# Patient Record
Sex: FEMALE | Race: WHITE | NOT HISPANIC OR LATINO | Employment: UNEMPLOYED | URBAN - METROPOLITAN AREA
[De-identification: names, ages, dates, MRNs, and addresses within clinical notes are randomized per-mention and may not be internally consistent; named-entity substitution may affect disease eponyms.]

---

## 2017-04-14 PROBLEM — I10 HYPERTENSION: Status: ACTIVE | Noted: 2017-04-14

## 2017-04-14 PROBLEM — E78.5 HYPERLIPIDEMIA: Status: ACTIVE | Noted: 2017-04-14

## 2017-04-14 PROBLEM — E16.2 HYPOGLYCEMIA: Status: ACTIVE | Noted: 2017-04-14

## 2017-05-16 PROBLEM — R07.9 CHEST PAIN: Status: ACTIVE | Noted: 2017-05-16

## 2017-09-28 ENCOUNTER — INITIAL CONSULT (OUTPATIENT)
Dept: RHEUMATOLOGY | Facility: CLINIC | Age: 67
End: 2017-09-28
Payer: MEDICARE

## 2017-09-28 VITALS
SYSTOLIC BLOOD PRESSURE: 123 MMHG | BODY MASS INDEX: 29.25 KG/M2 | HEART RATE: 69 BPM | DIASTOLIC BLOOD PRESSURE: 78 MMHG | WEIGHT: 186.75 LBS

## 2017-09-28 DIAGNOSIS — L40.50 PSA (PSORIATIC ARTHRITIS): Primary | ICD-10-CM

## 2017-09-28 DIAGNOSIS — G89.29 NECK PAIN, CHRONIC: ICD-10-CM

## 2017-09-28 DIAGNOSIS — M47.817 LUMBAR AND SACRAL ARTHRITIS: ICD-10-CM

## 2017-09-28 DIAGNOSIS — M06.9 RHEUMATOID ARTHRITIS, INVOLVING UNSPECIFIED SITE, UNSPECIFIED RHEUMATOID FACTOR PRESENCE: ICD-10-CM

## 2017-09-28 DIAGNOSIS — R94.6 ABNORMAL RESULTS OF THYROID FUNCTION STUDIES: ICD-10-CM

## 2017-09-28 DIAGNOSIS — M54.2 NECK PAIN, CHRONIC: ICD-10-CM

## 2017-09-28 DIAGNOSIS — B37.9 YEAST INFECTION: ICD-10-CM

## 2017-09-28 PROCEDURE — 99205 OFFICE O/P NEW HI 60 MIN: CPT | Mod: 25,S$PBB,, | Performed by: INTERNAL MEDICINE

## 2017-09-28 PROCEDURE — 99203 OFFICE O/P NEW LOW 30 MIN: CPT | Mod: PBBFAC,PO,25 | Performed by: INTERNAL MEDICINE

## 2017-09-28 PROCEDURE — 96372 THER/PROPH/DIAG INJ SC/IM: CPT | Mod: PBBFAC,PO

## 2017-09-28 PROCEDURE — 99999 PR PBB SHADOW E&M-NEW PATIENT-LVL III: CPT | Mod: PBBFAC,,, | Performed by: INTERNAL MEDICINE

## 2017-09-28 RX ORDER — CYANOCOBALAMIN 1000 UG/ML
1000 INJECTION, SOLUTION INTRAMUSCULAR; SUBCUTANEOUS
Status: COMPLETED | OUTPATIENT
Start: 2017-09-28 | End: 2017-09-28

## 2017-09-28 RX ORDER — HYDROCODONE BITARTRATE AND ACETAMINOPHEN 10; 325 MG/1; MG/1
1 TABLET ORAL 3 TIMES DAILY PRN
Qty: 90 TABLET | Refills: 0 | Status: SHIPPED | OUTPATIENT
Start: 2017-10-28 | End: 2017-11-20 | Stop reason: SDUPTHER

## 2017-09-28 RX ORDER — CLOTRIMAZOLE AND BETAMETHASONE DIPROPIONATE 10; .5 MG/ML; MG/ML
LOTION TOPICAL 2 TIMES DAILY
Qty: 30 ML | Refills: 5 | Status: SHIPPED | OUTPATIENT
Start: 2017-09-28 | End: 2018-10-02 | Stop reason: SDUPTHER

## 2017-09-28 RX ORDER — LISINOPRIL 10 MG/1
10 TABLET ORAL DAILY
Qty: 90 TABLET | Refills: 0 | Status: SHIPPED | OUTPATIENT
Start: 2017-09-28 | End: 2018-03-05 | Stop reason: SDUPTHER

## 2017-09-28 RX ORDER — METHYLPREDNISOLONE ACETATE 80 MG/ML
160 INJECTION, SUSPENSION INTRA-ARTICULAR; INTRALESIONAL; INTRAMUSCULAR; SOFT TISSUE
Status: COMPLETED | OUTPATIENT
Start: 2017-09-28 | End: 2017-09-28

## 2017-09-28 RX ORDER — ADALIMUMAB 40MG/0.8ML
40 KIT SUBCUTANEOUS
Qty: 2 EACH | Refills: 11 | Status: SHIPPED | OUTPATIENT
Start: 2017-09-28 | End: 2017-10-02 | Stop reason: SDUPTHER

## 2017-09-28 RX ORDER — FLUCONAZOLE 150 MG/1
150 TABLET ORAL DAILY
Qty: 5 TABLET | Refills: 2 | Status: SHIPPED | OUTPATIENT
Start: 2017-09-28 | End: 2017-09-29

## 2017-09-28 RX ORDER — KETOROLAC TROMETHAMINE 30 MG/ML
60 INJECTION, SOLUTION INTRAMUSCULAR; INTRAVENOUS
Status: COMPLETED | OUTPATIENT
Start: 2017-09-28 | End: 2017-09-28

## 2017-09-28 RX ORDER — MELOXICAM 15 MG/1
15 TABLET ORAL DAILY
Qty: 30 TABLET | Refills: 6 | Status: SHIPPED | OUTPATIENT
Start: 2017-09-28 | End: 2018-03-05 | Stop reason: SDUPTHER

## 2017-09-28 RX ORDER — PREDNISONE 5 MG/1
5 TABLET ORAL 3 TIMES DAILY PRN
Qty: 90 TABLET | Refills: 4 | Status: SHIPPED | OUTPATIENT
Start: 2017-09-28 | End: 2018-06-06 | Stop reason: SDUPTHER

## 2017-09-28 RX ORDER — ZOLPIDEM TARTRATE 10 MG/1
TABLET ORAL
Qty: 30 TABLET | Refills: 3 | Status: SHIPPED | OUTPATIENT
Start: 2017-09-28 | End: 2017-11-27 | Stop reason: SDUPTHER

## 2017-09-28 RX ORDER — DICLOFENAC SODIUM 10 MG/G
2 GEL TOPICAL DAILY
COMMUNITY
End: 2018-06-06 | Stop reason: SDUPTHER

## 2017-09-28 RX ORDER — METHOTREXATE 2.5 MG/1
10 TABLET ORAL WEEKLY
Qty: 16 TABLET | Refills: 3 | Status: SHIPPED | OUTPATIENT
Start: 2017-09-28 | End: 2017-11-20

## 2017-09-28 RX ADMIN — METHYLPREDNISOLONE ACETATE 160 MG: 80 INJECTION, SUSPENSION INTRA-ARTICULAR; INTRALESIONAL; INTRAMUSCULAR; SOFT TISSUE at 11:09

## 2017-09-28 RX ADMIN — CYANOCOBALAMIN 1000 MCG: 1000 INJECTION, SOLUTION INTRAMUSCULAR at 11:09

## 2017-09-28 RX ADMIN — KETOROLAC TROMETHAMINE 60 MG: 60 INJECTION, SOLUTION INTRAMUSCULAR at 11:09

## 2017-09-28 ASSESSMENT — ROUTINE ASSESSMENT OF PATIENT INDEX DATA (RAPID3)
MDHAQ FUNCTION SCORE: 1.5
AM STIFFNESS SCORE: 1, YES
FATIGUE SCORE: 4
WHEN YOU AWAKENED IN THE MORNING OVER THE LAST WEEK, PLEASE INDICATE THE AMOUNT OF TIME IT TAKES UNTIL YOU ARE AS LIMBER AS YOU WILL BE FOR THE DAY: 30 MINUTES AFTER WAKING
PSYCHOLOGICAL DISTRESS SCORE: 2.2
PAIN SCORE: 8
PATIENT GLOBAL ASSESSMENT SCORE: 3
TOTAL RAPID3 SCORE: 5.33

## 2017-09-28 NOTE — PROGRESS NOTES
Administered 1 cc ( 1000 mcg/ml ) of b12 to the right upper outer gluteal. Informed of s/s to report verbalized understanding. No adverse reactions noted.    Lot # [id number]  Expiration sep 18    Administered 2 cc ( 80 mg/ml ) of depomedrol to the right upper outer gluteal. Informed of s/s to report verbalized understanding. No adverse reactions noted.    Lot # [id number]  Expiration 09/2018    Administered 2 cc ( 30 mg/ml ) of toradol to the left upper outer gluteal. Informed of s/s to report verbalized understanding. No adverse reactions noted.    Lot # [id number]  Expiration 1 may 2019

## 2017-09-28 NOTE — PROGRESS NOTES
Subjective:       Patient ID: Rose Goff is a 67 y.o. female.    Chief Complaint: Consult    Hpi: PSA, RA, tried Humira, cimzia, enbrel, methotrexate, simponi. Patient complains of arthralgias and myalgias for which has been present for a few years. Pain is located in multiple joints, both shoulder(s), both elbow(s), both wrist(s), both MCP(s): 1st, 2nd, 3rd, 4th and 5th, both PIP(s): 1st, 2nd, 3rd, 4th and 5th, both DIP(s): 1st and 2nd, both hip(s), both knee(s) and both MTP(s): 1st, 2nd, 3rd, 4th and 5th, is described as aching, pulsating, shooting and throbbing, and is constant, moderate .  Associated symptoms include: crepitation, decreased range of motion, edema, effusion, tenderness and warmth.            Rheumatoid Arthritis    The disease course has been worsening.     She complains of pain, stiffness, joint swelling and joint warmth. The symptoms have been worsening. Affected locations include the neck, right shoulder, right wrist, right MCP, right hip, right knee, right foot, right toes, left shoulder, left elbow, left wrist, left MCP, left PIP, left DIP, left hip, left knee, left ankle, left foot and left toes. Associated symptoms include myalgias. Pertinent negatives include no dysuria, fatigue, fever, trouble swallowing or headaches.         Review of Systems   Constitutional: Negative for activity change, appetite change, chills, diaphoresis, fatigue, fever and unexpected weight change.   HENT: Negative for congestion, dental problem, ear discharge, ear pain, facial swelling, mouth sores, nosebleeds, postnasal drip, rhinorrhea, sinus pressure, sneezing, sore throat, tinnitus, trouble swallowing and voice change.    Eyes: Negative for photophobia, pain, discharge, redness and itching.   Respiratory: Negative for apnea, cough, chest tightness, shortness of breath and wheezing.    Cardiovascular: Positive for leg swelling. Negative for chest pain and palpitations.   Gastrointestinal: Negative for  abdominal distention, abdominal pain, constipation, diarrhea, nausea and vomiting.   Endocrine: Negative for cold intolerance, heat intolerance, polydipsia and polyuria.   Genitourinary: Negative for decreased urine volume, difficulty urinating, dysuria, flank pain, frequency, hematuria and urgency.   Musculoskeletal: Positive for arthralgias, back pain, gait problem, joint swelling, myalgias, neck pain, neck stiffness and stiffness.   Skin: Negative for pallor, rash and wound.   Allergic/Immunologic: Negative for immunocompromised state.   Neurological: Negative for dizziness, tremors, weakness, numbness and headaches.   Hematological: Negative for adenopathy. Does not bruise/bleed easily.   Psychiatric/Behavioral: Negative for sleep disturbance. The patient is not nervous/anxious.          Objective:   /78 (BP Location: Left arm, Patient Position: Sitting, BP Method: Large (Automatic))   Pulse 69   Wt 84.7 kg (186 lb 11.7 oz)   BMI 29.25 kg/m²      Physical Exam   Nursing note and vitals reviewed.  Constitutional: She is oriented to person, place, and time. She appears distressed.   HENT:   Head: Normocephalic and atraumatic.   Mouth/Throat: Oropharynx is clear and moist.   Eyes: EOM are normal. Pupils are equal, round, and reactive to light.   Neck: Neck supple. No thyromegaly present.   Cardiovascular: Normal rate, regular rhythm and normal heart sounds.  Exam reveals no gallop and no friction rub.    No murmur heard.  Pulmonary/Chest: Breath sounds normal. She has no wheezes. She has no rales. She exhibits no tenderness.   Abdominal: There is no tenderness. There is no rebound and no guarding.       Right Side Rheumatological Exam     The patient is tender to palpation of the shoulder, elbow, wrist, knee, 1st PIP, 1st MCP, 2nd PIP, 2nd MCP, 3rd PIP, 3rd MCP, 4th PIP, 4th MCP, 5th PIP and 5th MCP    She has swelling of the 1st PIP, 1st MCP, 2nd PIP, 2nd MCP, 3rd PIP, 3rd MCP, 4th PIP, 4th MCP, 5th PIP  and 5th MCP    Shoulder Exam   Tenderness Location: biceps tendon and clavicle    Range of Motion   Active Abduction: abnormal   Adduction: abnormal  Sensation: normal    Knee Exam   Patellofemoral Crepitus: positive  Effusion: negative  Sensation: normal    Hip Exam   Tenderness Location: posterior, greater trochanter and lateral  Sensation: normal    Elbow/Wrist Exam   Tenderness Location: lateral epicondyle and medial epicondyle  Sensation: normal    Foot Exam   Right foot exam exhibits signs of inflamed dorsum  Right foot exam exhibits signs of no podagra, no tophus and no plantar fasciitis    Muscle Strength (0-5 scale):  : 4/5     Left Side Rheumatological Exam     The patient is tender to palpation of the shoulder, elbow, wrist, knee, 1st PIP, 1st MCP, 2nd PIP, 2nd MCP, 3rd PIP, 3rd MCP, 4th PIP, 4th MCP, 5th PIP and 5th MCP.    She has swelling of the 1st PIP, 1st MCP, 2nd PIP, 2nd MCP, 3rd PIP, 3rd MCP, 4th PIP, 4th MCP, 5th PIP and 5th MCP    Shoulder Exam   Tenderness Location: biceps tendon and clavicle    Range of Motion   Active Abduction: abnormal   Sensation: normal    Knee Exam     Patellofemoral Crepitus: positive  Effusion: negative  Sensation: normal    Hip Exam   Tenderness Location: posterior, greater trochanter and lateral  Sensation: normal    Elbow/Wrist Exam   Tenderness Location: lateral epicondyle and medial epicondyle  Sensation: normal    Foot Exam   Left foot exam exhibits signs of inflamed dorsum  Left foot exam exhibits signs of no podagra and no plantar fasciitis    Muscle Strength (0-5 scale):  :  4/5       Back/Neck Exam   General Inspection   Gait: normal       Tenderness Right paramedian tenderness of the Occ, Upper C-Spine, Lower L-Spine and SI Joint.Left paramedian tenderness of the Occ, Upper C-Spine, Lower L-Spine and SI Joint.      Comments:  15 out of 18 tender points    Lymphadenopathy:     She has no cervical adenopathy.   Neurological: She is alert and oriented  to person, place, and time.   Skin: No rash noted. No erythema. No pallor.     Psychiatric: Mood and affect normal.   Musculoskeletal: She exhibits edema, tenderness and deformity.           Assessment:       1. PSA (psoriatic arthritis)    2. Rheumatoid arthritis, involving unspecified site, unspecified rheumatoid factor presence    3. Yeast infection    4. Neck pain, chronic    5. Lumbar and sacral arthritis    6. Abnormal results of thyroid function studies             Plan:       Rose was seen today for consult.    Diagnoses and all orders for this visit:    PSA (psoriatic arthritis)  -     meloxicam (MOBIC) 15 MG tablet; Take 1 tablet (15 mg total) by mouth once daily.  -     lisinopril 10 MG tablet; Take 1 tablet (10 mg total) by mouth once daily.  -     HUMIRA PEN PnKt injection; Inject 0.8 mLs (40 mg total) into the skin every 14 (fourteen) days. 1 injection every 2 weeks  -     methotrexate 2.5 MG Tab; Take 4 tablets (10 mg total) by mouth once a week. Uses Tuesday PM  -     predniSONE (DELTASONE) 5 MG tablet; Take 1 tablet (5 mg total) by mouth 3 (three) times daily as needed.  -     zolpidem (AMBIEN) 10 mg Tab; TAKE 1 TABLET BY MOUTH EVERY NIGHT AT BEDTIME AS NEEDED FOR SLEEP  -     fluconazole (DIFLUCAN) 150 MG Tab; Take 1 tablet (150 mg total) by mouth once daily.  -     clotrimazole-betamethasone (LOTRISONE) lotion; Apply topically 2 (two) times daily.  -     CBC auto differential; Future  -     Comprehensive metabolic panel; Future  -     C-reactive protein; Future  -     Sedimentation rate, manual; Future  -     Rheumatoid factor; Future  -     Cyclic citrul peptide antibody, IgG; Future  -     TSH; Future  -     Thyroid peroxidase antibody; Future  -     T4, free; Future  -     T3, free; Future  -     Anti-thyroglobulin antibody; Future  -     Vitamin D; Future  -     X-Ray Cervical Spine AP And Lateral; Future  -     Cancel: X-Ray Lumbar Spine Ap And Lateral; Future  -     X-Ray Lumbar Spine Ap  And Lateral    Rheumatoid arthritis, involving unspecified site, unspecified rheumatoid factor presence  -     meloxicam (MOBIC) 15 MG tablet; Take 1 tablet (15 mg total) by mouth once daily.  -     lisinopril 10 MG tablet; Take 1 tablet (10 mg total) by mouth once daily.  -     HUMIRA PEN PnKt injection; Inject 0.8 mLs (40 mg total) into the skin every 14 (fourteen) days. 1 injection every 2 weeks  -     methotrexate 2.5 MG Tab; Take 4 tablets (10 mg total) by mouth once a week. Uses Tuesday PM  -     predniSONE (DELTASONE) 5 MG tablet; Take 1 tablet (5 mg total) by mouth 3 (three) times daily as needed.  -     zolpidem (AMBIEN) 10 mg Tab; TAKE 1 TABLET BY MOUTH EVERY NIGHT AT BEDTIME AS NEEDED FOR SLEEP  -     fluconazole (DIFLUCAN) 150 MG Tab; Take 1 tablet (150 mg total) by mouth once daily.  -     clotrimazole-betamethasone (LOTRISONE) lotion; Apply topically 2 (two) times daily.  -     CBC auto differential; Future  -     Comprehensive metabolic panel; Future  -     C-reactive protein; Future  -     Sedimentation rate, manual; Future  -     Rheumatoid factor; Future  -     Cyclic citrul peptide antibody, IgG; Future  -     TSH; Future  -     Thyroid peroxidase antibody; Future  -     T4, free; Future  -     T3, free; Future  -     Anti-thyroglobulin antibody; Future  -     Vitamin D; Future  -     X-Ray Cervical Spine AP And Lateral; Future  -     Cancel: X-Ray Lumbar Spine Ap And Lateral; Future  -     X-Ray Lumbar Spine Ap And Lateral    Yeast infection  -     meloxicam (MOBIC) 15 MG tablet; Take 1 tablet (15 mg total) by mouth once daily.  -     lisinopril 10 MG tablet; Take 1 tablet (10 mg total) by mouth once daily.  -     HUMIRA PEN PnKt injection; Inject 0.8 mLs (40 mg total) into the skin every 14 (fourteen) days. 1 injection every 2 weeks  -     methotrexate 2.5 MG Tab; Take 4 tablets (10 mg total) by mouth once a week. Uses Tuesday PM  -     predniSONE (DELTASONE) 5 MG tablet; Take 1 tablet (5 mg  total) by mouth 3 (three) times daily as needed.  -     zolpidem (AMBIEN) 10 mg Tab; TAKE 1 TABLET BY MOUTH EVERY NIGHT AT BEDTIME AS NEEDED FOR SLEEP  -     fluconazole (DIFLUCAN) 150 MG Tab; Take 1 tablet (150 mg total) by mouth once daily.  -     clotrimazole-betamethasone (LOTRISONE) lotion; Apply topically 2 (two) times daily.  -     CBC auto differential; Future  -     Comprehensive metabolic panel; Future  -     C-reactive protein; Future  -     Sedimentation rate, manual; Future  -     Rheumatoid factor; Future  -     Cyclic citrul peptide antibody, IgG; Future  -     TSH; Future  -     Thyroid peroxidase antibody; Future  -     T4, free; Future  -     T3, free; Future  -     Anti-thyroglobulin antibody; Future  -     Vitamin D; Future  -     X-Ray Cervical Spine AP And Lateral; Future  -     Cancel: X-Ray Lumbar Spine Ap And Lateral; Future  -     X-Ray Lumbar Spine Ap And Lateral  -     X-Ray Lumbar Spine AP And Lateral; Future    Neck pain, chronic  -     CBC auto differential; Future  -     Comprehensive metabolic panel; Future  -     C-reactive protein; Future  -     Sedimentation rate, manual; Future  -     Rheumatoid factor; Future  -     Cyclic citrul peptide antibody, IgG; Future  -     TSH; Future  -     Thyroid peroxidase antibody; Future  -     T4, free; Future  -     T3, free; Future  -     Anti-thyroglobulin antibody; Future  -     Vitamin D; Future  -     X-Ray Cervical Spine AP And Lateral; Future  -     Cancel: X-Ray Lumbar Spine Ap And Lateral; Future  -     X-Ray Lumbar Spine Ap And Lateral  -     X-Ray Lumbar Spine AP And Lateral; Future    Lumbar and sacral arthritis  -     CBC auto differential; Future  -     Comprehensive metabolic panel; Future  -     C-reactive protein; Future  -     Sedimentation rate, manual; Future  -     Rheumatoid factor; Future  -     Cyclic citrul peptide antibody, IgG; Future  -     TSH; Future  -     Thyroid peroxidase antibody; Future  -     T4, free;  Future  -     T3, free; Future  -     Anti-thyroglobulin antibody; Future  -     Vitamin D; Future  -     X-Ray Cervical Spine AP And Lateral; Future  -     Cancel: X-Ray Lumbar Spine Ap And Lateral; Future  -     X-Ray Lumbar Spine Ap And Lateral  -     X-Ray Lumbar Spine AP And Lateral; Future    Abnormal results of thyroid function studies   -     CBC auto differential; Future  -     Comprehensive metabolic panel; Future  -     C-reactive protein; Future  -     Sedimentation rate, manual; Future  -     Rheumatoid factor; Future  -     Cyclic citrul peptide antibody, IgG; Future  -     TSH; Future  -     Thyroid peroxidase antibody; Future  -     T4, free; Future  -     T3, free; Future  -     Anti-thyroglobulin antibody; Future  -     Vitamin D; Future  -     X-Ray Cervical Spine AP And Lateral; Future  -     Cancel: X-Ray Lumbar Spine Ap And Lateral; Future  -     X-Ray Lumbar Spine Ap And Lateral  -     X-Ray Lumbar Spine AP And Lateral; Future    Other orders  -     hydrocodone-acetaminophen 10-325mg (NORCO)  mg Tab; Take 1 tablet by mouth 3 (three) times daily as needed.  -     ketorolac injection 60 mg; Inject 2 mLs (60 mg total) into the muscle one time.  -     methylPREDNISolone acetate injection 160 mg; Inject 2 mLs (160 mg total) into the muscle one time.  -     cyanocobalamin injection 1,000 mcg; Inject 1 mL (1,000 mcg total) into the muscle one time.        Tried Humira, enbrelm cimzia,  Simponi, mtx

## 2017-09-29 ENCOUNTER — HOSPITAL ENCOUNTER (OUTPATIENT)
Dept: RADIOLOGY | Facility: HOSPITAL | Age: 67
Discharge: HOME | End: 2017-09-29
Attending: INTERNAL MEDICINE
Payer: MEDICARE

## 2017-09-29 DIAGNOSIS — M54.2 NECK PAIN, CHRONIC: ICD-10-CM

## 2017-09-29 DIAGNOSIS — G89.29 NECK PAIN, CHRONIC: ICD-10-CM

## 2017-09-29 DIAGNOSIS — M47.817 LUMBAR AND SACRAL ARTHRITIS: ICD-10-CM

## 2017-09-29 DIAGNOSIS — M06.9 RHEUMATOID ARTHRITIS, INVOLVING UNSPECIFIED SITE, UNSPECIFIED RHEUMATOID FACTOR PRESENCE: ICD-10-CM

## 2017-09-29 DIAGNOSIS — R94.6 ABNORMAL RESULTS OF THYROID FUNCTION STUDIES: ICD-10-CM

## 2017-09-29 DIAGNOSIS — L40.50 PSA (PSORIATIC ARTHRITIS): ICD-10-CM

## 2017-09-29 DIAGNOSIS — B37.9 YEAST INFECTION: ICD-10-CM

## 2017-09-29 PROCEDURE — 72100 X-RAY EXAM L-S SPINE 2/3 VWS: CPT | Mod: 26,,, | Performed by: RADIOLOGY

## 2017-09-29 PROCEDURE — 72040 X-RAY EXAM NECK SPINE 2-3 VW: CPT | Mod: TC,PO

## 2017-09-29 PROCEDURE — 72040 X-RAY EXAM NECK SPINE 2-3 VW: CPT | Mod: 26,,, | Performed by: RADIOLOGY

## 2017-09-29 PROCEDURE — 72100 X-RAY EXAM L-S SPINE 2/3 VWS: CPT | Mod: TC,PO

## 2017-10-02 DIAGNOSIS — B37.9 YEAST INFECTION: ICD-10-CM

## 2017-10-02 DIAGNOSIS — M06.9 RHEUMATOID ARTHRITIS, INVOLVING UNSPECIFIED SITE, UNSPECIFIED RHEUMATOID FACTOR PRESENCE: ICD-10-CM

## 2017-10-02 DIAGNOSIS — L40.50 PSA (PSORIATIC ARTHRITIS): ICD-10-CM

## 2017-10-03 ENCOUNTER — TELEPHONE (OUTPATIENT)
Dept: PHARMACY | Facility: CLINIC | Age: 67
End: 2017-10-03

## 2017-10-03 RX ORDER — ADALIMUMAB 40MG/0.8ML
40 KIT SUBCUTANEOUS
Qty: 2 EACH | Refills: 11 | Status: SHIPPED | OUTPATIENT
Start: 2017-10-03 | End: 2018-04-10

## 2017-10-03 NOTE — TELEPHONE ENCOUNTER
LVM-[facility] received a prescription for Humira and it will require a prior authorization with their insurance company. We will update patient of status as more information is received.

## 2017-10-04 NOTE — TELEPHONE ENCOUNTER
DOCUMENTATION ONLY  Called insurance to inquire about starting a prior authorization and was told that there is already an existing PA on file (PA# [id number]) that is good through 4/24/2020. Insurance also stated that her refill is too soon until 10/26/2017. JOLANTA

## 2017-10-04 NOTE — TELEPHONE ENCOUNTER
Good morning,    We are currently working on Ms. Goff's Humira Rx approval. There is no updated TB test or hep panel on file. Is there anyway she can be scheduled for these labs in the near future?     Thanks,  June Robertson, PharmD  [facility]

## 2017-11-20 ENCOUNTER — HOSPITAL ENCOUNTER (OUTPATIENT)
Dept: RADIOLOGY | Facility: HOSPITAL | Age: 67
Discharge: HOME | End: 2017-11-20
Attending: INTERNAL MEDICINE
Payer: MEDICARE

## 2017-11-20 ENCOUNTER — OFFICE VISIT (OUTPATIENT)
Dept: RHEUMATOLOGY | Facility: CLINIC | Age: 67
End: 2017-11-20
Payer: MEDICARE

## 2017-11-20 VITALS
HEART RATE: 81 BPM | SYSTOLIC BLOOD PRESSURE: 125 MMHG | HEIGHT: 67 IN | BODY MASS INDEX: 28.61 KG/M2 | DIASTOLIC BLOOD PRESSURE: 72 MMHG | WEIGHT: 182.31 LBS

## 2017-11-20 DIAGNOSIS — L40.50 PSA (PSORIATIC ARTHRITIS): ICD-10-CM

## 2017-11-20 DIAGNOSIS — M47.817 LUMBAR AND SACRAL ARTHRITIS: ICD-10-CM

## 2017-11-20 DIAGNOSIS — M06.9 RHEUMATOID ARTHRITIS, INVOLVING UNSPECIFIED SITE, UNSPECIFIED RHEUMATOID FACTOR PRESENCE: Primary | ICD-10-CM

## 2017-11-20 DIAGNOSIS — M06.9 RHEUMATOID ARTHRITIS, INVOLVING UNSPECIFIED SITE, UNSPECIFIED RHEUMATOID FACTOR PRESENCE: ICD-10-CM

## 2017-11-20 PROCEDURE — 99999 PR PBB SHADOW E&M-EST. PATIENT-LVL III: CPT | Mod: PBBFAC,,, | Performed by: INTERNAL MEDICINE

## 2017-11-20 PROCEDURE — 73502 X-RAY EXAM HIP UNI 2-3 VIEWS: CPT | Mod: 26,LT,, | Performed by: RADIOLOGY

## 2017-11-20 PROCEDURE — 99214 OFFICE O/P EST MOD 30 MIN: CPT | Mod: 25,S$PBB,, | Performed by: INTERNAL MEDICINE

## 2017-11-20 PROCEDURE — 96372 THER/PROPH/DIAG INJ SC/IM: CPT | Mod: PBBFAC,PO

## 2017-11-20 PROCEDURE — 99213 OFFICE O/P EST LOW 20 MIN: CPT | Mod: PBBFAC,25,PO | Performed by: INTERNAL MEDICINE

## 2017-11-20 PROCEDURE — 73502 X-RAY EXAM HIP UNI 2-3 VIEWS: CPT | Mod: TC,PO,LT

## 2017-11-20 RX ORDER — HYDROCODONE BITARTRATE AND ACETAMINOPHEN 10; 325 MG/1; MG/1
1 TABLET ORAL 3 TIMES DAILY PRN
Qty: 90 TABLET | Refills: 0 | Status: SHIPPED | OUTPATIENT
Start: 2017-11-20 | End: 2017-11-20 | Stop reason: SDUPTHER

## 2017-11-20 RX ORDER — METHYLPREDNISOLONE ACETATE 80 MG/ML
160 INJECTION, SUSPENSION INTRA-ARTICULAR; INTRALESIONAL; INTRAMUSCULAR; SOFT TISSUE
Status: COMPLETED | OUTPATIENT
Start: 2017-11-20 | End: 2017-11-20

## 2017-11-20 RX ORDER — HYDROCODONE BITARTRATE AND ACETAMINOPHEN 10; 325 MG/1; MG/1
1 TABLET ORAL 3 TIMES DAILY PRN
Qty: 90 TABLET | Refills: 0 | Status: SHIPPED | OUTPATIENT
Start: 2017-12-20 | End: 2018-02-02 | Stop reason: SDUPTHER

## 2017-11-20 RX ORDER — KETOROLAC TROMETHAMINE 30 MG/ML
60 INJECTION, SOLUTION INTRAMUSCULAR; INTRAVENOUS
Status: COMPLETED | OUTPATIENT
Start: 2017-11-20 | End: 2017-11-20

## 2017-11-20 RX ADMIN — METHYLPREDNISOLONE ACETATE 160 MG: 80 INJECTION, SUSPENSION INTRA-ARTICULAR; INTRALESIONAL; INTRAMUSCULAR; SOFT TISSUE at 10:11

## 2017-11-20 RX ADMIN — KETOROLAC TROMETHAMINE 60 MG: 30 INJECTION, SOLUTION INTRAMUSCULAR at 10:11

## 2017-11-20 NOTE — PROGRESS NOTES
Subjective:       Patient ID: Rose Goff is a 67 y.o. female.    Chief Complaint: Disease Management and Pain    Hpi: PSA, RA, severe pain at the insertion of her thigh otherwise the other joint are painfree, . Pain is located in multiple joints, both shoulder(s), both elbow(s), both wrist(s), both MCP(s): 1st, 2nd, 3rd, 4th and 5th, both PIP(s): 1st, 2nd, 3rd, 4th and 5th, both DIP(s): 1st and 2nd, both hip(s), both knee(s) and both MTP(s): 1st, 2nd, 3rd, 4th and 5th, is described as aching, pulsating, shooting and throbbing, and is constant, moderate .  Associated symptoms include: crepitation, decreased range of motion, edema, effusion, tenderness and warmth.      Review of Systems   Constitutional: Negative for activity change, appetite change, chills, diaphoresis and unexpected weight change.   HENT: Negative for congestion, dental problem, ear discharge, ear pain, facial swelling, mouth sores, nosebleeds, postnasal drip, rhinorrhea, sinus pressure, sneezing, sore throat, tinnitus and voice change.    Eyes: Negative for photophobia, pain, discharge, redness and itching.   Respiratory: Negative for apnea, cough, chest tightness, shortness of breath and wheezing.    Cardiovascular: Positive for leg swelling. Negative for chest pain and palpitations.   Gastrointestinal: Negative for abdominal distention, abdominal pain, constipation, diarrhea, nausea and vomiting.   Endocrine: Negative for cold intolerance, heat intolerance, polydipsia and polyuria.   Genitourinary: Negative for decreased urine volume, difficulty urinating, flank pain, frequency, hematuria and urgency.   Musculoskeletal: Positive for arthralgias, back pain, gait problem, neck pain and neck stiffness.   Skin: Negative for pallor, rash and wound.   Allergic/Immunologic: Negative for immunocompromised state.   Neurological: Negative for dizziness, tremors, weakness and numbness.   Hematological: Negative for adenopathy. Does not bruise/bleed  "easily.   Psychiatric/Behavioral: Negative for sleep disturbance. The patient is not nervous/anxious.          Objective:   /72 (BP Location: Left arm, Patient Position: Sitting, BP Method: Medium (Automatic))   Pulse 81   Ht 5' 7" (1.702 m)   Wt 82.7 kg (182 lb 5.1 oz)   BMI 28.56 kg/m²      Physical Exam   Nursing note and vitals reviewed.  Constitutional: She is oriented to person, place, and time. No distress.   HENT:   Head: Normocephalic and atraumatic.   Mouth/Throat: Oropharynx is clear and moist.   Eyes: EOM are normal. Pupils are equal, round, and reactive to light.   Neck: Neck supple. No thyromegaly present.   Cardiovascular: Normal rate, regular rhythm and normal heart sounds.  Exam reveals no gallop and no friction rub.    No murmur heard.  Pulmonary/Chest: Breath sounds normal. She has no wheezes. She has no rales. She exhibits no tenderness.   Abdominal: There is no tenderness. There is no rebound and no guarding.       Right Side Rheumatological Exam     The patient is tender to palpation of the shoulder, elbow, wrist, knee, 1st PIP, 1st MCP, 2nd PIP, 2nd MCP, 3rd PIP, 3rd MCP, 4th PIP, 4th MCP, 5th PIP and 5th MCP    She has swelling of the 1st PIP, 1st MCP, 2nd PIP, 2nd MCP, 3rd PIP, 3rd MCP, 4th PIP, 4th MCP, 5th PIP and 5th MCP    Shoulder Exam   Tenderness Location: biceps tendon and clavicle    Range of Motion   Active Abduction: abnormal   Adduction: abnormal  Sensation: normal    Knee Exam   Patellofemoral Crepitus: positive  Effusion: negative  Sensation: normal    Hip Exam   Tenderness Location: posterior, greater trochanter and lateral  Sensation: normal    Elbow/Wrist Exam   Tenderness Location: lateral epicondyle and medial epicondyle  Sensation: normal    Foot Exam   Right foot exam exhibits signs of inflamed dorsum  Right foot exam exhibits signs of no podagra, no tophus and no plantar fasciitis    Muscle Strength (0-5 scale):  : 4/5     Left Side Rheumatological Exam "     The patient is tender to palpation of the shoulder, elbow, wrist, knee, 1st PIP, 1st MCP, 2nd PIP, 2nd MCP, 3rd PIP, 3rd MCP, 4th PIP, 4th MCP, 5th PIP and 5th MCP.    She has swelling of the 1st PIP, 1st MCP, 2nd PIP, 2nd MCP, 3rd PIP, 3rd MCP, 4th PIP, 4th MCP, 5th PIP and 5th MCP    Shoulder Exam   Tenderness Location: biceps tendon and clavicle    Range of Motion   Active Abduction: abnormal   Sensation: normal    Knee Exam     Patellofemoral Crepitus: positive  Effusion: negative  Sensation: normal    Hip Exam   Tenderness Location: posterior, greater trochanter and lateral  Sensation: normal    Elbow/Wrist Exam   Tenderness Location: lateral epicondyle and medial epicondyle  Sensation: normal    Foot Exam   Left foot exam exhibits signs of inflamed dorsum  Left foot exam exhibits signs of no podagra and no plantar fasciitis    Muscle Strength (0-5 scale):  :  4/5       Back/Neck Exam   General Inspection   Gait: normal       Tenderness Right paramedian tenderness of the Occ, Upper C-Spine, Lower L-Spine and SI Joint.Left paramedian tenderness of the Occ, Upper C-Spine, Lower L-Spine and SI Joint.      Comments:  15 out of 18 tender points    Lymphadenopathy:     She has no cervical adenopathy.   Neurological: She is alert and oriented to person, place, and time.   Skin: No rash noted. No erythema. No pallor.     Psychiatric: Mood and affect normal.   Musculoskeletal: She exhibits edema, tenderness and deformity.   L groin and  Thigh pain           Results for orders placed or performed in visit on 09/29/17   CBC auto differential   Result Value Ref Range    WBC 7.54 3.90 - 12.70 K/uL    RBC 4.05 4.00 - 5.40 M/uL    Hemoglobin 11.8 (L) 12.0 - 16.0 g/dL    Hematocrit 35.5 (L) 37.0 - 48.5 %    MCV 88 82 - 98 fL    MCH 29.1 27.0 - 31.0 pg    MCHC 33.2 32.0 - 36.0 g/dL    RDW 14.2 11.5 - 14.5 %    Platelets 305 150 - 350 K/uL    MPV 10.5 9.2 - 12.9 fL    Gran # 4.5 1.8 - 7.7 K/uL    Lymph # 2.5 1.0 - 4.8  K/uL    Mono # 0.4 0.3 - 1.0 K/uL    Eos # 0.1 0.0 - 0.5 K/uL    Baso # 0.05 0.00 - 0.20 K/uL    Gran% 59.6 38.0 - 73.0 %    Lymph% 32.8 18.0 - 48.0 %    Mono% 5.4 4.0 - 15.0 %    Eosinophil% 1.5 0.0 - 8.0 %    Basophil% 0.7 0.0 - 1.9 %    Differential Method Automated    Comprehensive metabolic panel   Result Value Ref Range    Sodium 138 136 - 145 mmol/L    Potassium 3.6 3.5 - 5.1 mmol/L    Chloride 105 95 - 110 mmol/L    CO2 25 23 - 29 mmol/L    Glucose 76 70 - 110 mg/dL    BUN, Bld 12 8 - 23 mg/dL    Creatinine 0.8 0.5 - 1.4 mg/dL    Calcium 8.7 8.7 - 10.5 mg/dL    Total Protein 7.5 6.0 - 8.4 g/dL    Albumin 3.7 3.5 - 5.2 g/dL    Total Bilirubin 0.5 0.1 - 1.0 mg/dL    Alkaline Phosphatase 83 55 - 135 U/L    AST 28 10 - 40 U/L    ALT 27 10 - 44 U/L    Anion Gap 8 8 - 16 mmol/L    eGFR if African American >60.0 >60 mL/min/1.73 m^2    eGFR if non African American >60.0 >60 mL/min/1.73 m^2   C-reactive protein   Result Value Ref Range    CRP 2.3 0.0 - 8.2 mg/L   Sedimentation rate, manual   Result Value Ref Range    Sed Rate 8 0 - 20 mm/Hr   Rheumatoid factor   Result Value Ref Range    Rheumatoid Factor <10.0 0.0 - 15.0 IU/mL   Cyclic citrul peptide antibody, IgG   Result Value Ref Range    CCP Antibodies <0.5 <5.0 U/mL   TSH   Result Value Ref Range    TSH 0.454 0.400 - 4.000 uIU/mL   Thyroid peroxidase antibody   Result Value Ref Range    Thyroperoxidase Antibodies <6.0 <6.0 IU/mL   T4, free   Result Value Ref Range    Free T4 1.21 0.71 - 1.51 ng/dL   T3, free   Result Value Ref Range    T3, Free 2.4 2.3 - 4.2 pg/mL   Anti-thyroglobulin antibody   Result Value Ref Range    Thyroglobulin Ab Screen <4.0 0.0 - 3.9 IU/mL   Vitamin D   Result Value Ref Range    Vit D, 25-Hydroxy 23 (L) 30 - 96 ng/mL       Assessment:       1. Rheumatoid arthritis, involving unspecified site, unspecified rheumatoid factor presence    2. PSA (psoriatic arthritis)    3. Lumbar and sacral arthritis            Plan:       Rose was seen  today for disease management and pain.    Diagnoses and all orders for this visit:    Rheumatoid arthritis, involving unspecified site, unspecified rheumatoid factor presence  -     methylPREDNISolone acetate injection 160 mg; Inject 2 mLs (160 mg total) into the muscle one time.  -     ketorolac injection 60 mg; Inject 2 mLs (60 mg total) into the muscle one time.  -     X-Ray Hip 2 View Left; Future  -     Discontinue: hydrocodone-acetaminophen 10-325mg (NORCO)  mg Tab; Take 1 tablet by mouth 3 (three) times daily as needed.  -     hydrocodone-acetaminophen 10-325mg (NORCO)  mg Tab; Take 1 tablet by mouth 3 (three) times daily as needed.  -     Comprehensive metabolic panel; Future  -     CBC auto differential; Future  -     C-reactive protein; Future  -     Sedimentation rate, manual; Future    PSA (psoriatic arthritis)  -     methylPREDNISolone acetate injection 160 mg; Inject 2 mLs (160 mg total) into the muscle one time.  -     ketorolac injection 60 mg; Inject 2 mLs (60 mg total) into the muscle one time.  -     X-Ray Hip 2 View Left; Future  -     Discontinue: hydrocodone-acetaminophen 10-325mg (NORCO)  mg Tab; Take 1 tablet by mouth 3 (three) times daily as needed.  -     hydrocodone-acetaminophen 10-325mg (NORCO)  mg Tab; Take 1 tablet by mouth 3 (three) times daily as needed.  -     Comprehensive metabolic panel; Future  -     CBC auto differential; Future  -     C-reactive protein; Future  -     Sedimentation rate, manual; Future    Lumbar and sacral arthritis  -     methylPREDNISolone acetate injection 160 mg; Inject 2 mLs (160 mg total) into the muscle one time.  -     ketorolac injection 60 mg; Inject 2 mLs (60 mg total) into the muscle one time.  -     X-Ray Hip 2 View Left; Future  -     Discontinue: hydrocodone-acetaminophen 10-325mg (NORCO)  mg Tab; Take 1 tablet by mouth 3 (three) times daily as needed.  -     hydrocodone-acetaminophen 10-325mg (NORCO)  mg Tab;  Take 1 tablet by mouth 3 (three) times daily as needed.  -     Comprehensive metabolic panel; Future  -     CBC auto differential; Future  -     C-reactive protein; Future  -     Sedimentation rate, manual; Future        Tried Humira, enbrelm cimzia,  Simponi, mtx

## 2017-11-20 NOTE — PROGRESS NOTES
Administered 2 cc ( 80 mg/ml ) of depomedrol to the right upper outer gluteal. Informed of s/s to report verbalized understanding. No adverse reactions noted.    Lot # [id number]  Expiration 10/2018    Administered 2 cc ( 30 mg/ml ) of toradol to the left upper outer gluteal. Informed of s/s to report verbalized understanding. No adverse reactions noted.    Lot # [id number]  Expiration 05/19

## 2017-11-27 PROBLEM — R07.9 CHEST PAIN: Status: RESOLVED | Noted: 2017-05-16 | Resolved: 2017-11-27

## 2017-11-27 RX ORDER — SUMATRIPTAN SUCCINATE 100 MG/1
TABLET ORAL
Qty: 18 TABLET | Refills: 3 | Status: SHIPPED | OUTPATIENT
Start: 2017-11-27 | End: 2018-05-21 | Stop reason: SDUPTHER

## 2018-01-02 ENCOUNTER — CLINICAL SUPPORT (OUTPATIENT)
Dept: RHEUMATOLOGY | Facility: CLINIC | Age: 68
End: 2018-01-02
Payer: MEDICARE

## 2018-01-02 DIAGNOSIS — M06.9 RHEUMATOID ARTHRITIS FLARE: Primary | ICD-10-CM

## 2018-01-02 PROCEDURE — 99211 OFF/OP EST MAY X REQ PHY/QHP: CPT | Mod: PBBFAC,PO

## 2018-01-02 PROCEDURE — 99211 OFF/OP EST MAY X REQ PHY/QHP: CPT | Mod: S$PBB,,, | Performed by: INTERNAL MEDICINE

## 2018-01-02 PROCEDURE — 99999 PR PBB SHADOW E&M-EST. PATIENT-LVL I: CPT | Mod: PBBFAC,,,

## 2018-01-02 PROCEDURE — 96372 THER/PROPH/DIAG INJ SC/IM: CPT | Mod: PBBFAC,PO

## 2018-01-02 RX ORDER — METHYLPREDNISOLONE ACETATE 80 MG/ML
160 INJECTION, SUSPENSION INTRA-ARTICULAR; INTRALESIONAL; INTRAMUSCULAR; SOFT TISSUE
Status: COMPLETED | OUTPATIENT
Start: 2018-01-02 | End: 2018-01-02

## 2018-01-02 RX ORDER — KETOROLAC TROMETHAMINE 30 MG/ML
60 INJECTION, SOLUTION INTRAMUSCULAR; INTRAVENOUS
Status: COMPLETED | OUTPATIENT
Start: 2018-01-02 | End: 2018-01-02

## 2018-01-02 RX ADMIN — METHYLPREDNISOLONE ACETATE 160 MG: 80 INJECTION, SUSPENSION INTRA-ARTICULAR; INTRALESIONAL; INTRAMUSCULAR; SOFT TISSUE at 09:01

## 2018-01-02 RX ADMIN — KETOROLAC TROMETHAMINE 60 MG: 60 INJECTION, SOLUTION INTRAMUSCULAR at 09:01

## 2018-01-02 NOTE — PROGRESS NOTES
Patient arrives for nurse visit due to rheumatoid arthritis flare. Patient receives 60 mg Toradol and 160 mg Depomedrol. See MAR. CG

## 2018-01-30 DIAGNOSIS — M47.817 LUMBAR AND SACRAL ARTHRITIS: ICD-10-CM

## 2018-01-30 DIAGNOSIS — L40.50 PSA (PSORIATIC ARTHRITIS): ICD-10-CM

## 2018-01-30 DIAGNOSIS — M06.9 RHEUMATOID ARTHRITIS, INVOLVING UNSPECIFIED SITE, UNSPECIFIED RHEUMATOID FACTOR PRESENCE: ICD-10-CM

## 2018-01-30 RX ORDER — HYDROCODONE BITARTRATE AND ACETAMINOPHEN 10; 325 MG/1; MG/1
1 TABLET ORAL 3 TIMES DAILY PRN
Qty: 90 TABLET | Refills: 0 | Status: CANCELLED | OUTPATIENT
Start: 2018-01-30

## 2018-01-30 NOTE — TELEPHONE ENCOUNTER
----- Message from Estephania Thornton sent at 1/30/2018 10:06 AM CST -----  Contact: Patient  Patient called last week and is calling again today to request a refill on hydrocodone-acetaminophen 10-325mg (NORCO)  mg Tab.  Please call patient once this has been called in.  Call Back#[phone]  Thanks     [facility] [id number] [place] [address] AT [facility]  [address] [place] [id number]  Phone: [phone] Fax: [phone]

## 2018-01-30 NOTE — TELEPHONE ENCOUNTER
Pt requesting refill on her pain meds    Last seen 11/20/17  Last filled 12/20/17    Med pended   Please advise.

## 2018-01-30 NOTE — TELEPHONE ENCOUNTER
Advised pt that Dr was having problems verifying script. Explained as soon as it's done dr will send script. Pt understanding verified.

## 2018-01-30 NOTE — TELEPHONE ENCOUNTER
----- Message from Mariya Garcia sent at 1/23/2018  2:09 PM CST -----  Contact: Patient  Rose, patient [phone], Also, calling for the results of her xrays on 11/20/17 and labs. And, calling about getting a refill on Pain medication. Rx hydrocodone-acetaminophen 10-325mg (NORCO)  mg Tab. Please advise if not able to refill and booked necessary appointment if needed. Thanks.   [facility] [id number] [address] [address]  Phone: [phone] Fax: [phone]

## 2018-02-01 DIAGNOSIS — M47.817 LUMBAR AND SACRAL ARTHRITIS: ICD-10-CM

## 2018-02-01 DIAGNOSIS — M06.9 RHEUMATOID ARTHRITIS, INVOLVING UNSPECIFIED SITE, UNSPECIFIED RHEUMATOID FACTOR PRESENCE: ICD-10-CM

## 2018-02-01 DIAGNOSIS — L40.50 PSA (PSORIATIC ARTHRITIS): ICD-10-CM

## 2018-02-01 RX ORDER — HYDROCODONE BITARTRATE AND ACETAMINOPHEN 10; 325 MG/1; MG/1
1 TABLET ORAL 3 TIMES DAILY PRN
Qty: 90 TABLET | Refills: 0 | Status: CANCELLED | OUTPATIENT
Start: 2018-02-01

## 2018-02-01 NOTE — TELEPHONE ENCOUNTER
checked  Last seen 11/20/17  Last filled 12/20/17  Next to be seen 2/14/18    Med pended  Please advise.

## 2018-02-02 RX ORDER — HYDROCODONE BITARTRATE AND ACETAMINOPHEN 10; 325 MG/1; MG/1
1 TABLET ORAL 3 TIMES DAILY PRN
Qty: 90 TABLET | Refills: 0 | Status: SHIPPED | OUTPATIENT
Start: 2018-02-02 | End: 2018-02-14 | Stop reason: SDUPTHER

## 2018-02-02 RX ORDER — HYDROCODONE BITARTRATE AND ACETAMINOPHEN 10; 325 MG/1; MG/1
1 TABLET ORAL 3 TIMES DAILY PRN
Qty: 90 TABLET | Refills: 0 | Status: SHIPPED | OUTPATIENT
Start: 2018-02-02 | End: 2018-02-02 | Stop reason: SDUPTHER

## 2018-02-14 ENCOUNTER — OFFICE VISIT (OUTPATIENT)
Dept: RHEUMATOLOGY | Facility: CLINIC | Age: 68
End: 2018-02-14
Payer: MEDICARE

## 2018-02-14 VITALS
HEART RATE: 85 BPM | HEIGHT: 67 IN | DIASTOLIC BLOOD PRESSURE: 86 MMHG | WEIGHT: 178.13 LBS | SYSTOLIC BLOOD PRESSURE: 140 MMHG | BODY MASS INDEX: 27.96 KG/M2

## 2018-02-14 DIAGNOSIS — R94.6 ABNORMAL RESULTS OF THYROID FUNCTION STUDIES: ICD-10-CM

## 2018-02-14 DIAGNOSIS — M06.9 RHEUMATOID ARTHRITIS, INVOLVING UNSPECIFIED SITE, UNSPECIFIED RHEUMATOID FACTOR PRESENCE: Primary | ICD-10-CM

## 2018-02-14 DIAGNOSIS — M47.817 LUMBAR AND SACRAL ARTHRITIS: ICD-10-CM

## 2018-02-14 DIAGNOSIS — L40.50 PSA (PSORIATIC ARTHRITIS): ICD-10-CM

## 2018-02-14 PROCEDURE — 96372 THER/PROPH/DIAG INJ SC/IM: CPT | Mod: PBBFAC,PO

## 2018-02-14 PROCEDURE — 99999 PR PBB SHADOW E&M-EST. PATIENT-LVL III: CPT | Mod: PBBFAC,,, | Performed by: INTERNAL MEDICINE

## 2018-02-14 PROCEDURE — 99213 OFFICE O/P EST LOW 20 MIN: CPT | Mod: PBBFAC,PO,25 | Performed by: INTERNAL MEDICINE

## 2018-02-14 PROCEDURE — 99215 OFFICE O/P EST HI 40 MIN: CPT | Mod: 25,S$PBB,, | Performed by: INTERNAL MEDICINE

## 2018-02-14 RX ORDER — CYANOCOBALAMIN 1000 UG/ML
1000 INJECTION, SOLUTION INTRAMUSCULAR; SUBCUTANEOUS
Status: COMPLETED | OUTPATIENT
Start: 2018-02-14 | End: 2018-02-14

## 2018-02-14 RX ORDER — HYDROCODONE BITARTRATE AND ACETAMINOPHEN 10; 325 MG/1; MG/1
1 TABLET ORAL 3 TIMES DAILY PRN
Qty: 90 TABLET | Refills: 0 | Status: SHIPPED | OUTPATIENT
Start: 2018-03-01 | End: 2018-02-14 | Stop reason: SDUPTHER

## 2018-02-14 RX ORDER — HYDROCODONE BITARTRATE AND ACETAMINOPHEN 10; 325 MG/1; MG/1
1 TABLET ORAL 3 TIMES DAILY PRN
Qty: 90 TABLET | Refills: 0 | Status: SHIPPED | OUTPATIENT
Start: 2018-02-14 | End: 2018-02-14 | Stop reason: SDUPTHER

## 2018-02-14 RX ORDER — METHYLPREDNISOLONE ACETATE 80 MG/ML
160 INJECTION, SUSPENSION INTRA-ARTICULAR; INTRALESIONAL; INTRAMUSCULAR; SOFT TISSUE
Status: COMPLETED | OUTPATIENT
Start: 2018-02-14 | End: 2018-02-14

## 2018-02-14 RX ORDER — KETOROLAC TROMETHAMINE 30 MG/ML
60 INJECTION, SOLUTION INTRAMUSCULAR; INTRAVENOUS
Status: COMPLETED | OUTPATIENT
Start: 2018-02-14 | End: 2018-02-14

## 2018-02-14 RX ORDER — HYDROCODONE BITARTRATE AND ACETAMINOPHEN 10; 325 MG/1; MG/1
1 TABLET ORAL 3 TIMES DAILY PRN
Qty: 90 TABLET | Refills: 0 | Status: SHIPPED | OUTPATIENT
Start: 2018-04-01 | End: 2018-02-14 | Stop reason: SDUPTHER

## 2018-02-14 RX ORDER — HYDROCODONE BITARTRATE AND ACETAMINOPHEN 10; 325 MG/1; MG/1
1 TABLET ORAL 3 TIMES DAILY PRN
Qty: 90 TABLET | Refills: 0 | Status: SHIPPED | OUTPATIENT
Start: 2018-05-01 | End: 2018-04-10 | Stop reason: SDUPTHER

## 2018-02-14 RX ADMIN — CYANOCOBALAMIN 1000 MCG: 1000 INJECTION, SOLUTION INTRAMUSCULAR at 12:02

## 2018-02-14 RX ADMIN — METHYLPREDNISOLONE ACETATE 160 MG: 80 INJECTION, SUSPENSION INTRA-ARTICULAR; INTRALESIONAL; INTRAMUSCULAR; SOFT TISSUE at 12:02

## 2018-02-14 RX ADMIN — KETOROLAC TROMETHAMINE 60 MG: 60 INJECTION, SOLUTION INTRAMUSCULAR at 12:02

## 2018-02-14 NOTE — PROGRESS NOTES
Administered 60mg (2cc) of Ketorolac 30mg/ml to left upper outer quadrant of the gluteus leonidas. Patient tolerated well. No acute reaction noted to site. Instructed to report S/S. Patient verbalized understanding.      Administered 160mg (2cc) of 80/ml of Depo Medrol and 1cc CYANOCOBALAMIN 1000mcg to right upper outer quadrant of the gluteus leonidas. Patient tolerated well. No acute reaction noted. Instructed patient to report S/S. Patient verbalized understanding.

## 2018-02-14 NOTE — PROGRESS NOTES
Subjective:       Patient ID: Rose Goff is a 67 y.o. female.    Chief Complaint: Rheumatoid Arthritis    Hpi: PSA, RA, severe pain she had a death in her family and is flaring . Pain is located in multiple joints, both shoulder(s), both elbow(s), both wrist(s), both MCP(s): 1st, 2nd, 3rd, 4th and 5th, both PIP(s): 1st, 2nd, 3rd, 4th and 5th, both DIP(s): 1st and 2nd, both hip(s), both knee(s) and both MTP(s): 1st, 2nd, 3rd, 4th and 5th, is described as aching, pulsating, shooting and throbbing, and is constant, moderate .  Associated symptoms include: crepitation, decreased range of motion, edema, effusion, tenderness and warmth.      Review of Systems   Constitutional: Positive for fatigue. Negative for activity change, appetite change, chills, diaphoresis and unexpected weight change.   HENT: Negative for congestion, dental problem, ear discharge, ear pain, facial swelling, mouth sores, nosebleeds, postnasal drip, rhinorrhea, sinus pressure, sneezing, sore throat, tinnitus and voice change.    Eyes: Negative for photophobia, pain, discharge, redness and itching.   Respiratory: Negative for apnea, cough, chest tightness, shortness of breath and wheezing.    Cardiovascular: Positive for leg swelling. Negative for chest pain and palpitations.   Gastrointestinal: Negative for abdominal distention, abdominal pain, constipation, diarrhea, nausea and vomiting.   Endocrine: Negative for cold intolerance, heat intolerance, polydipsia and polyuria.   Genitourinary: Negative for decreased urine volume, difficulty urinating, flank pain, frequency, hematuria and urgency.   Musculoskeletal: Positive for arthralgias, back pain, gait problem, joint swelling, myalgias, neck pain and neck stiffness.   Skin: Negative for pallor, rash and wound.   Allergic/Immunologic: Negative for immunocompromised state.   Neurological: Negative for dizziness, tremors, weakness and numbness.   Hematological: Negative for adenopathy. Does  "not bruise/bleed easily.   Psychiatric/Behavioral: Negative for sleep disturbance. The patient is not nervous/anxious.          Objective:   BP (!) 140/86   Pulse 85   Ht 5' 7" (1.702 m)   Wt 80.8 kg (178 lb 2.1 oz)   BMI 27.90 kg/m²      Physical Exam   Nursing note and vitals reviewed.  Constitutional: She is oriented to person, place, and time. No distress.   HENT:   Head: Normocephalic and atraumatic.   Mouth/Throat: Oropharynx is clear and moist.   Eyes: EOM are normal. Pupils are equal, round, and reactive to light.   Neck: Neck supple. No thyromegaly present.   Cardiovascular: Normal rate, regular rhythm and normal heart sounds.  Exam reveals no gallop and no friction rub.    No murmur heard.  Pulmonary/Chest: Breath sounds normal. She has no wheezes. She has no rales. She exhibits no tenderness.   Abdominal: There is no tenderness. There is no rebound and no guarding.       Right Side Rheumatological Exam     The patient is tender to palpation of the shoulder, elbow, wrist, knee, 1st PIP, 1st MCP, 2nd PIP, 2nd MCP, 3rd PIP, 3rd MCP, 4th PIP, 4th MCP, 5th PIP and 5th MCP    She has swelling of the 1st PIP, 1st MCP, 2nd PIP, 2nd MCP, 3rd PIP, 3rd MCP, 4th PIP, 4th MCP, 5th PIP and 5th MCP    Shoulder Exam   Tenderness Location: biceps tendon and clavicle    Range of Motion   Active Abduction: abnormal   Adduction: abnormal  Sensation: normal    Knee Exam   Patellofemoral Crepitus: positive  Effusion: negative  Sensation: normal    Hip Exam   Tenderness Location: posterior, greater trochanter and lateral  Sensation: normal    Elbow/Wrist Exam   Tenderness Location: lateral epicondyle and medial epicondyle  Sensation: normal    Foot Exam   Right foot exam exhibits signs of inflamed dorsum  Right foot exam exhibits signs of no podagra, no tophus and no plantar fasciitis    Muscle Strength (0-5 scale):  : 4/5     Left Side Rheumatological Exam     The patient is tender to palpation of the shoulder, elbow, " wrist, knee, 1st PIP, 1st MCP, 2nd PIP, 2nd MCP, 3rd PIP, 3rd MCP, 4th PIP, 4th MCP, 5th PIP and 5th MCP.    She has swelling of the 1st PIP, 1st MCP, 2nd PIP, 2nd MCP, 3rd PIP, 3rd MCP, 4th PIP, 4th MCP, 5th PIP and 5th MCP    Shoulder Exam   Tenderness Location: biceps tendon and clavicle    Range of Motion   Active Abduction: abnormal   Sensation: normal    Knee Exam     Patellofemoral Crepitus: positive  Effusion: negative  Sensation: normal    Hip Exam   Tenderness Location: posterior, greater trochanter and lateral  Sensation: normal    Elbow/Wrist Exam   Tenderness Location: lateral epicondyle and medial epicondyle  Sensation: normal    Foot Exam   Left foot exam exhibits signs of inflamed dorsum  Left foot exam exhibits signs of no podagra and no plantar fasciitis    Muscle Strength (0-5 scale):  :  4/5       Back/Neck Exam   General Inspection   Gait: normal       Tenderness Right paramedian tenderness of the Occ, Upper C-Spine, Lower L-Spine and SI Joint.Left paramedian tenderness of the Occ, Upper C-Spine, Lower L-Spine and SI Joint.      Comments:  15 out of 18 tender points    Lymphadenopathy:     She has no cervical adenopathy.   Neurological: She is alert and oriented to person, place, and time.   Skin: No rash noted. No erythema. No pallor.     Psychiatric: Mood and affect normal.   Musculoskeletal: She exhibits edema, tenderness and deformity.   L groin and  Thigh pain           Results for orders placed or performed in visit on 11/20/17   Comprehensive metabolic panel   Result Value Ref Range    Sodium 135 (L) 136 - 145 mmol/L    Potassium 3.7 3.5 - 5.1 mmol/L    Chloride 104 95 - 110 mmol/L    CO2 25 23 - 29 mmol/L    Glucose 88 70 - 110 mg/dL    BUN, Bld 15 8 - 23 mg/dL    Creatinine 0.8 0.5 - 1.4 mg/dL    Calcium 8.6 (L) 8.7 - 10.5 mg/dL    Total Protein 7.3 6.0 - 8.4 g/dL    Albumin 3.6 3.5 - 5.2 g/dL    Total Bilirubin 0.5 0.1 - 1.0 mg/dL    Alkaline Phosphatase 72 55 - 135 U/L    AST 17  10 - 40 U/L    ALT 16 10 - 44 U/L    Anion Gap 6 (L) 8 - 16 mmol/L    eGFR if African American >60.0 >60 mL/min/1.73 m^2    eGFR if non African American >60.0 >60 mL/min/1.73 m^2   CBC auto differential   Result Value Ref Range    WBC 7.34 3.90 - 12.70 K/uL    RBC 3.91 (L) 4.00 - 5.40 M/uL    Hemoglobin 11.6 (L) 12.0 - 16.0 g/dL    Hematocrit 35.2 (L) 37.0 - 48.5 %    MCV 90 82 - 98 fL    MCH 29.7 27.0 - 31.0 pg    MCHC 33.0 32.0 - 36.0 g/dL    RDW 15.7 (H) 11.5 - 14.5 %    Platelets 291 150 - 350 K/uL    MPV 10.7 9.2 - 12.9 fL    Immature Granulocytes 0.1 0.0 - 0.5 %    Gran # (ANC) 4.3 1.8 - 7.7 K/uL    Immature Grans (Abs) 0.01 0.00 - 0.04 K/uL    Lymph # 2.4 1.0 - 4.8 K/uL    Mono # 0.4 0.3 - 1.0 K/uL    Eos # 0.1 0.0 - 0.5 K/uL    Baso # 0.05 0.00 - 0.20 K/uL    nRBC 0 0 /100 WBC    Gran% 58.6 38.0 - 73.0 %    Lymph% 33.1 18.0 - 48.0 %    Mono% 5.9 4.0 - 15.0 %    Eosinophil% 1.6 0.0 - 8.0 %    Basophil% 0.7 0.0 - 1.9 %    Differential Method Automated    C-reactive protein   Result Value Ref Range    CRP 1.6 0.0 - 8.2 mg/L   Sedimentation rate, manual   Result Value Ref Range    Sed Rate 5 0 - 20 mm/Hr       Assessment:       1. Rheumatoid arthritis, involving unspecified site, unspecified rheumatoid factor presence    2. PSA (psoriatic arthritis)    3. Lumbar and sacral arthritis    4. Abnormal results of thyroid function studies             Plan:       Rose was seen today for rheumatoid arthritis.    Diagnoses and all orders for this visit:    Rheumatoid arthritis, involving unspecified site, unspecified rheumatoid factor presence  -     Discontinue: hydrocodone-acetaminophen 10-325mg (NORCO)  mg Tab; Take 1 tablet by mouth 3 (three) times daily as needed.  -     Discontinue: hydrocodone-acetaminophen 10-325mg (NORCO)  mg Tab; Take 1 tablet by mouth 3 (three) times daily as needed.  -     Discontinue: hydrocodone-acetaminophen 10-325mg (NORCO)  mg Tab; Take 1 tablet by mouth 3 (three)  times daily as needed.  -     CBC auto differential; Future  -     Comprehensive metabolic panel; Future  -     C-reactive protein; Future  -     Sedimentation rate, manual; Future  -     TSH; Future  -     hydrocodone-acetaminophen 10-325mg (NORCO)  mg Tab; Take 1 tablet by mouth 3 (three) times daily as needed.  -     methylPREDNISolone acetate injection 160 mg; Inject 2 mLs (160 mg total) into the muscle one time.  -     ketorolac injection 60 mg; Inject 2 mLs (60 mg total) into the muscle one time.  -     cyanocobalamin injection 1,000 mcg; Inject 1 mL (1,000 mcg total) into the muscle one time.    PSA (psoriatic arthritis)  -     Discontinue: hydrocodone-acetaminophen 10-325mg (NORCO)  mg Tab; Take 1 tablet by mouth 3 (three) times daily as needed.  -     Discontinue: hydrocodone-acetaminophen 10-325mg (NORCO)  mg Tab; Take 1 tablet by mouth 3 (three) times daily as needed.  -     Discontinue: hydrocodone-acetaminophen 10-325mg (NORCO)  mg Tab; Take 1 tablet by mouth 3 (three) times daily as needed.  -     CBC auto differential; Future  -     Comprehensive metabolic panel; Future  -     C-reactive protein; Future  -     Sedimentation rate, manual; Future  -     TSH; Future  -     hydrocodone-acetaminophen 10-325mg (NORCO)  mg Tab; Take 1 tablet by mouth 3 (three) times daily as needed.  -     methylPREDNISolone acetate injection 160 mg; Inject 2 mLs (160 mg total) into the muscle one time.  -     ketorolac injection 60 mg; Inject 2 mLs (60 mg total) into the muscle one time.  -     cyanocobalamin injection 1,000 mcg; Inject 1 mL (1,000 mcg total) into the muscle one time.    Lumbar and sacral arthritis  -     Discontinue: hydrocodone-acetaminophen 10-325mg (NORCO)  mg Tab; Take 1 tablet by mouth 3 (three) times daily as needed.  -     Discontinue: hydrocodone-acetaminophen 10-325mg (NORCO)  mg Tab; Take 1 tablet by mouth 3 (three) times daily as needed.  -      Discontinue: hydrocodone-acetaminophen 10-325mg (NORCO)  mg Tab; Take 1 tablet by mouth 3 (three) times daily as needed.  -     CBC auto differential; Future  -     Comprehensive metabolic panel; Future  -     C-reactive protein; Future  -     Sedimentation rate, manual; Future  -     TSH; Future  -     hydrocodone-acetaminophen 10-325mg (NORCO)  mg Tab; Take 1 tablet by mouth 3 (three) times daily as needed.  -     methylPREDNISolone acetate injection 160 mg; Inject 2 mLs (160 mg total) into the muscle one time.  -     ketorolac injection 60 mg; Inject 2 mLs (60 mg total) into the muscle one time.  -     cyanocobalamin injection 1,000 mcg; Inject 1 mL (1,000 mcg total) into the muscle one time.    Abnormal results of thyroid function studies   -     CBC auto differential; Future  -     Comprehensive metabolic panel; Future  -     C-reactive protein; Future  -     Sedimentation rate, manual; Future  -     TSH; Future  -     hydrocodone-acetaminophen 10-325mg (NORCO)  mg Tab; Take 1 tablet by mouth 3 (three) times daily as needed.  -     methylPREDNISolone acetate injection 160 mg; Inject 2 mLs (160 mg total) into the muscle one time.  -     ketorolac injection 60 mg; Inject 2 mLs (60 mg total) into the muscle one time.  -     cyanocobalamin injection 1,000 mcg; Inject 1 mL (1,000 mcg total) into the muscle one time.        Continue Humira

## 2018-03-05 DIAGNOSIS — B37.9 YEAST INFECTION: ICD-10-CM

## 2018-03-05 DIAGNOSIS — M06.9 RHEUMATOID ARTHRITIS, INVOLVING UNSPECIFIED SITE, UNSPECIFIED RHEUMATOID FACTOR PRESENCE: ICD-10-CM

## 2018-03-05 DIAGNOSIS — L40.50 PSA (PSORIATIC ARTHRITIS): ICD-10-CM

## 2018-03-07 RX ORDER — LISINOPRIL 10 MG/1
TABLET ORAL
Qty: 90 TABLET | Refills: 0 | Status: SHIPPED | OUTPATIENT
Start: 2018-03-07 | End: 2018-10-02 | Stop reason: SDUPTHER

## 2018-03-07 RX ORDER — MELOXICAM 15 MG/1
TABLET ORAL
Qty: 30 TABLET | Refills: 0 | Status: SHIPPED | OUTPATIENT
Start: 2018-03-07 | End: 2018-05-23 | Stop reason: SDUPTHER

## 2018-04-10 ENCOUNTER — LAB VISIT (OUTPATIENT)
Dept: LAB | Facility: HOSPITAL | Age: 68
End: 2018-04-10
Attending: INTERNAL MEDICINE
Payer: MEDICARE

## 2018-04-10 ENCOUNTER — OFFICE VISIT (OUTPATIENT)
Dept: RHEUMATOLOGY | Facility: CLINIC | Age: 68
End: 2018-04-10
Payer: MEDICARE

## 2018-04-10 ENCOUNTER — TELEPHONE (OUTPATIENT)
Dept: PHARMACY | Facility: HOSPITAL | Age: 68
End: 2018-04-10

## 2018-04-10 VITALS
DIASTOLIC BLOOD PRESSURE: 85 MMHG | HEART RATE: 67 BPM | WEIGHT: 179.44 LBS | RESPIRATION RATE: 14 BRPM | SYSTOLIC BLOOD PRESSURE: 136 MMHG | BODY MASS INDEX: 28.16 KG/M2 | HEIGHT: 67 IN

## 2018-04-10 DIAGNOSIS — G89.29 NECK PAIN, CHRONIC: ICD-10-CM

## 2018-04-10 DIAGNOSIS — L40.50 PSA (PSORIATIC ARTHRITIS): ICD-10-CM

## 2018-04-10 DIAGNOSIS — L40.8 PSORIASIS WITH PUSTULES: ICD-10-CM

## 2018-04-10 DIAGNOSIS — R94.6 ABNORMAL RESULTS OF THYROID FUNCTION STUDIES: ICD-10-CM

## 2018-04-10 DIAGNOSIS — M47.817 LUMBAR AND SACRAL ARTHRITIS: ICD-10-CM

## 2018-04-10 DIAGNOSIS — M06.9 RHEUMATOID ARTHRITIS, INVOLVING UNSPECIFIED SITE, UNSPECIFIED RHEUMATOID FACTOR PRESENCE: ICD-10-CM

## 2018-04-10 DIAGNOSIS — R53.82 CHRONIC FATIGUE: ICD-10-CM

## 2018-04-10 DIAGNOSIS — M54.2 NECK PAIN, CHRONIC: ICD-10-CM

## 2018-04-10 DIAGNOSIS — L40.8 PSORIASIS WITH PUSTULES: Primary | ICD-10-CM

## 2018-04-10 LAB
25(OH)D3+25(OH)D2 SERPL-MCNC: 22 NG/ML
ALBUMIN SERPL BCP-MCNC: 3.9 G/DL
ALP SERPL-CCNC: 81 U/L
ALT SERPL W/O P-5'-P-CCNC: 30 U/L
ANION GAP SERPL CALC-SCNC: 9 MMOL/L
AST SERPL-CCNC: 28 U/L
BASOPHILS # BLD AUTO: 0.05 K/UL
BASOPHILS NFR BLD: 0.7 %
BILIRUB SERPL-MCNC: 0.5 MG/DL
BUN SERPL-MCNC: 15 MG/DL
CALCIUM SERPL-MCNC: 8.7 MG/DL
CHLORIDE SERPL-SCNC: 104 MMOL/L
CO2 SERPL-SCNC: 25 MMOL/L
CREAT SERPL-MCNC: 0.7 MG/DL
CRP SERPL-MCNC: 1.4 MG/L
DIFFERENTIAL METHOD: ABNORMAL
EOSINOPHIL # BLD AUTO: 0.1 K/UL
EOSINOPHIL NFR BLD: 1.7 %
ERYTHROCYTE [DISTWIDTH] IN BLOOD BY AUTOMATED COUNT: 13.6 %
ERYTHROCYTE [SEDIMENTATION RATE] IN BLOOD BY WESTERGREN METHOD: 6 MM/HR
EST. GFR  (AFRICAN AMERICAN): >60 ML/MIN/1.73 M^2
EST. GFR  (NON AFRICAN AMERICAN): >60 ML/MIN/1.73 M^2
GLUCOSE SERPL-MCNC: 85 MG/DL
HCT VFR BLD AUTO: 36.2 %
HGB BLD-MCNC: 11.7 G/DL
IMM GRANULOCYTES # BLD AUTO: 0.01 K/UL
IMM GRANULOCYTES NFR BLD AUTO: 0.1 %
LYMPHOCYTES # BLD AUTO: 2.4 K/UL
LYMPHOCYTES NFR BLD: 32.4 %
MCH RBC QN AUTO: 30.7 PG
MCHC RBC AUTO-ENTMCNC: 32.3 G/DL
MCV RBC AUTO: 95 FL
MONOCYTES # BLD AUTO: 0.5 K/UL
MONOCYTES NFR BLD: 6.3 %
NEUTROPHILS # BLD AUTO: 4.4 K/UL
NEUTROPHILS NFR BLD: 58.8 %
NRBC BLD-RTO: 0 /100 WBC
PLATELET # BLD AUTO: 283 K/UL
PMV BLD AUTO: 10.6 FL
POTASSIUM SERPL-SCNC: 3.8 MMOL/L
PROT SERPL-MCNC: 7.3 G/DL
RBC # BLD AUTO: 3.81 M/UL
SODIUM SERPL-SCNC: 138 MMOL/L
WBC # BLD AUTO: 7.44 K/UL

## 2018-04-10 PROCEDURE — 85025 COMPLETE CBC W/AUTO DIFF WBC: CPT

## 2018-04-10 PROCEDURE — 86140 C-REACTIVE PROTEIN: CPT

## 2018-04-10 PROCEDURE — 36415 COLL VENOUS BLD VENIPUNCTURE: CPT | Mod: PO

## 2018-04-10 PROCEDURE — 99214 OFFICE O/P EST MOD 30 MIN: CPT | Mod: PBBFAC,PO,25 | Performed by: INTERNAL MEDICINE

## 2018-04-10 PROCEDURE — 96372 THER/PROPH/DIAG INJ SC/IM: CPT | Mod: PBBFAC,PO

## 2018-04-10 PROCEDURE — 80053 COMPREHEN METABOLIC PANEL: CPT

## 2018-04-10 PROCEDURE — 85651 RBC SED RATE NONAUTOMATED: CPT | Mod: PO

## 2018-04-10 PROCEDURE — 99999 PR PBB SHADOW E&M-EST. PATIENT-LVL IV: CPT | Mod: PBBFAC,,, | Performed by: INTERNAL MEDICINE

## 2018-04-10 PROCEDURE — 82306 VITAMIN D 25 HYDROXY: CPT

## 2018-04-10 PROCEDURE — 99214 OFFICE O/P EST MOD 30 MIN: CPT | Mod: 25,S$PBB,, | Performed by: INTERNAL MEDICINE

## 2018-04-10 RX ORDER — HYDROCODONE BITARTRATE AND ACETAMINOPHEN 10; 325 MG/1; MG/1
1 TABLET ORAL 3 TIMES DAILY PRN
Qty: 90 TABLET | Refills: 0 | Status: SHIPPED | OUTPATIENT
Start: 2018-06-01 | End: 2018-06-06 | Stop reason: SDUPTHER

## 2018-04-10 RX ORDER — HYDROCODONE BITARTRATE AND ACETAMINOPHEN 10; 325 MG/1; MG/1
1 TABLET ORAL 3 TIMES DAILY PRN
Qty: 90 TABLET | Refills: 0 | Status: SHIPPED | OUTPATIENT
Start: 2018-05-01 | End: 2018-04-10 | Stop reason: SDUPTHER

## 2018-04-10 RX ORDER — HYDROCODONE BITARTRATE AND ACETAMINOPHEN 10; 325 MG/1; MG/1
1 TABLET ORAL 3 TIMES DAILY PRN
Qty: 90 TABLET | Refills: 0 | Status: SHIPPED | OUTPATIENT
Start: 2018-07-01 | End: 2018-04-10 | Stop reason: SDUPTHER

## 2018-04-10 RX ORDER — KETOROLAC TROMETHAMINE 30 MG/ML
60 INJECTION, SOLUTION INTRAMUSCULAR; INTRAVENOUS
Status: COMPLETED | OUTPATIENT
Start: 2018-04-10 | End: 2018-04-10

## 2018-04-10 RX ORDER — CYANOCOBALAMIN 1000 UG/ML
1000 INJECTION, SOLUTION INTRAMUSCULAR; SUBCUTANEOUS
Status: COMPLETED | OUTPATIENT
Start: 2018-04-10 | End: 2018-04-10

## 2018-04-10 RX ORDER — METHYLPREDNISOLONE ACETATE 80 MG/ML
160 INJECTION, SUSPENSION INTRA-ARTICULAR; INTRALESIONAL; INTRAMUSCULAR; SOFT TISSUE
Status: COMPLETED | OUTPATIENT
Start: 2018-04-10 | End: 2018-04-10

## 2018-04-10 RX ADMIN — CYANOCOBALAMIN 1000 MCG: 1000 INJECTION, SOLUTION INTRAMUSCULAR at 12:04

## 2018-04-10 RX ADMIN — METHYLPREDNISOLONE ACETATE 160 MG: 80 INJECTION, SUSPENSION INTRA-ARTICULAR; INTRALESIONAL; INTRAMUSCULAR; SOFT TISSUE at 12:04

## 2018-04-10 RX ADMIN — KETOROLAC TROMETHAMINE 60 MG: 60 INJECTION, SOLUTION INTRAMUSCULAR at 12:04

## 2018-04-10 NOTE — TELEPHONE ENCOUNTER
Michelle Peter received a prescription for Cosentyx.We are currently working on this prescription. Will call the patient with an update.

## 2018-04-10 NOTE — PROGRESS NOTES
Subjective:       Patient ID: Rose Goff is a 67 y.o. female.    Chief Complaint: Rheumatoid Arthritis    Hpi: PSA, RA, on humira . Pain is located in multiple joints, both shoulder(s), both elbow(s), both wrist(s), both MCP(s): 1st, 2nd, 3rd, 4th and 5th, both PIP(s): 1st, 2nd, 3rd, 4th and 5th, both DIP(s): 1st and 2nd, both hip(s), both knee(s) and both MTP(s): 1st, 2nd, 3rd, 4th and 5th, is described as aching, pulsating, shooting and throbbing, and is constant, moderate .  Associated symptoms include: crepitation, decreased range of motion, edema, effusion, tenderness and warmth.      Review of Systems   Constitutional: Positive for fatigue. Negative for activity change, appetite change, chills, diaphoresis and unexpected weight change.   HENT: Negative for congestion, dental problem, ear discharge, ear pain, facial swelling, mouth sores, nosebleeds, postnasal drip, rhinorrhea, sinus pressure, sneezing, sore throat, tinnitus and voice change.    Eyes: Negative for photophobia, pain, discharge, redness and itching.   Respiratory: Negative for apnea, cough, chest tightness, shortness of breath and wheezing.    Cardiovascular: Positive for leg swelling. Negative for chest pain and palpitations.   Gastrointestinal: Negative for abdominal distention, abdominal pain, constipation, diarrhea, nausea and vomiting.   Endocrine: Negative for cold intolerance, heat intolerance, polydipsia and polyuria.   Genitourinary: Negative for decreased urine volume, difficulty urinating, flank pain, frequency, hematuria and urgency.   Musculoskeletal: Positive for arthralgias, back pain, gait problem, joint swelling, myalgias, neck pain and neck stiffness.   Skin: Negative for pallor, rash and wound.   Allergic/Immunologic: Negative for immunocompromised state.   Neurological: Negative for dizziness, tremors, weakness and numbness.   Hematological: Negative for adenopathy. Does not bruise/bleed easily.  "  Psychiatric/Behavioral: Negative for sleep disturbance. The patient is not nervous/anxious.          Objective:   /85   Pulse 67   Resp 14   Ht 5' 7" (1.702 m)   Wt 81.4 kg (179 lb 7.3 oz)   BMI 28.11 kg/m²      Physical Exam   Nursing note and vitals reviewed.  Constitutional: She is oriented to person, place, and time. No distress.   HENT:   Head: Normocephalic and atraumatic.   Mouth/Throat: Oropharynx is clear and moist.   Eyes: EOM are normal. Pupils are equal, round, and reactive to light.   Neck: Neck supple. No thyromegaly present.   Cardiovascular: Normal rate, regular rhythm and normal heart sounds.  Exam reveals no gallop and no friction rub.    No murmur heard.  Pulmonary/Chest: Breath sounds normal. She has no wheezes. She has no rales. She exhibits no tenderness.   Abdominal: There is no tenderness. There is no rebound and no guarding.       Right Side Rheumatological Exam     The patient is tender to palpation of the shoulder, elbow, wrist, knee, 1st PIP, 1st MCP, 2nd PIP, 2nd MCP, 3rd PIP, 3rd MCP, 4th PIP, 4th MCP, 5th PIP and 5th MCP    She has swelling of the 1st PIP, 1st MCP, 2nd PIP, 2nd MCP, 3rd PIP, 3rd MCP, 4th PIP, 4th MCP, 5th PIP and 5th MCP    Shoulder Exam   Tenderness Location: biceps tendon and clavicle    Range of Motion   Active Abduction: abnormal   Adduction: abnormal  Sensation: normal    Knee Exam   Patellofemoral Crepitus: positive  Effusion: negative  Sensation: normal    Hip Exam   Tenderness Location: posterior, greater trochanter and lateral  Sensation: normal    Elbow/Wrist Exam   Tenderness Location: lateral epicondyle and medial epicondyle  Sensation: normal    Foot Exam   Right foot exam exhibits signs of inflamed dorsum  Right foot exam exhibits signs of no podagra, no tophus and no plantar fasciitis    Muscle Strength (0-5 scale):  : 4/5     Left Side Rheumatological Exam     The patient is tender to palpation of the shoulder, elbow, wrist, knee, 1st " PIP, 1st MCP, 2nd PIP, 2nd MCP, 3rd PIP, 3rd MCP, 4th PIP, 4th MCP, 5th PIP and 5th MCP.    She has swelling of the 1st PIP, 1st MCP, 2nd PIP, 2nd MCP, 3rd PIP, 3rd MCP, 4th PIP, 4th MCP, 5th PIP and 5th MCP    Shoulder Exam   Tenderness Location: biceps tendon and clavicle    Range of Motion   Active Abduction: abnormal   Sensation: normal    Knee Exam     Patellofemoral Crepitus: positive  Effusion: negative  Sensation: normal    Hip Exam   Tenderness Location: posterior, greater trochanter and lateral  Sensation: normal    Elbow/Wrist Exam   Tenderness Location: lateral epicondyle and medial epicondyle  Sensation: normal    Foot Exam   Left foot exam exhibits signs of inflamed dorsum  Left foot exam exhibits signs of no podagra and no plantar fasciitis    Muscle Strength (0-5 scale):  :  4/5       Back/Neck Exam   General Inspection   Gait: normal       Tenderness Right paramedian tenderness of the Occ, Upper C-Spine, Lower L-Spine and SI Joint.Left paramedian tenderness of the Occ, Upper C-Spine, Lower L-Spine and SI Joint.      Comments:  15 out of 18 tender points    Lymphadenopathy:     She has no cervical adenopathy.   Neurological: She is alert and oriented to person, place, and time.   Skin: No rash noted. No erythema. No pallor.     Psychiatric: Mood and affect normal.   Musculoskeletal: She exhibits edema, tenderness and deformity.   L groin and  Thigh pain           Results for orders placed or performed in visit on 11/20/17   Comprehensive metabolic panel   Result Value Ref Range    Sodium 135 (L) 136 - 145 mmol/L    Potassium 3.7 3.5 - 5.1 mmol/L    Chloride 104 95 - 110 mmol/L    CO2 25 23 - 29 mmol/L    Glucose 88 70 - 110 mg/dL    BUN, Bld 15 8 - 23 mg/dL    Creatinine 0.8 0.5 - 1.4 mg/dL    Calcium 8.6 (L) 8.7 - 10.5 mg/dL    Total Protein 7.3 6.0 - 8.4 g/dL    Albumin 3.6 3.5 - 5.2 g/dL    Total Bilirubin 0.5 0.1 - 1.0 mg/dL    Alkaline Phosphatase 72 55 - 135 U/L    AST 17 10 - 40 U/L    ALT  16 10 - 44 U/L    Anion Gap 6 (L) 8 - 16 mmol/L    eGFR if African American >60.0 >60 mL/min/1.73 m^2    eGFR if non African American >60.0 >60 mL/min/1.73 m^2   CBC auto differential   Result Value Ref Range    WBC 7.34 3.90 - 12.70 K/uL    RBC 3.91 (L) 4.00 - 5.40 M/uL    Hemoglobin 11.6 (L) 12.0 - 16.0 g/dL    Hematocrit 35.2 (L) 37.0 - 48.5 %    MCV 90 82 - 98 fL    MCH 29.7 27.0 - 31.0 pg    MCHC 33.0 32.0 - 36.0 g/dL    RDW 15.7 (H) 11.5 - 14.5 %    Platelets 291 150 - 350 K/uL    MPV 10.7 9.2 - 12.9 fL    Immature Granulocytes 0.1 0.0 - 0.5 %    Gran # (ANC) 4.3 1.8 - 7.7 K/uL    Immature Grans (Abs) 0.01 0.00 - 0.04 K/uL    Lymph # 2.4 1.0 - 4.8 K/uL    Mono # 0.4 0.3 - 1.0 K/uL    Eos # 0.1 0.0 - 0.5 K/uL    Baso # 0.05 0.00 - 0.20 K/uL    nRBC 0 0 /100 WBC    Gran% 58.6 38.0 - 73.0 %    Lymph% 33.1 18.0 - 48.0 %    Mono% 5.9 4.0 - 15.0 %    Eosinophil% 1.6 0.0 - 8.0 %    Basophil% 0.7 0.0 - 1.9 %    Differential Method Automated    C-reactive protein   Result Value Ref Range    CRP 1.6 0.0 - 8.2 mg/L   Sedimentation rate, manual   Result Value Ref Range    Sed Rate 5 0 - 20 mm/Hr       Assessment:       1. Psoriasis with pustules    2. PSA (psoriatic arthritis)    3. Rheumatoid arthritis, involving unspecified site, unspecified rheumatoid factor presence    4. Lumbar and sacral arthritis    5. Neck pain, chronic            Plan:       Rose was seen today for rheumatoid arthritis.    Diagnoses and all orders for this visit:    Psoriasis with pustules  -     secukinumab (COSENTYX PEN) 150 mg/mL PnIj; Inject 300 mg into the skin every 30 days.  -     secukinumab (COSENTYX PEN, 2 PENS,) 150 mg/mL PnIj; Inject 300 each into the skin once a week.  -     CBC auto differential; Future  -     Comprehensive metabolic panel; Future  -     C-reactive protein; Future  -     TSH; Future  -     Thyroid peroxidase antibody; Future  -     T4, free; Future  -     T3, free; Future  -     Vitamin D; Future  -      Anti-thyroglobulin antibody; Future  -     Sedimentation rate, manual; Future    PSA (psoriatic arthritis)  -     CBC auto differential; Future  -     Comprehensive metabolic panel; Future  -     C-reactive protein; Future  -     TSH; Future  -     Thyroid peroxidase antibody; Future  -     T4, free; Future  -     T3, free; Future  -     Vitamin D; Future  -     Anti-thyroglobulin antibody; Future  -     Sedimentation rate, manual; Future  -     Discontinue: hydrocodone-acetaminophen 10-325mg (NORCO)  mg Tab; Take 1 tablet by mouth 3 (three) times daily as needed.  -     Discontinue: hydrocodone-acetaminophen 10-325mg (NORCO)  mg Tab; Take 1 tablet by mouth 3 (three) times daily as needed.  -     hydrocodone-acetaminophen 10-325mg (NORCO)  mg Tab; Take 1 tablet by mouth 3 (three) times daily as needed.    Rheumatoid arthritis, involving unspecified site, unspecified rheumatoid factor presence  -     CBC auto differential; Future  -     Comprehensive metabolic panel; Future  -     C-reactive protein; Future  -     TSH; Future  -     Thyroid peroxidase antibody; Future  -     T4, free; Future  -     T3, free; Future  -     Vitamin D; Future  -     Anti-thyroglobulin antibody; Future  -     Sedimentation rate, manual; Future  -     Discontinue: hydrocodone-acetaminophen 10-325mg (NORCO)  mg Tab; Take 1 tablet by mouth 3 (three) times daily as needed.  -     Discontinue: hydrocodone-acetaminophen 10-325mg (NORCO)  mg Tab; Take 1 tablet by mouth 3 (three) times daily as needed.  -     hydrocodone-acetaminophen 10-325mg (NORCO)  mg Tab; Take 1 tablet by mouth 3 (three) times daily as needed.    Lumbar and sacral arthritis  -     CBC auto differential; Future  -     Comprehensive metabolic panel; Future  -     C-reactive protein; Future  -     TSH; Future  -     Thyroid peroxidase antibody; Future  -     T4, free; Future  -     T3, free; Future  -     Vitamin D; Future  -      Anti-thyroglobulin antibody; Future  -     Sedimentation rate, manual; Future  -     Discontinue: hydrocodone-acetaminophen 10-325mg (NORCO)  mg Tab; Take 1 tablet by mouth 3 (three) times daily as needed.  -     Discontinue: hydrocodone-acetaminophen 10-325mg (NORCO)  mg Tab; Take 1 tablet by mouth 3 (three) times daily as needed.  -     hydrocodone-acetaminophen 10-325mg (NORCO)  mg Tab; Take 1 tablet by mouth 3 (three) times daily as needed.    Neck pain, chronic  -     CBC auto differential; Future  -     Comprehensive metabolic panel; Future  -     C-reactive protein; Future  -     TSH; Future  -     Thyroid peroxidase antibody; Future  -     T4, free; Future  -     T3, free; Future  -     Vitamin D; Future  -     Anti-thyroglobulin antibody; Future  -     Sedimentation rate, manual; Future    Abnormal results of thyroid function studies   -     CBC auto differential; Future  -     Comprehensive metabolic panel; Future  -     C-reactive protein; Future  -     TSH; Future  -     Thyroid peroxidase antibody; Future  -     T4, free; Future  -     T3, free; Future  -     Vitamin D; Future  -     Anti-thyroglobulin antibody; Future  -     Sedimentation rate, manual; Future  -     Discontinue: hydrocodone-acetaminophen 10-325mg (NORCO)  mg Tab; Take 1 tablet by mouth 3 (three) times daily as needed.  -     Discontinue: hydrocodone-acetaminophen 10-325mg (NORCO)  mg Tab; Take 1 tablet by mouth 3 (three) times daily as needed.  -     hydrocodone-acetaminophen 10-325mg (NORCO)  mg Tab; Take 1 tablet by mouth 3 (three) times daily as needed.        Continue Humira   tried enbrel, MTX, Humira, Cimzia

## 2018-04-10 NOTE — PROGRESS NOTES
Administered 1 cc ( 1000 mcg/ml ) of b12 to the right upper outer gluteal. Informed of s/s to report verbalized understanding. No adverse reactions noted.    Lot # [id number]  Expiration aug 19    Administered 2 cc ( 80 mg/ml ) of depomedrol to the right upper outer gluteal. Informed of s/s to report verbalized understanding. No adverse reactions noted.    Lot # [id number]  Expiration 02/2019    Administered 2 cc ( 30 mg/ml ) of toradol to the left upper outer gluteal. Informed of s/s to report verbalized understanding. No adverse reactions noted.    Lot # [id number]  Expiration 1 aug 2019

## 2018-04-11 DIAGNOSIS — L40.50 PSA (PSORIATIC ARTHRITIS): ICD-10-CM

## 2018-04-11 DIAGNOSIS — M06.9 RHEUMATOID ARTHRITIS, INVOLVING UNSPECIFIED SITE, UNSPECIFIED RHEUMATOID FACTOR PRESENCE: ICD-10-CM

## 2018-04-11 DIAGNOSIS — B37.9 YEAST INFECTION: ICD-10-CM

## 2018-04-17 RX ORDER — ZOLPIDEM TARTRATE 10 MG/1
TABLET ORAL
Qty: 30 TABLET | Refills: 3 | Status: SHIPPED | OUTPATIENT
Start: 2018-04-17 | End: 2018-10-02

## 2018-04-18 NOTE — TELEPHONE ENCOUNTER
FOR DOCUMENTATION:  Cosentyx prior authorization approved   Dates: 01/18/2018 through 04/19/2019  Case ID: [id number]  Co pay: $0

## 2018-04-18 NOTE — TELEPHONE ENCOUNTER
----- Message from Yoni Piedra sent at 4/18/2018  4:13 PM CDT -----  Contact: Pt  Pt is calling states she missed a call from Aaliyah. Pt can be reached at [phone] (home)

## 2018-05-15 ENCOUNTER — TELEPHONE (OUTPATIENT)
Dept: RHEUMATOLOGY | Facility: CLINIC | Age: 68
End: 2018-05-15

## 2018-05-15 NOTE — TELEPHONE ENCOUNTER
Attempted to reach pt, lvm at provided number of results and to add VitD 3000iu daily. Please call back with any questions.

## 2018-05-15 NOTE — TELEPHONE ENCOUNTER
----- Message from Robin Benjamin MD sent at 5/11/2018  1:17 PM CDT -----  Your results are essential normal.add vit d 3000 iu daily Continue your present medications. Will will adjust medicatiions at your next visit.

## 2018-05-22 RX ORDER — SUMATRIPTAN SUCCINATE 100 MG/1
TABLET ORAL
Qty: 18 TABLET | Refills: 3 | Status: SHIPPED | OUTPATIENT
Start: 2018-05-22 | End: 2018-11-06 | Stop reason: SDUPTHER

## 2018-05-23 DIAGNOSIS — B37.9 YEAST INFECTION: ICD-10-CM

## 2018-05-23 DIAGNOSIS — M06.9 RHEUMATOID ARTHRITIS, INVOLVING UNSPECIFIED SITE, UNSPECIFIED RHEUMATOID FACTOR PRESENCE: ICD-10-CM

## 2018-05-23 DIAGNOSIS — L40.50 PSA (PSORIATIC ARTHRITIS): ICD-10-CM

## 2018-05-30 ENCOUNTER — LAB VISIT (OUTPATIENT)
Dept: LAB | Facility: HOSPITAL | Age: 68
End: 2018-05-30
Attending: INTERNAL MEDICINE
Payer: MEDICARE

## 2018-05-30 DIAGNOSIS — R94.6 ABNORMAL RESULTS OF THYROID FUNCTION STUDIES: ICD-10-CM

## 2018-05-30 DIAGNOSIS — M54.2 NECK PAIN, CHRONIC: ICD-10-CM

## 2018-05-30 DIAGNOSIS — L40.50 PSA (PSORIATIC ARTHRITIS): ICD-10-CM

## 2018-05-30 DIAGNOSIS — G89.29 NECK PAIN, CHRONIC: ICD-10-CM

## 2018-05-30 DIAGNOSIS — L40.8 PSORIASIS WITH PUSTULES: ICD-10-CM

## 2018-05-30 DIAGNOSIS — M47.817 LUMBAR AND SACRAL ARTHRITIS: ICD-10-CM

## 2018-05-30 DIAGNOSIS — M06.9 RHEUMATOID ARTHRITIS, INVOLVING UNSPECIFIED SITE, UNSPECIFIED RHEUMATOID FACTOR PRESENCE: ICD-10-CM

## 2018-05-30 LAB
ALBUMIN SERPL BCP-MCNC: 3.8 G/DL
ALP SERPL-CCNC: 66 U/L
ALT SERPL W/O P-5'-P-CCNC: 17 U/L
ANION GAP SERPL CALC-SCNC: 6 MMOL/L
AST SERPL-CCNC: 18 U/L
BASOPHILS # BLD AUTO: 0.05 K/UL
BASOPHILS NFR BLD: 0.8 %
BILIRUB SERPL-MCNC: 0.6 MG/DL
BUN SERPL-MCNC: 13 MG/DL
CALCIUM SERPL-MCNC: 9 MG/DL
CHLORIDE SERPL-SCNC: 103 MMOL/L
CO2 SERPL-SCNC: 28 MMOL/L
CREAT SERPL-MCNC: 0.8 MG/DL
CRP SERPL-MCNC: 1.6 MG/L
DIFFERENTIAL METHOD: ABNORMAL
EOSINOPHIL # BLD AUTO: 0.2 K/UL
EOSINOPHIL NFR BLD: 2.5 %
ERYTHROCYTE [DISTWIDTH] IN BLOOD BY AUTOMATED COUNT: 13.8 %
ERYTHROCYTE [SEDIMENTATION RATE] IN BLOOD BY WESTERGREN METHOD: 5 MM/HR
EST. GFR  (AFRICAN AMERICAN): >60 ML/MIN/1.73 M^2
EST. GFR  (NON AFRICAN AMERICAN): >60 ML/MIN/1.73 M^2
GLUCOSE SERPL-MCNC: 88 MG/DL
HCT VFR BLD AUTO: 35.9 %
HGB BLD-MCNC: 11.2 G/DL
IMM GRANULOCYTES # BLD AUTO: 0.01 K/UL
IMM GRANULOCYTES NFR BLD AUTO: 0.2 %
LYMPHOCYTES # BLD AUTO: 2.7 K/UL
LYMPHOCYTES NFR BLD: 40.7 %
MCH RBC QN AUTO: 29.7 PG
MCHC RBC AUTO-ENTMCNC: 31.2 G/DL
MCV RBC AUTO: 95 FL
MONOCYTES # BLD AUTO: 0.5 K/UL
MONOCYTES NFR BLD: 7.7 %
NEUTROPHILS # BLD AUTO: 3.2 K/UL
NEUTROPHILS NFR BLD: 48.1 %
NRBC BLD-RTO: 0 /100 WBC
PLATELET # BLD AUTO: 279 K/UL
PMV BLD AUTO: 11.3 FL
POTASSIUM SERPL-SCNC: 4.3 MMOL/L
PROT SERPL-MCNC: 7 G/DL
RBC # BLD AUTO: 3.77 M/UL
SODIUM SERPL-SCNC: 137 MMOL/L
T3FREE SERPL-MCNC: 2.5 PG/ML
T4 FREE SERPL-MCNC: 1.49 NG/DL
THYROGLOB AB SERPL IA-ACNC: <4 IU/ML
THYROPEROXIDASE IGG SERPL-ACNC: <6 IU/ML
TSH SERPL DL<=0.005 MIU/L-ACNC: 0.74 UIU/ML
TSH SERPL DL<=0.005 MIU/L-ACNC: 0.74 UIU/ML
WBC # BLD AUTO: 6.53 K/UL

## 2018-05-30 PROCEDURE — 85651 RBC SED RATE NONAUTOMATED: CPT | Mod: PO

## 2018-05-30 PROCEDURE — 84481 FREE ASSAY (FT-3): CPT

## 2018-05-30 PROCEDURE — 36415 COLL VENOUS BLD VENIPUNCTURE: CPT | Mod: PO

## 2018-05-30 PROCEDURE — 84439 ASSAY OF FREE THYROXINE: CPT

## 2018-05-30 PROCEDURE — 80053 COMPREHEN METABOLIC PANEL: CPT

## 2018-05-30 PROCEDURE — 84443 ASSAY THYROID STIM HORMONE: CPT

## 2018-05-30 PROCEDURE — 85025 COMPLETE CBC W/AUTO DIFF WBC: CPT

## 2018-05-30 PROCEDURE — 86376 MICROSOMAL ANTIBODY EACH: CPT

## 2018-05-30 PROCEDURE — 86800 THYROGLOBULIN ANTIBODY: CPT

## 2018-05-30 PROCEDURE — 86140 C-REACTIVE PROTEIN: CPT

## 2018-05-30 RX ORDER — MELOXICAM 15 MG/1
TABLET ORAL
Qty: 30 TABLET | Refills: 3 | Status: SHIPPED | OUTPATIENT
Start: 2018-05-30 | End: 2018-06-06 | Stop reason: SDUPTHER

## 2018-06-06 ENCOUNTER — OFFICE VISIT (OUTPATIENT)
Dept: RHEUMATOLOGY | Facility: CLINIC | Age: 68
End: 2018-06-06
Payer: MEDICARE

## 2018-06-06 VITALS
HEART RATE: 71 BPM | SYSTOLIC BLOOD PRESSURE: 142 MMHG | DIASTOLIC BLOOD PRESSURE: 82 MMHG | BODY MASS INDEX: 28.98 KG/M2 | WEIGHT: 180.31 LBS | HEIGHT: 66 IN

## 2018-06-06 DIAGNOSIS — B37.9 YEAST INFECTION: ICD-10-CM

## 2018-06-06 DIAGNOSIS — R12 HEARTBURN: ICD-10-CM

## 2018-06-06 DIAGNOSIS — M06.9 RHEUMATOID ARTHRITIS, INVOLVING UNSPECIFIED SITE, UNSPECIFIED RHEUMATOID FACTOR PRESENCE: ICD-10-CM

## 2018-06-06 DIAGNOSIS — L40.50 PSA (PSORIATIC ARTHRITIS): ICD-10-CM

## 2018-06-06 DIAGNOSIS — M47.817 LUMBAR AND SACRAL ARTHRITIS: ICD-10-CM

## 2018-06-06 DIAGNOSIS — R94.6 ABNORMAL RESULTS OF THYROID FUNCTION STUDIES: ICD-10-CM

## 2018-06-06 DIAGNOSIS — L40.0 PLAQUE PSORIASIS: Primary | ICD-10-CM

## 2018-06-06 PROCEDURE — 99999 PR PBB SHADOW E&M-EST. PATIENT-LVL III: CPT | Mod: PBBFAC,,, | Performed by: INTERNAL MEDICINE

## 2018-06-06 PROCEDURE — 99213 OFFICE O/P EST LOW 20 MIN: CPT | Mod: PBBFAC,PO | Performed by: INTERNAL MEDICINE

## 2018-06-06 PROCEDURE — 99214 OFFICE O/P EST MOD 30 MIN: CPT | Mod: S$PBB,,, | Performed by: INTERNAL MEDICINE

## 2018-06-06 RX ORDER — DICLOFENAC SODIUM 10 MG/G
2 GEL TOPICAL DAILY
Qty: 100 G | Refills: 11 | Status: SHIPPED | OUTPATIENT
Start: 2018-06-06 | End: 2019-03-07 | Stop reason: SDUPTHER

## 2018-06-06 RX ORDER — HYDROCODONE BITARTRATE AND ACETAMINOPHEN 10; 325 MG/1; MG/1
1 TABLET ORAL 3 TIMES DAILY PRN
Qty: 90 TABLET | Refills: 0 | Status: SHIPPED | OUTPATIENT
Start: 2018-08-06 | End: 2018-10-02 | Stop reason: SDUPTHER

## 2018-06-06 RX ORDER — PREDNISONE 5 MG/1
5 TABLET ORAL 3 TIMES DAILY PRN
Qty: 90 TABLET | Refills: 4 | Status: SHIPPED | OUTPATIENT
Start: 2018-06-06 | End: 2019-10-07 | Stop reason: SDUPTHER

## 2018-06-06 RX ORDER — OMEPRAZOLE 40 MG/1
40 CAPSULE, DELAYED RELEASE ORAL DAILY
Qty: 90 CAPSULE | Refills: 3 | Status: SHIPPED | OUTPATIENT
Start: 2018-06-06 | End: 2020-03-20

## 2018-06-06 RX ORDER — VALACYCLOVIR HYDROCHLORIDE 1 G/1
TABLET, FILM COATED ORAL
Qty: 60 TABLET | Refills: 3 | Status: SHIPPED | OUTPATIENT
Start: 2018-06-06 | End: 2021-03-10

## 2018-06-06 RX ORDER — CLOTRIMAZOLE 1 G/ML
SOLUTION TOPICAL 2 TIMES DAILY
Qty: 30 ML | Refills: 5 | Status: SHIPPED | OUTPATIENT
Start: 2018-06-06 | End: 2019-01-07

## 2018-06-06 RX ORDER — MELOXICAM 15 MG/1
15 TABLET ORAL DAILY
Qty: 30 TABLET | Refills: 6 | Status: SHIPPED | OUTPATIENT
Start: 2018-06-06 | End: 2019-05-15 | Stop reason: SDUPTHER

## 2018-06-06 RX ORDER — CYCLOBENZAPRINE HCL 10 MG
10 TABLET ORAL 3 TIMES DAILY PRN
Qty: 30 TABLET | Refills: 5 | Status: SHIPPED | OUTPATIENT
Start: 2018-06-06 | End: 2020-01-09 | Stop reason: SDUPTHER

## 2018-06-06 RX ORDER — ZOLPIDEM TARTRATE 10 MG/1
TABLET ORAL
Qty: 30 TABLET | Refills: 3 | Status: SHIPPED | OUTPATIENT
Start: 2018-06-06 | End: 2018-11-19 | Stop reason: SDUPTHER

## 2018-06-06 RX ORDER — HYDROCODONE BITARTRATE AND ACETAMINOPHEN 10; 325 MG/1; MG/1
1 TABLET ORAL 3 TIMES DAILY PRN
Qty: 90 TABLET | Refills: 0 | Status: SHIPPED | OUTPATIENT
Start: 2018-06-06 | End: 2018-06-06 | Stop reason: SDUPTHER

## 2018-06-06 RX ORDER — HYDROCODONE BITARTRATE AND ACETAMINOPHEN 10; 325 MG/1; MG/1
1 TABLET ORAL 3 TIMES DAILY PRN
Qty: 90 TABLET | Refills: 0 | Status: SHIPPED | OUTPATIENT
Start: 2018-07-06 | End: 2018-06-06 | Stop reason: SDUPTHER

## 2018-06-06 NOTE — PROGRESS NOTES
Subjective:       Patient ID: Rose Goff is a 67 y.o. female.    Chief Complaint: Follow-up    Hpi: PSA, RA, on humira worked poorly, we will dc med and switch to cosentyx. Pain is located in multiple joints, both shoulder(s), both elbow(s), both wrist(s), both MCP(s): 1st, 2nd, 3rd, 4th and 5th, both PIP(s): 1st, 2nd, 3rd, 4th and 5th, both DIP(s): 1st and 2nd, both hip(s), both knee(s) and both MTP(s): 1st, 2nd, 3rd, 4th and 5th, is described as aching, pulsating, shooting and throbbing, and is constant, moderate .  Associated symptoms include: crepitation, decreased range of motion, edema, effusion, tenderness and warmth.      Review of Systems   Constitutional: Positive for fatigue. Negative for activity change, appetite change, chills, diaphoresis and unexpected weight change.   HENT: Negative for congestion, dental problem, ear discharge, ear pain, facial swelling, mouth sores, nosebleeds, postnasal drip, rhinorrhea, sinus pressure, sneezing, sore throat, tinnitus and voice change.    Eyes: Negative for photophobia, pain, discharge, redness and itching.   Respiratory: Negative for apnea, cough, chest tightness, shortness of breath and wheezing.    Cardiovascular: Positive for leg swelling. Negative for chest pain and palpitations.   Gastrointestinal: Negative for abdominal distention, abdominal pain, constipation, diarrhea, nausea and vomiting.   Endocrine: Negative for cold intolerance, heat intolerance, polydipsia and polyuria.   Genitourinary: Negative for decreased urine volume, difficulty urinating, flank pain, frequency, hematuria and urgency.   Musculoskeletal: Positive for arthralgias, back pain, gait problem, joint swelling, myalgias, neck pain and neck stiffness.   Skin: Negative for pallor, rash and wound.   Allergic/Immunologic: Negative for immunocompromised state.   Neurological: Negative for dizziness, tremors, weakness and numbness.   Hematological: Negative for adenopathy. Does not  "bruise/bleed easily.   Psychiatric/Behavioral: Negative for sleep disturbance. The patient is not nervous/anxious.          Objective:   BP (!) 142/82 (BP Location: Right arm, Patient Position: Sitting, BP Method: Medium (Automatic))   Pulse 71   Ht 5' 6" (1.676 m)   Wt 81.8 kg (180 lb 5.4 oz)   BMI 29.11 kg/m²      Physical Exam   Nursing note and vitals reviewed.  Constitutional: She is oriented to person, place, and time. No distress.   HENT:   Head: Normocephalic and atraumatic.   Mouth/Throat: Oropharynx is clear and moist.   Eyes: EOM are normal. Pupils are equal, round, and reactive to light.   Neck: Neck supple. No thyromegaly present.   Cardiovascular: Normal rate, regular rhythm and normal heart sounds.  Exam reveals no gallop and no friction rub.    No murmur heard.  Pulmonary/Chest: Breath sounds normal. She has no wheezes. She has no rales. She exhibits no tenderness.   Abdominal: There is no tenderness. There is no rebound and no guarding.       Right Side Rheumatological Exam     The patient is tender to palpation of the shoulder, elbow, wrist, knee, 1st PIP, 1st MCP, 2nd PIP, 2nd MCP, 3rd PIP, 3rd MCP, 4th PIP, 4th MCP, 5th PIP and 5th MCP    She has swelling of the 1st PIP, 1st MCP, 2nd PIP, 2nd MCP, 3rd PIP, 3rd MCP, 4th PIP, 4th MCP, 5th PIP and 5th MCP    Shoulder Exam   Tenderness Location: biceps tendon and clavicle    Range of Motion   Active Abduction: abnormal   Adduction: abnormal  Sensation: normal    Knee Exam   Patellofemoral Crepitus: positive  Effusion: negative  Sensation: normal    Hip Exam   Tenderness Location: posterior, greater trochanter and lateral  Sensation: normal    Elbow/Wrist Exam   Tenderness Location: lateral epicondyle and medial epicondyle  Sensation: normal    Foot Exam   Right foot exam exhibits signs of inflamed dorsum  Right foot exam exhibits signs of no podagra, no tophus and no plantar fasciitis    Muscle Strength (0-5 scale):  : 4/5     Left Side " Rheumatological Exam     The patient is tender to palpation of the shoulder, elbow, wrist, knee, 1st PIP, 1st MCP, 2nd PIP, 2nd MCP, 3rd PIP, 3rd MCP, 4th PIP, 4th MCP, 5th PIP and 5th MCP.    She has swelling of the 1st PIP, 1st MCP, 2nd PIP, 2nd MCP, 3rd PIP, 3rd MCP, 4th PIP, 4th MCP, 5th PIP and 5th MCP    Shoulder Exam   Tenderness Location: biceps tendon and clavicle    Range of Motion   Active Abduction: abnormal   Sensation: normal    Knee Exam     Patellofemoral Crepitus: positive  Effusion: negative  Sensation: normal    Hip Exam   Tenderness Location: posterior, greater trochanter and lateral  Sensation: normal    Elbow/Wrist Exam   Tenderness Location: lateral epicondyle and medial epicondyle  Sensation: normal    Foot Exam   Left foot exam exhibits signs of inflamed dorsum  Left foot exam exhibits signs of no podagra and no plantar fasciitis    Muscle Strength (0-5 scale):  :  4/5       Back/Neck Exam   General Inspection   Gait: normal       Tenderness Right paramedian tenderness of the Occ, Upper C-Spine, Lower L-Spine and SI Joint.Left paramedian tenderness of the Occ, Upper C-Spine, Lower L-Spine and SI Joint.      Comments:  15 out of 18 tender points    Lymphadenopathy:     She has no cervical adenopathy.   Neurological: She is alert and oriented to person, place, and time.   Skin: No rash noted. No erythema. No pallor.     Psychiatric: Mood and affect normal.   Musculoskeletal: She exhibits edema, tenderness and deformity.   L groin and  Thigh pain           Results for orders placed or performed in visit on 05/30/18   TSH   Result Value Ref Range    TSH 0.741 0.400 - 4.000 uIU/mL   CBC auto differential   Result Value Ref Range    WBC 6.53 3.90 - 12.70 K/uL    RBC 3.77 (L) 4.00 - 5.40 M/uL    Hemoglobin 11.2 (L) 12.0 - 16.0 g/dL    Hematocrit 35.9 (L) 37.0 - 48.5 %    MCV 95 82 - 98 fL    MCH 29.7 27.0 - 31.0 pg    MCHC 31.2 (L) 32.0 - 36.0 g/dL    RDW 13.8 11.5 - 14.5 %    Platelets 279 150  - 350 K/uL    MPV 11.3 9.2 - 12.9 fL    Immature Granulocytes 0.2 0.0 - 0.5 %    Gran # (ANC) 3.2 1.8 - 7.7 K/uL    Immature Grans (Abs) 0.01 0.00 - 0.04 K/uL    Lymph # 2.7 1.0 - 4.8 K/uL    Mono # 0.5 0.3 - 1.0 K/uL    Eos # 0.2 0.0 - 0.5 K/uL    Baso # 0.05 0.00 - 0.20 K/uL    nRBC 0 0 /100 WBC    Gran% 48.1 38.0 - 73.0 %    Lymph% 40.7 18.0 - 48.0 %    Mono% 7.7 4.0 - 15.0 %    Eosinophil% 2.5 0.0 - 8.0 %    Basophil% 0.8 0.0 - 1.9 %    Differential Method Automated    Comprehensive metabolic panel   Result Value Ref Range    Sodium 137 136 - 145 mmol/L    Potassium 4.3 3.5 - 5.1 mmol/L    Chloride 103 95 - 110 mmol/L    CO2 28 23 - 29 mmol/L    Glucose 88 70 - 110 mg/dL    BUN, Bld 13 8 - 23 mg/dL    Creatinine 0.8 0.5 - 1.4 mg/dL    Calcium 9.0 8.7 - 10.5 mg/dL    Total Protein 7.0 6.0 - 8.4 g/dL    Albumin 3.8 3.5 - 5.2 g/dL    Total Bilirubin 0.6 0.1 - 1.0 mg/dL    Alkaline Phosphatase 66 55 - 135 U/L    AST 18 10 - 40 U/L    ALT 17 10 - 44 U/L    Anion Gap 6 (L) 8 - 16 mmol/L    eGFR if African American >60.0 >60 mL/min/1.73 m^2    eGFR if non African American >60.0 >60 mL/min/1.73 m^2   C-reactive protein   Result Value Ref Range    CRP 1.6 0.0 - 8.2 mg/L   TSH   Result Value Ref Range    TSH 0.741 0.400 - 4.000 uIU/mL   Thyroid peroxidase antibody   Result Value Ref Range    Thyroperoxidase Antibodies <6.0 <6.0 IU/mL   T4, free   Result Value Ref Range    Free T4 1.49 0.71 - 1.51 ng/dL   T3, free   Result Value Ref Range    T3, Free 2.5 2.3 - 4.2 pg/mL   Anti-thyroglobulin antibody   Result Value Ref Range    Thyroglobulin Ab Screen <4.0 0.0 - 3.9 IU/mL   Sedimentation rate, manual   Result Value Ref Range    Sed Rate 5 0 - 20 mm/Hr       Assessment:       1. Heartburn    2. PSA (psoriatic arthritis)    3. Rheumatoid arthritis, involving unspecified site, unspecified rheumatoid factor presence    4. Yeast infection            Plan:       Rose was seen today for follow-up.    Diagnoses and all orders  for this visit:    Plaque psoriasis  -     CBC auto differential; Future  -     Comprehensive metabolic panel; Future    Heartburn  -     omeprazole (PRILOSEC) 40 MG capsule; Take 1 capsule (40 mg total) by mouth once daily.  -     CBC auto differential; Future  -     Comprehensive metabolic panel; Future    PSA (psoriatic arthritis)  -     meloxicam (MOBIC) 15 MG tablet; Take 1 tablet (15 mg total) by mouth once daily.  -     predniSONE (DELTASONE) 5 MG tablet; Take 1 tablet (5 mg total) by mouth 3 (three) times daily as needed.  -     zolpidem (AMBIEN) 10 mg Tab; TAKE 1 TABLET BY MOUTH EVERY NIGHT AT BEDTIME AS NEEDED FOR SLEEP  -     Discontinue: HYDROcodone-acetaminophen (NORCO)  mg per tablet; Take 1 tablet by mouth 3 (three) times daily as needed.  -     Discontinue: HYDROcodone-acetaminophen (NORCO)  mg per tablet; Take 1 tablet by mouth 3 (three) times daily as needed.  -     CBC auto differential; Future  -     Comprehensive metabolic panel; Future  -     HYDROcodone-acetaminophen (NORCO)  mg per tablet; Take 1 tablet by mouth 3 (three) times daily as needed.    Rheumatoid arthritis, involving unspecified site, unspecified rheumatoid factor presence  -     meloxicam (MOBIC) 15 MG tablet; Take 1 tablet (15 mg total) by mouth once daily.  -     predniSONE (DELTASONE) 5 MG tablet; Take 1 tablet (5 mg total) by mouth 3 (three) times daily as needed.  -     zolpidem (AMBIEN) 10 mg Tab; TAKE 1 TABLET BY MOUTH EVERY NIGHT AT BEDTIME AS NEEDED FOR SLEEP  -     Discontinue: HYDROcodone-acetaminophen (NORCO)  mg per tablet; Take 1 tablet by mouth 3 (three) times daily as needed.  -     Discontinue: HYDROcodone-acetaminophen (NORCO)  mg per tablet; Take 1 tablet by mouth 3 (three) times daily as needed.  -     CBC auto differential; Future  -     Comprehensive metabolic panel; Future  -     HYDROcodone-acetaminophen (NORCO)  mg per tablet; Take 1 tablet by mouth 3 (three) times  daily as needed.    Yeast infection  -     meloxicam (MOBIC) 15 MG tablet; Take 1 tablet (15 mg total) by mouth once daily.  -     predniSONE (DELTASONE) 5 MG tablet; Take 1 tablet (5 mg total) by mouth 3 (three) times daily as needed.  -     zolpidem (AMBIEN) 10 mg Tab; TAKE 1 TABLET BY MOUTH EVERY NIGHT AT BEDTIME AS NEEDED FOR SLEEP  -     CBC auto differential; Future  -     Comprehensive metabolic panel; Future    Lumbar and sacral arthritis  -     Discontinue: HYDROcodone-acetaminophen (NORCO)  mg per tablet; Take 1 tablet by mouth 3 (three) times daily as needed.  -     Discontinue: HYDROcodone-acetaminophen (NORCO)  mg per tablet; Take 1 tablet by mouth 3 (three) times daily as needed.  -     CBC auto differential; Future  -     Comprehensive metabolic panel; Future  -     HYDROcodone-acetaminophen (NORCO)  mg per tablet; Take 1 tablet by mouth 3 (three) times daily as needed.    Abnormal results of thyroid function studies   -     Discontinue: HYDROcodone-acetaminophen (NORCO)  mg per tablet; Take 1 tablet by mouth 3 (three) times daily as needed.  -     Discontinue: HYDROcodone-acetaminophen (NORCO)  mg per tablet; Take 1 tablet by mouth 3 (three) times daily as needed.  -     CBC auto differential; Future  -     Comprehensive metabolic panel; Future  -     HYDROcodone-acetaminophen (NORCO)  mg per tablet; Take 1 tablet by mouth 3 (three) times daily as needed.    Other orders  -     cyclobenzaprine (FLEXERIL) 10 MG tablet; Take 1 tablet (10 mg total) by mouth 3 (three) times daily as needed for Muscle spasms.  -     valACYclovir (VALTREX) 1000 MG tablet; TAKE 1 tab po bid  -     diclofenac sodium (VOLTAREN) 1 % Gel; Apply 2 g topically once daily.  -     clotrimazole (LOTRIMIN) 1 % Soln; Apply topically 2 (two) times daily.        Continue Humira   tried enbrel, MTX, Humira, Cimzia

## 2018-06-08 NOTE — TELEPHONE ENCOUNTER
Initial Cosentyx 300mg consult completed on 18. Cosentyx 300mg will be shipped on 18 to arrive at patient's home on 18 via [facility]. $ 8.35 copay. Patient will administer 2nd loading dose Cosentyx 300mg on 1318. Address confirmed, CC on file. Confirmed 2 patient identifiers - name and . Therapy Appropriate.    - Cosentyx SensoReady Pens 150mg:  Inject 2 pens (300mg) every week x 5 weeks, then 2 pens (300mg) every 4 weeks. (1st loading dose administered at provider office 18).     -Storage: Refrigerate between 36-46 degrees Fahrenheit  Use within ONE HOUR of taking out of fridge.    -Injection technique:  - Wash hands before and after injection.  - Monthly RX will come with gauze, bandaids, and alcohol swabs.  - Patient may inject in either the tops of the thighs, abdomen- but at least 2 inches away from her belly button, or the outer part of her upper arm. Patient was instructed to rotate injections sites.  - Examine device to ensure no particulates, cloudiness, etc.  - Patient is to wipe down the injection site with the alcohol pad, wait to dry.  Gently squeeze the area of the cleaned skin and hold it firmly. Place the pen flat at a 90 degree angle against the raised area of skin that is being squeezed, and then push down firmly on the pen to start the injection. The 1st 'click' indicates the start and the second 'click' indicates that the injection is almost complete. A green indicator will fill the window when completed, and pen can be removed.  - Patient will use sharps container; once full, per [place] law, she  may lock the sharps container and place in her trash. She can then contact the Pharmacy and we will replace the sharps at no additional charge.    -Potential Side effects:  Injection site reactions, diarrhea, cold like symptoms.  Patient advised to call if any signs of allergic reaction, infection, or use of live vaccines.    -DDI: Medication list reviewed.    Patient verbalized  understanding. Compliance stressed. Patient advised to keep a calendar marking dates of injections to ensure better compliance. Patient advised to call myself or provider should any questions arise. Patient plans to administer 2nd loading dose of Cosentyx 300mg on 6/13/18. Consultation included: indication; goals of treatment; administration; storage and handling; side effects; how to handle side effects; the importance of compliance; how to handle missed doses; the importance of laboratory monitoring; the importance of keeping all follow up appointments. Patient understands to report any medication changes to [facility] and provider. All questions answered and addressed to patients satisfaction. I will f/u with her in 1 week from start, [facility] to contact patient in 3 weeks for refills.

## 2018-07-24 ENCOUNTER — TELEPHONE (OUTPATIENT)
Dept: PHARMACY | Facility: CLINIC | Age: 68
End: 2018-07-24

## 2018-08-21 ENCOUNTER — TELEPHONE (OUTPATIENT)
Dept: PHARMACY | Facility: CLINIC | Age: 68
End: 2018-08-21

## 2018-09-24 ENCOUNTER — LAB VISIT (OUTPATIENT)
Dept: LAB | Facility: HOSPITAL | Age: 68
End: 2018-09-24
Attending: INTERNAL MEDICINE
Payer: MEDICARE

## 2018-09-24 DIAGNOSIS — L40.0 PLAQUE PSORIASIS: ICD-10-CM

## 2018-09-24 DIAGNOSIS — R12 HEARTBURN: ICD-10-CM

## 2018-09-24 DIAGNOSIS — L40.50 PSA (PSORIATIC ARTHRITIS): ICD-10-CM

## 2018-09-24 DIAGNOSIS — R94.6 ABNORMAL RESULTS OF THYROID FUNCTION STUDIES: ICD-10-CM

## 2018-09-24 DIAGNOSIS — M47.817 LUMBAR AND SACRAL ARTHRITIS: ICD-10-CM

## 2018-09-24 DIAGNOSIS — M06.9 RHEUMATOID ARTHRITIS, INVOLVING UNSPECIFIED SITE, UNSPECIFIED RHEUMATOID FACTOR PRESENCE: ICD-10-CM

## 2018-09-24 DIAGNOSIS — B37.9 YEAST INFECTION: ICD-10-CM

## 2018-09-24 LAB
ALBUMIN SERPL BCP-MCNC: 3.7 G/DL
ALP SERPL-CCNC: 73 U/L
ALT SERPL W/O P-5'-P-CCNC: 15 U/L
ANION GAP SERPL CALC-SCNC: 8 MMOL/L
AST SERPL-CCNC: 17 U/L
BASOPHILS # BLD AUTO: 0.09 K/UL
BASOPHILS NFR BLD: 1.2 %
BILIRUB SERPL-MCNC: 0.4 MG/DL
BUN SERPL-MCNC: 13 MG/DL
CALCIUM SERPL-MCNC: 8.7 MG/DL
CHLORIDE SERPL-SCNC: 104 MMOL/L
CO2 SERPL-SCNC: 25 MMOL/L
CREAT SERPL-MCNC: 0.7 MG/DL
DIFFERENTIAL METHOD: ABNORMAL
EOSINOPHIL # BLD AUTO: 0.2 K/UL
EOSINOPHIL NFR BLD: 3.2 %
ERYTHROCYTE [DISTWIDTH] IN BLOOD BY AUTOMATED COUNT: 13.9 %
EST. GFR  (AFRICAN AMERICAN): >60 ML/MIN/1.73 M^2
EST. GFR  (NON AFRICAN AMERICAN): >60 ML/MIN/1.73 M^2
GLUCOSE SERPL-MCNC: 87 MG/DL
HCT VFR BLD AUTO: 35.9 %
HGB BLD-MCNC: 11.3 G/DL
IMM GRANULOCYTES # BLD AUTO: 0.01 K/UL
IMM GRANULOCYTES NFR BLD AUTO: 0.1 %
LYMPHOCYTES # BLD AUTO: 2 K/UL
LYMPHOCYTES NFR BLD: 27.4 %
MCH RBC QN AUTO: 29.2 PG
MCHC RBC AUTO-ENTMCNC: 31.5 G/DL
MCV RBC AUTO: 93 FL
MONOCYTES # BLD AUTO: 0.5 K/UL
MONOCYTES NFR BLD: 7.5 %
NEUTROPHILS # BLD AUTO: 4.4 K/UL
NEUTROPHILS NFR BLD: 60.6 %
NRBC BLD-RTO: 0 /100 WBC
PLATELET # BLD AUTO: 291 K/UL
PMV BLD AUTO: 11.1 FL
POTASSIUM SERPL-SCNC: 3.9 MMOL/L
PROT SERPL-MCNC: 7.2 G/DL
RBC # BLD AUTO: 3.87 M/UL
SODIUM SERPL-SCNC: 137 MMOL/L
WBC # BLD AUTO: 7.23 K/UL

## 2018-09-24 PROCEDURE — 80053 COMPREHEN METABOLIC PANEL: CPT

## 2018-09-24 PROCEDURE — 85025 COMPLETE CBC W/AUTO DIFF WBC: CPT

## 2018-09-24 PROCEDURE — 36415 COLL VENOUS BLD VENIPUNCTURE: CPT | Mod: PO

## 2018-10-02 ENCOUNTER — OFFICE VISIT (OUTPATIENT)
Dept: RHEUMATOLOGY | Facility: CLINIC | Age: 68
End: 2018-10-02
Payer: MEDICARE

## 2018-10-02 VITALS
SYSTOLIC BLOOD PRESSURE: 149 MMHG | WEIGHT: 176.5 LBS | HEART RATE: 62 BPM | BODY MASS INDEX: 27.7 KG/M2 | DIASTOLIC BLOOD PRESSURE: 89 MMHG | HEIGHT: 67 IN

## 2018-10-02 DIAGNOSIS — M47.817 LUMBAR AND SACRAL ARTHRITIS: ICD-10-CM

## 2018-10-02 DIAGNOSIS — R94.6 ABNORMAL RESULTS OF THYROID FUNCTION STUDIES: ICD-10-CM

## 2018-10-02 DIAGNOSIS — B37.9 YEAST INFECTION: ICD-10-CM

## 2018-10-02 DIAGNOSIS — L40.50 PSA (PSORIATIC ARTHRITIS): ICD-10-CM

## 2018-10-02 DIAGNOSIS — M06.9 RHEUMATOID ARTHRITIS, INVOLVING UNSPECIFIED SITE, UNSPECIFIED RHEUMATOID FACTOR PRESENCE: ICD-10-CM

## 2018-10-02 PROCEDURE — 99999 PR PBB SHADOW E&M-EST. PATIENT-LVL IV: CPT | Mod: PBBFAC,,, | Performed by: INTERNAL MEDICINE

## 2018-10-02 PROCEDURE — 99214 OFFICE O/P EST MOD 30 MIN: CPT | Mod: 25,S$PBB,, | Performed by: INTERNAL MEDICINE

## 2018-10-02 PROCEDURE — 99214 OFFICE O/P EST MOD 30 MIN: CPT | Mod: PBBFAC,PO | Performed by: INTERNAL MEDICINE

## 2018-10-02 PROCEDURE — 96372 THER/PROPH/DIAG INJ SC/IM: CPT | Mod: PBBFAC,PO

## 2018-10-02 RX ORDER — LISINOPRIL 10 MG/1
10 TABLET ORAL DAILY
Qty: 90 TABLET | Refills: 0 | Status: SHIPPED | OUTPATIENT
Start: 2018-10-02 | End: 2019-01-07

## 2018-10-02 RX ORDER — METHOTREXATE 2.5 MG/1
10 TABLET ORAL WEEKLY
Qty: 16 TABLET | Refills: 3 | Status: SHIPPED | OUTPATIENT
Start: 2018-10-02 | End: 2019-01-08

## 2018-10-02 RX ORDER — HYDROCODONE BITARTRATE AND ACETAMINOPHEN 10; 325 MG/1; MG/1
1 TABLET ORAL 3 TIMES DAILY PRN
Qty: 90 TABLET | Refills: 0 | Status: SHIPPED | OUTPATIENT
Start: 2018-10-02 | End: 2018-10-02 | Stop reason: SDUPTHER

## 2018-10-02 RX ORDER — CLOTRIMAZOLE AND BETAMETHASONE DIPROPIONATE 10; .5 MG/ML; MG/ML
LOTION TOPICAL 2 TIMES DAILY
Qty: 30 ML | Refills: 5 | Status: SHIPPED | OUTPATIENT
Start: 2018-10-02 | End: 2019-10-25

## 2018-10-02 RX ORDER — KETOROLAC TROMETHAMINE 30 MG/ML
60 INJECTION, SOLUTION INTRAMUSCULAR; INTRAVENOUS
Status: COMPLETED | OUTPATIENT
Start: 2018-10-02 | End: 2018-10-02

## 2018-10-02 RX ORDER — LIDOCAINE AND PRILOCAINE 25; 25 MG/G; MG/G
CREAM TOPICAL
COMMUNITY
Start: 2015-09-11 | End: 2019-03-11

## 2018-10-02 RX ORDER — CYANOCOBALAMIN 1000 UG/ML
1000 INJECTION, SOLUTION INTRAMUSCULAR; SUBCUTANEOUS
Status: COMPLETED | OUTPATIENT
Start: 2018-10-02 | End: 2018-10-02

## 2018-10-02 RX ORDER — FOLIC ACID 0.8 MG
800 TABLET ORAL DAILY
COMMUNITY
Start: 2016-04-05 | End: 2021-07-08

## 2018-10-02 RX ORDER — METHOTREXATE 2.5 MG/1
10 TABLET ORAL WEEKLY
COMMUNITY
Start: 2016-04-05 | End: 2018-10-02 | Stop reason: SDUPTHER

## 2018-10-02 RX ORDER — HYDROCODONE BITARTRATE AND ACETAMINOPHEN 10; 325 MG/1; MG/1
1 TABLET ORAL 3 TIMES DAILY PRN
Qty: 90 TABLET | Refills: 0 | Status: SHIPPED | OUTPATIENT
Start: 2018-11-02 | End: 2018-10-02 | Stop reason: SDUPTHER

## 2018-10-02 RX ORDER — HYDROCODONE BITARTRATE AND ACETAMINOPHEN 10; 325 MG/1; MG/1
1 TABLET ORAL 3 TIMES DAILY PRN
Qty: 90 TABLET | Refills: 0 | Status: SHIPPED | OUTPATIENT
Start: 2018-12-02 | End: 2019-01-01

## 2018-10-02 RX ORDER — METHYLPREDNISOLONE ACETATE 80 MG/ML
160 INJECTION, SUSPENSION INTRA-ARTICULAR; INTRALESIONAL; INTRAMUSCULAR; SOFT TISSUE
Status: COMPLETED | OUTPATIENT
Start: 2018-10-02 | End: 2018-10-02

## 2018-10-02 RX ADMIN — KETOROLAC TROMETHAMINE 60 MG: 60 INJECTION, SOLUTION INTRAMUSCULAR at 01:10

## 2018-10-02 RX ADMIN — CYANOCOBALAMIN 1000 MCG: 1000 INJECTION, SOLUTION INTRAMUSCULAR at 01:10

## 2018-10-02 RX ADMIN — METHYLPREDNISOLONE ACETATE 160 MG: 80 INJECTION, SUSPENSION INTRA-ARTICULAR; INTRALESIONAL; INTRAMUSCULAR; SOFT TISSUE at 01:10

## 2018-10-02 ASSESSMENT — ROUTINE ASSESSMENT OF PATIENT INDEX DATA (RAPID3)
MDHAQ FUNCTION SCORE: 1.1
PATIENT GLOBAL ASSESSMENT SCORE: 5.5
PSYCHOLOGICAL DISTRESS SCORE: .2
PAIN SCORE: 6
TOTAL RAPID3 SCORE: 5.05

## 2018-10-02 NOTE — PROGRESS NOTES
Administered 1 cc ( 1000 mcg/ml ) of b12 to the right upper outer gluteal. Informed of s/s to report verbalized understanding. No adverse reactions noted.    Lot # [id number]  Expiration nov 19    Administered 2 cc ( 80 mg/ml ) of depomedrol to the right upper outer gluteal. Informed of s/s to report verbalized understanding. No adverse reactions noted.    Lot # [id number]  Expiration 08/2019    Administered 2 cc ( 30 mg/ml ) of toradol to the left upper outer gluteal. Informed of s/s to report verbalized understanding. No adverse reactions noted.    Lot # [id number]  Expiration 1 dec 2019

## 2018-10-02 NOTE — PROGRESS NOTES
Subjective:       Patient ID: Rose Goff is a 68 y.o. female.    Chief Complaint: Follow-up    Hpi: PSA, RA,  On  cosentyx and MTX doing fair. Pain is located in multiple joints, both shoulder(s), both elbow(s), both wrist(s), both MCP(s): 1st, 2nd, 3rd, 4th and 5th, both PIP(s): 1st, 2nd, 3rd, 4th and 5th, both DIP(s): 1st and 2nd, both hip(s), both knee(s) and both MTP(s): 1st, 2nd, 3rd, 4th and 5th, is described as aching, pulsating, shooting and throbbing, and is constant, moderate .  Associated symptoms include: crepitation, decreased range of motion, edema, effusion, tenderness and warmth.      Review of Systems   Constitutional: Positive for fatigue. Negative for activity change, appetite change, chills, diaphoresis and unexpected weight change.   HENT: Negative for congestion, dental problem, ear discharge, ear pain, facial swelling, mouth sores, nosebleeds, postnasal drip, rhinorrhea, sinus pressure, sneezing, sore throat, tinnitus and voice change.    Eyes: Negative for photophobia, pain, discharge, redness and itching.   Respiratory: Negative for apnea, cough, chest tightness, shortness of breath and wheezing.    Cardiovascular: Positive for leg swelling. Negative for chest pain and palpitations.   Gastrointestinal: Negative for abdominal distention, abdominal pain, constipation, diarrhea, nausea and vomiting.   Endocrine: Negative for cold intolerance, heat intolerance, polydipsia and polyuria.   Genitourinary: Negative for decreased urine volume, difficulty urinating, flank pain, frequency, hematuria and urgency.   Musculoskeletal: Positive for arthralgias, back pain, gait problem, joint swelling, myalgias, neck pain and neck stiffness.   Skin: Negative for pallor, rash and wound.   Allergic/Immunologic: Negative for immunocompromised state.   Neurological: Negative for dizziness, tremors, weakness and numbness.   Hematological: Negative for adenopathy. Does not bruise/bleed easily.  "  Psychiatric/Behavioral: Negative for sleep disturbance. The patient is not nervous/anxious.          Objective:   BP (!) 149/89 (BP Location: Left arm, Patient Position: Sitting, BP Method: Medium (Automatic))   Pulse 62   Ht 5' 7" (1.702 m)   Wt 80 kg (176 lb 7.7 oz)   BMI 27.64 kg/m²      Physical Exam   Nursing note and vitals reviewed.  Constitutional: She is oriented to person, place, and time. No distress.   HENT:   Head: Normocephalic and atraumatic.   Mouth/Throat: Oropharynx is clear and moist.   Eyes: EOM are normal. Pupils are equal, round, and reactive to light.   Neck: Neck supple. No thyromegaly present.   Cardiovascular: Normal rate, regular rhythm and normal heart sounds.  Exam reveals no gallop and no friction rub.    No murmur heard.  Pulmonary/Chest: Breath sounds normal. She has no wheezes. She has no rales. She exhibits no tenderness.   Abdominal: There is no tenderness. There is no rebound and no guarding.       Right Side Rheumatological Exam     The patient is tender to palpation of the shoulder, elbow, wrist, knee, 1st PIP, 1st MCP, 2nd PIP, 2nd MCP, 3rd PIP, 3rd MCP, 4th PIP, 4th MCP, 5th PIP and 5th MCP    She has swelling of the 1st PIP, 1st MCP, 2nd PIP, 2nd MCP, 3rd PIP, 3rd MCP, 4th PIP, 4th MCP, 5th PIP and 5th MCP    Shoulder Exam   Tenderness Location: biceps tendon and clavicle    Range of Motion   Active abduction: abnormal   Adduction: abnormal  Sensation: normal    Knee Exam   Patellofemoral Crepitus: positive  Effusion: negative  Sensation: normal    Hip Exam   Tenderness Location: posterior, greater trochanter and lateral  Sensation: normal    Elbow/Wrist Exam   Tenderness Location: lateral epicondyle and medial epicondyle  Sensation: normal    Foot Exam   Right foot exam exhibits signs of inflamed dorsum  Right foot exam exhibits signs of no podagra, no tophus and no plantar fasciitis    Muscle Strength (0-5 scale):  : 4/5     Left Side Rheumatological Exam     The " patient is tender to palpation of the shoulder, elbow, wrist, knee, 1st PIP, 1st MCP, 2nd PIP, 2nd MCP, 3rd PIP, 3rd MCP, 4th PIP, 4th MCP, 5th PIP and 5th MCP.    She has swelling of the 1st PIP, 1st MCP, 2nd PIP, 2nd MCP, 3rd PIP, 3rd MCP, 4th PIP, 4th MCP, 5th PIP and 5th MCP    Shoulder Exam   Tenderness Location: biceps tendon and clavicle    Range of Motion   Active abduction: abnormal   Sensation: normal    Knee Exam     Patellofemoral Crepitus: positive  Effusion: negative  Sensation: normal    Hip Exam   Tenderness Location: posterior, greater trochanter and lateral  Sensation: normal    Elbow/Wrist Exam   Tenderness Location: lateral epicondyle and medial epicondyle  Sensation: normal    Foot Exam   Left foot exam exhibits signs of inflamed dorsum  Left foot exam exhibits signs of no podagra and no plantar fasciitis    Muscle Strength (0-5 scale):  :  4/5       Back/Neck Exam   General Inspection   Gait: normal       Tenderness Right paramedian tenderness of the Occ, Upper C-Spine, Lower L-Spine and SI Joint.Left paramedian tenderness of the Occ, Upper C-Spine, Lower L-Spine and SI Joint.      Comments:  15 out of 18 tender points    Lymphadenopathy:     She has no cervical adenopathy.   Neurological: She is alert and oriented to person, place, and time.   Skin: No rash noted. No erythema. No pallor.     Psychiatric: Mood and affect normal.   Musculoskeletal: She exhibits edema, tenderness and deformity.   L groin and  Thigh pain           Results for orders placed or performed in visit on 09/24/18   CBC auto differential   Result Value Ref Range    WBC 7.23 3.90 - 12.70 K/uL    RBC 3.87 (L) 4.00 - 5.40 M/uL    Hemoglobin 11.3 (L) 12.0 - 16.0 g/dL    Hematocrit 35.9 (L) 37.0 - 48.5 %    MCV 93 82 - 98 fL    MCH 29.2 27.0 - 31.0 pg    MCHC 31.5 (L) 32.0 - 36.0 g/dL    RDW 13.9 11.5 - 14.5 %    Platelets 291 150 - 350 K/uL    MPV 11.1 9.2 - 12.9 fL    Immature Granulocytes 0.1 0.0 - 0.5 %    Gran # (ANC)  4.4 1.8 - 7.7 K/uL    Immature Grans (Abs) 0.01 0.00 - 0.04 K/uL    Lymph # 2.0 1.0 - 4.8 K/uL    Mono # 0.5 0.3 - 1.0 K/uL    Eos # 0.2 0.0 - 0.5 K/uL    Baso # 0.09 0.00 - 0.20 K/uL    nRBC 0 0 /100 WBC    Gran% 60.6 38.0 - 73.0 %    Lymph% 27.4 18.0 - 48.0 %    Mono% 7.5 4.0 - 15.0 %    Eosinophil% 3.2 0.0 - 8.0 %    Basophil% 1.2 0.0 - 1.9 %    Differential Method Automated    Comprehensive metabolic panel   Result Value Ref Range    Sodium 137 136 - 145 mmol/L    Potassium 3.9 3.5 - 5.1 mmol/L    Chloride 104 95 - 110 mmol/L    CO2 25 23 - 29 mmol/L    Glucose 87 70 - 110 mg/dL    BUN, Bld 13 8 - 23 mg/dL    Creatinine 0.7 0.5 - 1.4 mg/dL    Calcium 8.7 8.7 - 10.5 mg/dL    Total Protein 7.2 6.0 - 8.4 g/dL    Albumin 3.7 3.5 - 5.2 g/dL    Total Bilirubin 0.4 0.1 - 1.0 mg/dL    Alkaline Phosphatase 73 55 - 135 U/L    AST 17 10 - 40 U/L    ALT 15 10 - 44 U/L    Anion Gap 8 8 - 16 mmol/L    eGFR if African American >60.0 >60 mL/min/1.73 m^2    eGFR if non African American >60.0 >60 mL/min/1.73 m^2       Assessment:       1. PSA (psoriatic arthritis)    2. Rheumatoid arthritis, involving unspecified site, unspecified rheumatoid factor presence    3. Yeast infection    4. Lumbar and sacral arthritis    5. Abnormal results of thyroid function studies             Plan:       Rose was seen today for follow-up.    Diagnoses and all orders for this visit:    PSA (psoriatic arthritis)  -     clotrimazole-betamethasone (LOTRISONE) lotion; Apply topically 2 (two) times daily.  -     Discontinue: HYDROcodone-acetaminophen (NORCO)  mg per tablet; Take 1 tablet by mouth 3 (three) times daily as needed.  -     lisinopril 10 MG tablet; Take 1 tablet (10 mg total) by mouth once daily.  -     methotrexate 2.5 MG Tab; Take 4 tablets (10 mg total) by mouth once a week.  -     cyanocobalamin injection 1,000 mcg; Inject 1 mL (1,000 mcg total) into the muscle one time.  -     methylPREDNISolone acetate injection 160 mg; Inject  2 mLs (160 mg total) into the muscle one time.  -     ketorolac injection 60 mg; Inject 2 mLs (60 mg total) into the muscle one time.  -     Discontinue: HYDROcodone-acetaminophen (NORCO)  mg per tablet; Take 1 tablet by mouth 3 (three) times daily as needed.  -     HYDROcodone-acetaminophen (NORCO)  mg per tablet; Take 1 tablet by mouth 3 (three) times daily as needed.  -     CBC auto differential; Future  -     Comprehensive metabolic panel; Future  -     Sedimentation rate; Future  -     Rheumatoid factor; Future  -     Cyclic citrul peptide antibody, IgG; Future    Rheumatoid arthritis, involving unspecified site, unspecified rheumatoid factor presence  -     clotrimazole-betamethasone (LOTRISONE) lotion; Apply topically 2 (two) times daily.  -     Discontinue: HYDROcodone-acetaminophen (NORCO)  mg per tablet; Take 1 tablet by mouth 3 (three) times daily as needed.  -     lisinopril 10 MG tablet; Take 1 tablet (10 mg total) by mouth once daily.  -     methotrexate 2.5 MG Tab; Take 4 tablets (10 mg total) by mouth once a week.  -     cyanocobalamin injection 1,000 mcg; Inject 1 mL (1,000 mcg total) into the muscle one time.  -     methylPREDNISolone acetate injection 160 mg; Inject 2 mLs (160 mg total) into the muscle one time.  -     ketorolac injection 60 mg; Inject 2 mLs (60 mg total) into the muscle one time.  -     Discontinue: HYDROcodone-acetaminophen (NORCO)  mg per tablet; Take 1 tablet by mouth 3 (three) times daily as needed.  -     HYDROcodone-acetaminophen (NORCO)  mg per tablet; Take 1 tablet by mouth 3 (three) times daily as needed.  -     CBC auto differential; Future  -     Comprehensive metabolic panel; Future  -     Sedimentation rate; Future  -     Rheumatoid factor; Future  -     Cyclic citrul peptide antibody, IgG; Future    Yeast infection  -     clotrimazole-betamethasone (LOTRISONE) lotion; Apply topically 2 (two) times daily.  -     Discontinue:  HYDROcodone-acetaminophen (NORCO)  mg per tablet; Take 1 tablet by mouth 3 (three) times daily as needed.  -     lisinopril 10 MG tablet; Take 1 tablet (10 mg total) by mouth once daily.  -     methotrexate 2.5 MG Tab; Take 4 tablets (10 mg total) by mouth once a week.  -     cyanocobalamin injection 1,000 mcg; Inject 1 mL (1,000 mcg total) into the muscle one time.  -     methylPREDNISolone acetate injection 160 mg; Inject 2 mLs (160 mg total) into the muscle one time.  -     ketorolac injection 60 mg; Inject 2 mLs (60 mg total) into the muscle one time.  -     Discontinue: HYDROcodone-acetaminophen (NORCO)  mg per tablet; Take 1 tablet by mouth 3 (three) times daily as needed.  -     HYDROcodone-acetaminophen (NORCO)  mg per tablet; Take 1 tablet by mouth 3 (three) times daily as needed.  -     CBC auto differential; Future  -     Comprehensive metabolic panel; Future  -     Sedimentation rate; Future  -     Rheumatoid factor; Future  -     Cyclic citrul peptide antibody, IgG; Future    Lumbar and sacral arthritis  -     clotrimazole-betamethasone (LOTRISONE) lotion; Apply topically 2 (two) times daily.  -     Discontinue: HYDROcodone-acetaminophen (NORCO)  mg per tablet; Take 1 tablet by mouth 3 (three) times daily as needed.  -     lisinopril 10 MG tablet; Take 1 tablet (10 mg total) by mouth once daily.  -     methotrexate 2.5 MG Tab; Take 4 tablets (10 mg total) by mouth once a week.  -     cyanocobalamin injection 1,000 mcg; Inject 1 mL (1,000 mcg total) into the muscle one time.  -     methylPREDNISolone acetate injection 160 mg; Inject 2 mLs (160 mg total) into the muscle one time.  -     ketorolac injection 60 mg; Inject 2 mLs (60 mg total) into the muscle one time.  -     Discontinue: HYDROcodone-acetaminophen (NORCO)  mg per tablet; Take 1 tablet by mouth 3 (three) times daily as needed.  -     HYDROcodone-acetaminophen (NORCO)  mg per tablet; Take 1 tablet by mouth 3  (three) times daily as needed.  -     CBC auto differential; Future  -     Comprehensive metabolic panel; Future  -     Sedimentation rate; Future  -     Rheumatoid factor; Future  -     Cyclic citrul peptide antibody, IgG; Future    Abnormal results of thyroid function studies   -     clotrimazole-betamethasone (LOTRISONE) lotion; Apply topically 2 (two) times daily.  -     Discontinue: HYDROcodone-acetaminophen (NORCO)  mg per tablet; Take 1 tablet by mouth 3 (three) times daily as needed.  -     lisinopril 10 MG tablet; Take 1 tablet (10 mg total) by mouth once daily.  -     methotrexate 2.5 MG Tab; Take 4 tablets (10 mg total) by mouth once a week.  -     cyanocobalamin injection 1,000 mcg; Inject 1 mL (1,000 mcg total) into the muscle one time.  -     methylPREDNISolone acetate injection 160 mg; Inject 2 mLs (160 mg total) into the muscle one time.  -     ketorolac injection 60 mg; Inject 2 mLs (60 mg total) into the muscle one time.  -     Discontinue: HYDROcodone-acetaminophen (NORCO)  mg per tablet; Take 1 tablet by mouth 3 (three) times daily as needed.  -     HYDROcodone-acetaminophen (NORCO)  mg per tablet; Take 1 tablet by mouth 3 (three) times daily as needed.  -     CBC auto differential; Future  -     Comprehensive metabolic panel; Future  -     Sedimentation rate; Future  -     Rheumatoid factor; Future  -     Cyclic citrul peptide antibody, IgG; Future        Continue cosentyx  We may return to remicade  if  Not helping

## 2018-10-09 ENCOUNTER — TELEPHONE (OUTPATIENT)
Dept: PHARMACY | Facility: CLINIC | Age: 68
End: 2018-10-09

## 2018-10-31 ENCOUNTER — TELEPHONE (OUTPATIENT)
Dept: PHARMACY | Facility: CLINIC | Age: 68
End: 2018-10-31

## 2018-11-02 ENCOUNTER — TELEPHONE (OUTPATIENT)
Dept: PHARMACY | Facility: CLINIC | Age: 68
End: 2018-11-02

## 2018-11-07 RX ORDER — SUMATRIPTAN SUCCINATE 100 MG/1
TABLET ORAL
Qty: 18 TABLET | Refills: 0 | Status: SHIPPED | OUTPATIENT
Start: 2018-11-07 | End: 2018-12-20 | Stop reason: SDUPTHER

## 2018-11-13 ENCOUNTER — CLINICAL SUPPORT (OUTPATIENT)
Dept: RHEUMATOLOGY | Facility: CLINIC | Age: 68
End: 2018-11-13
Payer: MEDICARE

## 2018-11-13 VITALS
WEIGHT: 175.69 LBS | DIASTOLIC BLOOD PRESSURE: 73 MMHG | HEIGHT: 67 IN | SYSTOLIC BLOOD PRESSURE: 128 MMHG | HEART RATE: 60 BPM | BODY MASS INDEX: 27.57 KG/M2

## 2018-11-13 DIAGNOSIS — M06.9 RHEUMATOID ARTHRITIS, INVOLVING UNSPECIFIED SITE, UNSPECIFIED RHEUMATOID FACTOR PRESENCE: ICD-10-CM

## 2018-11-13 DIAGNOSIS — L40.50 PSA (PSORIATIC ARTHRITIS): Primary | ICD-10-CM

## 2018-11-13 PROCEDURE — 99999 PR PBB SHADOW E&M-EST. PATIENT-LVL IV: CPT | Mod: PBBFAC,,,

## 2018-11-13 PROCEDURE — 99211 OFF/OP EST MAY X REQ PHY/QHP: CPT | Mod: S$PBB,,, | Performed by: INTERNAL MEDICINE

## 2018-11-13 PROCEDURE — 99214 OFFICE O/P EST MOD 30 MIN: CPT | Mod: PBBFAC,PO

## 2018-11-13 PROCEDURE — 96372 THER/PROPH/DIAG INJ SC/IM: CPT | Mod: PBBFAC,PO

## 2018-11-13 RX ORDER — KETOROLAC TROMETHAMINE 30 MG/ML
60 INJECTION, SOLUTION INTRAMUSCULAR; INTRAVENOUS
Status: COMPLETED | OUTPATIENT
Start: 2018-11-13 | End: 2018-11-13

## 2018-11-13 RX ORDER — METHYLPREDNISOLONE ACETATE 80 MG/ML
160 INJECTION, SUSPENSION INTRA-ARTICULAR; INTRALESIONAL; INTRAMUSCULAR; SOFT TISSUE
Status: COMPLETED | OUTPATIENT
Start: 2018-11-13 | End: 2018-11-13

## 2018-11-13 RX ORDER — SIMVASTATIN 40 MG/1
40 TABLET, FILM COATED ORAL NIGHTLY
Qty: 90 TABLET | Refills: 0 | Status: SHIPPED | OUTPATIENT
Start: 2018-11-13 | End: 2019-01-17 | Stop reason: SDUPTHER

## 2018-11-13 RX ORDER — CYANOCOBALAMIN 1000 UG/ML
1000 INJECTION, SOLUTION INTRAMUSCULAR; SUBCUTANEOUS
Status: COMPLETED | OUTPATIENT
Start: 2018-11-13 | End: 2018-11-13

## 2018-11-13 RX ADMIN — METHYLPREDNISOLONE ACETATE 160 MG: 80 INJECTION, SUSPENSION INTRA-ARTICULAR; INTRALESIONAL; INTRAMUSCULAR; SOFT TISSUE at 10:11

## 2018-11-13 RX ADMIN — KETOROLAC TROMETHAMINE 60 MG: 60 INJECTION, SOLUTION INTRAMUSCULAR at 10:11

## 2018-11-13 RX ADMIN — CYANOCOBALAMIN 1000 MCG: 1000 INJECTION, SOLUTION INTRAMUSCULAR at 10:11

## 2018-11-13 NOTE — PROGRESS NOTES
Patient presented to clinic needing nurse injections. Complaining of level 7 out of 10 pain generalized. Informed nurse that had abscessed teeth and was on antibiotics. Nurse informed patient to hold cosenytx while on antibiotics. Form given to patient with instructions on when to hold medication. Discussed symptoms with Dr Benjamin labs reviewed verbal given for B12 1000 mcg, 60 mg Toradol and 160 mg of DepoMedrol.     Administered 1 cc ( 1000 mcg/ml ) of b12 to the right upper outer gluteal. Informed of s/s to report verbalized understanding. No adverse reactions noted.    Lot # [id number]  Expiration nov 19    Administered 2 cc ( 80 mg/ml ) of depomedrol to the right upper outer gluteal. Informed of s/s to report verbalized understanding. No adverse reactions noted.    Lot # [id number]  Expiration 11/19    Administered 2 cc ( 30 mg/ml ) of toradol to the left upper outer gluteal. Informed of s/s to report verbalized understanding. No adverse reactions noted.    Lot # [id number]  Expiration 1 dec 2019

## 2018-11-19 DIAGNOSIS — L40.50 PSA (PSORIATIC ARTHRITIS): ICD-10-CM

## 2018-11-19 DIAGNOSIS — M06.9 RHEUMATOID ARTHRITIS, INVOLVING UNSPECIFIED SITE, UNSPECIFIED RHEUMATOID FACTOR PRESENCE: ICD-10-CM

## 2018-11-19 DIAGNOSIS — B37.9 YEAST INFECTION: ICD-10-CM

## 2018-11-21 RX ORDER — ZOLPIDEM TARTRATE 10 MG/1
TABLET ORAL
Qty: 30 TABLET | Refills: 0 | Status: SHIPPED | OUTPATIENT
Start: 2018-11-21 | End: 2019-01-05 | Stop reason: SDUPTHER

## 2018-11-26 ENCOUNTER — TELEPHONE (OUTPATIENT)
Dept: PHARMACY | Facility: CLINIC | Age: 68
End: 2018-11-26

## 2018-11-28 ENCOUNTER — TELEPHONE (OUTPATIENT)
Dept: PHARMACY | Facility: CLINIC | Age: 68
End: 2018-11-28

## 2018-11-30 ENCOUNTER — TELEPHONE (OUTPATIENT)
Dept: PHARMACY | Facility: CLINIC | Age: 68
End: 2018-11-30

## 2018-12-06 ENCOUNTER — TELEPHONE (OUTPATIENT)
Dept: PHARMACY | Facility: CLINIC | Age: 68
End: 2018-12-06

## 2018-12-20 NOTE — TELEPHONE ENCOUNTER
----- Message from Dayami Patel sent at 12/20/2018  3:28 PM CST -----  Contact: self  Type:  RX Refill Request    Who Called:  self  Refill or New Rx:  refill  RX Name and Strength:  sumatriptan (IMITREX) 100 MG tablet;HYDROcodone-acetaminophen (NORCO)  mg per tablet  How is the patient currently taking it? (ex. 1XDay):    Is this a 30 day or 90 day RX:  30  Preferred Pharmacy with phone number:  [facility]  Local or Mail Order:  local  Ordering Provider:  Dr Sourav Roblero Call Back Number:  [phone]  Additional Information:  Patient would like to get it before 01/01/18 due to her deducible. Please call patient. Thanks!   [facility] [address] [place] [address] AT [facility] [address]  [address] [place] [id number]  Phone: [phone] Fax: [phone]

## 2018-12-21 RX ORDER — SUMATRIPTAN SUCCINATE 100 MG/1
TABLET ORAL
Qty: 18 TABLET | Refills: 0 | Status: SHIPPED | OUTPATIENT
Start: 2018-12-21 | End: 2019-03-11 | Stop reason: SDUPTHER

## 2019-01-05 DIAGNOSIS — B37.9 YEAST INFECTION: ICD-10-CM

## 2019-01-05 DIAGNOSIS — M06.9 RHEUMATOID ARTHRITIS, INVOLVING UNSPECIFIED SITE, UNSPECIFIED RHEUMATOID FACTOR PRESENCE: ICD-10-CM

## 2019-01-05 DIAGNOSIS — L40.50 PSA (PSORIATIC ARTHRITIS): ICD-10-CM

## 2019-01-07 ENCOUNTER — CLINICAL SUPPORT (OUTPATIENT)
Dept: RHEUMATOLOGY | Facility: CLINIC | Age: 69
End: 2019-01-07
Payer: MEDICARE

## 2019-01-07 VITALS
BODY MASS INDEX: 27.41 KG/M2 | HEART RATE: 60 BPM | DIASTOLIC BLOOD PRESSURE: 74 MMHG | SYSTOLIC BLOOD PRESSURE: 128 MMHG | WEIGHT: 174.63 LBS | HEIGHT: 67 IN

## 2019-01-07 DIAGNOSIS — L40.50 PSA (PSORIATIC ARTHRITIS): Primary | ICD-10-CM

## 2019-01-07 DIAGNOSIS — L40.50 PSORIATIC ARTHRITIS: Primary | ICD-10-CM

## 2019-01-07 DIAGNOSIS — M06.9 RHEUMATOID ARTHRITIS, INVOLVING UNSPECIFIED SITE, UNSPECIFIED RHEUMATOID FACTOR PRESENCE: ICD-10-CM

## 2019-01-07 PROCEDURE — 99999 PR PBB SHADOW E&M-EST. PATIENT-LVL IV: CPT | Mod: PBBFAC,,,

## 2019-01-07 PROCEDURE — 99211 OFF/OP EST MAY X REQ PHY/QHP: CPT | Mod: S$PBB,,, | Performed by: INTERNAL MEDICINE

## 2019-01-07 PROCEDURE — 96372 THER/PROPH/DIAG INJ SC/IM: CPT | Mod: PBBFAC,PO

## 2019-01-07 PROCEDURE — 99211 PR OFFICE/OUTPT VISIT, EST, LEVL I: ICD-10-PCS | Mod: S$PBB,,, | Performed by: INTERNAL MEDICINE

## 2019-01-07 PROCEDURE — 99999 PR PBB SHADOW E&M-EST. PATIENT-LVL IV: ICD-10-PCS | Mod: PBBFAC,,,

## 2019-01-07 PROCEDURE — 99214 OFFICE O/P EST MOD 30 MIN: CPT | Mod: PBBFAC,PO

## 2019-01-07 RX ORDER — KETOROLAC TROMETHAMINE 30 MG/ML
60 INJECTION, SOLUTION INTRAMUSCULAR; INTRAVENOUS
Status: COMPLETED | OUTPATIENT
Start: 2019-01-07 | End: 2019-01-07

## 2019-01-07 RX ORDER — CYANOCOBALAMIN 1000 UG/ML
1000 INJECTION, SOLUTION INTRAMUSCULAR; SUBCUTANEOUS
Status: COMPLETED | OUTPATIENT
Start: 2019-01-07 | End: 2019-01-07

## 2019-01-07 RX ORDER — ZOLPIDEM TARTRATE 10 MG/1
TABLET ORAL
Qty: 30 TABLET | Refills: 3 | Status: SHIPPED | OUTPATIENT
Start: 2019-01-07 | End: 2019-06-25 | Stop reason: SDUPTHER

## 2019-01-07 RX ORDER — HYDROCODONE BITARTRATE AND ACETAMINOPHEN 10; 325 MG/1; MG/1
1 TABLET ORAL
COMMUNITY
End: 2019-01-07 | Stop reason: SDUPTHER

## 2019-01-07 RX ORDER — METHYLPREDNISOLONE ACETATE 80 MG/ML
160 INJECTION, SUSPENSION INTRA-ARTICULAR; INTRALESIONAL; INTRAMUSCULAR; SOFT TISSUE
Status: COMPLETED | OUTPATIENT
Start: 2019-01-07 | End: 2019-01-07

## 2019-01-07 RX ADMIN — KETOROLAC TROMETHAMINE 60 MG: 60 INJECTION, SOLUTION INTRAMUSCULAR at 10:01

## 2019-01-07 RX ADMIN — METHYLPREDNISOLONE ACETATE 160 MG: 80 INJECTION, SUSPENSION INTRA-ARTICULAR; INTRALESIONAL; INTRAMUSCULAR; SOFT TISSUE at 10:01

## 2019-01-07 RX ADMIN — CYANOCOBALAMIN 1000 MCG: 1000 INJECTION, SOLUTION INTRAMUSCULAR at 10:01

## 2019-01-07 NOTE — PROGRESS NOTES
Patient presented to clinic needing nurse injections. Complaining of level 5 out of 10 to both hips. Discussed symptoms with Dr Benjamin labs reviewed. Received verbal order for b12 1000 mcg, 60 mg toradol and 160 mg depomedrol. Scheduled appointment with Codi BRUNO tomorrow to discuss possible remicade treatments. Stated cosentyx no longer helping. Patient aware of time of appointment tomorrow.    Administered 1 cc ( 1000 mcg/ml ) of b12 to the right upper outer gluteal. Informed of s/s to report verbalized understanding. No adverse reactions noted.    Lot # [id number]  Expiration mar 20    Administered 2 cc ( 80 mg/ml ) of depomedrol to the right upper outer gluteal. Informed of s/s to report verbalized understanding. No adverse reactions noted.    Lot # [id number]  Expiration 01/20    Administered 2 cc ( 30 mg/ml ) of toradol to the left upper outer gluteal. Informed of s/s to report verbalized understanding. No adverse reactions noted.    Lot # [id number]  Expiration 1 feb 2020

## 2019-01-08 ENCOUNTER — OFFICE VISIT (OUTPATIENT)
Dept: RHEUMATOLOGY | Facility: CLINIC | Age: 69
End: 2019-01-08
Payer: MEDICARE

## 2019-01-08 VITALS
HEIGHT: 67 IN | BODY MASS INDEX: 27.41 KG/M2 | WEIGHT: 174.63 LBS | DIASTOLIC BLOOD PRESSURE: 83 MMHG | HEART RATE: 75 BPM | SYSTOLIC BLOOD PRESSURE: 131 MMHG

## 2019-01-08 DIAGNOSIS — D64.9 NORMOCYTIC ANEMIA: ICD-10-CM

## 2019-01-08 DIAGNOSIS — L40.50 PSORIATIC ARTHRITIS: Primary | ICD-10-CM

## 2019-01-08 DIAGNOSIS — D84.821 IMMUNOCOMPROMISED STATE DUE TO DRUG THERAPY: ICD-10-CM

## 2019-01-08 DIAGNOSIS — Z79.899 IMMUNOCOMPROMISED STATE DUE TO DRUG THERAPY: ICD-10-CM

## 2019-01-08 DIAGNOSIS — L40.9 PSORIASIS: ICD-10-CM

## 2019-01-08 DIAGNOSIS — F32.9 MAJOR DEPRESSION, CHRONIC: ICD-10-CM

## 2019-01-08 PROCEDURE — 99215 OFFICE O/P EST HI 40 MIN: CPT | Mod: PBBFAC,PO | Performed by: PHYSICIAN ASSISTANT

## 2019-01-08 PROCEDURE — 99999 PR PBB SHADOW E&M-EST. PATIENT-LVL V: ICD-10-PCS | Mod: PBBFAC,,, | Performed by: PHYSICIAN ASSISTANT

## 2019-01-08 PROCEDURE — 99214 PR OFFICE/OUTPT VISIT, EST, LEVL IV, 30-39 MIN: ICD-10-PCS | Mod: S$PBB,,, | Performed by: PHYSICIAN ASSISTANT

## 2019-01-08 PROCEDURE — 99999 PR PBB SHADOW E&M-EST. PATIENT-LVL V: CPT | Mod: PBBFAC,,, | Performed by: PHYSICIAN ASSISTANT

## 2019-01-08 PROCEDURE — 99214 OFFICE O/P EST MOD 30 MIN: CPT | Mod: S$PBB,,, | Performed by: PHYSICIAN ASSISTANT

## 2019-01-08 RX ORDER — ESCITALOPRAM OXALATE 20 MG/1
20 TABLET ORAL DAILY
Qty: 30 TABLET | Refills: 5 | Status: SHIPPED | OUTPATIENT
Start: 2019-01-08 | End: 2019-06-25 | Stop reason: SDUPTHER

## 2019-01-08 RX ORDER — HYDROCODONE BITARTRATE AND ACETAMINOPHEN 10; 325 MG/1; MG/1
1 TABLET ORAL EVERY 8 HOURS PRN
Qty: 90 TABLET | Refills: 0 | Status: SHIPPED | OUTPATIENT
Start: 2019-01-08 | End: 2019-02-04 | Stop reason: SDUPTHER

## 2019-01-08 ASSESSMENT — ROUTINE ASSESSMENT OF PATIENT INDEX DATA (RAPID3)
PAIN SCORE: 7
PATIENT GLOBAL ASSESSMENT SCORE: 6
PSYCHOLOGICAL DISTRESS SCORE: 4.4
MDHAQ FUNCTION SCORE: .8
TOTAL RAPID3 SCORE: 5.22

## 2019-01-08 NOTE — PROGRESS NOTES
"Subjective:       Patient ID: Rose Goff is a 68 y.o. female.    Chief Complaint: Follow-up    Mrs. Goff is a 68 year old female who presents to clinic for follow up on psoriatic arthritis. She is a new patient to me. She has not been doing well and was seen in clinic yesterday for injections (toradol/depomedrol). She does not feel cosentyx has worked well for arthralgias, although it has worked well for clearing her skin. Overall, Humira was more effective with helping manage her symptoms. She discontinued MTX several months ago due to side effects --"not feeling well."  Today she complains of pain involving MCPs, PIPs, wrists, and shoulders. Pain is constant and aching in nature. She complains of worsening depression and December was a difficult month for her as it was the 1 year anniversary of the loss of her grandchild. She is having low energy and anhedonia on lexapro 10 mg daily. She is open to counseling. She needs refill of pain medication today. She is hesitant to try remicade infusions.       Prior treatments: Humira, Enbrel, Cimzia, Simponi, MTX, Cosentyx       Review of Systems   Constitutional: Positive for activity change. Negative for appetite change, chills, fatigue and fever.   Eyes: Negative for visual disturbance.   Respiratory: Negative for cough and shortness of breath.    Cardiovascular: Negative for chest pain, palpitations and leg swelling.   Gastrointestinal: Negative for abdominal pain, constipation, diarrhea, nausea and vomiting.   Musculoskeletal: Positive for arthralgias, joint swelling and myalgias. Negative for neck pain and neck stiffness.   Skin: Negative for rash.   Neurological: Negative for dizziness, weakness, light-headedness and headaches.   Psychiatric/Behavioral: Positive for dysphoric mood. Negative for decreased concentration, sleep disturbance and suicidal ideas. The patient is nervous/anxious.          Objective:     Vitals:    01/08/19 0833   BP: 131/83 "   Pulse: 75       Past Medical History:   Diagnosis Date    Abnormal mammogram     Amaurosis fugax     Anemia     Back pain     Chest pain     Degenerative cervical disc     Degenerative disc disease, thoracic     Depression     Depression     Fatigue     GERD (gastroesophageal reflux disease)     Head ache     Hemorrhoids     Hyperlipidemia     Hypertension     Hypoglycemia     Hypothyroidism     Hypothyroidism     Insomnia     Menopausal state     Neck pain     Neuralgia     Nonrheumatic tricuspid valve disorder     Optic neuritis     Palpitations     PONV (postoperative nausea and vomiting)     Psoriasis     Psoriatic arthropathy     Rosacea     Thyroid nodule      Past Surgical History:   Procedure Laterality Date    ANKLE FRACTURE SURGERY      APPENDECTOMY      BREAST BIOPSY Left     Negative    HEART CATH-LEFT Left 5/16/2017    Performed by [facility]id [facility] at [facility] CATH    HYSTERECTOMY      OOPHORECTOMY      RHIZOTOMY W/ RADIOFREQUENCY ABLATION Right     lumbar    thyroid tumor removed            Physical Exam   Constitutional: She is well-developed, well-nourished, and in no distress.   Eyes: Right conjunctiva is not injected. Left conjunctiva is not injected. Right eye exhibits normal extraocular motion. Left eye exhibits normal extraocular motion.   Neck: No JVD present. No thyromegaly present.   Cardiovascular: Normal rate and regular rhythm.  Exam reveals no decreased pulses.    Pulmonary/Chest: She has no wheezes. She has no rhonchi. She has no rales.       Right Side Rheumatological Exam     Examination finds the shoulder, 1st PIP, 2nd PIP, 3rd PIP, 3rd MCP, 4th PIP, 4th MCP, 5th PIP and 5th MCP normal.    The patient is tender to palpation of the elbow, wrist, knee, 1st MCP and 2nd MCP    She has swelling of the wrist and knee    Left Side Rheumatological Exam     Examination finds the shoulder, 1st PIP, 2nd PIP, 3rd PIP, 3rd MCP, 4th PIP, 4th MCP, 5th PIP and  5th MCP normal.    The patient is tender to palpation of the elbow, wrist, knee, 1st MCP and 2nd MCP.    She has swelling of the wrist      Lymphadenopathy:     She has no cervical adenopathy.   Neurological: Gait normal.   Skin: No rash (no active psoriasis) noted.     Psychiatric: Affect normal. She exhibits a depressed mood.       Reviewed recent labs with the patient in detail:    Component      Latest Ref Rng & Units 9/24/2018 5/30/2018              WBC      3.90 - 12.70 K/uL 7.23    RBC      4.00 - 5.40 M/uL 3.87 (L)    Hemoglobin      12.0 - 16.0 g/dL 11.3 (L)    Hematocrit      37.0 - 48.5 % 35.9 (L)    MCV      82 - 98 fL 93    MCH      27.0 - 31.0 pg 29.2    MCHC      32.0 - 36.0 g/dL 31.5 (L)    RDW      11.5 - 14.5 % 13.9    Platelets      150 - 350 K/uL 291    MPV      9.2 - 12.9 fL 11.1    Immature Granulocytes      0.0 - 0.5 % 0.1    Gran # (ANC)      1.8 - 7.7 K/uL 4.4    Immature Grans (Abs)      0.00 - 0.04 K/uL 0.01    Lymph #      1.0 - 4.8 K/uL 2.0    Mono #      0.3 - 1.0 K/uL 0.5    Eos #      0.0 - 0.5 K/uL 0.2    Baso #      0.00 - 0.20 K/uL 0.09    nRBC      0 /100 WBC 0    Gran%      38.0 - 73.0 % 60.6    Lymph%      18.0 - 48.0 % 27.4    Mono%      4.0 - 15.0 % 7.5    Eosinophil%      0.0 - 8.0 % 3.2    Basophil%      0.0 - 1.9 % 1.2    Differential Method       Automated    Sodium      136 - 145 mmol/L 137    Potassium      3.5 - 5.1 mmol/L 3.9    Chloride      95 - 110 mmol/L 104    CO2      23 - 29 mmol/L 25    Glucose      70 - 110 mg/dL 87    BUN, Bld      8 - 23 mg/dL 13    Creatinine      0.5 - 1.4 mg/dL 0.7    Calcium      8.7 - 10.5 mg/dL 8.7    Total Protein      6.0 - 8.4 g/dL 7.2    Albumin      3.5 - 5.2 g/dL 3.7    Total Bilirubin      0.1 - 1.0 mg/dL 0.4    Alkaline Phosphatase      55 - 135 U/L 73    AST      10 - 40 U/L 17    ALT      10 - 44 U/L 15    Anion Gap      8 - 16 mmol/L 8    eGFR if African American      >60 mL/min/1.73 m:2 >60.0    eGFR if non African  American      >60 mL/min/1.73 m:2 >60.0    Hepatitis B Surface Ag           Hep B C IgM           Hep A IgM           Hepatitis C Ab           T-SPOT TB Screening Test           CRP      0.0 - 8.2 mg/L  1.6   Sed Rate      0 - 20 mm/Hr  5     Component      Latest Ref Rng & Units 5/10/2018 5/4/2018 5/4/2018            8:59 AM  8:59 AM   WBC      3.90 - 12.70 K/uL      RBC      4.00 - 5.40 M/uL      Hemoglobin      12.0 - 16.0 g/dL      Hematocrit      37.0 - 48.5 %      MCV      82 - 98 fL      MCH      27.0 - 31.0 pg      MCHC      32.0 - 36.0 g/dL      RDW      11.5 - 14.5 %      Platelets      150 - 350 K/uL      MPV      9.2 - 12.9 fL      Immature Granulocytes      0.0 - 0.5 %      Gran # (ANC)      1.8 - 7.7 K/uL      Immature Grans (Abs)      0.00 - 0.04 K/uL      Lymph #      1.0 - 4.8 K/uL      Mono #      0.3 - 1.0 K/uL      Eos #      0.0 - 0.5 K/uL      Baso #      0.00 - 0.20 K/uL      nRBC      0 /100 WBC      Gran%      38.0 - 73.0 %      Lymph%      18.0 - 48.0 %      Mono%      4.0 - 15.0 %      Eosinophil%      0.0 - 8.0 %      Basophil%      0.0 - 1.9 %      Differential Method            Sodium      136 - 145 mmol/L      Potassium      3.5 - 5.1 mmol/L      Chloride      95 - 110 mmol/L      CO2      23 - 29 mmol/L      Glucose      70 - 110 mg/dL      BUN, Bld      8 - 23 mg/dL      Creatinine      0.5 - 1.4 mg/dL      Calcium      8.7 - 10.5 mg/dL      Total Protein      6.0 - 8.4 g/dL      Albumin      3.5 - 5.2 g/dL      Total Bilirubin      0.1 - 1.0 mg/dL      Alkaline Phosphatase      55 - 135 U/L      AST      10 - 40 U/L      ALT      10 - 44 U/L      Anion Gap      8 - 16 mmol/L      eGFR if African American      >60 mL/min/1.73 m:2      eGFR if non African American      >60 mL/min/1.73 m:2      Hepatitis B Surface Ag        Negative    Hep B C IgM        Negative Negative   Hep A IgM        Negative    Hepatitis C Ab        Negative    T-SPOT TB Screening Test       See result image  under hyperlink     CRP      0.0 - 8.2 mg/L      Sed Rate      0 - 20 mm/Hr        Assessment:       1. Psoriatic arthritis    2. Psoriasis    3. Immunocompromised state due to drug therapy    4. Normocytic anemia    5. Major depression, chronic            Plan:       Psoriatic arthritis  -     tofacitinib (XELJANZ XR) 11 mg Tb24; Take 11 mg by mouth once daily.  Dispense: 30 tablet; Refill: 5    Psoriasis    Immunocompromised state due to drug therapy    Normocytic anemia    Major depression, chronic  -     escitalopram oxalate (LEXAPRO) 20 MG tablet; Take 1 tablet (20 mg total) by mouth once daily.  Dispense: 30 tablet; Refill: 5  -     Ambulatory referral to Psychology        Plan:  1. D/C cosentyx, failed treatment  2. D/C MTX, pt self d/david 2 months ago  3. Start xeljanz 11 mg daily submitted to [facility] for authorization. Information provided to patient to review. If not covered by insurance, pt was to restart Humira.  4. Will repeat labs in 1 month to monitor anemia  5. Increase lexapro 20 mg daily and refer for counseling     Follow up: 8 weeks with Dr. Benjamin

## 2019-01-09 ENCOUNTER — TELEPHONE (OUTPATIENT)
Dept: PHARMACY | Facility: CLINIC | Age: 69
End: 2019-01-09

## 2019-01-17 RX ORDER — SIMVASTATIN 40 MG/1
TABLET, FILM COATED ORAL
Qty: 90 TABLET | Refills: 3 | Status: SHIPPED | OUTPATIENT
Start: 2019-01-17 | End: 2019-02-13 | Stop reason: SDUPTHER

## 2019-01-22 ENCOUNTER — TELEPHONE (OUTPATIENT)
Dept: PHARMACY | Facility: CLINIC | Age: 69
End: 2019-01-22

## 2019-01-22 NOTE — TELEPHONE ENCOUNTER
DOCUMENTATION ONLY:  Appeal for Xeljanz approved from 1/1/19 to 1/22/20    Co-pay: $8.50    Patient Assistance is not required.

## 2019-02-01 ENCOUNTER — TELEPHONE (OUTPATIENT)
Dept: RHEUMATOLOGY | Facility: CLINIC | Age: 69
End: 2019-02-01

## 2019-02-01 NOTE — TELEPHONE ENCOUNTER
----- Message from Jillian Acevedo PharmD sent at 2/1/2019  2:36 PM CST -----  Regarding: Xeljanz XR  Good afternoon,    [facility] has been attempting to reach Ms. Goff regarding prescription approval for Xeljanz XR. After numerous unsuccessful attempts, we are sending a postcard to the address on file. At this point in time, no further contact will be made from [facility] until patient responds to our mail correspondence.    If there is anything else we can do to further assist, please let us know.     Thank you,  Jillian Acevedo, Abigail  [facility]  [phone]

## 2019-02-04 ENCOUNTER — LAB VISIT (OUTPATIENT)
Dept: LAB | Facility: HOSPITAL | Age: 69
End: 2019-02-04
Attending: INTERNAL MEDICINE
Payer: MEDICARE

## 2019-02-04 ENCOUNTER — TELEPHONE (OUTPATIENT)
Dept: PHARMACY | Facility: CLINIC | Age: 69
End: 2019-02-04

## 2019-02-04 DIAGNOSIS — L40.50 PSORIATIC ARTHRITIS: ICD-10-CM

## 2019-02-04 DIAGNOSIS — M47.817 LUMBAR AND SACRAL ARTHRITIS: ICD-10-CM

## 2019-02-04 DIAGNOSIS — L40.50 PSA (PSORIATIC ARTHRITIS): ICD-10-CM

## 2019-02-04 DIAGNOSIS — M06.9 RHEUMATOID ARTHRITIS, INVOLVING UNSPECIFIED SITE, UNSPECIFIED RHEUMATOID FACTOR PRESENCE: ICD-10-CM

## 2019-02-04 DIAGNOSIS — B37.9 YEAST INFECTION: ICD-10-CM

## 2019-02-04 DIAGNOSIS — R94.6 ABNORMAL RESULTS OF THYROID FUNCTION STUDIES: ICD-10-CM

## 2019-02-04 LAB
ALBUMIN SERPL BCP-MCNC: 3.6 G/DL
ALP SERPL-CCNC: 84 U/L
ALT SERPL W/O P-5'-P-CCNC: 19 U/L
ANION GAP SERPL CALC-SCNC: 7 MMOL/L
AST SERPL-CCNC: 19 U/L
BASOPHILS # BLD AUTO: 0.07 K/UL
BASOPHILS NFR BLD: 1.1 %
BILIRUB SERPL-MCNC: 0.3 MG/DL
BUN SERPL-MCNC: 14 MG/DL
CALCIUM SERPL-MCNC: 8.6 MG/DL
CCP AB SER IA-ACNC: <0.5 U/ML
CHLORIDE SERPL-SCNC: 104 MMOL/L
CO2 SERPL-SCNC: 25 MMOL/L
CREAT SERPL-MCNC: 0.7 MG/DL
DIFFERENTIAL METHOD: ABNORMAL
EOSINOPHIL # BLD AUTO: 0.2 K/UL
EOSINOPHIL NFR BLD: 3.5 %
ERYTHROCYTE [DISTWIDTH] IN BLOOD BY AUTOMATED COUNT: 14.1 %
ERYTHROCYTE [SEDIMENTATION RATE] IN BLOOD BY WESTERGREN METHOD: 8 MM/HR
EST. GFR  (AFRICAN AMERICAN): >60 ML/MIN/1.73 M^2
EST. GFR  (NON AFRICAN AMERICAN): >60 ML/MIN/1.73 M^2
GLUCOSE SERPL-MCNC: 88 MG/DL
HCT VFR BLD AUTO: 36.6 %
HGB BLD-MCNC: 11.3 G/DL
IMM GRANULOCYTES # BLD AUTO: 0.02 K/UL
IMM GRANULOCYTES NFR BLD AUTO: 0.3 %
LYMPHOCYTES # BLD AUTO: 1.6 K/UL
LYMPHOCYTES NFR BLD: 24.7 %
MCH RBC QN AUTO: 29.5 PG
MCHC RBC AUTO-ENTMCNC: 30.9 G/DL
MCV RBC AUTO: 96 FL
MONOCYTES # BLD AUTO: 0.5 K/UL
MONOCYTES NFR BLD: 7.4 %
NEUTROPHILS # BLD AUTO: 4.2 K/UL
NEUTROPHILS NFR BLD: 63 %
NRBC BLD-RTO: 0 /100 WBC
PLATELET # BLD AUTO: 311 K/UL
PMV BLD AUTO: 10.7 FL
POTASSIUM SERPL-SCNC: 4 MMOL/L
PROT SERPL-MCNC: 7.2 G/DL
RBC # BLD AUTO: 3.83 M/UL
RHEUMATOID FACT SERPL-ACNC: <10 IU/ML
SODIUM SERPL-SCNC: 136 MMOL/L
WBC # BLD AUTO: 6.61 K/UL

## 2019-02-04 PROCEDURE — 86200 CCP ANTIBODY: CPT

## 2019-02-04 PROCEDURE — 85651 RBC SED RATE NONAUTOMATED: CPT | Mod: PO

## 2019-02-04 PROCEDURE — 85025 COMPLETE CBC W/AUTO DIFF WBC: CPT

## 2019-02-04 PROCEDURE — 86431 RHEUMATOID FACTOR QUANT: CPT

## 2019-02-04 PROCEDURE — 80053 COMPREHEN METABOLIC PANEL: CPT

## 2019-02-04 PROCEDURE — 36415 COLL VENOUS BLD VENIPUNCTURE: CPT | Mod: PO

## 2019-02-04 NOTE — TELEPHONE ENCOUNTER
Spoke to pt, advised [facility] has been trying to reach her with Xeljanz approval to set up delivery. Pt was not returning call as she thought it was not approved. Advised it was approved with a copay of $8.50. Gave [facility] phone number for her to set up delivery. Pt also request refill on her pain medication. Advised request would be submitted to Dr. Benjamin.    checked, last filled 01/08/19.  Last OV 10/2/18.

## 2019-02-04 NOTE — TELEPHONE ENCOUNTER
Initial Xejanz XR consult completed on . Xeljanz will be shipped on  to arrive at patient's home on  via [employer]. $8.50 copay. Patient will start Xeljanz XR on . Address confirmed, CC on file. Confirmed 2 patient identifiers - name and . Therapy Appropriate.     Patient was counseled on the administration directions for Xeljanz XR:  -Take 1 tablet (11mg) by mouth once daily, with or without food  -If dose is missed, skip the missed dose and go back to your normal time.  Do not take 2 doses at the same time or extra doses    Patient was counseled on the following possible side effects, which include, but are not limited to:   -diarrhea, headache, cold like symptoms - runny nose, stuffy nose, sore throat.  Contact provider if allergic reaction, changes in heartbeat, muscle pain or weakness, signs of infection, liver problems - dark urine, fatigue, light-colored stools, yellow skin or eyes.    Patient confirmed understanding. Patient did not have additional questions.  Patient will start Xeljanz XR on . Consultation included: indication; goals of treatment; administration; storage and handling; side effects; how to handle side effects; the importance of compliance; how to handle missed doses; the importance of laboratory monitoring; the importance of keeping all follow up appointments.  Patient understands to report any medication changes to [facility] and provider. All questions answered and addressed to patients satisfaction. I will f/u with her in 1 week from start, [facility] to contact patient in 3 weeks for refills.

## 2019-02-05 RX ORDER — HYDROCODONE BITARTRATE AND ACETAMINOPHEN 10; 325 MG/1; MG/1
1 TABLET ORAL EVERY 8 HOURS PRN
Qty: 90 TABLET | Refills: 0 | Status: SHIPPED | OUTPATIENT
Start: 2019-02-05 | End: 2019-03-11 | Stop reason: SDUPTHER

## 2019-03-07 DIAGNOSIS — M19.90 OSTEOARTHRITIS, UNSPECIFIED OSTEOARTHRITIS TYPE, UNSPECIFIED SITE: Primary | ICD-10-CM

## 2019-03-07 DIAGNOSIS — L40.50 PSORIATIC ARTHRITIS: ICD-10-CM

## 2019-03-07 RX ORDER — HYDROCODONE BITARTRATE AND ACETAMINOPHEN 10; 325 MG/1; MG/1
1 TABLET ORAL EVERY 8 HOURS PRN
Qty: 90 TABLET | Refills: 0 | Status: CANCELLED | OUTPATIENT
Start: 2019-03-07

## 2019-03-07 RX ORDER — SUMATRIPTAN SUCCINATE 100 MG/1
TABLET ORAL
Qty: 18 TABLET | Refills: 0 | Status: CANCELLED | OUTPATIENT
Start: 2019-03-07

## 2019-03-07 NOTE — TELEPHONE ENCOUNTER
Refill request for Sabinal forwarded to dr. Benjamin. Patient last filled 02/05. Last seen in office 01/08

## 2019-03-07 NOTE — TELEPHONE ENCOUNTER
----- Message from Estephania Thornton sent at 3/7/2019  3:34 PM CST -----  Contact: Patient  Type:  RX Refill Request    Who Called:  Patient   Refill or New Rx:  Refill  RX Name and Strength:    HYDROcodone-acetaminophen (NORCO)  mg per tablet  diclofenac sodium (VOLTAREN) 1 % Gel  sumatriptan (IMITREX) 100 MG tablet  How is the patient currently taking it? (ex. 1XDay):    Is this a 30 day or 90 day RX:    Preferred Pharmacy with phone number:    [id number] - [facility] [place] [address] [facility] [address] AT [facility] [address] [facility] [address] [place]  [id number] [place] [id number]  Phone: [phone] Fax: [phone]  Local or Mail Order:  Local  Ordering Provider:  Sourav Roblero Call Back Number:  [phone]  Additional Information:

## 2019-03-11 ENCOUNTER — OFFICE VISIT (OUTPATIENT)
Dept: RHEUMATOLOGY | Facility: CLINIC | Age: 69
End: 2019-03-11
Payer: MEDICARE

## 2019-03-11 VITALS
HEIGHT: 67 IN | DIASTOLIC BLOOD PRESSURE: 83 MMHG | WEIGHT: 175.94 LBS | HEART RATE: 69 BPM | RESPIRATION RATE: 13 BRPM | SYSTOLIC BLOOD PRESSURE: 133 MMHG | BODY MASS INDEX: 27.61 KG/M2

## 2019-03-11 DIAGNOSIS — M15.9 PRIMARY OSTEOARTHRITIS INVOLVING MULTIPLE JOINTS: ICD-10-CM

## 2019-03-11 DIAGNOSIS — L40.50 PSORIATIC ARTHRITIS: Primary | ICD-10-CM

## 2019-03-11 DIAGNOSIS — L40.50 PSORIATIC ARTHRITIS: ICD-10-CM

## 2019-03-11 DIAGNOSIS — M06.9 RHEUMATOID ARTHRITIS, INVOLVING UNSPECIFIED SITE, UNSPECIFIED RHEUMATOID FACTOR PRESENCE: ICD-10-CM

## 2019-03-11 PROCEDURE — 99214 OFFICE O/P EST MOD 30 MIN: CPT | Mod: PBBFAC,PO,25 | Performed by: INTERNAL MEDICINE

## 2019-03-11 PROCEDURE — 99999 PR PBB SHADOW E&M-EST. PATIENT-LVL IV: CPT | Mod: PBBFAC,,, | Performed by: INTERNAL MEDICINE

## 2019-03-11 PROCEDURE — 96372 THER/PROPH/DIAG INJ SC/IM: CPT | Mod: PBBFAC,PO

## 2019-03-11 PROCEDURE — 99214 OFFICE O/P EST MOD 30 MIN: CPT | Mod: 25,S$PBB,, | Performed by: INTERNAL MEDICINE

## 2019-03-11 PROCEDURE — 99214 PR OFFICE/OUTPT VISIT, EST, LEVL IV, 30-39 MIN: ICD-10-PCS | Mod: 25,S$PBB,, | Performed by: INTERNAL MEDICINE

## 2019-03-11 PROCEDURE — 99999 PR PBB SHADOW E&M-EST. PATIENT-LVL IV: ICD-10-PCS | Mod: PBBFAC,,, | Performed by: INTERNAL MEDICINE

## 2019-03-11 RX ORDER — METHYLPREDNISOLONE ACETATE 80 MG/ML
160 INJECTION, SUSPENSION INTRA-ARTICULAR; INTRALESIONAL; INTRAMUSCULAR; SOFT TISSUE
Status: COMPLETED | OUTPATIENT
Start: 2019-03-11 | End: 2019-03-11

## 2019-03-11 RX ORDER — KETOROLAC TROMETHAMINE 30 MG/ML
60 INJECTION, SOLUTION INTRAMUSCULAR; INTRAVENOUS
Status: COMPLETED | OUTPATIENT
Start: 2019-03-11 | End: 2019-03-11

## 2019-03-11 RX ORDER — FLUCONAZOLE 150 MG/1
150 TABLET ORAL DAILY
Qty: 7 TABLET | Refills: 3 | Status: SHIPPED | OUTPATIENT
Start: 2019-03-11 | End: 2019-03-18

## 2019-03-11 RX ORDER — DICLOFENAC SODIUM 10 MG/G
2 GEL TOPICAL DAILY
Qty: 100 G | Refills: 11 | Status: SHIPPED | OUTPATIENT
Start: 2019-03-11 | End: 2019-06-25 | Stop reason: SDUPTHER

## 2019-03-11 RX ORDER — HYDROCODONE BITARTRATE AND ACETAMINOPHEN 10; 325 MG/1; MG/1
1 TABLET ORAL EVERY 8 HOURS PRN
Qty: 90 TABLET | Refills: 0 | Status: SHIPPED | OUTPATIENT
Start: 2019-04-11 | End: 2019-03-11 | Stop reason: SDUPTHER

## 2019-03-11 RX ORDER — HYDROCODONE BITARTRATE AND ACETAMINOPHEN 10; 325 MG/1; MG/1
1 TABLET ORAL EVERY 8 HOURS PRN
Qty: 90 TABLET | Refills: 0 | Status: SHIPPED | OUTPATIENT
Start: 2019-03-11 | End: 2019-03-11 | Stop reason: SDUPTHER

## 2019-03-11 RX ORDER — HYDROCODONE BITARTRATE AND ACETAMINOPHEN 10; 325 MG/1; MG/1
1 TABLET ORAL EVERY 8 HOURS PRN
Qty: 90 TABLET | Refills: 0 | Status: SHIPPED | OUTPATIENT
Start: 2019-05-11 | End: 2019-06-10

## 2019-03-11 RX ORDER — CLARITHROMYCIN 500 MG/1
500 TABLET, FILM COATED ORAL 2 TIMES DAILY
Qty: 14 TABLET | Refills: 0 | Status: SHIPPED | OUTPATIENT
Start: 2019-03-11 | End: 2019-03-18

## 2019-03-11 RX ORDER — HYDROCODONE BITARTRATE AND ACETAMINOPHEN 10; 325 MG/1; MG/1
1 TABLET ORAL EVERY 8 HOURS PRN
Qty: 90 TABLET | Refills: 0 | Status: CANCELLED | OUTPATIENT
Start: 2019-05-11 | End: 2019-06-10

## 2019-03-11 RX ORDER — CEFTRIAXONE 1 G/1
1 INJECTION, POWDER, FOR SOLUTION INTRAMUSCULAR; INTRAVENOUS
Status: DISCONTINUED | OUTPATIENT
Start: 2019-03-11 | End: 2019-03-11

## 2019-03-11 RX ADMIN — METHYLPREDNISOLONE ACETATE 160 MG: 80 INJECTION, SUSPENSION INTRA-ARTICULAR; INTRALESIONAL; INTRAMUSCULAR; SOFT TISSUE at 02:03

## 2019-03-11 RX ADMIN — KETOROLAC TROMETHAMINE 60 MG: 60 INJECTION, SOLUTION INTRAMUSCULAR at 02:03

## 2019-03-11 ASSESSMENT — ROUTINE ASSESSMENT OF PATIENT INDEX DATA (RAPID3)
MDHAQ FUNCTION SCORE: .9
PAIN SCORE: 6.5
PSYCHOLOGICAL DISTRESS SCORE: 5.5
TOTAL RAPID3 SCORE: 5.33
PATIENT GLOBAL ASSESSMENT SCORE: 6.5

## 2019-03-11 NOTE — PROGRESS NOTES
Administered 2 cc ( 80 mg/ml ) of depomedrol to the right upper outer gluteal. Informed of s/s to report verbalized understanding. No adverse reactions noted.    Lot # [id number]  Expiration 03/20    Administered 2 cc ( 30 mg/ml ) of toradol to the left upper outer gluteal. Informed of s/s to report verbalized understanding. No adverse reactions noted.    Lot # [id number]  Expiration 1 mar 2020

## 2019-03-11 NOTE — PROGRESS NOTES
Subjective:       Patient ID: Rose Goff is a 68 y.o. female.    Chief Complaint: Disease Management (PSA)    Mrs. Goff is a 68 year old female who presents to clinic for follow up on psoriatic arthritis. She tried xeljanz but had two abscess  Tooth and had an infection and she had the flu.  Today she complains of pain involving MCPs, PIPs, wrists, and shoulders.  She complains of worsening depression and December was a difficult month for her as it was the 1 year anniversary of the loss of her grandchild. She is having low energy and anhedonia on lexapro 10 mg daily. She is open to counseling. She needs refill of pain medication today.               She complains of joint swelling. Associated symptoms include myalgias. Pertinent negatives include no fatigue, fever or headaches.         Review of Systems   Constitutional: Positive for activity change. Negative for appetite change, chills, fatigue and fever.   Eyes: Negative for visual disturbance.   Respiratory: Negative for cough and shortness of breath.    Cardiovascular: Negative for chest pain, palpitations and leg swelling.   Gastrointestinal: Negative for abdominal pain, constipation, diarrhea, nausea and vomiting.   Musculoskeletal: Positive for arthralgias, joint swelling and myalgias. Negative for neck pain and neck stiffness.   Skin: Negative for rash.   Neurological: Negative for dizziness, weakness, light-headedness and headaches.   Psychiatric/Behavioral: Positive for dysphoric mood. Negative for decreased concentration, sleep disturbance and suicidal ideas. The patient is nervous/anxious.          Objective:     Vitals:    03/11/19 1314   BP: 133/83   Pulse: 69   Resp: 13          Physical Exam   Constitutional: She is well-developed, well-nourished, and in no distress.   Eyes: Right conjunctiva is not injected. Left conjunctiva is not injected. Right eye exhibits normal extraocular motion. Left eye exhibits normal extraocular motion.   Neck:  No JVD present. No thyromegaly present.   Cardiovascular: Normal rate and regular rhythm.  Exam reveals no decreased pulses.    Pulmonary/Chest: She has no wheezes. She has no rhonchi. She has no rales.       Right Side Rheumatological Exam     Examination finds the shoulder, 1st PIP, 2nd PIP, 3rd PIP, 3rd MCP, 4th PIP, 4th MCP, 5th PIP and 5th MCP normal.    The patient is tender to palpation of the elbow, wrist, knee, 1st MCP and 2nd MCP    She has swelling of the wrist and knee    Left Side Rheumatological Exam     Examination finds the shoulder, 1st PIP, 2nd PIP, 3rd PIP, 3rd MCP, 4th PIP, 4th MCP, 5th PIP and 5th MCP normal.    The patient is tender to palpation of the elbow, wrist, knee, 1st MCP and 2nd MCP.    She has swelling of the wrist      Lymphadenopathy:     She has no cervical adenopathy.   Neurological: Gait normal.   Skin: No rash (no active psoriasis) noted.     Psychiatric: Affect normal. She exhibits a depressed mood.           Assessment:       1. Psoriatic arthritis    2. Primary osteoarthritis involving multiple joints    3. Rheumatoid arthritis, involving unspecified site, unspecified rheumatoid factor presence            Plan:       Rose was seen today for disease management.    Diagnoses and all orders for this visit:    Psoriatic arthritis  -     Discontinue: cefTRIAXone injection 1 g  -     methylPREDNISolone acetate injection 160 mg  -     ketorolac injection 60 mg  -     adalimumab (HUMIRA,CF, PEN) 40 mg/0.4 mL PnKt; Inject 0.4 mLs (40 mg total) into the skin every 14 (fourteen) days.  -     Discontinue: HYDROcodone-acetaminophen (NORCO)  mg per tablet; Take 1 tablet by mouth every 8 (eight) hours as needed for Pain.  -     Discontinue: HYDROcodone-acetaminophen (NORCO)  mg per tablet; Take 1 tablet by mouth every 8 (eight) hours as needed for Pain.  -     HYDROcodone-acetaminophen (NORCO)  mg per tablet; Take 1 tablet by mouth every 8 (eight) hours as needed for  Pain.  -     CBC auto differential; Future  -     Comprehensive metabolic panel; Future  -     C-reactive protein; Future  -     Sedimentation rate; Future    Primary osteoarthritis involving multiple joints  -     Discontinue: cefTRIAXone injection 1 g  -     methylPREDNISolone acetate injection 160 mg  -     ketorolac injection 60 mg  -     adalimumab (HUMIRA,CF, PEN) 40 mg/0.4 mL PnKt; Inject 0.4 mLs (40 mg total) into the skin every 14 (fourteen) days.  -     CBC auto differential; Future  -     Comprehensive metabolic panel; Future  -     C-reactive protein; Future  -     Sedimentation rate; Future    Rheumatoid arthritis, involving unspecified site, unspecified rheumatoid factor presence  -     Discontinue: cefTRIAXone injection 1 g  -     methylPREDNISolone acetate injection 160 mg  -     ketorolac injection 60 mg  -     adalimumab (HUMIRA,CF, PEN) 40 mg/0.4 mL PnKt; Inject 0.4 mLs (40 mg total) into the skin every 14 (fourteen) days.  -     CBC auto differential; Future  -     Comprehensive metabolic panel; Future  -     C-reactive protein; Future  -     Sedimentation rate; Future    Other orders  -     fluconazole (DIFLUCAN) 150 MG Tab; Take 1 tablet (150 mg total) by mouth once daily. for 7 days  -     clarithromycin (BIAXIN) 500 MG tablet; Take 1 tablet (500 mg total) by mouth 2 (two) times daily. for 7 days     Complex of care for this patient was high

## 2019-03-12 RX ORDER — SUMATRIPTAN SUCCINATE 100 MG/1
TABLET ORAL
Qty: 18 TABLET | Refills: 0 | Status: SHIPPED | OUTPATIENT
Start: 2019-03-12 | End: 2019-08-02 | Stop reason: SDUPTHER

## 2019-03-13 ENCOUNTER — TELEPHONE (OUTPATIENT)
Dept: PHARMACY | Facility: CLINIC | Age: 69
End: 2019-03-13

## 2019-03-18 ENCOUNTER — TELEPHONE (OUTPATIENT)
Dept: RHEUMATOLOGY | Facility: CLINIC | Age: 69
End: 2019-03-18

## 2019-03-21 NOTE — TELEPHONE ENCOUNTER
DOCUMENTATION ONLY:  Previous prior authorization on file for Humira.   Dates: 1/25/17 through 4/24/20  Case ID: [id number]  Co pay: $8.50  Forwarded to clinical pharmacist for consultation & shipment. CEB

## 2019-03-27 ENCOUNTER — TELEPHONE (OUTPATIENT)
Dept: PHARMACY | Facility: CLINIC | Age: 69
End: 2019-03-27

## 2019-03-28 NOTE — TELEPHONE ENCOUNTER
Humira consult declined. Pt confirmed shipping of Humira CF on 3/28 to arrive on 3/29. Address and  verified. $8.50 copay in 004, [facility]. Pt plans to start Humira on 3/29. Briefly reviewed dosage, storage, and administration. Informed pt Humira is stable at room temperature for 14 days. Counseled pt to hold dose if ill. Advised pt to use a calendar to keep track of injection days. Pt voiced understanding. All questions answered and addressed to patients satisfaction. I will f/u with her in 1 week from start, [facility] to contact patient in 3 weeks for refills.

## 2019-04-23 ENCOUNTER — TELEPHONE (OUTPATIENT)
Dept: PHARMACY | Facility: CLINIC | Age: 69
End: 2019-04-23

## 2019-05-02 ENCOUNTER — TELEPHONE (OUTPATIENT)
Dept: PHARMACY | Facility: CLINIC | Age: 69
End: 2019-05-02

## 2019-05-07 ENCOUNTER — TELEPHONE (OUTPATIENT)
Dept: PHARMACY | Facility: CLINIC | Age: 69
End: 2019-05-07

## 2019-05-15 DIAGNOSIS — M06.9 RHEUMATOID ARTHRITIS, INVOLVING UNSPECIFIED SITE, UNSPECIFIED RHEUMATOID FACTOR PRESENCE: ICD-10-CM

## 2019-05-15 DIAGNOSIS — B37.9 YEAST INFECTION: ICD-10-CM

## 2019-05-15 DIAGNOSIS — L40.50 PSA (PSORIATIC ARTHRITIS): ICD-10-CM

## 2019-05-15 RX ORDER — SUMATRIPTAN SUCCINATE 100 MG/1
TABLET ORAL
Qty: 18 TABLET | Refills: 0 | Status: SHIPPED | OUTPATIENT
Start: 2019-05-15 | End: 2019-06-25 | Stop reason: SDUPTHER

## 2019-05-15 RX ORDER — MELOXICAM 15 MG/1
TABLET ORAL
Qty: 30 TABLET | Refills: 3 | Status: SHIPPED | OUTPATIENT
Start: 2019-05-15 | End: 2019-06-25 | Stop reason: SDUPTHER

## 2019-05-21 DIAGNOSIS — L40.50 PSA (PSORIATIC ARTHRITIS): ICD-10-CM

## 2019-05-21 DIAGNOSIS — M47.817 LUMBAR AND SACRAL ARTHRITIS: ICD-10-CM

## 2019-05-21 DIAGNOSIS — M06.9 RHEUMATOID ARTHRITIS, INVOLVING UNSPECIFIED SITE, UNSPECIFIED RHEUMATOID FACTOR PRESENCE: ICD-10-CM

## 2019-05-21 DIAGNOSIS — B37.9 YEAST INFECTION: ICD-10-CM

## 2019-05-21 DIAGNOSIS — R94.6 ABNORMAL RESULTS OF THYROID FUNCTION STUDIES: ICD-10-CM

## 2019-05-21 RX ORDER — LISINOPRIL 10 MG/1
TABLET ORAL
Qty: 90 TABLET | Refills: 0 | Status: SHIPPED | OUTPATIENT
Start: 2019-05-21 | End: 2019-06-16 | Stop reason: SDUPTHER

## 2019-05-30 ENCOUNTER — TELEPHONE (OUTPATIENT)
Dept: PHARMACY | Facility: CLINIC | Age: 69
End: 2019-05-30

## 2019-06-16 DIAGNOSIS — B37.9 YEAST INFECTION: ICD-10-CM

## 2019-06-16 DIAGNOSIS — M06.9 RHEUMATOID ARTHRITIS, INVOLVING UNSPECIFIED SITE, UNSPECIFIED RHEUMATOID FACTOR PRESENCE: ICD-10-CM

## 2019-06-16 DIAGNOSIS — L40.50 PSA (PSORIATIC ARTHRITIS): ICD-10-CM

## 2019-06-16 DIAGNOSIS — R94.6 ABNORMAL RESULTS OF THYROID FUNCTION STUDIES: ICD-10-CM

## 2019-06-16 DIAGNOSIS — M47.817 LUMBAR AND SACRAL ARTHRITIS: ICD-10-CM

## 2019-06-17 RX ORDER — LISINOPRIL 10 MG/1
TABLET ORAL
Qty: 90 TABLET | Refills: 0 | Status: SHIPPED | OUTPATIENT
Start: 2019-06-17 | End: 2019-06-25 | Stop reason: SDUPTHER

## 2019-06-25 ENCOUNTER — TELEPHONE (OUTPATIENT)
Dept: PHARMACY | Facility: CLINIC | Age: 69
End: 2019-06-25

## 2019-06-25 ENCOUNTER — OFFICE VISIT (OUTPATIENT)
Dept: RHEUMATOLOGY | Facility: CLINIC | Age: 69
End: 2019-06-25
Payer: MEDICARE

## 2019-06-25 ENCOUNTER — HOSPITAL ENCOUNTER (OUTPATIENT)
Dept: RADIOLOGY | Facility: HOSPITAL | Age: 69
Discharge: HOME | End: 2019-06-25
Attending: INTERNAL MEDICINE
Payer: MEDICARE

## 2019-06-25 VITALS
SYSTOLIC BLOOD PRESSURE: 122 MMHG | HEIGHT: 67 IN | HEART RATE: 64 BPM | WEIGHT: 176.38 LBS | DIASTOLIC BLOOD PRESSURE: 73 MMHG | BODY MASS INDEX: 27.68 KG/M2

## 2019-06-25 DIAGNOSIS — R94.6 ABNORMAL RESULTS OF THYROID FUNCTION STUDIES: ICD-10-CM

## 2019-06-25 DIAGNOSIS — M06.9 RHEUMATOID ARTHRITIS, INVOLVING UNSPECIFIED SITE, UNSPECIFIED RHEUMATOID FACTOR PRESENCE: ICD-10-CM

## 2019-06-25 DIAGNOSIS — L40.50 PSA (PSORIATIC ARTHRITIS): ICD-10-CM

## 2019-06-25 DIAGNOSIS — M19.90 OSTEOARTHRITIS, UNSPECIFIED OSTEOARTHRITIS TYPE, UNSPECIFIED SITE: ICD-10-CM

## 2019-06-25 DIAGNOSIS — F32.9 MAJOR DEPRESSION, CHRONIC: ICD-10-CM

## 2019-06-25 DIAGNOSIS — M47.817 LUMBAR AND SACRAL ARTHRITIS: ICD-10-CM

## 2019-06-25 DIAGNOSIS — L40.50 PSORIATIC ARTHRITIS: ICD-10-CM

## 2019-06-25 DIAGNOSIS — B37.9 YEAST INFECTION: ICD-10-CM

## 2019-06-25 PROCEDURE — 73130 X-RAY EXAM OF HAND: CPT | Mod: TC,FY,PO,RT

## 2019-06-25 PROCEDURE — 99999 PR PBB SHADOW E&M-EST. PATIENT-LVL III: CPT | Mod: PBBFAC,,, | Performed by: INTERNAL MEDICINE

## 2019-06-25 PROCEDURE — 96372 THER/PROPH/DIAG INJ SC/IM: CPT | Mod: PBBFAC,PO

## 2019-06-25 PROCEDURE — 99214 OFFICE O/P EST MOD 30 MIN: CPT | Mod: 25,S$PBB,, | Performed by: INTERNAL MEDICINE

## 2019-06-25 PROCEDURE — 99213 OFFICE O/P EST LOW 20 MIN: CPT | Mod: PBBFAC,25,PO | Performed by: INTERNAL MEDICINE

## 2019-06-25 PROCEDURE — 73130 X-RAY EXAM OF HAND: CPT | Mod: 26,RT,, | Performed by: RADIOLOGY

## 2019-06-25 PROCEDURE — 99214 PR OFFICE/OUTPT VISIT, EST, LEVL IV, 30-39 MIN: ICD-10-PCS | Mod: 25,S$PBB,, | Performed by: INTERNAL MEDICINE

## 2019-06-25 PROCEDURE — 73130 XR HAND COMPLETE 3 VIEW RIGHT: ICD-10-PCS | Mod: 26,RT,, | Performed by: RADIOLOGY

## 2019-06-25 PROCEDURE — 99999 PR PBB SHADOW E&M-EST. PATIENT-LVL III: ICD-10-PCS | Mod: PBBFAC,,, | Performed by: INTERNAL MEDICINE

## 2019-06-25 RX ORDER — HYDROCODONE BITARTRATE AND ACETAMINOPHEN 10; 325 MG/1; MG/1
1 TABLET ORAL EVERY 8 HOURS PRN
Qty: 90 TABLET | Refills: 0 | Status: SHIPPED | OUTPATIENT
Start: 2019-08-25 | End: 2019-06-25

## 2019-06-25 RX ORDER — FLUCONAZOLE 150 MG/1
TABLET ORAL
Refills: 2 | COMMUNITY
Start: 2019-05-13 | End: 2019-10-25

## 2019-06-25 RX ORDER — CYANOCOBALAMIN 1000 UG/ML
1000 INJECTION, SOLUTION INTRAMUSCULAR; SUBCUTANEOUS
Status: COMPLETED | OUTPATIENT
Start: 2019-06-25 | End: 2019-06-25

## 2019-06-25 RX ORDER — ESCITALOPRAM OXALATE 20 MG/1
20 TABLET ORAL DAILY
Qty: 30 TABLET | Refills: 5 | Status: SHIPPED | OUTPATIENT
Start: 2019-06-25 | End: 2019-10-21 | Stop reason: SDUPTHER

## 2019-06-25 RX ORDER — MELOXICAM 15 MG/1
TABLET ORAL
Qty: 30 TABLET | Refills: 6 | Status: SHIPPED | OUTPATIENT
Start: 2019-06-25 | End: 2019-10-07

## 2019-06-25 RX ORDER — HYDROCODONE BITARTRATE AND ACETAMINOPHEN 10; 325 MG/1; MG/1
1 TABLET ORAL EVERY 8 HOURS PRN
Qty: 90 TABLET | Refills: 0 | Status: SHIPPED | OUTPATIENT
Start: 2019-06-25 | End: 2019-06-25

## 2019-06-25 RX ORDER — METHYLPREDNISOLONE ACETATE 80 MG/ML
160 INJECTION, SUSPENSION INTRA-ARTICULAR; INTRALESIONAL; INTRAMUSCULAR; SOFT TISSUE ONCE
Status: COMPLETED | OUTPATIENT
Start: 2019-06-25 | End: 2019-06-25

## 2019-06-25 RX ORDER — HYDROCODONE BITARTRATE AND ACETAMINOPHEN 10; 325 MG/1; MG/1
1 TABLET ORAL EVERY 8 HOURS PRN
Qty: 90 TABLET | Refills: 0 | Status: SHIPPED | OUTPATIENT
Start: 2019-06-25 | End: 2019-07-25

## 2019-06-25 RX ORDER — ZOLPIDEM TARTRATE 10 MG/1
10 TABLET ORAL NIGHTLY PRN
Qty: 30 TABLET | Refills: 3 | Status: SHIPPED | OUTPATIENT
Start: 2019-06-25 | End: 2020-01-10

## 2019-06-25 RX ORDER — KETOROLAC TROMETHAMINE 30 MG/ML
60 INJECTION, SOLUTION INTRAMUSCULAR; INTRAVENOUS
Status: COMPLETED | OUTPATIENT
Start: 2019-06-25 | End: 2019-06-25

## 2019-06-25 RX ORDER — HYDROCODONE BITARTRATE AND ACETAMINOPHEN 10; 325 MG/1; MG/1
1 TABLET ORAL EVERY 8 HOURS PRN
Qty: 90 TABLET | Refills: 0 | Status: SHIPPED | OUTPATIENT
Start: 2019-08-25 | End: 2019-09-24

## 2019-06-25 RX ORDER — DICLOFENAC SODIUM 10 MG/G
2 GEL TOPICAL DAILY
Qty: 100 G | Refills: 11 | Status: SHIPPED | OUTPATIENT
Start: 2019-06-25 | End: 2019-10-07 | Stop reason: SDUPTHER

## 2019-06-25 RX ORDER — HYDROCODONE BITARTRATE AND ACETAMINOPHEN 10; 325 MG/1; MG/1
1 TABLET ORAL EVERY 8 HOURS PRN
Qty: 90 TABLET | Refills: 0 | Status: SHIPPED | OUTPATIENT
Start: 2019-07-25 | End: 2019-08-24

## 2019-06-25 RX ORDER — LISINOPRIL 10 MG/1
TABLET ORAL
Qty: 90 TABLET | Refills: 3 | Status: SHIPPED | OUTPATIENT
Start: 2019-06-25 | End: 2020-05-08 | Stop reason: SDUPTHER

## 2019-06-25 RX ORDER — SUMATRIPTAN SUCCINATE 100 MG/1
TABLET ORAL
Qty: 18 TABLET | Refills: 0 | Status: SHIPPED | OUTPATIENT
Start: 2019-06-25 | End: 2019-08-18 | Stop reason: SDUPTHER

## 2019-06-25 RX ORDER — HYDROCODONE BITARTRATE AND ACETAMINOPHEN 10; 325 MG/1; MG/1
1 TABLET ORAL EVERY 8 HOURS PRN
Qty: 90 TABLET | Refills: 0 | Status: SHIPPED | OUTPATIENT
Start: 2019-07-25 | End: 2019-06-25

## 2019-06-25 RX ADMIN — METHYLPREDNISOLONE ACETATE 160 MG: 80 INJECTION, SUSPENSION INTRA-ARTICULAR; INTRALESIONAL; INTRAMUSCULAR; SOFT TISSUE at 04:06

## 2019-06-25 RX ADMIN — KETOROLAC TROMETHAMINE 60 MG: 60 INJECTION, SOLUTION INTRAMUSCULAR at 04:06

## 2019-06-25 RX ADMIN — CYANOCOBALAMIN 1000 MCG: 1000 INJECTION, SOLUTION INTRAMUSCULAR at 04:06

## 2019-06-25 NOTE — PROGRESS NOTES
Administered 1 cc ( 1000 mcg/ml ) of b12 to the right upper outer gluteal. Informed of s/s to report verbalized understanding. No adverse reactions noted.    Lot # [id number]  Expiration oct 20    Administered 2 cc ( 80 mg/ml ) of depomedrol to the right upper outer gluteal. Informed of s/s to report verbalized understanding. No adverse reactions noted.    Lot # [id number]  Expiration 10/20    Administered 2 cc ( 30 mg/ml ) of toradol to the left upper outer gluteal. Informed of s/s to report verbalized understanding. No adverse reactions noted.    Lot # [id number]  Expiration 1 may 2020

## 2019-06-25 NOTE — PROGRESS NOTES
Subjective:       Patient ID: Rose Goff is a 68 y.o. female.    Chief Complaint: Psoriatic Arthritis and Psoriasis (plaque psoriasis)    Follow up: 68 year old female  on psoriatic arthritis. She restarted Humira and has had 3 injection,she fell and hit her R wrist R side of the face and shoulder.  Today she complains of pain involving MCPs, PIPs, wrists, and shoulders.  She complains of worsening depression and December was a difficult month for her as it was the 1 year anniversary of the loss of her grandchild. On lexapro 20 mg po qam    Review of Systems   Constitutional: Positive for activity change. Negative for appetite change, chills, diaphoresis, fatigue, fever and unexpected weight change.   HENT: Negative for congestion, dental problem, ear discharge, ear pain, facial swelling, mouth sores, nosebleeds, postnasal drip, rhinorrhea, sinus pressure, sneezing, sore throat, tinnitus, trouble swallowing and voice change.    Eyes: Negative for photophobia, pain, discharge, redness, itching and visual disturbance.   Respiratory: Negative for apnea, cough, chest tightness, shortness of breath and wheezing.    Cardiovascular: Negative for chest pain, palpitations and leg swelling.   Gastrointestinal: Negative for abdominal distention, abdominal pain, constipation, diarrhea, nausea and vomiting.   Endocrine: Negative for cold intolerance, heat intolerance, polydipsia and polyuria.   Genitourinary: Negative for decreased urine volume, difficulty urinating, dysuria, flank pain, frequency, hematuria and urgency.   Musculoskeletal: Positive for arthralgias, gait problem, joint swelling, myalgias and neck pain. Negative for neck stiffness.   Skin: Negative for pallor, rash and wound.   Allergic/Immunologic: Negative for immunocompromised state.   Neurological: Negative for dizziness, tremors, weakness, light-headedness, numbness and headaches.   Hematological: Negative for adenopathy. Does not bruise/bleed easily.    Psychiatric/Behavioral: Negative for decreased concentration, dysphoric mood, sleep disturbance and suicidal ideas. The patient is not nervous/anxious.          Objective:     Vitals:    06/25/19 1449   BP: 122/73   Pulse: 64          Physical Exam   Constitutional: She is well-developed, well-nourished, and in no distress.   Eyes: Right conjunctiva is not injected. Left conjunctiva is not injected. Right eye exhibits normal extraocular motion. Left eye exhibits normal extraocular motion.   Neck: No JVD present. No thyromegaly present.   Cardiovascular: Normal rate and regular rhythm.  Exam reveals no decreased pulses.    Pulmonary/Chest: She has no wheezes. She has no rhonchi. She has no rales.       Right Side Rheumatological Exam     Examination finds the shoulder, 1st PIP, 2nd PIP, 3rd PIP, 3rd MCP, 4th PIP, 4th MCP, 5th PIP and 5th MCP normal.    The patient is tender to palpation of the elbow, wrist, knee, 1st MCP and 2nd MCP    She has swelling of the wrist and knee    Left Side Rheumatological Exam     Examination finds the shoulder, 1st PIP, 2nd PIP, 3rd PIP, 3rd MCP, 4th PIP, 4th MCP, 5th PIP and 5th MCP normal.    The patient is tender to palpation of the elbow, wrist, knee, 1st MCP and 2nd MCP.    She has swelling of the wrist      Lymphadenopathy:     She has no cervical adenopathy.   Neurological: Gait normal.   Skin: No rash (no active psoriasis) noted.     Psychiatric: Affect normal. She exhibits a depressed mood.   Musculoskeletal: She exhibits tenderness and deformity.         Results for orders placed or performed in visit on 02/04/19   CBC auto differential   Result Value Ref Range    WBC 6.61 3.90 - 12.70 K/uL    RBC 3.83 (L) 4.00 - 5.40 M/uL    Hemoglobin 11.3 (L) 12.0 - 16.0 g/dL    Hematocrit 36.6 (L) 37.0 - 48.5 %    Mean Corpuscular Volume 96 82 - 98 fL    Mean Corpuscular Hemoglobin 29.5 27.0 - 31.0 pg    Mean Corpuscular Hemoglobin Conc 30.9 (L) 32.0 - 36.0 g/dL    RDW 14.1 11.5 - 14.5  %    Platelets 311 150 - 350 K/uL    MPV 10.7 9.2 - 12.9 fL    Immature Granulocytes 0.3 0.0 - 0.5 %    Gran # (ANC) 4.2 1.8 - 7.7 K/uL    Immature Grans (Abs) 0.02 0.00 - 0.04 K/uL    Lymph # 1.6 1.0 - 4.8 K/uL    Mono # 0.5 0.3 - 1.0 K/uL    Eos # 0.2 0.0 - 0.5 K/uL    Baso # 0.07 0.00 - 0.20 K/uL    nRBC 0 0 /100 WBC    Gran% 63.0 38.0 - 73.0 %    Lymph% 24.7 18.0 - 48.0 %    Mono% 7.4 4.0 - 15.0 %    Eosinophil% 3.5 0.0 - 8.0 %    Basophil% 1.1 0.0 - 1.9 %    Differential Method Automated    Comprehensive metabolic panel   Result Value Ref Range    Sodium 136 136 - 145 mmol/L    Potassium 4.0 3.5 - 5.1 mmol/L    Chloride 104 95 - 110 mmol/L    CO2 25 23 - 29 mmol/L    Glucose 88 70 - 110 mg/dL    BUN, Bld 14 8 - 23 mg/dL    Creatinine 0.7 0.5 - 1.4 mg/dL    Calcium 8.6 (L) 8.7 - 10.5 mg/dL    Total Protein 7.2 6.0 - 8.4 g/dL    Albumin 3.6 3.5 - 5.2 g/dL    Total Bilirubin 0.3 0.1 - 1.0 mg/dL    Alkaline Phosphatase 84 55 - 135 U/L    AST 19 10 - 40 U/L    ALT 19 10 - 44 U/L    Anion Gap 7 (L) 8 - 16 mmol/L    eGFR if African American >60.0 >60 mL/min/1.73 m^2    eGFR if non African American >60.0 >60 mL/min/1.73 m^2   Sedimentation rate   Result Value Ref Range    Sed Rate 8 0 - 20 mm/Hr   Rheumatoid factor   Result Value Ref Range    Rheumatoid Factor <10.0 0.0 - 15.0 IU/mL   Cyclic citrul peptide antibody, IgG   Result Value Ref Range    CCP Antibodies <0.5 <5.0 U/mL            Assessment:       1. PSA (psoriatic arthritis)    2. Rheumatoid arthritis, involving unspecified site, unspecified rheumatoid factor presence    3. Yeast infection    4. Lumbar and sacral arthritis    5. Abnormal results of thyroid function studies     6. Psoriatic arthritis    7. Osteoarthritis, unspecified osteoarthritis type, unspecified site    8. Major depression, chronic            Plan:       Rose was seen today for psoriatic arthritis and psoriasis.    Diagnoses and all orders for this visit:    PSA (psoriatic arthritis)  -      zolpidem (AMBIEN) 10 mg Tab; Take 1 tablet (10 mg total) by mouth nightly as needed.  -     sumatriptan (IMITREX) 100 MG tablet; TAKE 1 TABLET BY MOUTH AT ONSET OF HEADACHE. MAY REPEAT AFTER 2 HOURS AS NEEDED.NO MORE THAN 2 TABLETS DAILY  -     lisinopril 10 MG tablet; TAKE 1 TABLET(10 MG) BY MOUTH EVERY DAY  -     diclofenac sodium (VOLTAREN) 1 % Gel; Apply 2 g topically once daily.  -     meloxicam (MOBIC) 15 MG tablet; TAKE 1 TABLET(15 MG) BY MOUTH EVERY DAY  -     escitalopram oxalate (LEXAPRO) 20 MG tablet; Take 1 tablet (20 mg total) by mouth once daily.  -     X-Ray Hand Complete Right; Future  -     cyanocobalamin injection 1,000 mcg  -     ketorolac injection 60 mg  -     methylPREDNISolone acetate injection 160 mg  -     Discontinue: HYDROcodone-acetaminophen (NORCO)  mg per tablet; Take 1 tablet by mouth every 8 (eight) hours as needed.  -     Discontinue: HYDROcodone-acetaminophen (NORCO)  mg per tablet; Take 1 tablet by mouth every 8 (eight) hours as needed for Pain.  -     HYDROcodone-acetaminophen (NORCO)  mg per tablet; Take 1 tablet by mouth every 8 (eight) hours as needed.  -     HYDROcodone-acetaminophen (NORCO)  mg per tablet; Take 1 tablet by mouth every 8 (eight) hours as needed for Pain.    Rheumatoid arthritis, involving unspecified site, unspecified rheumatoid factor presence  -     zolpidem (AMBIEN) 10 mg Tab; Take 1 tablet (10 mg total) by mouth nightly as needed.  -     sumatriptan (IMITREX) 100 MG tablet; TAKE 1 TABLET BY MOUTH AT ONSET OF HEADACHE. MAY REPEAT AFTER 2 HOURS AS NEEDED.NO MORE THAN 2 TABLETS DAILY  -     lisinopril 10 MG tablet; TAKE 1 TABLET(10 MG) BY MOUTH EVERY DAY  -     diclofenac sodium (VOLTAREN) 1 % Gel; Apply 2 g topically once daily.  -     meloxicam (MOBIC) 15 MG tablet; TAKE 1 TABLET(15 MG) BY MOUTH EVERY DAY  -     escitalopram oxalate (LEXAPRO) 20 MG tablet; Take 1 tablet (20 mg total) by mouth once daily.  -     X-Ray Hand Complete  Right; Future  -     cyanocobalamin injection 1,000 mcg  -     ketorolac injection 60 mg  -     methylPREDNISolone acetate injection 160 mg  -     Discontinue: HYDROcodone-acetaminophen (NORCO)  mg per tablet; Take 1 tablet by mouth every 8 (eight) hours as needed.  -     Discontinue: HYDROcodone-acetaminophen (NORCO)  mg per tablet; Take 1 tablet by mouth every 8 (eight) hours as needed for Pain.  -     HYDROcodone-acetaminophen (NORCO)  mg per tablet; Take 1 tablet by mouth every 8 (eight) hours as needed.  -     HYDROcodone-acetaminophen (NORCO)  mg per tablet; Take 1 tablet by mouth every 8 (eight) hours as needed for Pain.    Yeast infection  -     X-Ray Hand Complete Right; Future    Lumbar and sacral arthritis  -     zolpidem (AMBIEN) 10 mg Tab; Take 1 tablet (10 mg total) by mouth nightly as needed.  -     sumatriptan (IMITREX) 100 MG tablet; TAKE 1 TABLET BY MOUTH AT ONSET OF HEADACHE. MAY REPEAT AFTER 2 HOURS AS NEEDED.NO MORE THAN 2 TABLETS DAILY  -     lisinopril 10 MG tablet; TAKE 1 TABLET(10 MG) BY MOUTH EVERY DAY  -     diclofenac sodium (VOLTAREN) 1 % Gel; Apply 2 g topically once daily.  -     meloxicam (MOBIC) 15 MG tablet; TAKE 1 TABLET(15 MG) BY MOUTH EVERY DAY  -     escitalopram oxalate (LEXAPRO) 20 MG tablet; Take 1 tablet (20 mg total) by mouth once daily.  -     X-Ray Hand Complete Right; Future    Abnormal results of thyroid function studies   -     zolpidem (AMBIEN) 10 mg Tab; Take 1 tablet (10 mg total) by mouth nightly as needed.  -     sumatriptan (IMITREX) 100 MG tablet; TAKE 1 TABLET BY MOUTH AT ONSET OF HEADACHE. MAY REPEAT AFTER 2 HOURS AS NEEDED.NO MORE THAN 2 TABLETS DAILY  -     lisinopril 10 MG tablet; TAKE 1 TABLET(10 MG) BY MOUTH EVERY DAY  -     diclofenac sodium (VOLTAREN) 1 % Gel; Apply 2 g topically once daily.  -     meloxicam (MOBIC) 15 MG tablet; TAKE 1 TABLET(15 MG) BY MOUTH EVERY DAY  -     escitalopram oxalate (LEXAPRO) 20 MG tablet; Take 1  tablet (20 mg total) by mouth once daily.  -     X-Ray Hand Complete Right; Future    Psoriatic arthritis  -     zolpidem (AMBIEN) 10 mg Tab; Take 1 tablet (10 mg total) by mouth nightly as needed.  -     sumatriptan (IMITREX) 100 MG tablet; TAKE 1 TABLET BY MOUTH AT ONSET OF HEADACHE. MAY REPEAT AFTER 2 HOURS AS NEEDED.NO MORE THAN 2 TABLETS DAILY  -     lisinopril 10 MG tablet; TAKE 1 TABLET(10 MG) BY MOUTH EVERY DAY  -     diclofenac sodium (VOLTAREN) 1 % Gel; Apply 2 g topically once daily.  -     meloxicam (MOBIC) 15 MG tablet; TAKE 1 TABLET(15 MG) BY MOUTH EVERY DAY  -     escitalopram oxalate (LEXAPRO) 20 MG tablet; Take 1 tablet (20 mg total) by mouth once daily.  -     X-Ray Hand Complete Right; Future    Osteoarthritis, unspecified osteoarthritis type, unspecified site  -     zolpidem (AMBIEN) 10 mg Tab; Take 1 tablet (10 mg total) by mouth nightly as needed.  -     sumatriptan (IMITREX) 100 MG tablet; TAKE 1 TABLET BY MOUTH AT ONSET OF HEADACHE. MAY REPEAT AFTER 2 HOURS AS NEEDED.NO MORE THAN 2 TABLETS DAILY  -     lisinopril 10 MG tablet; TAKE 1 TABLET(10 MG) BY MOUTH EVERY DAY  -     diclofenac sodium (VOLTAREN) 1 % Gel; Apply 2 g topically once daily.  -     meloxicam (MOBIC) 15 MG tablet; TAKE 1 TABLET(15 MG) BY MOUTH EVERY DAY  -     escitalopram oxalate (LEXAPRO) 20 MG tablet; Take 1 tablet (20 mg total) by mouth once daily.  -     X-Ray Hand Complete Right; Future    Major depression, chronic  -     zolpidem (AMBIEN) 10 mg Tab; Take 1 tablet (10 mg total) by mouth nightly as needed.  -     sumatriptan (IMITREX) 100 MG tablet; TAKE 1 TABLET BY MOUTH AT ONSET OF HEADACHE. MAY REPEAT AFTER 2 HOURS AS NEEDED.NO MORE THAN 2 TABLETS DAILY  -     lisinopril 10 MG tablet; TAKE 1 TABLET(10 MG) BY MOUTH EVERY DAY  -     diclofenac sodium (VOLTAREN) 1 % Gel; Apply 2 g topically once daily.  -     meloxicam (MOBIC) 15 MG tablet; TAKE 1 TABLET(15 MG) BY MOUTH EVERY DAY  -     escitalopram oxalate (LEXAPRO) 20  MG tablet; Take 1 tablet (20 mg total) by mouth once daily.  -     X-Ray Hand Complete Right; Future    Other orders  -     Discontinue: HYDROcodone-acetaminophen (NORCO)  mg per tablet; Take 1 tablet by mouth every 8 (eight) hours as needed.  -     HYDROcodone-acetaminophen (NORCO)  mg per tablet; Take 1 tablet by mouth every 8 (eight) hours as needed.     Complex of care for this patient was high

## 2019-08-18 DIAGNOSIS — F32.9 MAJOR DEPRESSION, CHRONIC: ICD-10-CM

## 2019-08-18 DIAGNOSIS — L40.50 PSORIATIC ARTHRITIS: ICD-10-CM

## 2019-08-18 DIAGNOSIS — M19.90 OSTEOARTHRITIS, UNSPECIFIED OSTEOARTHRITIS TYPE, UNSPECIFIED SITE: ICD-10-CM

## 2019-08-18 DIAGNOSIS — L40.50 PSA (PSORIATIC ARTHRITIS): ICD-10-CM

## 2019-08-18 DIAGNOSIS — R94.6 ABNORMAL RESULTS OF THYROID FUNCTION STUDIES: ICD-10-CM

## 2019-08-18 DIAGNOSIS — M06.9 RHEUMATOID ARTHRITIS, INVOLVING UNSPECIFIED SITE, UNSPECIFIED RHEUMATOID FACTOR PRESENCE: ICD-10-CM

## 2019-08-18 DIAGNOSIS — M47.817 LUMBAR AND SACRAL ARTHRITIS: ICD-10-CM

## 2019-08-19 RX ORDER — SUMATRIPTAN SUCCINATE 100 MG/1
TABLET ORAL
Qty: 18 TABLET | Refills: 0 | Status: SHIPPED | OUTPATIENT
Start: 2019-08-19 | End: 2019-10-07 | Stop reason: SDUPTHER

## 2019-08-22 ENCOUNTER — TELEPHONE (OUTPATIENT)
Dept: RHEUMATOLOGY | Facility: CLINIC | Age: 69
End: 2019-08-22

## 2019-08-22 NOTE — TELEPHONE ENCOUNTER
----- Message from Geno Shannon sent at 8/22/2019  3:51 PM CDT -----  Contact: pt  Pt calling would like a call back from the nurse about shots.[phone] (home)

## 2019-09-03 ENCOUNTER — TELEPHONE (OUTPATIENT)
Dept: PHARMACY | Facility: CLINIC | Age: 69
End: 2019-09-03

## 2019-09-14 DIAGNOSIS — B37.9 YEAST INFECTION: ICD-10-CM

## 2019-09-14 DIAGNOSIS — M06.9 RHEUMATOID ARTHRITIS, INVOLVING UNSPECIFIED SITE, UNSPECIFIED RHEUMATOID FACTOR PRESENCE: ICD-10-CM

## 2019-09-14 DIAGNOSIS — L40.50 PSA (PSORIATIC ARTHRITIS): ICD-10-CM

## 2019-09-15 RX ORDER — MELOXICAM 15 MG/1
TABLET ORAL
Qty: 30 TABLET | Refills: 3 | Status: SHIPPED | OUTPATIENT
Start: 2019-09-15 | End: 2020-01-09 | Stop reason: SDUPTHER

## 2019-09-24 RX ORDER — SUMATRIPTAN SUCCINATE 100 MG/1
TABLET ORAL
Qty: 18 TABLET | Refills: 0 | Status: SHIPPED | OUTPATIENT
Start: 2019-09-24 | End: 2020-09-02 | Stop reason: SDUPTHER

## 2019-10-07 ENCOUNTER — OFFICE VISIT (OUTPATIENT)
Dept: RHEUMATOLOGY | Facility: CLINIC | Age: 69
End: 2019-10-07
Payer: MEDICARE

## 2019-10-07 VITALS
DIASTOLIC BLOOD PRESSURE: 81 MMHG | WEIGHT: 176 LBS | BODY MASS INDEX: 27.62 KG/M2 | HEIGHT: 67 IN | SYSTOLIC BLOOD PRESSURE: 133 MMHG | HEART RATE: 67 BPM

## 2019-10-07 DIAGNOSIS — L40.50 PSA (PSORIATIC ARTHRITIS): ICD-10-CM

## 2019-10-07 DIAGNOSIS — B37.9 YEAST INFECTION: ICD-10-CM

## 2019-10-07 DIAGNOSIS — M47.817 LUMBAR AND SACRAL ARTHRITIS: ICD-10-CM

## 2019-10-07 DIAGNOSIS — R94.6 ABNORMAL RESULTS OF THYROID FUNCTION STUDIES: ICD-10-CM

## 2019-10-07 DIAGNOSIS — M19.90 OSTEOARTHRITIS, UNSPECIFIED OSTEOARTHRITIS TYPE, UNSPECIFIED SITE: ICD-10-CM

## 2019-10-07 DIAGNOSIS — R11.2 NAUSEA AND VOMITING, INTRACTABILITY OF VOMITING NOT SPECIFIED, UNSPECIFIED VOMITING TYPE: Primary | ICD-10-CM

## 2019-10-07 DIAGNOSIS — G89.4 CHRONIC PAIN SYNDROME: ICD-10-CM

## 2019-10-07 DIAGNOSIS — F32.9 MAJOR DEPRESSION, CHRONIC: ICD-10-CM

## 2019-10-07 DIAGNOSIS — M06.9 RHEUMATOID ARTHRITIS, INVOLVING UNSPECIFIED SITE, UNSPECIFIED RHEUMATOID FACTOR PRESENCE: ICD-10-CM

## 2019-10-07 DIAGNOSIS — L40.50 PSORIATIC ARTHRITIS: ICD-10-CM

## 2019-10-07 PROCEDURE — 99213 OFFICE O/P EST LOW 20 MIN: CPT | Mod: PBBFAC,PO | Performed by: INTERNAL MEDICINE

## 2019-10-07 PROCEDURE — 99999 PR PBB SHADOW E&M-EST. PATIENT-LVL III: CPT | Mod: PBBFAC,,, | Performed by: INTERNAL MEDICINE

## 2019-10-07 PROCEDURE — 99214 OFFICE O/P EST MOD 30 MIN: CPT | Mod: 25,S$PBB,, | Performed by: INTERNAL MEDICINE

## 2019-10-07 PROCEDURE — 99999 PR PBB SHADOW E&M-EST. PATIENT-LVL III: ICD-10-PCS | Mod: PBBFAC,,, | Performed by: INTERNAL MEDICINE

## 2019-10-07 PROCEDURE — 96372 THER/PROPH/DIAG INJ SC/IM: CPT | Mod: PBBFAC,PO

## 2019-10-07 PROCEDURE — 99214 PR OFFICE/OUTPT VISIT, EST, LEVL IV, 30-39 MIN: ICD-10-PCS | Mod: 25,S$PBB,, | Performed by: INTERNAL MEDICINE

## 2019-10-07 RX ORDER — HYDROCODONE BITARTRATE AND ACETAMINOPHEN 10; 325 MG/1; MG/1
1 TABLET ORAL EVERY 8 HOURS PRN
Qty: 90 TABLET | Refills: 0 | Status: SHIPPED | OUTPATIENT
Start: 2019-10-07 | End: 2019-10-07 | Stop reason: SDUPTHER

## 2019-10-07 RX ORDER — CYANOCOBALAMIN 1000 UG/ML
1000 INJECTION, SOLUTION INTRAMUSCULAR; SUBCUTANEOUS
Status: COMPLETED | OUTPATIENT
Start: 2019-10-07 | End: 2019-10-07

## 2019-10-07 RX ORDER — ONDANSETRON 8 MG/1
8 TABLET, ORALLY DISINTEGRATING ORAL EVERY 6 HOURS PRN
Qty: 45 TABLET | Refills: 3 | Status: SHIPPED | OUTPATIENT
Start: 2019-10-07 | End: 2021-12-28 | Stop reason: ALTCHOICE

## 2019-10-07 RX ORDER — HYDROCODONE BITARTRATE AND ACETAMINOPHEN 10; 325 MG/1; MG/1
1 TABLET ORAL EVERY 8 HOURS PRN
Qty: 90 TABLET | Refills: 0 | Status: SHIPPED | OUTPATIENT
Start: 2019-11-06 | End: 2019-10-07 | Stop reason: SDUPTHER

## 2019-10-07 RX ORDER — DICLOFENAC SODIUM 10 MG/G
2 GEL TOPICAL DAILY
Qty: 100 G | Refills: 11 | Status: SHIPPED | OUTPATIENT
Start: 2019-10-07 | End: 2021-03-10 | Stop reason: SDUPTHER

## 2019-10-07 RX ORDER — BUSPIRONE HYDROCHLORIDE 10 MG/1
10 TABLET ORAL 3 TIMES DAILY
Qty: 90 TABLET | Refills: 11 | Status: SHIPPED | OUTPATIENT
Start: 2019-10-07 | End: 2020-08-06

## 2019-10-07 RX ORDER — METHYLPREDNISOLONE ACETATE 80 MG/ML
160 INJECTION, SUSPENSION INTRA-ARTICULAR; INTRALESIONAL; INTRAMUSCULAR; SOFT TISSUE
Status: COMPLETED | OUTPATIENT
Start: 2019-10-07 | End: 2019-10-07

## 2019-10-07 RX ORDER — HYDROCODONE BITARTRATE AND ACETAMINOPHEN 10; 325 MG/1; MG/1
1 TABLET ORAL EVERY 8 HOURS PRN
Qty: 90 TABLET | Refills: 0 | Status: SHIPPED | OUTPATIENT
Start: 2019-12-06 | End: 2020-01-05

## 2019-10-07 RX ORDER — KETOROLAC TROMETHAMINE 30 MG/ML
60 INJECTION, SOLUTION INTRAMUSCULAR; INTRAVENOUS
Status: COMPLETED | OUTPATIENT
Start: 2019-10-07 | End: 2019-10-07

## 2019-10-07 RX ORDER — PROMETHAZINE HYDROCHLORIDE 25 MG/1
25 SUPPOSITORY RECTAL EVERY 6 HOURS PRN
Qty: 30 SUPPOSITORY | Refills: 3 | Status: SHIPPED | OUTPATIENT
Start: 2019-10-07 | End: 2019-11-06

## 2019-10-07 RX ORDER — ESTRADIOL 1 MG/1
1 TABLET ORAL DAILY
Qty: 90 TABLET | Refills: 1 | Status: SHIPPED | OUTPATIENT
Start: 2019-10-07 | End: 2019-10-25 | Stop reason: SDUPTHER

## 2019-10-07 RX ORDER — PREDNISONE 5 MG/1
5 TABLET ORAL 3 TIMES DAILY PRN
Qty: 90 TABLET | Refills: 4 | Status: SHIPPED | OUTPATIENT
Start: 2019-10-07 | End: 2020-05-08 | Stop reason: SDUPTHER

## 2019-10-07 RX ORDER — SUMATRIPTAN SUCCINATE 100 MG/1
100 TABLET ORAL ONCE
Qty: 18 TABLET | Refills: 3 | Status: SHIPPED | OUTPATIENT
Start: 2019-10-07 | End: 2019-10-07

## 2019-10-07 RX ADMIN — METHYLPREDNISOLONE ACETATE 160 MG: 80 INJECTION, SUSPENSION INTRA-ARTICULAR; INTRALESIONAL; INTRAMUSCULAR; SOFT TISSUE at 12:10

## 2019-10-07 RX ADMIN — KETOROLAC TROMETHAMINE 60 MG: 60 INJECTION, SOLUTION INTRAMUSCULAR at 12:10

## 2019-10-07 RX ADMIN — CYANOCOBALAMIN 1000 MCG: 1000 INJECTION, SOLUTION INTRAMUSCULAR at 12:10

## 2019-10-07 ASSESSMENT — ROUTINE ASSESSMENT OF PATIENT INDEX DATA (RAPID3)
PATIENT GLOBAL ASSESSMENT SCORE: 7
MDHAQ FUNCTION SCORE: .8
PAIN SCORE: 6.5
TOTAL RAPID3 SCORE: 5.39
PSYCHOLOGICAL DISTRESS SCORE: 4.4
FATIGUE SCORE: 1.1

## 2019-10-07 NOTE — PROGRESS NOTES
Administered 1 cc ( 1000 mcg/ml ) of b12 to the right upper outer gluteal. Informed of s/s to report verbalized understanding. No adverse reactions noted.    Lot # [id number]  Expiration feb 21    Administered 2 cc ( 80 mg/ml ) of depomedrol to the right upper outer gluteal. Informed of s/s to report verbalized understanding. No adverse reactions noted.    Lot # [id number]  Expiration 01/21    Administered 2 cc ( 30 mg/ml ) of toradol to the left upper outer gluteal. Informed of s/s to report verbalized understanding. No adverse reactions noted.    Lot # [id number]  Expiration 1 aug 2020

## 2019-10-07 NOTE — PROGRESS NOTES
Subjective:       Patient ID: Rose Goff is a 69 y.o. female.    Chief Complaint: Psoriatic Arthritis and Disease Management    Follow up: 68 year old female  on psoriatic arthritis. R foot pain dropped a   Now bruised,  Today she complains of pain involving MCPs, PIPs, wrists, and shoulders.   Doing  fair depressed and  Anxious. Patient complains of arthralgias and myalgias for which has been present for a few years. Pain is located in multiple joints, both shoulder(s), both elbow(s), both wrist(s), both MCP(s): 1st, 2nd, 3rd, 4th and 5th, both PIP(s): 1st, 2nd, 3rd, 4th and 5th, both DIP(s): 1st and 2nd, both hip(s), both knee(s) and both MTP(s): 1st, 2nd, 3rd, 4th and 5th, is described as aching, pulsating, shooting and throbbing, and is constant, moderate .  Associated symptoms include: crepitation, decreased range of motion, edema, effusion, tenderness and warmth.      Review of Systems   Constitutional: Positive for activity change. Negative for appetite change, chills, diaphoresis and unexpected weight change.   HENT: Negative for congestion, dental problem, ear discharge, ear pain, facial swelling, mouth sores, nosebleeds, postnasal drip, rhinorrhea, sinus pressure, sneezing, sore throat, tinnitus and voice change.    Eyes: Negative for photophobia, pain, discharge, redness, itching and visual disturbance.   Respiratory: Negative for apnea, cough, chest tightness, shortness of breath and wheezing.    Cardiovascular: Negative for chest pain, palpitations and leg swelling.   Gastrointestinal: Negative for abdominal distention, abdominal pain, constipation, diarrhea, nausea and vomiting.   Endocrine: Negative for cold intolerance, heat intolerance, polydipsia and polyuria.   Genitourinary: Negative for decreased urine volume, difficulty urinating, flank pain, frequency, hematuria and urgency.   Musculoskeletal: Positive for arthralgias, gait problem, joint swelling and neck pain. Negative for neck  stiffness.   Skin: Negative for pallor, rash and wound.   Allergic/Immunologic: Negative for immunocompromised state.   Neurological: Negative for dizziness, tremors, weakness, light-headedness and numbness.   Hematological: Negative for adenopathy. Does not bruise/bleed easily.   Psychiatric/Behavioral: Negative for decreased concentration, dysphoric mood, sleep disturbance and suicidal ideas. The patient is not nervous/anxious.          Objective:     Vitals:    10/07/19 1038   BP: 133/81   Pulse: 67          Physical Exam   Constitutional: She is well-developed, well-nourished, and in no distress.   Eyes: Right conjunctiva is not injected. Left conjunctiva is not injected. Right eye exhibits normal extraocular motion. Left eye exhibits normal extraocular motion.   Neck: No JVD present. No thyromegaly present.   Cardiovascular: Normal rate and regular rhythm.  Exam reveals no decreased pulses.    Pulmonary/Chest: She has no wheezes. She has no rhonchi. She has no rales.       Right Side Rheumatological Exam     Examination finds the shoulder, 1st PIP, 2nd PIP, 3rd PIP, 3rd MCP, 4th PIP, 4th MCP, 5th PIP and 5th MCP normal.    The patient is tender to palpation of the elbow, wrist, knee, 1st MCP and 2nd MCP    She has swelling of the wrist and knee    Left Side Rheumatological Exam     Examination finds the shoulder, 1st PIP, 2nd PIP, 3rd PIP, 3rd MCP, 4th PIP, 4th MCP, 5th PIP and 5th MCP normal.    The patient is tender to palpation of the elbow, wrist, knee, 1st MCP and 2nd MCP.    She has swelling of the wrist      Lymphadenopathy:     She has no cervical adenopathy.   Neurological: Gait normal.   Skin: No rash (no active psoriasis) noted.     Psychiatric: Affect normal. She exhibits a depressed mood.   Musculoskeletal: She exhibits tenderness and deformity.         Results for orders placed or performed in visit on 06/25/19   CBC auto differential   Result Value Ref Range    WBC 6.89 3.90 - 12.70 K/uL    RBC  3.94 (L) 4.00 - 5.40 M/uL    Hemoglobin 11.3 (L) 12.0 - 16.0 g/dL    Hematocrit 36.3 (L) 37.0 - 48.5 %    Mean Corpuscular Volume 92 82 - 98 fL    Mean Corpuscular Hemoglobin 28.7 27.0 - 31.0 pg    Mean Corpuscular Hemoglobin Conc 31.1 (L) 32.0 - 36.0 g/dL    RDW 14.2 11.5 - 14.5 %    Platelets 296 150 - 350 K/uL    MPV 11.2 9.2 - 12.9 fL    Immature Granulocytes 0.1 0.0 - 0.5 %    Gran # (ANC) 3.7 1.8 - 7.7 K/uL    Immature Grans (Abs) 0.01 0.00 - 0.04 K/uL    Lymph # 2.6 1.0 - 4.8 K/uL    Mono # 0.5 0.3 - 1.0 K/uL    Eos # 0.1 0.0 - 0.5 K/uL    Baso # 0.05 0.00 - 0.20 K/uL    nRBC 0 0 /100 WBC    Gran% 53.0 38.0 - 73.0 %    Lymph% 37.2 18.0 - 48.0 %    Mono% 7.1 4.0 - 15.0 %    Eosinophil% 1.9 0.0 - 8.0 %    Basophil% 0.7 0.0 - 1.9 %    Differential Method Automated    Comprehensive metabolic panel   Result Value Ref Range    Sodium 133 (L) 136 - 145 mmol/L    Potassium 4.3 3.5 - 5.1 mmol/L    Chloride 101 95 - 110 mmol/L    CO2 26 23 - 29 mmol/L    Glucose 90 70 - 110 mg/dL    BUN, Bld 12 8 - 23 mg/dL    Creatinine 0.8 0.5 - 1.4 mg/dL    Calcium 9.0 8.7 - 10.5 mg/dL    Total Protein 7.5 6.0 - 8.4 g/dL    Albumin 3.9 3.5 - 5.2 g/dL    Total Bilirubin 0.3 0.1 - 1.0 mg/dL    Alkaline Phosphatase 70 55 - 135 U/L    AST 21 10 - 40 U/L    ALT 16 10 - 44 U/L    Anion Gap 6 (L) 8 - 16 mmol/L    eGFR if African American >60.0 >60 mL/min/1.73 m^2    eGFR if non African American >60.0 >60 mL/min/1.73 m^2   C-reactive protein   Result Value Ref Range    CRP 2.3 0.0 - 8.2 mg/L   Sedimentation rate   Result Value Ref Range    Sed Rate 8 0 - 20 mm/Hr            Assessment:       1. Nausea and vomiting, intractability of vomiting not specified, unspecified vomiting type    2. Psoriatic arthritis    3. Osteoarthritis, unspecified osteoarthritis type, unspecified site    4. PSA (psoriatic arthritis)    5. Rheumatoid arthritis, involving unspecified site, unspecified rheumatoid factor presence    6. Lumbar and sacral arthritis     7. Abnormal results of thyroid function studies     8. Major depression, chronic    9. Yeast infection    10. Chronic pain syndrome            Plan:       Rose was seen today for psoriatic arthritis and disease management.    Diagnoses and all orders for this visit:    Nausea and vomiting, intractability of vomiting not specified, unspecified vomiting type  -     diclofenac sodium (VOLTAREN) 1 % Gel; Apply 2 g topically once daily.  -     sumatriptan (IMITREX) 100 MG tablet; Take 1 tablet (100 mg total) by mouth once. for 1 dose  -     predniSONE (DELTASONE) 5 MG tablet; Take 1 tablet (5 mg total) by mouth 3 (three) times daily as needed.  -     estradiol (ESTRACE) 1 MG tablet; Take 1 tablet (1 mg total) by mouth once daily.  -     X-Ray Foot Complete Right; Future  -     busPIRone (BUSPAR) 10 MG tablet; Take 1 tablet (10 mg total) by mouth 3 (three) times daily.  -     ondansetron (ZOFRAN-ODT) 8 MG TbDL; Take 1 tablet (8 mg total) by mouth every 6 (six) hours as needed (nausea).  -     promethazine (PHENERGAN) 25 MG suppository; Place 1 suppository (25 mg total) rectally every 6 (six) hours as needed for Nausea.  -     CBC auto differential; Future  -     Comprehensive metabolic panel; Future  -     TSH; Future  -     Thyroid peroxidase antibody; Future  -     T4, free; Future  -     T3, free; Future  -     Anti-thyroglobulin antibody; Future  -     Sedimentation rate; Future  -     C-reactive protein; Future  -     Vitamin D; Future  -     ketorolac injection 60 mg  -     methylPREDNISolone acetate injection 160 mg  -     cyanocobalamin injection 1,000 mcg    Psoriatic arthritis  -     diclofenac sodium (VOLTAREN) 1 % Gel; Apply 2 g topically once daily.  -     sumatriptan (IMITREX) 100 MG tablet; Take 1 tablet (100 mg total) by mouth once. for 1 dose  -     CBC auto differential; Future  -     Comprehensive metabolic panel; Future  -     TSH; Future  -     Thyroid peroxidase antibody; Future  -     T4, free;  Future  -     T3, free; Future  -     Anti-thyroglobulin antibody; Future  -     Sedimentation rate; Future  -     C-reactive protein; Future  -     Vitamin D; Future  -     ketorolac injection 60 mg  -     methylPREDNISolone acetate injection 160 mg  -     cyanocobalamin injection 1,000 mcg    Osteoarthritis, unspecified osteoarthritis type, unspecified site  -     diclofenac sodium (VOLTAREN) 1 % Gel; Apply 2 g topically once daily.  -     sumatriptan (IMITREX) 100 MG tablet; Take 1 tablet (100 mg total) by mouth once. for 1 dose  -     CBC auto differential; Future  -     Comprehensive metabolic panel; Future  -     TSH; Future  -     Thyroid peroxidase antibody; Future  -     T4, free; Future  -     T3, free; Future  -     Anti-thyroglobulin antibody; Future  -     Sedimentation rate; Future  -     C-reactive protein; Future  -     Vitamin D; Future  -     ketorolac injection 60 mg  -     methylPREDNISolone acetate injection 160 mg  -     cyanocobalamin injection 1,000 mcg    PSA (psoriatic arthritis)  -     diclofenac sodium (VOLTAREN) 1 % Gel; Apply 2 g topically once daily.  -     sumatriptan (IMITREX) 100 MG tablet; Take 1 tablet (100 mg total) by mouth once. for 1 dose  -     predniSONE (DELTASONE) 5 MG tablet; Take 1 tablet (5 mg total) by mouth 3 (three) times daily as needed.    Rheumatoid arthritis, involving unspecified site, unspecified rheumatoid factor presence  -     diclofenac sodium (VOLTAREN) 1 % Gel; Apply 2 g topically once daily.  -     sumatriptan (IMITREX) 100 MG tablet; Take 1 tablet (100 mg total) by mouth once. for 1 dose  -     predniSONE (DELTASONE) 5 MG tablet; Take 1 tablet (5 mg total) by mouth 3 (three) times daily as needed.  -     CBC auto differential; Future  -     Comprehensive metabolic panel; Future  -     TSH; Future  -     Thyroid peroxidase antibody; Future  -     T4, free; Future  -     T3, free; Future  -     Anti-thyroglobulin antibody; Future  -     Sedimentation  rate; Future  -     C-reactive protein; Future  -     Vitamin D; Future  -     ketorolac injection 60 mg  -     methylPREDNISolone acetate injection 160 mg  -     cyanocobalamin injection 1,000 mcg    Lumbar and sacral arthritis  -     diclofenac sodium (VOLTAREN) 1 % Gel; Apply 2 g topically once daily.  -     sumatriptan (IMITREX) 100 MG tablet; Take 1 tablet (100 mg total) by mouth once. for 1 dose  -     CBC auto differential; Future  -     Comprehensive metabolic panel; Future  -     TSH; Future  -     Thyroid peroxidase antibody; Future  -     T4, free; Future  -     T3, free; Future  -     Anti-thyroglobulin antibody; Future  -     Sedimentation rate; Future  -     C-reactive protein; Future  -     Vitamin D; Future  -     ketorolac injection 60 mg  -     methylPREDNISolone acetate injection 160 mg  -     cyanocobalamin injection 1,000 mcg    Abnormal results of thyroid function studies   -     diclofenac sodium (VOLTAREN) 1 % Gel; Apply 2 g topically once daily.  -     sumatriptan (IMITREX) 100 MG tablet; Take 1 tablet (100 mg total) by mouth once. for 1 dose  -     CBC auto differential; Future  -     Comprehensive metabolic panel; Future  -     TSH; Future  -     Thyroid peroxidase antibody; Future  -     T4, free; Future  -     T3, free; Future  -     Anti-thyroglobulin antibody; Future  -     Sedimentation rate; Future  -     C-reactive protein; Future  -     Vitamin D; Future  -     ketorolac injection 60 mg  -     methylPREDNISolone acetate injection 160 mg  -     cyanocobalamin injection 1,000 mcg    Major depression, chronic  -     diclofenac sodium (VOLTAREN) 1 % Gel; Apply 2 g topically once daily.  -     sumatriptan (IMITREX) 100 MG tablet; Take 1 tablet (100 mg total) by mouth once. for 1 dose  -     ketorolac injection 60 mg  -     methylPREDNISolone acetate injection 160 mg  -     cyanocobalamin injection 1,000 mcg    Yeast infection  -     predniSONE (DELTASONE) 5 MG tablet; Take 1 tablet (5  mg total) by mouth 3 (three) times daily as needed.  -     CBC auto differential; Future  -     Comprehensive metabolic panel; Future  -     TSH; Future  -     Thyroid peroxidase antibody; Future  -     T4, free; Future  -     T3, free; Future  -     Anti-thyroglobulin antibody; Future  -     Sedimentation rate; Future  -     C-reactive protein; Future  -     Vitamin D; Future    Chronic pain syndrome  -     Discontinue: HYDROcodone-acetaminophen (NORCO)  mg per tablet; Take 1 tablet by mouth every 8 (eight) hours as needed for Pain.  -     Discontinue: HYDROcodone-acetaminophen (NORCO)  mg per tablet; Take 1 tablet by mouth every 8 (eight) hours as needed for Pain.  -     HYDROcodone-acetaminophen (NORCO)  mg per tablet; Take 1 tablet by mouth every 8 (eight) hours as needed for Pain.     Complex of care for this patient was high

## 2019-10-14 ENCOUNTER — TELEPHONE (OUTPATIENT)
Dept: PHARMACY | Facility: CLINIC | Age: 69
End: 2019-10-14

## 2019-11-11 ENCOUNTER — TELEPHONE (OUTPATIENT)
Dept: PHARMACY | Facility: CLINIC | Age: 69
End: 2019-11-11

## 2019-11-18 NOTE — TELEPHONE ENCOUNTER
Refill and follow up complete Humira CF pens. Patient confirmed need for refill, 1 dose on hand for this Friday. She reports she did have to miss 1 dose over last month due to illness, pharyngitis. She is now feeling better. No changes with other medications or conditions. Shipment set for 11/21 for patient to receive 11/22, requesting prior to holiday rush next week. Address verified. $0 copay (004). No other questions or concerns today, patient is aware to let [facility] know if anything comes up.

## 2019-12-11 RX ORDER — CLOTRIMAZOLE 1 G/ML
SOLUTION TOPICAL
Qty: 30 ML | Refills: 0 | Status: SHIPPED | OUTPATIENT
Start: 2019-12-11 | End: 2021-12-28 | Stop reason: ALTCHOICE

## 2019-12-11 RX ORDER — DICLOFENAC SODIUM 10 MG/G
GEL TOPICAL
Qty: 100 G | Refills: 0 | Status: SHIPPED | OUTPATIENT
Start: 2019-12-11 | End: 2021-01-11 | Stop reason: SDUPTHER

## 2019-12-31 ENCOUNTER — TELEPHONE (OUTPATIENT)
Dept: PHARMACY | Facility: CLINIC | Age: 69
End: 2019-12-31

## 2020-01-08 ENCOUNTER — LAB VISIT (OUTPATIENT)
Dept: LAB | Facility: HOSPITAL | Age: 70
End: 2020-01-08
Attending: INTERNAL MEDICINE
Payer: MEDICARE

## 2020-01-08 DIAGNOSIS — R11.2 NAUSEA AND VOMITING, INTRACTABILITY OF VOMITING NOT SPECIFIED, UNSPECIFIED VOMITING TYPE: ICD-10-CM

## 2020-01-08 DIAGNOSIS — M19.90 OSTEOARTHRITIS, UNSPECIFIED OSTEOARTHRITIS TYPE, UNSPECIFIED SITE: ICD-10-CM

## 2020-01-08 DIAGNOSIS — R94.6 ABNORMAL RESULTS OF THYROID FUNCTION STUDIES: ICD-10-CM

## 2020-01-08 DIAGNOSIS — L40.50 PSORIATIC ARTHRITIS: ICD-10-CM

## 2020-01-08 DIAGNOSIS — B37.9 YEAST INFECTION: ICD-10-CM

## 2020-01-08 DIAGNOSIS — M47.817 LUMBAR AND SACRAL ARTHRITIS: ICD-10-CM

## 2020-01-08 DIAGNOSIS — M06.9 RHEUMATOID ARTHRITIS, INVOLVING UNSPECIFIED SITE, UNSPECIFIED RHEUMATOID FACTOR PRESENCE: ICD-10-CM

## 2020-01-08 LAB
25(OH)D3+25(OH)D2 SERPL-MCNC: 30 NG/ML (ref 30–96)
ALBUMIN SERPL BCP-MCNC: 4.2 G/DL (ref 3.5–5.2)
ALP SERPL-CCNC: 71 U/L (ref 55–135)
ALT SERPL W/O P-5'-P-CCNC: 19 U/L (ref 10–44)
ANION GAP SERPL CALC-SCNC: 7 MMOL/L (ref 8–16)
AST SERPL-CCNC: 23 U/L (ref 10–40)
BASOPHILS # BLD AUTO: 0.07 K/UL (ref 0–0.2)
BASOPHILS NFR BLD: 1 % (ref 0–1.9)
BILIRUB SERPL-MCNC: 0.5 MG/DL (ref 0.1–1)
BUN SERPL-MCNC: 11 MG/DL (ref 8–23)
CALCIUM SERPL-MCNC: 9.6 MG/DL (ref 8.7–10.5)
CHLORIDE SERPL-SCNC: 104 MMOL/L (ref 95–110)
CO2 SERPL-SCNC: 28 MMOL/L (ref 23–29)
CREAT SERPL-MCNC: 0.8 MG/DL (ref 0.5–1.4)
CRP SERPL-MCNC: 1.7 MG/L (ref 0–8.2)
DIFFERENTIAL METHOD: ABNORMAL
EOSINOPHIL # BLD AUTO: 0.3 K/UL (ref 0–0.5)
EOSINOPHIL NFR BLD: 4.1 % (ref 0–8)
ERYTHROCYTE [DISTWIDTH] IN BLOOD BY AUTOMATED COUNT: 15 % (ref 11.5–14.5)
ERYTHROCYTE [SEDIMENTATION RATE] IN BLOOD BY WESTERGREN METHOD: 2 MM/HR (ref 0–20)
EST. GFR  (AFRICAN AMERICAN): >60 ML/MIN/1.73 M^2
EST. GFR  (NON AFRICAN AMERICAN): >60 ML/MIN/1.73 M^2
GLUCOSE SERPL-MCNC: 91 MG/DL (ref 70–110)
HCT VFR BLD AUTO: 38.4 % (ref 37–48.5)
HGB BLD-MCNC: 11.5 G/DL (ref 12–16)
IMM GRANULOCYTES # BLD AUTO: 0.02 K/UL (ref 0–0.04)
IMM GRANULOCYTES NFR BLD AUTO: 0.3 % (ref 0–0.5)
LYMPHOCYTES # BLD AUTO: 2.3 K/UL (ref 1–4.8)
LYMPHOCYTES NFR BLD: 32.8 % (ref 18–48)
MCH RBC QN AUTO: 28.5 PG (ref 27–31)
MCHC RBC AUTO-ENTMCNC: 29.9 G/DL (ref 32–36)
MCV RBC AUTO: 95 FL (ref 82–98)
MONOCYTES # BLD AUTO: 0.5 K/UL (ref 0.3–1)
MONOCYTES NFR BLD: 7.4 % (ref 4–15)
NEUTROPHILS # BLD AUTO: 3.9 K/UL (ref 1.8–7.7)
NEUTROPHILS NFR BLD: 54.4 % (ref 38–73)
NRBC BLD-RTO: 0 /100 WBC
PLATELET # BLD AUTO: 317 K/UL (ref 150–350)
PMV BLD AUTO: 10.8 FL (ref 9.2–12.9)
POTASSIUM SERPL-SCNC: 4.4 MMOL/L (ref 3.5–5.1)
PROT SERPL-MCNC: 7.8 G/DL (ref 6–8.4)
RBC # BLD AUTO: 4.03 M/UL (ref 4–5.4)
SODIUM SERPL-SCNC: 139 MMOL/L (ref 136–145)
T3FREE SERPL-MCNC: 2.3 PG/ML (ref 2.3–4.2)
T4 FREE SERPL-MCNC: 1.45 NG/DL (ref 0.71–1.51)
THYROGLOB AB SERPL IA-ACNC: <4 IU/ML (ref 0–3.9)
THYROPEROXIDASE IGG SERPL-ACNC: <6 IU/ML
TSH SERPL DL<=0.005 MIU/L-ACNC: 0.42 UIU/ML (ref 0.4–4)
WBC # BLD AUTO: 7.13 K/UL (ref 3.9–12.7)

## 2020-01-08 PROCEDURE — 84443 ASSAY THYROID STIM HORMONE: CPT

## 2020-01-08 PROCEDURE — 82306 VITAMIN D 25 HYDROXY: CPT

## 2020-01-08 PROCEDURE — 80053 COMPREHEN METABOLIC PANEL: CPT

## 2020-01-08 PROCEDURE — 85025 COMPLETE CBC W/AUTO DIFF WBC: CPT

## 2020-01-08 PROCEDURE — 86140 C-REACTIVE PROTEIN: CPT

## 2020-01-08 PROCEDURE — 36415 COLL VENOUS BLD VENIPUNCTURE: CPT | Mod: PO

## 2020-01-08 PROCEDURE — 85651 RBC SED RATE NONAUTOMATED: CPT | Mod: PO

## 2020-01-08 PROCEDURE — 84439 ASSAY OF FREE THYROXINE: CPT

## 2020-01-08 PROCEDURE — 84481 FREE ASSAY (FT-3): CPT

## 2020-01-08 PROCEDURE — 86800 THYROGLOBULIN ANTIBODY: CPT

## 2020-01-09 ENCOUNTER — OFFICE VISIT (OUTPATIENT)
Dept: RHEUMATOLOGY | Facility: CLINIC | Age: 70
End: 2020-01-09
Payer: MEDICARE

## 2020-01-09 VITALS
DIASTOLIC BLOOD PRESSURE: 83 MMHG | SYSTOLIC BLOOD PRESSURE: 140 MMHG | WEIGHT: 180 LBS | HEART RATE: 71 BPM | HEIGHT: 67 IN | BODY MASS INDEX: 28.25 KG/M2

## 2020-01-09 DIAGNOSIS — R94.6 ABNORMAL RESULTS OF THYROID FUNCTION STUDIES: ICD-10-CM

## 2020-01-09 DIAGNOSIS — L40.50 PSA (PSORIATIC ARTHRITIS): ICD-10-CM

## 2020-01-09 DIAGNOSIS — L40.50 PSORIATIC ARTHRITIS: ICD-10-CM

## 2020-01-09 DIAGNOSIS — E78.5 HYPERLIPIDEMIA, UNSPECIFIED HYPERLIPIDEMIA TYPE: ICD-10-CM

## 2020-01-09 DIAGNOSIS — L40.50 PSORIATIC ARTHRITIS: Primary | ICD-10-CM

## 2020-01-09 DIAGNOSIS — F32.9 MAJOR DEPRESSION, CHRONIC: ICD-10-CM

## 2020-01-09 DIAGNOSIS — Z79.890 HORMONE REPLACEMENT THERAPY: ICD-10-CM

## 2020-01-09 DIAGNOSIS — G89.4 CHRONIC PAIN SYNDROME: ICD-10-CM

## 2020-01-09 DIAGNOSIS — M06.9 RHEUMATOID ARTHRITIS, INVOLVING UNSPECIFIED SITE, UNSPECIFIED RHEUMATOID FACTOR PRESENCE: ICD-10-CM

## 2020-01-09 DIAGNOSIS — M19.90 OSTEOARTHRITIS, UNSPECIFIED OSTEOARTHRITIS TYPE, UNSPECIFIED SITE: ICD-10-CM

## 2020-01-09 DIAGNOSIS — M06.9 RHEUMATOID ARTHRITIS, INVOLVING UNSPECIFIED SITE, UNSPECIFIED RHEUMATOID FACTOR PRESENCE: Primary | ICD-10-CM

## 2020-01-09 DIAGNOSIS — M47.817 LUMBAR AND SACRAL ARTHRITIS: ICD-10-CM

## 2020-01-09 DIAGNOSIS — J32.9 SINUSITIS, UNSPECIFIED CHRONICITY, UNSPECIFIED LOCATION: ICD-10-CM

## 2020-01-09 PROCEDURE — 1159F MED LIST DOCD IN RCRD: CPT | Mod: ,,, | Performed by: PHYSICIAN ASSISTANT

## 2020-01-09 PROCEDURE — 99999 PR PBB SHADOW E&M-EST. PATIENT-LVL V: CPT | Mod: PBBFAC,,, | Performed by: PHYSICIAN ASSISTANT

## 2020-01-09 PROCEDURE — 1159F PR MEDICATION LIST DOCUMENTED IN MEDICAL RECORD: ICD-10-PCS | Mod: ,,, | Performed by: PHYSICIAN ASSISTANT

## 2020-01-09 PROCEDURE — 1125F AMNT PAIN NOTED PAIN PRSNT: CPT | Mod: ,,, | Performed by: PHYSICIAN ASSISTANT

## 2020-01-09 PROCEDURE — 1125F PR PAIN SEVERITY QUANTIFIED, PAIN PRESENT: ICD-10-PCS | Mod: ,,, | Performed by: PHYSICIAN ASSISTANT

## 2020-01-09 PROCEDURE — 99214 OFFICE O/P EST MOD 30 MIN: CPT | Mod: 25,S$PBB,, | Performed by: PHYSICIAN ASSISTANT

## 2020-01-09 PROCEDURE — 99215 OFFICE O/P EST HI 40 MIN: CPT | Mod: PBBFAC,PO,25 | Performed by: PHYSICIAN ASSISTANT

## 2020-01-09 PROCEDURE — 99214 PR OFFICE/OUTPT VISIT, EST, LEVL IV, 30-39 MIN: ICD-10-PCS | Mod: 25,S$PBB,, | Performed by: PHYSICIAN ASSISTANT

## 2020-01-09 PROCEDURE — 96372 THER/PROPH/DIAG INJ SC/IM: CPT | Mod: PBBFAC,PO

## 2020-01-09 PROCEDURE — 99999 PR PBB SHADOW E&M-EST. PATIENT-LVL V: ICD-10-PCS | Mod: PBBFAC,,, | Performed by: PHYSICIAN ASSISTANT

## 2020-01-09 RX ORDER — CYANOCOBALAMIN 1000 UG/ML
1000 INJECTION, SOLUTION INTRAMUSCULAR; SUBCUTANEOUS
Status: COMPLETED | OUTPATIENT
Start: 2020-01-09 | End: 2020-01-09

## 2020-01-09 RX ORDER — HYDROCODONE BITARTRATE AND ACETAMINOPHEN 10; 325 MG/1; MG/1
1 TABLET ORAL EVERY 8 HOURS PRN
Qty: 90 TABLET | Refills: 0 | Status: SHIPPED | OUTPATIENT
Start: 2020-01-09 | End: 2020-01-09 | Stop reason: SDUPTHER

## 2020-01-09 RX ORDER — SIMVASTATIN 40 MG/1
40 TABLET, FILM COATED ORAL NIGHTLY
Qty: 90 TABLET | Refills: 0 | Status: SHIPPED | OUTPATIENT
Start: 2020-01-09 | End: 2020-03-06 | Stop reason: SDUPTHER

## 2020-01-09 RX ORDER — HYDROCODONE BITARTRATE AND ACETAMINOPHEN 10; 325 MG/1; MG/1
1 TABLET ORAL EVERY 8 HOURS PRN
Qty: 90 TABLET | Refills: 0 | Status: SHIPPED | OUTPATIENT
Start: 2020-03-09 | End: 2020-01-09 | Stop reason: SDUPTHER

## 2020-01-09 RX ORDER — HYDROCODONE BITARTRATE AND ACETAMINOPHEN 10; 325 MG/1; MG/1
1 TABLET ORAL EVERY 8 HOURS PRN
Qty: 90 TABLET | Refills: 0 | Status: SHIPPED | OUTPATIENT
Start: 2020-02-09 | End: 2020-01-09 | Stop reason: SDUPTHER

## 2020-01-09 RX ORDER — CYCLOBENZAPRINE HCL 10 MG
10 TABLET ORAL 3 TIMES DAILY PRN
Qty: 90 TABLET | Refills: 1 | Status: SHIPPED | OUTPATIENT
Start: 2020-01-09 | End: 2020-11-10 | Stop reason: SDUPTHER

## 2020-01-09 RX ORDER — MELOXICAM 15 MG/1
TABLET ORAL
Qty: 90 TABLET | Refills: 1 | Status: SHIPPED | OUTPATIENT
Start: 2020-01-09 | End: 2020-05-08 | Stop reason: SDUPTHER

## 2020-01-09 RX ORDER — ESTRADIOL 1 MG/1
1 TABLET ORAL DAILY
Qty: 90 TABLET | Refills: 0 | Status: SHIPPED | OUTPATIENT
Start: 2020-01-09 | End: 2020-07-01

## 2020-01-09 RX ORDER — KETOROLAC TROMETHAMINE 30 MG/ML
60 INJECTION, SOLUTION INTRAMUSCULAR; INTRAVENOUS
Status: COMPLETED | OUTPATIENT
Start: 2020-01-09 | End: 2020-01-09

## 2020-01-09 RX ORDER — METHYLPREDNISOLONE ACETATE 80 MG/ML
160 INJECTION, SUSPENSION INTRA-ARTICULAR; INTRALESIONAL; INTRAMUSCULAR; SOFT TISSUE
Status: COMPLETED | OUTPATIENT
Start: 2020-01-09 | End: 2020-01-09

## 2020-01-09 RX ORDER — DOXYCYCLINE 100 MG/1
100 CAPSULE ORAL EVERY 12 HOURS
Qty: 20 CAPSULE | Refills: 0 | Status: SHIPPED | OUTPATIENT
Start: 2020-01-09 | End: 2020-01-19

## 2020-01-09 RX ADMIN — METHYLPREDNISOLONE ACETATE 160 MG: 80 INJECTION, SUSPENSION INTRA-ARTICULAR; INTRALESIONAL; INTRAMUSCULAR; SOFT TISSUE at 11:01

## 2020-01-09 RX ADMIN — CYANOCOBALAMIN 1000 MCG: 1000 INJECTION, SOLUTION INTRAMUSCULAR at 11:01

## 2020-01-09 RX ADMIN — KETOROLAC TROMETHAMINE 60 MG: 60 INJECTION, SOLUTION INTRAMUSCULAR at 11:01

## 2020-01-09 ASSESSMENT — ROUTINE ASSESSMENT OF PATIENT INDEX DATA (RAPID3)
PATIENT GLOBAL ASSESSMENT SCORE: 6.5
MDHAQ FUNCTION SCORE: 1.6
TOTAL RAPID3 SCORE: 6.61
FATIGUE SCORE: 1.1
PAIN SCORE: 8
PSYCHOLOGICAL DISTRESS SCORE: 2.2

## 2020-01-09 NOTE — PATIENT INSTRUCTIONS
Start doxycycline twice daily for 10 days for sinus infection    Hold Humira until antibiotics are completed then continue every 14 days    Stop MTX and folic acid    Start Vit D 1000 IU daily

## 2020-01-09 NOTE — PROGRESS NOTES
Administered 1 cc B12 1000mcg to right upper outer gluteal.  Pt tolerated well.No acute reaction noted to site. Pt instructed on S/S to report. Advised pt to waitn in lobby 15 minutes after receiving injection to monitor for any reactions. Pt verbalized understanding.     Lot:[id number]  Exp:Jan21  Administered 2 cc DepoMedrol 80mg/cc  to right upper outer gluteal. Pt tolerated well. No acute reaction noted to site. Pt instructed on S/S to report. Advised patient to wait in lobby 15 minutes after receiving injection to monitor for any reactions..  Pt verbalized understanding.     Lot: [id number]  Exp: 05/2021  Administered 2 cc  Toradol 30mg/cc  to left upper outer gluteal. Pt tolerated well. No acute reaction noted to site. Pt instructed on S/S to report. Advised patient to wait in lobby 15 minutes after receiving injection to monitor for any reactions. Pt verbalized understanding.     Lot: [id number]  Exp: [id number]

## 2020-01-10 RX ORDER — HYDROCODONE BITARTRATE AND ACETAMINOPHEN 10; 325 MG/1; MG/1
1 TABLET ORAL EVERY 8 HOURS PRN
Qty: 90 TABLET | Refills: 0 | Status: SHIPPED | OUTPATIENT
Start: 2020-03-09 | End: 2020-04-06 | Stop reason: SDUPTHER

## 2020-01-10 RX ORDER — ZOLPIDEM TARTRATE 10 MG/1
TABLET ORAL
Qty: 30 TABLET | Refills: 3 | Status: SHIPPED | OUTPATIENT
Start: 2020-01-10 | End: 2020-06-21

## 2020-01-12 NOTE — PROGRESS NOTES
Subjective:       Patient ID: Rose Goff is a 69 y.o. female.    Chief Complaint: Disease Management and Psoriatic Arthritis    Mrs. Goff is a 68 year old female who presents to clinic for follow up on psoriatic arthritis. She has been doing fair since her last visit. She complains of 1-2 wk hx of sinus pressure, facial pain, frontal sinus pressure, rhinorrhea, and headache. She has also noticed sores on the arms, chest, and a boil on the buttock, which is new. She has been applying topical cream derm prescribes (unsure what). Arthritis is stable on current regimen of Humira, mobic, norco, and prednisone PRN. She stopped MTX approximately 1 month ago Joint pain involving MCPs, PIPs, wrists, and shoulders. Pain is constant and aching in nature.     Mood is stable at this time and her son starts [employer] on Sunday and her daughter recently moved in with her.    Current tx:  1. Humira  2. Mobic  3. Norco    Prior treatments:   Enbrel, Cimzia, Simponi, MTX, Cosentyx     Review of Systems   Constitutional: Positive for activity change. Negative for appetite change, chills, fatigue and fever.   Eyes: Negative for visual disturbance.   Respiratory: Negative for cough and shortness of breath.    Cardiovascular: Negative for chest pain, palpitations and leg swelling.   Gastrointestinal: Negative for abdominal pain, constipation, diarrhea, nausea and vomiting.   Musculoskeletal: Positive for arthralgias, joint swelling and myalgias. Negative for neck pain and neck stiffness.   Skin: Positive for rash.   Neurological: Negative for dizziness, weakness, light-headedness and headaches.   Psychiatric/Behavioral: Negative for decreased concentration, dysphoric mood, sleep disturbance and suicidal ideas. The patient is not nervous/anxious.          Objective:     Vitals:    01/09/20 1049   BP: (!) 140/83   Pulse: 71       Past Medical History:   Diagnosis Date    Abnormal mammogram     Amaurosis fugax     Anemia      Back pain     Chest pain     Degenerative cervical disc     Degenerative disc disease, thoracic     Depression     Depression     Fatigue     GERD (gastroesophageal reflux disease)     Head ache     Hemorrhoids     Hyperlipidemia     Hypertension     Hypoglycemia     Hypothyroidism     Hypothyroidism     Insomnia     Menopausal state     Neck pain     Neuralgia     Nonrheumatic tricuspid valve disorder     Optic neuritis     Palpitations     PONV (postoperative nausea and vomiting)     Psoriasis     Psoriatic arthropathy     Rosacea     Thyroid nodule      Past Surgical History:   Procedure Laterality Date    ANKLE FRACTURE SURGERY      APPENDECTOMY      BREAST BIOPSY Left     Negative    HYSTERECTOMY      OOPHORECTOMY      RHIZOTOMY W/ RADIOFREQUENCY ABLATION Right     lumbar    thyroid tumor removed            Physical Exam   Constitutional: She is well-developed, well-nourished, and in no distress.   Eyes: Right conjunctiva is not injected. Left conjunctiva is not injected. Right eye exhibits normal extraocular motion. Left eye exhibits normal extraocular motion.   Neck: No JVD present. Muscular tenderness present. No spinous process tenderness present. No thyromegaly present.   Cardiovascular: Normal rate and regular rhythm.  Exam reveals no decreased pulses.    Pulmonary/Chest: She has no wheezes. She has no rhonchi. She has no rales.       Right Side Rheumatological Exam     Examination finds the shoulder, 1st PIP, 2nd PIP, 3rd PIP, 3rd MCP, 4th PIP, 4th MCP, 5th PIP and 5th MCP normal.    The patient is tender to palpation of the elbow, wrist, knee, 1st MCP and 2nd MCP    Left Side Rheumatological Exam     Examination finds the shoulder, 1st PIP, 2nd PIP, 3rd PIP, 3rd MCP, 4th PIP, 4th MCP, 5th PIP and 5th MCP normal.    The patient is tender to palpation of the elbow, wrist, knee, 1st MCP and 2nd MCP.      Lymphadenopathy:     She has no cervical adenopathy.   Neurological:  Gait normal.   Skin: No rash (no active psoriasis) noted.     Psychiatric: Affect normal. She does not exhibit a depressed mood.       Reviewed recent labs:  Component      Latest Ref Rng & Units 1/8/2020   WBC      3.90 - 12.70 K/uL 7.13   RBC      4.00 - 5.40 M/uL 4.03   Hemoglobin      12.0 - 16.0 g/dL 11.5 (L)   Hematocrit      37.0 - 48.5 % 38.4   MCV      82 - 98 fL 95   MCH      27.0 - 31.0 pg 28.5   MCHC      32.0 - 36.0 g/dL 29.9 (L)   RDW      11.5 - 14.5 % 15.0 (H)   Platelets      150 - 350 K/uL 317   MPV      9.2 - 12.9 fL 10.8   Immature Granulocytes      0.0 - 0.5 % 0.3   Gran # (ANC)      1.8 - 7.7 K/uL 3.9   Immature Grans (Abs)      0.00 - 0.04 K/uL 0.02   Lymph #      1.0 - 4.8 K/uL 2.3   Mono #      0.3 - 1.0 K/uL 0.5   Eos #      0.0 - 0.5 K/uL 0.3   Baso #      0.00 - 0.20 K/uL 0.07   nRBC      0 /100 WBC 0   Gran%      38.0 - 73.0 % 54.4   Lymph%      18.0 - 48.0 % 32.8   Mono%      4.0 - 15.0 % 7.4   Eosinophil%      0.0 - 8.0 % 4.1   Basophil%      0.0 - 1.9 % 1.0   Differential Method       Automated   Sodium      136 - 145 mmol/L 139   Potassium      3.5 - 5.1 mmol/L 4.4   Chloride      95 - 110 mmol/L 104   CO2      23 - 29 mmol/L 28   Glucose      70 - 110 mg/dL 91   BUN, Bld      8 - 23 mg/dL 11   Creatinine      0.5 - 1.4 mg/dL 0.8   Calcium      8.7 - 10.5 mg/dL 9.6   PROTEIN TOTAL      6.0 - 8.4 g/dL 7.8   Albumin      3.5 - 5.2 g/dL 4.2   BILIRUBIN TOTAL      0.1 - 1.0 mg/dL 0.5   Alkaline Phosphatase      55 - 135 U/L 71   AST      10 - 40 U/L 23   ALT      10 - 44 U/L 19   Anion Gap      8 - 16 mmol/L 7 (L)   eGFR if African American      >60 mL/min/1.73 m:2 >60.0   eGFR if non African American      >60 mL/min/1.73 m:2 >60.0   TSH      0.400 - 4.000 uIU/mL 0.417   Thyroperoxidase Antibodies      <6.0 IU/mL <6.0   Free T4      0.71 - 1.51 ng/dL 1.45   T3, Free      2.3 - 4.2 pg/mL 2.3   Thyroglobulin Ab Screen      0.0 - 3.9 IU/mL <4.0   Sed Rate      0 - 20 mm/Hr 2   CRP      0.0  - 8.2 mg/L 1.7   Vit D, 25-Hydroxy      30 - 96 ng/mL 30       Assessment:       1. Rheumatoid arthritis, involving unspecified site, unspecified rheumatoid factor presence    2. Psoriatic arthritis    3. Sinusitis, unspecified chronicity, unspecified location    4. Hyperlipidemia, unspecified hyperlipidemia type    5. Hormone replacement therapy    6. Chronic pain syndrome            Plan:       Rheumatoid arthritis, involving unspecified site, unspecified rheumatoid factor presence    Psoriatic arthritis  -     cyanocobalamin injection 1,000 mcg  -     ketorolac injection 60 mg  -     methylPREDNISolone acetate injection 160 mg  -     CBC auto differential; Future; Expected date: 01/09/2020  -     Comprehensive metabolic panel; Future; Expected date: 01/09/2020  -     C-reactive protein; Future; Expected date: 01/09/2020  -     Sedimentation rate; Future; Expected date: 01/09/2020  -     Quantiferon Gold TB; Future; Expected date: 01/09/2020  -     Hepatitis B core antibody, IgM; Future; Expected date: 01/09/2020  -     Hepatitis C antibody; Future; Expected date: 01/09/2020  -     Hepatitis B surface antigen; Future; Expected date: 01/09/2020  -     meloxicam (MOBIC) 15 MG tablet; TAKE 1 TABLET(15 MG) BY MOUTH EVERY DAY  Dispense: 90 tablet; Refill: 1  -     cyclobenzaprine (FLEXERIL) 10 MG tablet; Take 1 tablet (10 mg total) by mouth 3 (three) times daily as needed for Muscle spasms.  Dispense: 90 tablet; Refill: 1    Sinusitis, unspecified chronicity, unspecified location  -     doxycycline (VIBRAMYCIN) 100 MG Cap; Take 1 capsule (100 mg total) by mouth every 12 (twelve) hours. for 10 days  Dispense: 20 capsule; Refill: 0    Hyperlipidemia, unspecified hyperlipidemia type  -     simvastatin (ZOCOR) 40 MG tablet; Take 1 tablet (40 mg total) by mouth every evening.  Dispense: 90 tablet; Refill: 0    Hormone replacement therapy  -     estradiol (ESTRACE) 1 MG tablet; Take 1 tablet (1 mg total) by mouth once daily.   Dispense: 90 tablet; Refill: 0    Chronic pain syndrome  -     Discontinue: HYDROcodone-acetaminophen (NORCO)  mg per tablet; Take 1 tablet by mouth every 8 (eight) hours as needed for Pain.  Dispense: 90 tablet; Refill: 0  -     Discontinue: HYDROcodone-acetaminophen (NORCO)  mg per tablet; Take 1 tablet by mouth every 8 (eight) hours as needed for Pain.  Dispense: 90 tablet; Refill: 0  -     Discontinue: HYDROcodone-acetaminophen (NORCO)  mg per tablet; Take 1 tablet by mouth every 8 (eight) hours as needed for Pain.  Dispense: 90 tablet; Refill: 0          Assessment:  69 year old female with   Psoriatic arthritis, rheumatoid arthritis on Humira  --chronic pain syndrome on norco  --insomnia on ambien    Plan:  1.toradol 60, depo 160, b12 1000 mcg given today  2. Start doxycycline 100 mg bid x 10 days for sinusitis  3. Cont. Humira every 14 days. Stop MTX  4. Cont. norco 10/325 TID per Dr. Benjamin. I have checked [place] prescription monitoring program site and no unusual or abnormal behavior has occurred pt understand the risk and benefits of taking opioid medications and has decided to continue the  medication   5. Refills provided for 90 days, but then f/u with PCP recommended  6. Cont. mobic 15 mg, cont. Flexeril PRN  7. If rash persists then derm referral    Follow up:   3 months

## 2020-02-05 ENCOUNTER — TELEPHONE (OUTPATIENT)
Dept: PHARMACY | Facility: CLINIC | Age: 70
End: 2020-02-05

## 2020-02-13 NOTE — TELEPHONE ENCOUNTER
Spoke with patient regarding Humira refill and follow up. Patient confirmed need for refill, 1 dose on hand for tomorrow, will need by . Shipment set for  for patient to receive . Address and  verified. $8.95 copay (004). Patient did miss 1 dose over the last month due to illness. Patient is otherwise taking as directed every other Friday. No changes with other conditions or allergies.  New sharps container requested. No other questions or concerns today, patient is aware to let [facility] know if anything comes up.

## 2020-03-04 ENCOUNTER — TELEPHONE (OUTPATIENT)
Dept: RHEUMATOLOGY | Facility: CLINIC | Age: 70
End: 2020-03-04

## 2020-03-04 NOTE — TELEPHONE ENCOUNTER
Patient returned call from office. Patient stated she will be out of medication before her next appointment. Nurse informed that she can call office and request refills. Patient verbalized understanding. Patient requested a nurse visit today for injections. Nurse added patient to schedule to receive injections.

## 2020-03-04 NOTE — TELEPHONE ENCOUNTER
----- Message from Princess JOSE MARTIN Caraballo sent at 3/4/2020  8:12 AM CST -----  Contact: pt  Type: Needs Medical Advice    Who Called: Patient  Best Call Back Number: [phone]  Additional Information: Patient called requesting to speak with the Nurse regarding her appt for today. Patient states she will be out of medication by the time the next appt. Please call to assist.

## 2020-03-20 DIAGNOSIS — L40.50 PSORIATIC ARTHRITIS: ICD-10-CM

## 2020-03-20 DIAGNOSIS — M15.9 PRIMARY OSTEOARTHRITIS INVOLVING MULTIPLE JOINTS: ICD-10-CM

## 2020-03-20 DIAGNOSIS — M06.9 RHEUMATOID ARTHRITIS, INVOLVING UNSPECIFIED SITE, UNSPECIFIED RHEUMATOID FACTOR PRESENCE: ICD-10-CM

## 2020-03-24 ENCOUNTER — TELEPHONE (OUTPATIENT)
Dept: PHARMACY | Facility: CLINIC | Age: 70
End: 2020-03-24

## 2020-03-24 DIAGNOSIS — Z79.890 HORMONE REPLACEMENT THERAPY: ICD-10-CM

## 2020-03-24 RX ORDER — ESTRADIOL 1 MG/1
1 TABLET ORAL DAILY
Qty: 90 TABLET | Refills: 0 | Status: CANCELLED | OUTPATIENT
Start: 2020-03-24

## 2020-03-24 NOTE — TELEPHONE ENCOUNTER
Contacted patient regarding estrace. Informed to please request this with PCP. Patient verbalized understanding.

## 2020-04-06 DIAGNOSIS — G89.4 CHRONIC PAIN SYNDROME: ICD-10-CM

## 2020-04-06 DIAGNOSIS — L40.50 PSORIATIC ARTHRITIS: ICD-10-CM

## 2020-04-06 NOTE — TELEPHONE ENCOUNTER
----- Message from Carmencita Sarah sent at 4/6/2020  9:24 AM CDT -----  Contact: Pt  Type: Rx Refill Request    Who Called: Pt  Refill or New Rx: refill  Rx Name and Strength:  HYDROcodone-acetaminophen (NORCO)  mg per tablet  Is it a 30 day or 90 day? As directed  Preferred Pharmacy:   [facility] #[id number] [place] [id number] AT [facility] [address] [facility]  [address] [place] [address]  Phone: [phone] Fax: [phone]  Local or Mail: local  Ordering Provider: belinda Roblero Call Back Number: [phone]     Please advise. Thank you

## 2020-04-07 RX ORDER — HYDROCODONE BITARTRATE AND ACETAMINOPHEN 10; 325 MG/1; MG/1
1 TABLET ORAL EVERY 8 HOURS PRN
Qty: 90 TABLET | Refills: 0 | Status: SHIPPED | OUTPATIENT
Start: 2020-04-07 | End: 2020-05-08 | Stop reason: SDUPTHER

## 2020-04-24 ENCOUNTER — TELEPHONE (OUTPATIENT)
Dept: PHARMACY | Facility: CLINIC | Age: 70
End: 2020-04-24

## 2020-05-08 ENCOUNTER — OFFICE VISIT (OUTPATIENT)
Dept: RHEUMATOLOGY | Facility: CLINIC | Age: 70
End: 2020-05-08
Payer: MEDICARE

## 2020-05-08 VITALS — BODY MASS INDEX: 28.19 KG/M2 | WEIGHT: 180 LBS

## 2020-05-08 DIAGNOSIS — L40.50 PSORIATIC ARTHRITIS: Primary | ICD-10-CM

## 2020-05-08 DIAGNOSIS — J01.00 ACUTE NON-RECURRENT MAXILLARY SINUSITIS: ICD-10-CM

## 2020-05-08 DIAGNOSIS — I10 ESSENTIAL HYPERTENSION: ICD-10-CM

## 2020-05-08 DIAGNOSIS — M47.817 LUMBAR AND SACRAL ARTHRITIS: ICD-10-CM

## 2020-05-08 DIAGNOSIS — G89.4 CHRONIC PAIN SYNDROME: ICD-10-CM

## 2020-05-08 DIAGNOSIS — Z79.899 IMMUNOCOMPROMISED STATE DUE TO DRUG THERAPY: ICD-10-CM

## 2020-05-08 DIAGNOSIS — D84.821 IMMUNOCOMPROMISED STATE DUE TO DRUG THERAPY: ICD-10-CM

## 2020-05-08 DIAGNOSIS — M06.9 RHEUMATOID ARTHRITIS, INVOLVING UNSPECIFIED SITE, UNSPECIFIED RHEUMATOID FACTOR PRESENCE: ICD-10-CM

## 2020-05-08 PROCEDURE — 99441 PR PHYSICIAN TELEPHONE EVALUATION 5-10 MIN: ICD-10-PCS | Mod: 95,,, | Performed by: PHYSICIAN ASSISTANT

## 2020-05-08 PROCEDURE — 99441 PR PHYSICIAN TELEPHONE EVALUATION 5-10 MIN: CPT | Mod: 95,,, | Performed by: PHYSICIAN ASSISTANT

## 2020-05-08 RX ORDER — PREDNISONE 5 MG/1
TABLET ORAL
Qty: 90 TABLET | Refills: 4 | Status: SHIPPED | OUTPATIENT
Start: 2020-05-08 | End: 2021-09-01 | Stop reason: SDUPTHER

## 2020-05-08 RX ORDER — DOXYCYCLINE 100 MG/1
100 CAPSULE ORAL EVERY 12 HOURS
Qty: 20 CAPSULE | Refills: 0 | Status: SHIPPED | OUTPATIENT
Start: 2020-05-08 | End: 2020-05-18

## 2020-05-08 RX ORDER — LISINOPRIL 10 MG/1
TABLET ORAL
Qty: 90 TABLET | Refills: 1 | Status: SHIPPED | OUTPATIENT
Start: 2020-05-08 | End: 2020-11-10 | Stop reason: SDUPTHER

## 2020-05-08 RX ORDER — MELOXICAM 15 MG/1
TABLET ORAL
Qty: 90 TABLET | Refills: 1 | Status: SHIPPED | OUTPATIENT
Start: 2020-05-08 | End: 2020-11-10 | Stop reason: SDUPTHER

## 2020-05-08 NOTE — Clinical Note
Labs and nurse shots next Wednesday if schedule availabilityappt with me 3 months after dr. trevino visit

## 2020-05-08 NOTE — PROGRESS NOTES
Established Patient - Audio Only Telehealth Visit     The patient location is: home  The chief complaint leading to consultation is: PsA  Visit type: Virtual visit with audio only (telephone)  Total time spent with patient: 9 minutes       The reason for the audio only service rather than synchronous audio and video virtual visit was related to technical difficulties or patient preference/necessity.     Each patient to whom I provide medical services by telemedicine is:  (1) informed of the relationship between the physician and patient and the respective role of any other health care provider with respect to management of the patient; and (2) notified that they may decline to receive medical services by telemedicine and may withdraw from such care at any time. Patient verbally consented to receive this service via voice-only telephone call.    This service was not originating from a related E/M service provided within the previous 7 days nor will  to an E/M service or procedure within the next 24 hours or my soonest available appointment.  Prevailing standard of care was able to be met in this audio-only visit.      Subjective:       Patient ID: Rose Goff is a 69 y.o. female.    Chief Complaint: Follow-up    Mrs. Goff is a 68 year old female who presents to audio visit for follow up on psoriatic arthritis. She complains of 1 week history of sinus pressure, thick nasal drainage, facial pain, and headache. She is taking anti-histamine daily. No cough, fever, chills, sob, or recent known exposure to covid. With regards to her arthritis, she is doing well on her current regimen of Humira, mobic, norco, and prednisone PRN. She has intermittent joint pain involving MCPs, PIPs, wrists, and shoulders. She has chronic low back pain secondary to OA and is taking norco TID PRN with good relief of pain.    Mood is stable at this time and her daughter is living with her. No new labs due to covid  outbreak.    Current tx:  1. Humira  2. Mobic  3. Norco    Prior treatments:   Enbrel, Cimzia, Simponi, MTX, Cosentyx     Review of Systems   Constitutional: Positive for activity change. Negative for appetite change, chills, fatigue and fever.   HENT: Positive for rhinorrhea, sinus pressure and sinus pain.    Eyes: Negative for visual disturbance.   Respiratory: Negative for cough and shortness of breath.    Cardiovascular: Negative for chest pain, palpitations and leg swelling.   Gastrointestinal: Negative for abdominal pain, constipation, diarrhea, nausea and vomiting.   Musculoskeletal: Positive for arthralgias, joint swelling and myalgias. Negative for neck pain and neck stiffness.   Skin: Negative for rash.   Allergic/Immunologic: Positive for immunocompromised state.   Neurological: Positive for headaches. Negative for dizziness, weakness and light-headedness.   Psychiatric/Behavioral: Negative for decreased concentration, dysphoric mood, sleep disturbance and suicidal ideas. The patient is not nervous/anxious.          Objective:     There were no vitals filed for this visit.    Past Medical History:   Diagnosis Date    Abnormal mammogram     Amaurosis fugax     Anemia     Back pain     Chest pain     Degenerative cervical disc     Degenerative disc disease, thoracic     Depression     Depression     Fatigue     GERD (gastroesophageal reflux disease)     Head ache     Hemorrhoids     Hyperlipidemia     Hypertension     Hypoglycemia     Hypothyroidism     Hypothyroidism     Insomnia     Menopausal state     Neck pain     Neuralgia     Nonrheumatic tricuspid valve disorder     Optic neuritis     Palpitations     PONV (postoperative nausea and vomiting)     Psoriasis     Psoriatic arthropathy     Rosacea     Thyroid nodule      Past Surgical History:   Procedure Laterality Date    ANKLE FRACTURE SURGERY      APPENDECTOMY      BREAST BIOPSY Left     Negative    HYSTERECTOMY       OOPHORECTOMY      RHIZOTOMY W/ RADIOFREQUENCY ABLATION Right     lumbar    thyroid tumor removed            Physical Exam   Constitutional: She is oriented to person, place, and time.   Neurological: She is alert and oriented to person, place, and time.      Assessment:       1. Psoriatic arthritis    2. Rheumatoid arthritis, involving unspecified site, unspecified rheumatoid factor presence    3. Acute non-recurrent maxillary sinusitis    4. Immunocompromised state due to drug therapy    5. Lumbar and sacral arthritis    6. Essential hypertension    7. Chronic pain syndrome            Plan:       Psoriatic arthritis  -     meloxicam (MOBIC) 15 MG tablet; TAKE 1 TABLET(15 MG) BY MOUTH EVERY DAY  Dispense: 90 tablet; Refill: 1    Rheumatoid arthritis, involving unspecified site, unspecified rheumatoid factor presence  -     predniSONE (DELTASONE) 5 MG tablet; 1-3 tabs PO daily PRN  Dispense: 90 tablet; Refill: 4    Acute non-recurrent maxillary sinusitis  -     doxycycline (VIBRAMYCIN) 100 MG Cap; Take 1 capsule (100 mg total) by mouth every 12 (twelve) hours. for 10 days  Dispense: 20 capsule; Refill: 0    Immunocompromised state due to drug therapy    Lumbar and sacral arthritis    Essential hypertension  -     lisinopriL 10 MG tablet; TAKE 1 TABLET(10 MG) BY MOUTH EVERY DAY  Dispense: 90 tablet; Refill: 1    Chronic pain syndrome          Assessment:  69 year old female with   Psoriatic arthritis, rheumatoid arthritis on Humira  --chronic pain syndrome on norco  --insomnia on ambien    Plan:  1. Labs and nurse shots next week if possible  2. Start doxycycline 100 mg bid x 10 days for sinusitis  3. Cont. Humira every 14 days, but hold for current infection  4. Cont. norco 10/325 TID per Dr. Benjamin. I have checked [place] prescription monitoring program site and no unusual or abnormal behavior has occurred pt understand the risk and benefits of taking opioid medications and has decided to continue the   medication   5. Cont. mobic 15 mg, cont. Flexeril PRN    The patient is aware that there is a COVID-19 pandemic and that the immunosuppressants being taken to treat arthritis can increased the  risk for infection/Viruses.  This patient understands  to hold (not to take) all DMARD/Immunsuppressants with the exception of Plaquenil,  if having fever chills, cough, shortness of breath loss of sense of smell and or myalgias.  It is understood that the patient is to call the office as well as call their primary care physician and observe  social isolation    Follow up:   3 months Dr. Benjamin

## 2020-05-20 ENCOUNTER — LAB VISIT (OUTPATIENT)
Dept: LAB | Facility: HOSPITAL | Age: 70
End: 2020-05-20
Attending: INTERNAL MEDICINE
Payer: MEDICARE

## 2020-05-20 ENCOUNTER — CLINICAL SUPPORT (OUTPATIENT)
Dept: RHEUMATOLOGY | Facility: CLINIC | Age: 70
End: 2020-05-20
Payer: MEDICARE

## 2020-05-20 VITALS — SYSTOLIC BLOOD PRESSURE: 134 MMHG | HEART RATE: 82 BPM | DIASTOLIC BLOOD PRESSURE: 85 MMHG

## 2020-05-20 DIAGNOSIS — M06.9 RHEUMATOID ARTHRITIS, INVOLVING UNSPECIFIED SITE, UNSPECIFIED RHEUMATOID FACTOR PRESENCE: ICD-10-CM

## 2020-05-20 DIAGNOSIS — B37.9 YEAST INFECTION: Primary | ICD-10-CM

## 2020-05-20 DIAGNOSIS — L40.50 PSORIATIC ARTHRITIS: Primary | ICD-10-CM

## 2020-05-20 DIAGNOSIS — L40.50 PSORIATIC ARTHRITIS: ICD-10-CM

## 2020-05-20 LAB
BASOPHILS # BLD AUTO: 0.08 K/UL (ref 0–0.2)
BASOPHILS NFR BLD: 1.1 % (ref 0–1.9)
DIFFERENTIAL METHOD: ABNORMAL
EOSINOPHIL # BLD AUTO: 0.2 K/UL (ref 0–0.5)
EOSINOPHIL NFR BLD: 2.9 % (ref 0–8)
ERYTHROCYTE [DISTWIDTH] IN BLOOD BY AUTOMATED COUNT: 15.4 % (ref 11.5–14.5)
ERYTHROCYTE [SEDIMENTATION RATE] IN BLOOD BY WESTERGREN METHOD: 10 MM/HR (ref 0–20)
HCT VFR BLD AUTO: 35.1 % (ref 37–48.5)
HGB BLD-MCNC: 10.9 G/DL (ref 12–16)
IMM GRANULOCYTES # BLD AUTO: 0.03 K/UL (ref 0–0.04)
IMM GRANULOCYTES NFR BLD AUTO: 0.4 % (ref 0–0.5)
LYMPHOCYTES # BLD AUTO: 1.9 K/UL (ref 1–4.8)
LYMPHOCYTES NFR BLD: 26.9 % (ref 18–48)
MCH RBC QN AUTO: 28.8 PG (ref 27–31)
MCHC RBC AUTO-ENTMCNC: 31.1 G/DL (ref 32–36)
MCV RBC AUTO: 93 FL (ref 82–98)
MONOCYTES # BLD AUTO: 0.6 K/UL (ref 0.3–1)
MONOCYTES NFR BLD: 9 % (ref 4–15)
NEUTROPHILS # BLD AUTO: 4.2 K/UL (ref 1.8–7.7)
NEUTROPHILS NFR BLD: 59.7 % (ref 38–73)
NRBC BLD-RTO: 0 /100 WBC
PLATELET # BLD AUTO: 307 K/UL (ref 150–350)
PMV BLD AUTO: 11.2 FL (ref 9.2–12.9)
RBC # BLD AUTO: 3.79 M/UL (ref 4–5.4)
WBC # BLD AUTO: 7 K/UL (ref 3.9–12.7)

## 2020-05-20 PROCEDURE — 96372 THER/PROPH/DIAG INJ SC/IM: CPT | Mod: PBBFAC,PO

## 2020-05-20 PROCEDURE — 99212 OFFICE O/P EST SF 10 MIN: CPT | Mod: PBBFAC,PO,25

## 2020-05-20 PROCEDURE — 80053 COMPREHEN METABOLIC PANEL: CPT

## 2020-05-20 PROCEDURE — 99211 OFF/OP EST MAY X REQ PHY/QHP: CPT | Mod: S$PBB,,, | Performed by: INTERNAL MEDICINE

## 2020-05-20 PROCEDURE — 86140 C-REACTIVE PROTEIN: CPT

## 2020-05-20 PROCEDURE — 85025 COMPLETE CBC W/AUTO DIFF WBC: CPT

## 2020-05-20 PROCEDURE — 99211 PR OFFICE/OUTPT VISIT, EST, LEVL I: ICD-10-PCS | Mod: S$PBB,,, | Performed by: INTERNAL MEDICINE

## 2020-05-20 PROCEDURE — 85651 RBC SED RATE NONAUTOMATED: CPT | Mod: PO

## 2020-05-20 PROCEDURE — 99999 PR PBB SHADOW E&M-EST. PATIENT-LVL II: ICD-10-PCS | Mod: PBBFAC,,,

## 2020-05-20 PROCEDURE — 86705 HEP B CORE ANTIBODY IGM: CPT

## 2020-05-20 PROCEDURE — 86803 HEPATITIS C AB TEST: CPT

## 2020-05-20 PROCEDURE — 99999 PR PBB SHADOW E&M-EST. PATIENT-LVL II: CPT | Mod: PBBFAC,,,

## 2020-05-20 PROCEDURE — 87340 HEPATITIS B SURFACE AG IA: CPT

## 2020-05-20 RX ORDER — FLUCONAZOLE 150 MG/1
150 TABLET ORAL DAILY
Qty: 5 TABLET | Refills: 1 | Status: SHIPPED | OUTPATIENT
Start: 2020-05-20 | End: 2020-05-25

## 2020-05-20 RX ORDER — CYANOCOBALAMIN 1000 UG/ML
1000 INJECTION, SOLUTION INTRAMUSCULAR; SUBCUTANEOUS
Status: COMPLETED | OUTPATIENT
Start: 2020-05-20 | End: 2020-05-20

## 2020-05-20 RX ORDER — KETOROLAC TROMETHAMINE 30 MG/ML
60 INJECTION, SOLUTION INTRAMUSCULAR; INTRAVENOUS
Status: COMPLETED | OUTPATIENT
Start: 2020-05-20 | End: 2020-05-20

## 2020-05-20 RX ORDER — METHYLPREDNISOLONE ACETATE 80 MG/ML
160 INJECTION, SUSPENSION INTRA-ARTICULAR; INTRALESIONAL; INTRAMUSCULAR; SOFT TISSUE
Status: COMPLETED | OUTPATIENT
Start: 2020-05-20 | End: 2020-05-20

## 2020-05-20 RX ADMIN — METHYLPREDNISOLONE ACETATE 160 MG: 80 INJECTION, SUSPENSION INTRA-ARTICULAR; INTRALESIONAL; INTRAMUSCULAR; SOFT TISSUE at 11:05

## 2020-05-20 RX ADMIN — CYANOCOBALAMIN 1000 MCG: 1000 INJECTION, SOLUTION INTRAMUSCULAR at 11:05

## 2020-05-20 RX ADMIN — KETOROLAC TROMETHAMINE 60 MG: 60 INJECTION, SOLUTION INTRAMUSCULAR at 11:05

## 2020-05-20 NOTE — PROGRESS NOTES
Pt presents for nurse injections. Reports generalized joint pain 8/10 on pain scale. Codi Courtney PA-C reviewed chart and placed orders. Administered 1 cc B12 1000mcg to right upper outer gluteal.  Pt tolerated well.No acute reaction noted to site. Pt instructed on S/S to report. Advised pt to waitn in lobby 15 minutes after receiving injection to monitor for any reactions. Pt verbalized understanding.     Lot: [id number]  Exp: Jan21  Administered 2 cc DepoMedrol 80mg/cc  to right upper outer gluteal. Pt tolerated well. No acute reaction noted to site. Pt instructed on S/S to report. Advised patient to wait in lobby 15 minutes after receiving injection to monitor for any reactions..  Pt verbalized understanding.     Lot: [id number]  Exp: 06/2021  Administered 2 cc  Toradol 30mg/cc  to left upper outer gluteal. Pt tolerated well. No acute reaction noted to site. Pt instructed on S/S to report. Advised patient to wait in lobby 15 minutes after receiving injection to monitor for any reactions. Pt verbalized understanding.     Lot: [id number]  Exp: 06/2021

## 2020-05-21 LAB
ALBUMIN SERPL BCP-MCNC: 3.9 G/DL (ref 3.5–5.2)
ALP SERPL-CCNC: 69 U/L (ref 55–135)
ALT SERPL W/O P-5'-P-CCNC: 11 U/L (ref 10–44)
ANION GAP SERPL CALC-SCNC: 11 MMOL/L (ref 8–16)
AST SERPL-CCNC: 20 U/L (ref 10–40)
BILIRUB SERPL-MCNC: 0.5 MG/DL (ref 0.1–1)
BUN SERPL-MCNC: 13 MG/DL (ref 8–23)
CALCIUM SERPL-MCNC: 8.6 MG/DL (ref 8.7–10.5)
CHLORIDE SERPL-SCNC: 103 MMOL/L (ref 95–110)
CO2 SERPL-SCNC: 21 MMOL/L (ref 23–29)
CREAT SERPL-MCNC: 0.7 MG/DL (ref 0.5–1.4)
CRP SERPL-MCNC: 1.1 MG/L (ref 0–8.2)
EST. GFR  (AFRICAN AMERICAN): >60 ML/MIN/1.73 M^2
EST. GFR  (NON AFRICAN AMERICAN): >60 ML/MIN/1.73 M^2
GLUCOSE SERPL-MCNC: 74 MG/DL (ref 70–110)
HBV CORE IGM SERPL QL IA: NEGATIVE
HBV SURFACE AG SERPL QL IA: NEGATIVE
HCV AB SERPL QL IA: NEGATIVE
POTASSIUM SERPL-SCNC: 4.2 MMOL/L (ref 3.5–5.1)
PROT SERPL-MCNC: 7.3 G/DL (ref 6–8.4)
SODIUM SERPL-SCNC: 135 MMOL/L (ref 136–145)

## 2020-05-28 ENCOUNTER — TELEPHONE (OUTPATIENT)
Dept: PHARMACY | Facility: CLINIC | Age: 70
End: 2020-05-28

## 2020-05-28 NOTE — TELEPHONE ENCOUNTER
Refill call regarding Humira from [facility]. Shipping out humira on  for  arrival with patients consent. Copay of $0 @ 004. Address and  confirmed.

## 2020-05-29 ENCOUNTER — TELEPHONE (OUTPATIENT)
Dept: RHEUMATOLOGY | Facility: CLINIC | Age: 70
End: 2020-05-29

## 2020-05-29 NOTE — TELEPHONE ENCOUNTER
Contacted patient regarding recent lab results. Informed labs are stable with exception of anemia. Informed Codi has ordered iron studies to add to next set of labs. Nurse asked if patient has stopped MTX. Patient confirmed is no longer taking MTX. Patient verbalized understanding regarding lab results

## 2020-05-29 NOTE — TELEPHONE ENCOUNTER
----- Message from Codi Courtney PA-C sent at 5/22/2020  7:59 AM CDT -----  Labs stable with exception of anemia which is slightly worse. I will order iron studies to add to her next lab. Please confirm she stopped MTX.

## 2020-06-03 DIAGNOSIS — L40.50 PSORIATIC ARTHRITIS: ICD-10-CM

## 2020-06-03 DIAGNOSIS — G89.4 CHRONIC PAIN SYNDROME: ICD-10-CM

## 2020-06-03 NOTE — TELEPHONE ENCOUNTER
Returned patient call regarding refill request. Nurse informed that there was no iron pills on medication list. Patient stated she must have gotten that from her PCP will call them. Nurse informed a refill request for pain medication will be sent to Dr Benjamin. Patient verbalized understanding.

## 2020-06-03 NOTE — TELEPHONE ENCOUNTER
----- Message from Bibiana Gallardo sent at 6/3/2020 10:48 AM CDT -----  Contact: [phone]  Patient requesting a refill on hydrocodone and iron pills.      Patient will be using   [facility] #[id number] - [facility] [address] [place] [address] [address] [facility] [address]  [address] [place] [address]  Phone: [phone] Fax: [phone]    Please call patient at [phone]. Thanks!

## 2020-06-06 RX ORDER — HYDROCODONE BITARTRATE AND ACETAMINOPHEN 10; 325 MG/1; MG/1
1 TABLET ORAL EVERY 8 HOURS PRN
Qty: 90 TABLET | Refills: 0 | Status: SHIPPED | OUTPATIENT
Start: 2020-06-06 | End: 2020-06-30 | Stop reason: SDUPTHER

## 2020-06-30 DIAGNOSIS — L40.50 PSORIATIC ARTHRITIS: ICD-10-CM

## 2020-06-30 DIAGNOSIS — G89.4 CHRONIC PAIN SYNDROME: ICD-10-CM

## 2020-06-30 DIAGNOSIS — Z79.890 HORMONE REPLACEMENT THERAPY: ICD-10-CM

## 2020-06-30 RX ORDER — ESTRADIOL 1 MG/1
1 TABLET ORAL DAILY
Qty: 90 TABLET | Refills: 0 | Status: CANCELLED | OUTPATIENT
Start: 2020-06-30

## 2020-06-30 NOTE — TELEPHONE ENCOUNTER
Contacted patient regarding hormone therapy. Patient will call PCP and request refill for this. Patient requested refill on pain medication. Informed a request will be sent to Dr Benjamin. Patient verbalized understanding.

## 2020-06-30 NOTE — TELEPHONE ENCOUNTER
Is it possible for her to get hormone from PCP or GYN--that would be more appropriate long term management?

## 2020-07-02 ENCOUNTER — TELEPHONE (OUTPATIENT)
Dept: PHARMACY | Facility: CLINIC | Age: 70
End: 2020-07-02

## 2020-07-03 RX ORDER — HYDROCODONE BITARTRATE AND ACETAMINOPHEN 10; 325 MG/1; MG/1
1 TABLET ORAL EVERY 8 HOURS PRN
Qty: 90 TABLET | Refills: 0 | Status: SHIPPED | OUTPATIENT
Start: 2020-07-03 | End: 2020-08-02

## 2020-07-08 NOTE — TELEPHONE ENCOUNTER
RX outgoing call regarding Humira @ [facility]. Shipping out  for 7/15 arrival with patients consent. Copay of $0.00 @ 004. Address and  confirmed. Patient has 0 doses on hand at this time. Patient has not started any new medications, has had no missed doses and no side effects present. Patient is currently taking the medication as directed by doctors instruction. Patient states they do not have any questions or concerns at this time. Patients next injection is scheduled for . No sharps container needed at this time

## 2020-08-05 ENCOUNTER — OFFICE VISIT (OUTPATIENT)
Dept: RHEUMATOLOGY | Facility: CLINIC | Age: 70
End: 2020-08-05
Payer: MEDICARE

## 2020-08-05 DIAGNOSIS — L40.50 PSA (PSORIATIC ARTHRITIS): ICD-10-CM

## 2020-08-05 DIAGNOSIS — G89.4 CHRONIC PAIN SYNDROME: ICD-10-CM

## 2020-08-05 DIAGNOSIS — F32.9 MAJOR DEPRESSION, CHRONIC: ICD-10-CM

## 2020-08-05 DIAGNOSIS — M06.9 RHEUMATOID ARTHRITIS, INVOLVING UNSPECIFIED SITE, UNSPECIFIED RHEUMATOID FACTOR PRESENCE: ICD-10-CM

## 2020-08-05 DIAGNOSIS — M19.90 OSTEOARTHRITIS, UNSPECIFIED OSTEOARTHRITIS TYPE, UNSPECIFIED SITE: ICD-10-CM

## 2020-08-05 DIAGNOSIS — R94.6 ABNORMAL RESULTS OF THYROID FUNCTION STUDIES: ICD-10-CM

## 2020-08-05 DIAGNOSIS — L40.50 PSORIATIC ARTHRITIS: Primary | ICD-10-CM

## 2020-08-05 DIAGNOSIS — D53.1 OTHER MEGALOBLASTIC ANEMIAS, NOT ELSEWHERE CLASSIFIED: ICD-10-CM

## 2020-08-05 DIAGNOSIS — M47.817 LUMBAR AND SACRAL ARTHRITIS: ICD-10-CM

## 2020-08-05 PROCEDURE — 99215 PR OFFICE/OUTPT VISIT, EST, LEVL V, 40-54 MIN: ICD-10-PCS | Mod: 95,,, | Performed by: INTERNAL MEDICINE

## 2020-08-05 PROCEDURE — 99215 OFFICE O/P EST HI 40 MIN: CPT | Mod: 95,,, | Performed by: INTERNAL MEDICINE

## 2020-08-05 RX ORDER — CLOBETASOL PROPIONATE 0.5 MG/G
OINTMENT TOPICAL 2 TIMES DAILY
Qty: 45 G | Refills: 3 | Status: SHIPPED | OUTPATIENT
Start: 2020-08-05 | End: 2020-08-06

## 2020-08-05 RX ORDER — HYDROCODONE BITARTRATE AND ACETAMINOPHEN 10; 325 MG/1; MG/1
1 TABLET ORAL EVERY 8 HOURS PRN
Qty: 90 TABLET | Refills: 0 | Status: SHIPPED | OUTPATIENT
Start: 2020-08-05 | End: 2020-09-02 | Stop reason: SDUPTHER

## 2020-08-05 RX ORDER — ZOLPIDEM TARTRATE 10 MG/1
TABLET ORAL
Qty: 30 TABLET | Refills: 3 | Status: SHIPPED | OUTPATIENT
Start: 2020-08-05 | End: 2020-11-03 | Stop reason: SDUPTHER

## 2020-08-05 NOTE — PROGRESS NOTES
Subjective:       Patient ID: Rose Goff is a 69 y.o. female.    Chief Complaint: No chief complaint on file.    Mrs. Goff is a 68 year old female  psoriatic arthritis. She had sinus  Infection and tested negative for covid,19   With regards to her arthritis, she is doing well on her current regimen of Humira, mobic, norco, and prednisone PRN. She has intermittent joint pain involving MCPs, PIPs, wrists, and shoulders. She has chronic low back pain secondary to OA and is taking norco TID PRN with good relief of pain.    Current tx:  1. Humira  2. Mobic  3. Norco    Prior treatments:   Enbrel, Cimzia, Simponi, MTX, Cosentyx     Review of Systems   Constitutional: Positive for activity change and fatigue. Negative for appetite change, chills and fever.   HENT: Positive for rhinorrhea, sinus pressure and sinus pain.    Eyes: Negative for visual disturbance.   Respiratory: Negative for cough and shortness of breath.    Cardiovascular: Negative for chest pain, palpitations and leg swelling.   Gastrointestinal: Negative for abdominal pain, constipation, diarrhea, nausea and vomiting.   Musculoskeletal: Positive for arthralgias, joint swelling and myalgias. Negative for neck pain and neck stiffness.   Skin: Negative for rash.   Allergic/Immunologic: Positive for immunocompromised state.   Neurological: Positive for headaches. Negative for dizziness, weakness and light-headedness.   Psychiatric/Behavioral: Negative for decreased concentration, dysphoric mood, sleep disturbance and suicidal ideas. The patient is not nervous/anxious.          Objective:     There were no vitals filed for this visit.    Past Medical History:   Diagnosis Date    Abnormal mammogram     Amaurosis fugax     Anemia     Back pain     Chest pain     Degenerative cervical disc     Degenerative disc disease, thoracic     Depression     Depression     Fatigue     GERD (gastroesophageal reflux disease)     Head ache      Hemorrhoids     Hyperlipidemia     Hypertension     Hypoglycemia     Hypothyroidism     Hypothyroidism     Insomnia     Menopausal state     Neck pain     Neuralgia     Nonrheumatic tricuspid valve disorder     Optic neuritis     Palpitations     PONV (postoperative nausea and vomiting)     Psoriasis     Psoriatic arthropathy     Rosacea     Thyroid nodule      Past Surgical History:   Procedure Laterality Date    ANKLE FRACTURE SURGERY      APPENDECTOMY      BREAST BIOPSY Left     Negative    HYSTERECTOMY      OOPHORECTOMY      RHIZOTOMY W/ RADIOFREQUENCY ABLATION Right     lumbar    thyroid tumor removed            Physical Exam   Constitutional: She is oriented to person, place, and time and well-developed, well-nourished, and in no distress.   Neurological: She is alert and oriented to person, place, and time.   Psychiatric: Mood, affect and judgment normal.      Assessment:       1. Psoriatic arthritis    2. Rheumatoid arthritis, involving unspecified site, unspecified rheumatoid factor presence    3. Chronic pain syndrome    4. PSA (psoriatic arthritis)    5. Lumbar and sacral arthritis    6. Abnormal results of thyroid function studies     7. Osteoarthritis, unspecified osteoarthritis type, unspecified site    8. Major depression, chronic    9. Other megaloblastic anemias, not elsewhere classified             Plan:       Psoriatic arthritis  -     zolpidem (AMBIEN) 10 mg Tab; TAKE 1 TABLET(10 MG) BY MOUTH EVERY NIGHT AS NEEDED  Dispense: 30 tablet; Refill: 3  -     TSH; Future; Expected date: 08/05/2020  -     Thyroid Peroxidase Antibody; Future; Expected date: 08/05/2020  -     T4, free; Future; Expected date: 08/05/2020  -     T3, free; Future; Expected date: 08/05/2020  -     HYDROcodone-acetaminophen (NORCO)  mg per tablet; Take 1 tablet by mouth every 8 (eight) hours as needed.  Dispense: 90 tablet; Refill: 0  -     Vitamin B12; Future; Expected date: 08/05/2020  -      Folate; Future; Expected date: 08/05/2020  -     clobetasol 0.05% (TEMOVATE) 0.05 % Oint; Apply topically 2 (two) times daily.  Dispense: 45 g; Refill: 3    Rheumatoid arthritis, involving unspecified site, unspecified rheumatoid factor presence  -     zolpidem (AMBIEN) 10 mg Tab; TAKE 1 TABLET(10 MG) BY MOUTH EVERY NIGHT AS NEEDED  Dispense: 30 tablet; Refill: 3  -     TSH; Future; Expected date: 08/05/2020  -     Thyroid Peroxidase Antibody; Future; Expected date: 08/05/2020  -     T4, free; Future; Expected date: 08/05/2020  -     T3, free; Future; Expected date: 08/05/2020  -     HYDROcodone-acetaminophen (NORCO)  mg per tablet; Take 1 tablet by mouth every 8 (eight) hours as needed.  Dispense: 90 tablet; Refill: 0  -     Vitamin B12; Future; Expected date: 08/05/2020  -     Folate; Future; Expected date: 08/05/2020  -     clobetasol 0.05% (TEMOVATE) 0.05 % Oint; Apply topically 2 (two) times daily.  Dispense: 45 g; Refill: 3    Chronic pain syndrome  -     zolpidem (AMBIEN) 10 mg Tab; TAKE 1 TABLET(10 MG) BY MOUTH EVERY NIGHT AS NEEDED  Dispense: 30 tablet; Refill: 3  -     TSH; Future; Expected date: 08/05/2020  -     Thyroid Peroxidase Antibody; Future; Expected date: 08/05/2020  -     T4, free; Future; Expected date: 08/05/2020  -     T3, free; Future; Expected date: 08/05/2020  -     HYDROcodone-acetaminophen (NORCO)  mg per tablet; Take 1 tablet by mouth every 8 (eight) hours as needed.  Dispense: 90 tablet; Refill: 0  -     Vitamin B12; Future; Expected date: 08/05/2020  -     Folate; Future; Expected date: 08/05/2020  -     clobetasol 0.05% (TEMOVATE) 0.05 % Oint; Apply topically 2 (two) times daily.  Dispense: 45 g; Refill: 3    PSA (psoriatic arthritis)  -     zolpidem (AMBIEN) 10 mg Tab; TAKE 1 TABLET(10 MG) BY MOUTH EVERY NIGHT AS NEEDED  Dispense: 30 tablet; Refill: 3  -     TSH; Future; Expected date: 08/05/2020  -     Thyroid Peroxidase Antibody; Future; Expected date: 08/05/2020  -      T4, free; Future; Expected date: 08/05/2020  -     T3, free; Future; Expected date: 08/05/2020  -     HYDROcodone-acetaminophen (NORCO)  mg per tablet; Take 1 tablet by mouth every 8 (eight) hours as needed.  Dispense: 90 tablet; Refill: 0  -     Vitamin B12; Future; Expected date: 08/05/2020  -     Folate; Future; Expected date: 08/05/2020  -     clobetasol 0.05% (TEMOVATE) 0.05 % Oint; Apply topically 2 (two) times daily.  Dispense: 45 g; Refill: 3    Lumbar and sacral arthritis  -     zolpidem (AMBIEN) 10 mg Tab; TAKE 1 TABLET(10 MG) BY MOUTH EVERY NIGHT AS NEEDED  Dispense: 30 tablet; Refill: 3  -     TSH; Future; Expected date: 08/05/2020  -     Thyroid Peroxidase Antibody; Future; Expected date: 08/05/2020  -     T4, free; Future; Expected date: 08/05/2020  -     T3, free; Future; Expected date: 08/05/2020  -     HYDROcodone-acetaminophen (NORCO)  mg per tablet; Take 1 tablet by mouth every 8 (eight) hours as needed.  Dispense: 90 tablet; Refill: 0  -     Vitamin B12; Future; Expected date: 08/05/2020  -     Folate; Future; Expected date: 08/05/2020    Abnormal results of thyroid function studies   -     zolpidem (AMBIEN) 10 mg Tab; TAKE 1 TABLET(10 MG) BY MOUTH EVERY NIGHT AS NEEDED  Dispense: 30 tablet; Refill: 3  -     TSH; Future; Expected date: 08/05/2020  -     Thyroid Peroxidase Antibody; Future; Expected date: 08/05/2020  -     T4, free; Future; Expected date: 08/05/2020  -     T3, free; Future; Expected date: 08/05/2020  -     HYDROcodone-acetaminophen (NORCO)  mg per tablet; Take 1 tablet by mouth every 8 (eight) hours as needed.  Dispense: 90 tablet; Refill: 0  -     Vitamin B12; Future; Expected date: 08/05/2020  -     Folate; Future; Expected date: 08/05/2020    Osteoarthritis, unspecified osteoarthritis type, unspecified site  -     zolpidem (AMBIEN) 10 mg Tab; TAKE 1 TABLET(10 MG) BY MOUTH EVERY NIGHT AS NEEDED  Dispense: 30 tablet; Refill: 3  -     TSH; Future; Expected date:  08/05/2020  -     Thyroid Peroxidase Antibody; Future; Expected date: 08/05/2020  -     T4, free; Future; Expected date: 08/05/2020  -     T3, free; Future; Expected date: 08/05/2020  -     HYDROcodone-acetaminophen (NORCO)  mg per tablet; Take 1 tablet by mouth every 8 (eight) hours as needed.  Dispense: 90 tablet; Refill: 0  -     Vitamin B12; Future; Expected date: 08/05/2020  -     Folate; Future; Expected date: 08/05/2020    Major depression, chronic  -     zolpidem (AMBIEN) 10 mg Tab; TAKE 1 TABLET(10 MG) BY MOUTH EVERY NIGHT AS NEEDED  Dispense: 30 tablet; Refill: 3  -     TSH; Future; Expected date: 08/05/2020  -     Thyroid Peroxidase Antibody; Future; Expected date: 08/05/2020  -     T4, free; Future; Expected date: 08/05/2020  -     T3, free; Future; Expected date: 08/05/2020  -     HYDROcodone-acetaminophen (NORCO)  mg per tablet; Take 1 tablet by mouth every 8 (eight) hours as needed.  Dispense: 90 tablet; Refill: 0  -     Vitamin B12; Future; Expected date: 08/05/2020  -     Folate; Future; Expected date: 08/05/2020    Other megaloblastic anemias, not elsewhere classified   -     zolpidem (AMBIEN) 10 mg Tab; TAKE 1 TABLET(10 MG) BY MOUTH EVERY NIGHT AS NEEDED  Dispense: 30 tablet; Refill: 3  -     TSH; Future; Expected date: 08/05/2020  -     Thyroid Peroxidase Antibody; Future; Expected date: 08/05/2020  -     T4, free; Future; Expected date: 08/05/2020  -     T3, free; Future; Expected date: 08/05/2020  -     HYDROcodone-acetaminophen (NORCO)  mg per tablet; Take 1 tablet by mouth every 8 (eight) hours as needed.  Dispense: 90 tablet; Refill: 0  -     Vitamin B12; Future; Expected date: 08/05/2020  -     Folate; Future; Expected date: 08/05/2020  -     clobetasol 0.05% (TEMOVATE) 0.05 % Oint; Apply topically 2 (two) times daily.  Dispense: 45 g; Refill: 3          1.  Continue Humira  2.  Continue Norco as her pain management  3.  Continue p.r.n. prednisone  4.I have  check  [place] prescription monitoring program site and no unusual or abnormal behavior has occurred pt understand the risk and benefits of taking opioid medications and has decided to continue the  medication     The patient location is: home  The chief complaint leading to consultation is: psa    Visit type: audiovisual    Face to Face time with patient: 30   minutes of total time spent on the encounter, which includes face to face time and non-face to face time preparing to see the patient (eg, review of tests), Obtaining and/or reviewing separately obtained history, Documenting clinical information in the electronic or other health record, Independently interpreting results (not separately reported) and communicating results to the patient/family/caregiver, or Care coordination (not separately reported).         Each patient to whom he or she provides medical services by telemedicine is:  (1) informed of the relationship between the physician and patient and the respective role of any other health care provider with respect to management of the patient; and (2) notified that he or she may decline to receive medical services by telemedicine and may withdraw from such care at any time.

## 2020-08-06 ENCOUNTER — TELEPHONE (OUTPATIENT)
Dept: RHEUMATOLOGY | Facility: CLINIC | Age: 70
End: 2020-08-06

## 2020-08-06 NOTE — TELEPHONE ENCOUNTER
Dr. Benjamin,  Pharmacy contacted us letting us know that pt's insurance does not cover Clobetasol Prop 0.05% ointment and they are wanting to change rx to Halobetasol 0.05% ointment.  Please review and advise.  Thanks,    Heather Cabrera MA  8/6/2020

## 2020-08-10 ENCOUNTER — TELEPHONE (OUTPATIENT)
Dept: PHARMACY | Facility: CLINIC | Age: 70
End: 2020-08-10

## 2020-08-10 NOTE — TELEPHONE ENCOUNTER
RX outgoing call regarding Humira @ [facility]. Shipping out  for  arrival with patients consent. Copay of $0.00 @ 004. Address and  confirmed. Patient has 1 dose on hand at this time. Patient has not started any new medications, has had no missed doses and no side effects present. Patient is currently taking the medication as directed by doctors instruction. Patient states they do not have any questions or concerns at this time. Patients next injection is scheduled for . No sharps needed

## 2020-08-21 ENCOUNTER — TELEPHONE (OUTPATIENT)
Dept: RHEUMATOLOGY | Facility: CLINIC | Age: 70
End: 2020-08-21

## 2020-08-21 NOTE — TELEPHONE ENCOUNTER
----- Message from Marilee Beltran PharmD sent at 8/6/2020  2:33 PM CDT -----  Regarding: Humira CF for PsA  Good afternoon,    I just spoke with pt regarding follow-up assessment. She reports that her PsO rashes have worsened in the last 3 months and during review of Humria warnings and precautions she mentioned rash on her cheeks that worsens in the sun which may be characteristic of lupus-like syndrome. Pt stated that she has Rosacea and I see that Dr. Benjamin has already acknowledged in previous chart notes but pt requested I mention this in case she may need further lab work at upcoming lab appt.     Pt does not wish to change therapy at this time she wishes to continue giving Humira a try but I did advise pt that if symptoms continue to worsen that alternative treatment should be discussed for joint pain and pso control.    Please let me know if I can further assist.    Sincerely,  Marilee Beltran, Pharm.D., CSP  Clinical Pharmacist- Inflammatory/GI [facility]  [phone]

## 2020-09-02 DIAGNOSIS — F32.9 MAJOR DEPRESSION, CHRONIC: ICD-10-CM

## 2020-09-02 DIAGNOSIS — D53.1 OTHER MEGALOBLASTIC ANEMIAS, NOT ELSEWHERE CLASSIFIED: ICD-10-CM

## 2020-09-02 DIAGNOSIS — L40.50 PSORIATIC ARTHRITIS: ICD-10-CM

## 2020-09-02 DIAGNOSIS — M47.817 LUMBAR AND SACRAL ARTHRITIS: ICD-10-CM

## 2020-09-02 DIAGNOSIS — M06.9 RHEUMATOID ARTHRITIS, INVOLVING UNSPECIFIED SITE, UNSPECIFIED RHEUMATOID FACTOR PRESENCE: ICD-10-CM

## 2020-09-02 DIAGNOSIS — M19.90 OSTEOARTHRITIS, UNSPECIFIED OSTEOARTHRITIS TYPE, UNSPECIFIED SITE: ICD-10-CM

## 2020-09-02 DIAGNOSIS — L40.50 PSA (PSORIATIC ARTHRITIS): ICD-10-CM

## 2020-09-02 DIAGNOSIS — G89.4 CHRONIC PAIN SYNDROME: ICD-10-CM

## 2020-09-02 DIAGNOSIS — R94.6 ABNORMAL RESULTS OF THYROID FUNCTION STUDIES: ICD-10-CM

## 2020-09-02 NOTE — TELEPHONE ENCOUNTER
----- Message from Aaliyahshira Beatty sent at 9/2/2020 10:17 AM CDT -----  Regarding: refills  Contact: pt  Type: Needs Medical Advice    Who Called:      Best Call Back Number:   Additional Information: Requesting a call back from Nurse, regarding a refill for Rx be called in today     sumatriptan (IMITREX) 100 MG tablet 18 tablet   HYDROcodone-acetaminophen (NORCO)  mg per tablet 90 tablet   clotrimazole-betamethasone 1-0.05% (LOTRISONE) cream

## 2020-09-04 RX ORDER — SUMATRIPTAN SUCCINATE 100 MG/1
100 TABLET ORAL ONCE
Qty: 18 TABLET | Refills: 1 | Status: SHIPPED | OUTPATIENT
Start: 2020-09-04 | End: 2020-10-05 | Stop reason: SDUPTHER

## 2020-09-04 RX ORDER — HYDROCODONE BITARTRATE AND ACETAMINOPHEN 10; 325 MG/1; MG/1
1 TABLET ORAL EVERY 8 HOURS PRN
Qty: 90 TABLET | Refills: 0 | Status: SHIPPED | OUTPATIENT
Start: 2020-09-04 | End: 2020-10-05 | Stop reason: SDUPTHER

## 2020-09-04 RX ORDER — CLOTRIMAZOLE AND BETAMETHASONE DIPROPIONATE 10; .64 MG/G; MG/G
CREAM TOPICAL
Qty: 45 G | Refills: 1 | Status: SHIPPED | OUTPATIENT
Start: 2020-09-04 | End: 2021-07-28 | Stop reason: SDUPTHER

## 2020-09-19 ENCOUNTER — PATIENT MESSAGE (OUTPATIENT)
Dept: PHARMACY | Facility: CLINIC | Age: 70
End: 2020-09-19

## 2020-09-19 ENCOUNTER — TELEPHONE (OUTPATIENT)
Dept: PHARMACY | Facility: CLINIC | Age: 70
End: 2020-09-19

## 2020-09-21 ENCOUNTER — TELEPHONE (OUTPATIENT)
Dept: PHARMACY | Facility: CLINIC | Age: 70
End: 2020-09-21

## 2020-10-05 DIAGNOSIS — L40.50 PSORIATIC ARTHRITIS: ICD-10-CM

## 2020-10-05 DIAGNOSIS — R94.6 ABNORMAL RESULTS OF THYROID FUNCTION STUDIES: ICD-10-CM

## 2020-10-05 DIAGNOSIS — F32.9 MAJOR DEPRESSION, CHRONIC: ICD-10-CM

## 2020-10-05 DIAGNOSIS — M47.817 LUMBAR AND SACRAL ARTHRITIS: ICD-10-CM

## 2020-10-05 DIAGNOSIS — M19.90 OSTEOARTHRITIS, UNSPECIFIED OSTEOARTHRITIS TYPE, UNSPECIFIED SITE: ICD-10-CM

## 2020-10-05 DIAGNOSIS — L40.50 PSA (PSORIATIC ARTHRITIS): ICD-10-CM

## 2020-10-05 DIAGNOSIS — M06.9 RHEUMATOID ARTHRITIS: ICD-10-CM

## 2020-10-05 DIAGNOSIS — D53.1 OTHER MEGALOBLASTIC ANEMIAS, NOT ELSEWHERE CLASSIFIED: ICD-10-CM

## 2020-10-05 DIAGNOSIS — G89.4 CHRONIC PAIN SYNDROME: ICD-10-CM

## 2020-10-05 NOTE — TELEPHONE ENCOUNTER
----- Message from Farheen Aranda sent at 10/5/2020 11:40 AM CDT -----  Regarding: refill  Contact: patient  Type:  RX Refill Request    Who Called:  patient  Refill or New Rx:  Refill  RX Name and Strength:  HYDROcodone-acetaminophen (NORCO)  mg per tablet  How is the patient currently taking it? (ex. 1XDay):?  Is this a 30 day or 90 day RX:  30  Preferred Pharmacy with phone number:    [id number] - [facility] [id number] [place] [address] AT [facility] OF [facility] [address]  [address] [place] [address]  Phone: [phone] Fax: [phone]  Local or Mail Order:  local  Ordering Provider:  belinda Roblero Call Back Number:  [phone]  Additional Information:  Patient also needs a refill on migrane medication but does not know the name of medication.  Thanks!

## 2020-10-06 RX ORDER — SUMATRIPTAN SUCCINATE 100 MG/1
100 TABLET ORAL ONCE
Qty: 18 TABLET | Refills: 1 | Status: SHIPPED | OUTPATIENT
Start: 2020-10-06 | End: 2021-04-13 | Stop reason: SDUPTHER

## 2020-10-06 RX ORDER — HYDROCODONE BITARTRATE AND ACETAMINOPHEN 10; 325 MG/1; MG/1
1 TABLET ORAL EVERY 8 HOURS PRN
Qty: 90 TABLET | Refills: 0 | Status: SHIPPED | OUTPATIENT
Start: 2020-10-06 | End: 2020-11-03 | Stop reason: SDUPTHER

## 2020-10-21 ENCOUNTER — SPECIALTY PHARMACY (OUTPATIENT)
Dept: PHARMACY | Facility: CLINIC | Age: 70
End: 2020-10-21

## 2020-10-21 NOTE — TELEPHONE ENCOUNTER
Specialty Pharmacy - Refill Coordination    Specialty Medication Orders Linked to Encounter      Most Recent Value   Medication #1  adalimumab (HUMIRA,CF, PEN) 40 mg/0.4 mL PnKt (Order#[id number], Rx#[id number])          Refill Questions - Documented Responses      Most Recent Value   Relationship to patient of person spoken to?  Self   HIPAA/medical authority confirmed?  Yes   Any changes in contact preferences or allowed representatives?  No   Has the patient had any insurance changes?  No   Has the patient had any changes to specialty medication, dose, or instructions?  No   Has the patient started taking any new medications, herbals, or supplements?  No   Has the patient been diagnosed with any new medical conditions?  No   Does the patient have any new allergies to medications or foods?  No   Does the patient have any concerns about side effects?  No   Can the patient store medication/sharps container properly (at the correct temperature, away from children/pets, etc.)?  Yes   Can the patient call emergency services (911) in the event of an emergency?  Yes   Does the patient have any concerns or questions about taking or administering this medication as prescribed?  No   How many doses did the patient miss in the past 4 weeks or since the last fill?  0   How many doses does the patient have on hand?  0   How many days does the patient report on hand quantity will last?  3   Does the number of doses/days supply remaining match pharmacy expected amounts?  Yes   How will the patient receive the medication?  Mail   When does the patient need to receive the medication?  10/23/20   Shipping Address  Home   Address in [facility] confirmed and updated if neccessary?  Yes   Expected Copay ($)  0   Is the patient able to afford the medication copay?  Yes   Payment Method  zero copay   Days supply of Refill  28   Would patient like to speak to a pharmacist?  No   Do you want to trigger an intervention?  No   Do you  want to trigger an additional referral task?  No   Refill activity completed?  Yes   Refill activity plan  Refill scheduled   Shipment/Pickup Date:  10/22/20          Current Outpatient Medications   Medication Sig    adalimumab (HUMIRA,CF, PEN) 40 mg/0.4 mL PnKt Inject 0.4 mLs (40 mg total) into the skin every 14 (fourteen) days.    clotrimazole (LOTRIMIN) 1 % Soln APPLY EXTERNALLY TO THE AFFECTED AREA TWICE DAILY    clotrimazole-betamethasone 1-0.05% (LOTRISONE) cream APPLY TO ITCHY BUMPS BID 14 DAYS    cyclobenzaprine (FLEXERIL) 10 MG tablet Take 1 tablet (10 mg total) by mouth 3 (three) times daily as needed for Muscle spasms.    diclofenac sodium (VOLTAREN) 1 % Gel Apply 2 g topically once daily.    doxycycline (MONODOX) 100 MG capsule TAKE 1 CAPSULE(100 MG) BY MOUTH TWICE DAILY    escitalopram oxalate (LEXAPRO) 20 MG tablet TAKE 1 TABLET BY MOUTH EVERY DAY    estradioL (ESTRACE) 1 MG tablet TAKE 1 TABLET(1 MG) BY MOUTH EVERY DAY    FLUZONE HIGH-DOSE 2019-20, PF, 180 mcg/0.5 mL Syrg ADM 0.5ML IM UTD    folic acid (FOLVITE) 800 MCG Tab Take 800 mcg by mouth once daily.    HYDROcodone-acetaminophen (NORCO)  mg per tablet Take 1 tablet by mouth every 8 (eight) hours as needed.    hydrOXYzine HCl (ATARAX) 25 MG tablet     levothyroxine (SYNTHROID) 125 MCG tablet TAKE 1 TABLET BY MOUTH EVERY MORNING 30 MINUTES BEFORE BREAKFAST    lisinopriL 10 MG tablet TAKE 1 TABLET(10 MG) BY MOUTH EVERY DAY    loratadine 10 mg Cap Take 1 tablet by mouth once daily.    meloxicam (MOBIC) 15 MG tablet TAKE 1 TABLET(15 MG) BY MOUTH EVERY DAY    omeprazole (PRILOSEC) 40 MG capsule TAKE 1 CAPSULE BY MOUTH EVERY DAY    ondansetron (ZOFRAN-ODT) 8 MG TbDL Take 1 tablet (8 mg total) by mouth every 6 (six) hours as needed (nausea).    predniSONE (DELTASONE) 5 MG tablet 1-3 tabs PO daily PRN    simvastatin (ZOCOR) 40 MG tablet TAKE 1 TABLET(40 MG) BY MOUTH EVERY EVENING    sumatriptan (IMITREX) 100 MG tablet Take 1  tablet (100 mg total) by mouth once. for 1 dose    valACYclovir (VALTREX) 1000 MG tablet TAKE 1 tab po bid    VOLTAREN 1 % Gel APPLY 2 GRAMS TOPICALLY ONCE DAILY    zolpidem (AMBIEN) 10 mg Tab TAKE 1 TABLET(10 MG) BY MOUTH EVERY NIGHT AS NEEDED   Last reviewed on 10/21/2020  3:44 PM by Shell Hassan    Review of patient's allergies indicates:   Allergen Reactions    Cimzia [certolizumab pegol]     Methotrexate analogues     Penicillins     Zyprexa [olanzapine]     Last reviewed on  10/21/2020 3:44 PM by Shell Hassan      Tasks added this encounter   No tasks added.   Tasks due within next 3 months   10/28/2020 - Clinical - Follow Up Assesement (90 day)  10/19/2020 - Refill Call   10/22 SHIp $0.00 ARS  No sharps  SHELL HASSAN  [facility] - Specialty Pharm[facility]  [address] [place] [address]  Phone: [phone]  Fax: [phone]

## 2020-11-03 DIAGNOSIS — G89.4 CHRONIC PAIN SYNDROME: ICD-10-CM

## 2020-11-03 DIAGNOSIS — M47.817 LUMBAR AND SACRAL ARTHRITIS: ICD-10-CM

## 2020-11-03 DIAGNOSIS — D53.1 OTHER MEGALOBLASTIC ANEMIAS, NOT ELSEWHERE CLASSIFIED: ICD-10-CM

## 2020-11-03 DIAGNOSIS — L40.50 PSA (PSORIATIC ARTHRITIS): ICD-10-CM

## 2020-11-03 DIAGNOSIS — F32.9 MAJOR DEPRESSION, CHRONIC: ICD-10-CM

## 2020-11-03 DIAGNOSIS — R94.6 ABNORMAL RESULTS OF THYROID FUNCTION STUDIES: ICD-10-CM

## 2020-11-03 DIAGNOSIS — M06.9 RHEUMATOID ARTHRITIS: ICD-10-CM

## 2020-11-03 DIAGNOSIS — M19.90 OSTEOARTHRITIS, UNSPECIFIED OSTEOARTHRITIS TYPE, UNSPECIFIED SITE: ICD-10-CM

## 2020-11-03 DIAGNOSIS — L40.50 PSORIATIC ARTHRITIS: ICD-10-CM

## 2020-11-03 NOTE — TELEPHONE ENCOUNTER
----- Message from Bibiana Gallardo sent at 11/3/2020 10:48 AM CST -----  Regarding: refill  Contact: KLAUS CAMPOS [[id number]]  Patient requesting a refill on Ambien and hydrocodone.      Patient will be using   [facility][id number] [address] [place] [address] AT [facility] [address]  [address] [place] [id number]  Phone: [phone] Fax: [phone]    Please call patient at [phone]. Thanks!

## 2020-11-06 RX ORDER — HYDROCODONE BITARTRATE AND ACETAMINOPHEN 10; 325 MG/1; MG/1
1 TABLET ORAL EVERY 8 HOURS PRN
Qty: 90 TABLET | Refills: 0 | Status: SHIPPED | OUTPATIENT
Start: 2020-11-06 | End: 2020-12-02 | Stop reason: SDUPTHER

## 2020-11-06 RX ORDER — ZOLPIDEM TARTRATE 10 MG/1
TABLET ORAL
Qty: 30 TABLET | Refills: 3 | Status: SHIPPED | OUTPATIENT
Start: 2020-11-06 | End: 2021-03-10 | Stop reason: SDUPTHER

## 2020-11-09 ENCOUNTER — TELEPHONE (OUTPATIENT)
Dept: RHEUMATOLOGY | Facility: CLINIC | Age: 70
End: 2020-11-09

## 2020-11-09 ENCOUNTER — SPECIALTY PHARMACY (OUTPATIENT)
Dept: PHARMACY | Facility: CLINIC | Age: 70
End: 2020-11-09

## 2020-11-09 DIAGNOSIS — L40.50 PSORIATIC ARTHRITIS: Primary | ICD-10-CM

## 2020-11-09 RX ORDER — FLUCONAZOLE 50 MG/1
50 TABLET ORAL DAILY PRN
COMMUNITY
End: 2022-11-23 | Stop reason: SDUPTHER

## 2020-11-09 NOTE — TELEPHONE ENCOUNTER
Pt would like a video visit. She is in isolation due to covid exposure. She has been changed to virtual

## 2020-11-09 NOTE — TELEPHONE ENCOUNTER
Specialty Pharmacy - Clinical Reassessment    Specialty Medication Orders Linked to Encounter      Most Recent Value   Medication #1  adalimumab (HUMIRA,CF, PEN) 40 mg/0.4 mL PnKt (Order#[id number], Rx#[id number])        Subjective    Rose Goff is a 70 y.o. female, who is followed by the specialty pharmacy service for management and education.    Encounters since last clinical assessment   10/21/2020: Refill Coordination with Shell Maier   Clinical call attempts since last clinical assessment   10/30/2020 11:06 PM - Specialty Pharmacy - Clinical Reassessment by Ashlyn Melara, Abigail     Today she received follow up education for her specialty medication(s).    Current Outpatient Medications   Medication Sig    fluconazole (DIFLUCAN) 50 MG Tab Take 50 mg by mouth daily as needed. As needed for yeast infections    adalimumab (HUMIRA,CF, PEN) 40 mg/0.4 mL PnKt Inject 0.4 mLs (40 mg total) into the skin every 14 (fourteen) days.    clotrimazole (LOTRIMIN) 1 % Soln APPLY EXTERNALLY TO THE AFFECTED AREA TWICE DAILY    clotrimazole-betamethasone 1-0.05% (LOTRISONE) cream APPLY TO ITCHY BUMPS BID 14 DAYS    cyclobenzaprine (FLEXERIL) 10 MG tablet Take 1 tablet (10 mg total) by mouth 3 (three) times daily as needed for Muscle spasms.    diclofenac sodium (VOLTAREN) 1 % Gel Apply 2 g topically once daily.    doxycycline (MONODOX) 100 MG capsule TAKE 1 CAPSULE(100 MG) BY MOUTH TWICE DAILY    escitalopram oxalate (LEXAPRO) 20 MG tablet TAKE 1 TABLET BY MOUTH EVERY DAY    estradioL (ESTRACE) 1 MG tablet TAKE 1 TABLET(1 MG) BY MOUTH EVERY DAY    FLUZONE HIGH-DOSE 2019-20, PF, 180 mcg/0.5 mL Syrg ADM 0.5ML IM UTD    folic acid (FOLVITE) 800 MCG Tab Take 800 mcg by mouth once daily.    HYDROcodone-acetaminophen (NORCO)  mg per tablet Take 1 tablet by mouth every 8 (eight) hours as needed.    hydrOXYzine HCl (ATARAX) 25 MG tablet     levothyroxine (SYNTHROID) 125 MCG tablet TAKE 1 TABLET BY MOUTH  EVERY MORNING 30 MINUTES BEFORE BREAKFAST    lisinopriL 10 MG tablet TAKE 1 TABLET(10 MG) BY MOUTH EVERY DAY    loratadine 10 mg Cap Take 1 tablet by mouth once daily.    meloxicam (MOBIC) 15 MG tablet TAKE 1 TABLET(15 MG) BY MOUTH EVERY DAY    omeprazole (PRILOSEC) 40 MG capsule TAKE 1 CAPSULE BY MOUTH EVERY DAY    ondansetron (ZOFRAN-ODT) 8 MG TbDL Take 1 tablet (8 mg total) by mouth every 6 (six) hours as needed (nausea).    predniSONE (DELTASONE) 5 MG tablet 1-3 tabs PO daily PRN    simvastatin (ZOCOR) 40 MG tablet TAKE 1 TABLET(40 MG) BY MOUTH EVERY EVENING    sumatriptan (IMITREX) 100 MG tablet Take 1 tablet (100 mg total) by mouth once. for 1 dose    valACYclovir (VALTREX) 1000 MG tablet TAKE 1 tab po bid    VOLTAREN 1 % Gel APPLY 2 GRAMS TOPICALLY ONCE DAILY    zolpidem (AMBIEN) 10 mg Tab TAKE 1 TABLET(10 MG) BY MOUTH EVERY NIGHT AS NEEDED   Last reviewed on 11/9/2020 11:47 AM by Ashlyn Melara, PharmD    Review of patient's allergies indicates:   Allergen Reactions    Cimzia [certolizumab pegol]     Methotrexate analogues     Penicillins     Zyprexa [olanzapine]    Last reviewed on  11/9/2020 11:45 AM by Ashlyn Melara    Drug Interactions    Drug interactions evaluated: yes  Clinically relevant drug interactions identified: yes   Interactions list: DDI with meloxicam + prednisone/fluconazole increasing change of GI irritation/stomach bleeds.    Drug management plan: Patient states that she typically does not take meloxicam, prednisone, and fluconazole concurrently. Patient only uses fluconazole when directed by MD for yeast infections. Patient was advised to look out for excess stomach irritation and potential stomach bleeds.    Provided the patient with educational material regarding drug interactions: not applicable       Medication Adherence    Patient reported X missed doses in the last month: 0  Any gaps in refill history greater than 2 weeks in the last 3 months: no  Demonstrates  understanding of importance of adherence: yes  Informant: patient  Reliability of informant: reliable  Provider-estimated medication adherence level: %  Reasons for non-adherence: no problems identified  Adherence tools used: alarm, cell phone  Support network for adherence: family member  Confirmed plan for next specialty medication refill: not applicable  Refills needed for supportive medications: not needed       Adverse Effects    Arthralgias: Pos  Constitution: System reviewed and is negative  Skin: System reviewed and is negative  Eyes: System reviewed and is negative  Endocrine and Metabolic: System reviewed and is negative  Gastrointestinal: System reviewed and is negative  Genitourinary: System reviewed and is negative  Cardiovascular: System reviewed and is negative  Hematologic: System reviewed and is negative  HENT: System reviewed and is negative  Neurological: System reviewed and is negative  Psychiatric: System reviewed and is negative  Respiratory: System reviewed and is negative       Assessment Questions - Documented Responses      Most Recent Value   Assessment   Medication Reconciliation completed for patient  Yes   During the past 4 weeks, has patient missed any activities due to condition or medication?  No   During the past 4 weeks, did patient have any of the following urgent care visits?  None   Goals of Therapy Status  Achieving   Welcome packet contents reviewed and discussed with patient?  Yes   Assesment completed?  Yes   Plan  Therapy continued   Do you need to open a clinical intervention (i-vent)?  No   Do you want to schedule first shipment?  No   Medication #1 Assessment Info   Patient status  Exisiting to [facility], Existing medication   Is this medication appropriate for the patient?  Yes   Is this medication effective?  Yes          Objective    She has a past medical history of Abnormal mammogram, Amaurosis fugax, Anemia, Back pain, Chest pain, Degenerative cervical disc,  "Degenerative disc disease, thoracic, Depression, Depression, Fatigue, GERD (gastroesophageal reflux disease), Head ache, Hemorrhoids, Hyperlipidemia, Hypertension, Hypoglycemia, Hypothyroidism, Hypothyroidism, Insomnia, Menopausal state, Neck pain, Neuralgia, Nonrheumatic tricuspid valve disorder, Optic neuritis, Palpitations, PONV (postoperative nausea and vomiting), Psoriasis, Psoriatic arthropathy, Rosacea, and Thyroid nodule.    Tried/failed medications: Enbrel, Cimzia, Simponi, MTX, Cosentyx     BP Readings from Last 4 Encounters:   05/20/20 134/85   01/09/20 (!) 140/83   10/25/19 104/70   10/07/19 133/81     Ht Readings from Last 4 Encounters:   01/09/20 5' 7" (1.702 m)   11/18/19 5' 7" (1.702 m)   10/25/19 5' 7" (1.702 m)   10/07/19 5' 7" (1.702 m)     Wt Readings from Last 4 Encounters:   05/08/20 81.6 kg (180 lb)   01/09/20 81.6 kg (180 lb)   11/18/19 79.4 kg (175 lb)   10/25/19 78.3 kg (172 lb 11.2 oz)     No results for input(s): CREATININE, ALT, AST, HEPBSAG, HEPBSAB, HEPBCAB in the last 2160 hours.  The goals of prescribed drug therapy management include:  · Supporting patient to meet the prescriber's medical treatment objectives  · Improving or maintaining quality of life  · Maintaining optimal therapy adherence  · Minimizing and managing side effects      Goals of Therapy Status: Achieving    Assessment/Plan  Patient plans to continue therapy without changes      Indication, dosage, appropriateness, effectiveness, safety and convenience of her specialty medication(s) were reviewed today.     Patient Counseling    Counseled the patient on the following: doses and administration discussed, safe handling, storage, and disposal discussed, possible adverse effects and management discussed, possible drug and prescription drug interactions discussed, possible drug and OTC drug and food interactions discussed, lab monitoring and follow-up discussed, therapeutic rationale discussed, cost of medications and " cost implications discussed, adherence and missed doses discussed, pharmacy contact information discussed       Humira reassessment complete. Next injection due 11/20/20. Pt is very comfortable with the injection process. Pt denies missed doses and uses a phone alarm to remember injection dates - patient injects every other friday. Pt stores in the refrigerator and reminded that humira is only stable at RT for 14 days. Pt denies side effects and reminded to hold medication if ill. Pt states that her daughter was recently exposed to COVID and both herslef and her daughter is quarantining. Pt questioned whether to take humira or not - pt's next inject is not due for 10 days. Pt was advised to HOLD humira if she has a COVID positive test or any symptoms by the time her next injection is due. Patient voiced understanding. Medications reviewed and up to date. DDI with meloxicam + prednisone/fluconazole increasing change of GI irritation/stomach bleeds. Patient states that she typically does not take meloxicam, prednisone, and fluconazole concurrently. Patient only uses fluconazole when directed by MD for yeast infections. Patient was advised to look out for excess stomach irritation and potential stomach bleeds. Comorbidities reviewed. Allergies reviewed. Labs reviewed (5/20/20) - CBC stable, LFTs WNL. Therapy appropriate to continue.     Patient states that her Humira works great for her. She states that she does always have baseline joint pain, but it has recently been controlled. Patient states that she had flare up when the weather changed, but she is no longer in a flare up. Patient states that she has morning stiffness that that resolved with movement. She denies other symptoms and states that her QoL has improved immensely while on the Humira.  Pt denies  missed plans or trips to the ER/urgent care in the last month. Pt rates pain at 3/10 and overall health at 5/10 (10 being the best). Next appt 11/10/20 pt  advised to keep up with all labs an appts. [facility] will follow up in 180 days - pt has been stable on therapy for 1 year. Pt had no further questions.     Tasks added this encounter   5/2/2021 - (180 day follow up)    Tasks due within next 3 months   11/13/2020 - Refill Call (Auto Added)     Ashlyn Melara, PharmD  [facility] - Specialty Pharm[facility]  [address] [place] [address]  Phone: [phone]  Fax: [phone]

## 2020-11-10 ENCOUNTER — OFFICE VISIT (OUTPATIENT)
Dept: RHEUMATOLOGY | Facility: CLINIC | Age: 70
End: 2020-11-10
Payer: MEDICARE

## 2020-11-10 ENCOUNTER — PATIENT MESSAGE (OUTPATIENT)
Dept: RHEUMATOLOGY | Facility: CLINIC | Age: 70
End: 2020-11-10

## 2020-11-10 DIAGNOSIS — Z79.899 IMMUNOCOMPROMISED STATE DUE TO DRUG THERAPY: ICD-10-CM

## 2020-11-10 DIAGNOSIS — G89.4 CHRONIC PAIN SYNDROME: ICD-10-CM

## 2020-11-10 DIAGNOSIS — F32.9 MAJOR DEPRESSION, CHRONIC: ICD-10-CM

## 2020-11-10 DIAGNOSIS — M47.817 LUMBAR AND SACRAL ARTHRITIS: ICD-10-CM

## 2020-11-10 DIAGNOSIS — I10 ESSENTIAL HYPERTENSION: ICD-10-CM

## 2020-11-10 DIAGNOSIS — M19.90 OSTEOARTHRITIS, UNSPECIFIED OSTEOARTHRITIS TYPE, UNSPECIFIED SITE: ICD-10-CM

## 2020-11-10 DIAGNOSIS — L40.50 PSORIATIC ARTHRITIS: Primary | ICD-10-CM

## 2020-11-10 DIAGNOSIS — D84.821 IMMUNOCOMPROMISED STATE DUE TO DRUG THERAPY: ICD-10-CM

## 2020-11-10 PROCEDURE — 99213 OFFICE O/P EST LOW 20 MIN: CPT | Mod: 95,,, | Performed by: PHYSICIAN ASSISTANT

## 2020-11-10 PROCEDURE — 99213 PR OFFICE/OUTPT VISIT, EST, LEVL III, 20-29 MIN: ICD-10-PCS | Mod: 95,,, | Performed by: PHYSICIAN ASSISTANT

## 2020-11-10 RX ORDER — LISINOPRIL 10 MG/1
TABLET ORAL
Qty: 90 TABLET | Refills: 2 | Status: SHIPPED | OUTPATIENT
Start: 2020-11-10 | End: 2021-07-08 | Stop reason: SDUPTHER

## 2020-11-10 RX ORDER — MELOXICAM 15 MG/1
TABLET ORAL
Qty: 90 TABLET | Refills: 2 | Status: SHIPPED | OUTPATIENT
Start: 2020-11-10 | End: 2021-07-08 | Stop reason: SDUPTHER

## 2020-11-10 RX ORDER — CYCLOBENZAPRINE HCL 10 MG
10 TABLET ORAL 3 TIMES DAILY PRN
Qty: 90 TABLET | Refills: 2 | Status: SHIPPED | OUTPATIENT
Start: 2020-11-10 | End: 2021-07-28 | Stop reason: SDUPTHER

## 2020-11-10 RX ORDER — ESCITALOPRAM OXALATE 20 MG/1
20 TABLET ORAL DAILY
Qty: 90 TABLET | Refills: 2 | Status: SHIPPED | OUTPATIENT
Start: 2020-11-10 | End: 2021-05-25 | Stop reason: SDUPTHER

## 2020-11-10 NOTE — PROGRESS NOTES
The patient location is: home  The chief complaint leading to consultation is: psoriatic arthritis    Visit type: audiovisual    Face to Face time with patient: 14 minutes  20 minutes of total time spent on the encounter, which includes face to face time and non-face to face time preparing to see the patient (eg, review of tests), Obtaining and/or reviewing separately obtained history, Documenting clinical information in the electronic or other health record, Independently interpreting results (not separately reported) and communicating results to the patient/family/caregiver, or Care coordination (not separately reported).     Each patient to whom he or she provides medical services by telemedicine is:  (1) informed of the relationship between the physician and patient and the respective role of any other health care provider with respect to management of the patient; and (2) notified that he or she may decline to receive medical services by telemedicine and may withdraw from such care at any time.    Notes:   Subjective:       Patient ID: Rose Goff is a 70 y.o. female.    Chief Complaint: Disease Management    Mrs. Goff is a 70 year old female who presents to telemedicine virtual visit for follow up on psoriatic arthritis. Her daughter who lives with her was recently exposed to covid at work--so she decided to quarantine for 14 days. She has no sx and her daughter has no sx. She is due for her Humira and I advised her to wait at least 1 week to monitor herself before next injection. If any sx start then notify clinic for testing and treatment. She strained her L knee recently and underwent Ortho eval wearing a brace.  With regards to her arthritis, she is doing well on her current regimen of Humira, mobic, norco, and prednisone PRN. She has intermittent joint pain involving MCPs, PIPs, wrists, and shoulders. She has chronic low back pain secondary to OA and is taking norco TID PRN with good relief of  pain.    Mood is stable at this time and her daughter is living with her. No new labs since May 2020.    Current tx:  1. Humira  2. Mobic  3. Norco    Prior treatments:   Enbrel, Cimzia, Simponi, MTX, Cosentyx     Review of Systems   Constitutional: Positive for activity change. Negative for appetite change, chills, fatigue and fever.   Eyes: Negative for visual disturbance.   Respiratory: Negative for cough.    Cardiovascular: Negative for chest pain and palpitations.   Gastrointestinal: Negative for abdominal pain and constipation.   Musculoskeletal: Positive for arthralgias, joint swelling and myalgias. Negative for neck pain and neck stiffness.   Skin: Negative for rash.   Allergic/Immunologic: Positive for immunocompromised state.   Neurological: Positive for headaches. Negative for dizziness, weakness and light-headedness.   Psychiatric/Behavioral: Negative for dysphoric mood and sleep disturbance. The patient is not nervous/anxious.          Objective:     There were no vitals filed for this visit.    Past Medical History:   Diagnosis Date    Abnormal mammogram     Amaurosis fugax     Anemia     Back pain     Chest pain     Degenerative cervical disc     Degenerative disc disease, thoracic     Depression     Depression     Fatigue     GERD (gastroesophageal reflux disease)     Head ache     Hemorrhoids     Hyperlipidemia     Hypertension     Hypoglycemia     Hypothyroidism     Hypothyroidism     Insomnia     Menopausal state     Neck pain     Neuralgia     Nonrheumatic tricuspid valve disorder     Optic neuritis     Palpitations     PONV (postoperative nausea and vomiting)     Psoriasis     Psoriatic arthropathy     Rosacea     Thyroid nodule      Past Surgical History:   Procedure Laterality Date    ANKLE FRACTURE SURGERY      APPENDECTOMY      BREAST BIOPSY Left     Negative    HYSTERECTOMY      OOPHORECTOMY      RHIZOTOMY W/ RADIOFREQUENCY ABLATION Right     lumbar     thyroid tumor removed            Physical Exam   Constitutional: She is oriented to person, place, and time.   Neurological: She is alert and oriented to person, place, and time.      Assessment:       1. Psoriatic arthritis    2. Major depression, chronic    3. Lumbar and sacral arthritis    4. Osteoarthritis, unspecified osteoarthritis type, unspecified site    5. Essential hypertension    6. Immunocompromised state due to drug therapy    7. Chronic pain syndrome            Plan:       Psoriatic arthritis  -     cyclobenzaprine (FLEXERIL) 10 MG tablet; Take 1 tablet (10 mg total) by mouth 3 (three) times daily as needed for Muscle spasms.  Dispense: 90 tablet; Refill: 2  -     meloxicam (MOBIC) 15 MG tablet; TAKE 1 TABLET(15 MG) BY MOUTH EVERY DAY  Dispense: 90 tablet; Refill: 2  -     CBC Auto Differential; Future; Expected date: 11/10/2020  -     Comprehensive Metabolic Panel; Future; Expected date: 11/10/2020  -     C-Reactive Protein; Future; Expected date: 11/10/2020  -     Sedimentation rate; Future; Expected date: 11/10/2020    Major depression, chronic  -     escitalopram oxalate (LEXAPRO) 20 MG tablet; Take 1 tablet (20 mg total) by mouth once daily.  Dispense: 90 tablet; Refill: 2    Lumbar and sacral arthritis    Osteoarthritis, unspecified osteoarthritis type, unspecified site    Essential hypertension  -     lisinopriL 10 MG tablet; TAKE 1 TABLET(10 MG) BY MOUTH EVERY DAY  Dispense: 90 tablet; Refill: 2    Immunocompromised state due to drug therapy    Chronic pain syndrome          Assessment:  70 year old female with   Psoriatic arthritis, rheumatoid arthritis on Humira  --chronic pain syndrome on norco  --insomnia on ambien    Plan:  1. Cont. Quarantine and hold humira atleast 1 week to monitor for sx. She was not directly exposed to covid + patient  2. Cont. Humira (see above for instructions)  3. Cont. norco 10/325 TID per Dr. Benjamin. I have checked [place] prescription monitoring program site  and no unusual or abnormal behavior has occurred pt understand the risk and benefits of taking opioid medications and has decided to continue the  medication   4. Cont. mobic 15 mg, cont. Flexeril PRN  5. Lexapro and lisinopril refilled for pcp  6. Labs soon    Follow up:   3 months Dr. Benjamin

## 2020-11-23 ENCOUNTER — SPECIALTY PHARMACY (OUTPATIENT)
Dept: PHARMACY | Facility: CLINIC | Age: 70
End: 2020-11-23

## 2020-11-23 NOTE — TELEPHONE ENCOUNTER
Specialty Pharmacy - Refill Coordination    Specialty Medication Orders Linked to Encounter      Most Recent Value   Medication #1  adalimumab (HUMIRA,CF, PEN) 40 mg/0.4 mL PnKt (Order#[id number], Rx#[id number])          Refill Questions - Documented Responses      Most Recent Value   Relationship to patient of person spoken to?  Self   HIPAA/medical authority confirmed?  Yes   Any changes in contact preferences or allowed representatives?  No   Has the patient had any insurance changes?  No   Has the patient had any changes to specialty medication, dose, or instructions?  No   Has the patient started taking any new medications, herbals, or supplements?  No   Has the patient been diagnosed with any new medical conditions?  No   Does the patient have any new allergies to medications or foods?  No   Does the patient have any concerns about side effects?  No   Can the patient store medication/sharps container properly (at the correct temperature, away from children/pets, etc.)?  Yes   Can the patient call emergency services (911) in the event of an emergency?  Yes   Does the patient have any concerns or questions about taking or administering this medication as prescribed?  No   How many doses did the patient miss in the past 4 weeks or since the last fill?  0   How many doses does the patient have on hand?  1   How many days does the patient report on hand quantity will last?  14   Does the number of doses/days supply remaining match pharmacy expected amounts?  Yes   Does the patient feel that this medication is effective?  Yes   During the past 4 weeks, has patient missed any activities due to condition or medication?  No   During the past 4 weeks, did patient have any of the following urgent care visits?  None   How will the patient receive the medication?  Mail   When does the patient need to receive the medication?  12/18/20   Shipping Address  Home   Address in [facility] confirmed and updated if  neccessary?  Yes   Expected Copay ($)  0   Is the patient able to afford the medication copay?  Yes   Payment Method  zero copay   Days supply of Refill  28   Would patient like to speak to a pharmacist?  No   Do you want to trigger an intervention?  No   Do you want to trigger an additional referral task?  No   Refill activity completed?  Yes   Refill activity plan  Refill scheduled   Shipment/Pickup Date:  11/30/20          Current Outpatient Medications   Medication Sig    adalimumab (HUMIRA,CF, PEN) 40 mg/0.4 mL PnKt Inject 0.4 mLs (40 mg total) into the skin every 14 (fourteen) days.    clotrimazole (LOTRIMIN) 1 % Soln APPLY EXTERNALLY TO THE AFFECTED AREA TWICE DAILY    clotrimazole-betamethasone 1-0.05% (LOTRISONE) cream APPLY TO ITCHY BUMPS BID 14 DAYS    cyclobenzaprine (FLEXERIL) 10 MG tablet Take 1 tablet (10 mg total) by mouth 3 (three) times daily as needed for Muscle spasms.    diclofenac sodium (VOLTAREN) 1 % Gel Apply 2 g topically once daily.    escitalopram oxalate (LEXAPRO) 20 MG tablet Take 1 tablet (20 mg total) by mouth once daily.    estradioL (ESTRACE) 1 MG tablet TAKE 1 TABLET(1 MG) BY MOUTH EVERY DAY    fluconazole (DIFLUCAN) 50 MG Tab Take 50 mg by mouth daily as needed. As needed for yeast infections    FLUZONE HIGH-DOSE 2019-20, PF, 180 mcg/0.5 mL Syrg ADM 0.5ML IM UTD    folic acid (FOLVITE) 800 MCG Tab Take 800 mcg by mouth once daily.    HYDROcodone-acetaminophen (NORCO)  mg per tablet Take 1 tablet by mouth every 8 (eight) hours as needed.    hydrOXYzine HCl (ATARAX) 25 MG tablet     levothyroxine (SYNTHROID) 125 MCG tablet TAKE 1 TABLET BY MOUTH EVERY MORNING 30 MINUTES BEFORE BREAKFAST    lisinopriL 10 MG tablet TAKE 1 TABLET(10 MG) BY MOUTH EVERY DAY    loratadine 10 mg Cap Take 1 tablet by mouth once daily.    meloxicam (MOBIC) 15 MG tablet TAKE 1 TABLET(15 MG) BY MOUTH EVERY DAY    omeprazole (PRILOSEC) 40 MG capsule TAKE 1 CAPSULE BY MOUTH EVERY DAY     ondansetron (ZOFRAN-ODT) 8 MG TbDL Take 1 tablet (8 mg total) by mouth every 6 (six) hours as needed (nausea).    predniSONE (DELTASONE) 5 MG tablet 1-3 tabs PO daily PRN    simvastatin (ZOCOR) 40 MG tablet TAKE 1 TABLET(40 MG) BY MOUTH EVERY EVENING    sumatriptan (IMITREX) 100 MG tablet Take 1 tablet (100 mg total) by mouth once. for 1 dose    valACYclovir (VALTREX) 1000 MG tablet TAKE 1 tab po bid    VOLTAREN 1 % Gel APPLY 2 GRAMS TOPICALLY ONCE DAILY    zolpidem (AMBIEN) 10 mg Tab TAKE 1 TABLET(10 MG) BY MOUTH EVERY NIGHT AS NEEDED   Last reviewed on 11/10/2020 11:43 AM by Codi Courtney PA-C    Review of patient's allergies indicates:   Allergen Reactions    Cimzia [certolizumab pegol]     Methotrexate analogues     Penicillins     Zyprexa [olanzapine]     Last reviewed on  11/10/2020 11:43 AM by Codi Courtney      Tasks added this encounter   1/5/2021 - Refill Call (Auto Added)   Tasks due within next 3 months   No tasks due.     Maria C Forman, PharmD  [facility]  [address] [place] [id number]  Phone: [phone]  Fax: [phone]

## 2020-12-02 DIAGNOSIS — G89.4 CHRONIC PAIN SYNDROME: ICD-10-CM

## 2020-12-02 DIAGNOSIS — L40.50 PSA (PSORIATIC ARTHRITIS): ICD-10-CM

## 2020-12-02 DIAGNOSIS — R94.6 ABNORMAL RESULTS OF THYROID FUNCTION STUDIES: ICD-10-CM

## 2020-12-02 DIAGNOSIS — D53.1 OTHER MEGALOBLASTIC ANEMIAS, NOT ELSEWHERE CLASSIFIED: ICD-10-CM

## 2020-12-02 DIAGNOSIS — M19.90 OSTEOARTHRITIS, UNSPECIFIED OSTEOARTHRITIS TYPE, UNSPECIFIED SITE: ICD-10-CM

## 2020-12-02 DIAGNOSIS — M06.9 RHEUMATOID ARTHRITIS: ICD-10-CM

## 2020-12-02 DIAGNOSIS — F32.9 MAJOR DEPRESSION, CHRONIC: ICD-10-CM

## 2020-12-02 DIAGNOSIS — M47.817 LUMBAR AND SACRAL ARTHRITIS: ICD-10-CM

## 2020-12-02 DIAGNOSIS — L40.50 PSORIATIC ARTHRITIS: ICD-10-CM

## 2020-12-02 NOTE — TELEPHONE ENCOUNTER
----- Message from Zunilda Aldana MA sent at 12/2/2020  9:54 AM CST -----  Regarding: refill  Contact: patient  Type:  RX Refill Request    Who Called:  patient   Refill or New Rx:  refill   RX Name and Strength:  anti -depressant medication, HYDROcodone-acetaminophen (NORCO)  mg per tablet and diclofenac sodium (VOLTAREN) 1 % Gel      How is the patient currently taking it? (ex. 1XDay):    Is this a 30 day or 90 day RX:      Preferred Pharmacy with phone number:    [id number] - [facility] [id number] [place] [address] [address] AT [facility]  [address] [place] [id number]      Local or Mail Order:  local   Ordering Provider: Sourav Roblero Call Back Number:  [phone] (home)     Additional Information:

## 2020-12-03 RX ORDER — HYDROCODONE BITARTRATE AND ACETAMINOPHEN 10; 325 MG/1; MG/1
1 TABLET ORAL EVERY 8 HOURS PRN
Qty: 90 TABLET | Refills: 0 | Status: SHIPPED | OUTPATIENT
Start: 2020-12-03 | End: 2021-01-02

## 2020-12-21 ENCOUNTER — PATIENT MESSAGE (OUTPATIENT)
Dept: OTHER | Facility: OTHER | Age: 70
End: 2020-12-21

## 2020-12-24 DIAGNOSIS — Z79.890 HORMONE REPLACEMENT THERAPY: ICD-10-CM

## 2020-12-24 RX ORDER — ESTRADIOL 1 MG/1
TABLET ORAL
Qty: 90 TABLET | Refills: 0 | Status: SHIPPED | OUTPATIENT
Start: 2020-12-24 | End: 2021-01-11

## 2020-12-24 NOTE — TELEPHONE ENCOUNTER
Pharmacy requesting refill on Estradiol 1mg  Pt's LOV 11/10/2020  Pt's NOV 03/10/2021  Medication pending

## 2021-01-05 DIAGNOSIS — E78.5 HYPERLIPIDEMIA, UNSPECIFIED HYPERLIPIDEMIA TYPE: ICD-10-CM

## 2021-01-05 RX ORDER — SIMVASTATIN 40 MG/1
40 TABLET, FILM COATED ORAL NIGHTLY
Qty: 90 TABLET | Refills: 0 | OUTPATIENT
Start: 2021-01-05

## 2021-01-06 ENCOUNTER — SPECIALTY PHARMACY (OUTPATIENT)
Dept: PHARMACY | Facility: CLINIC | Age: 71
End: 2021-01-06

## 2021-01-08 ENCOUNTER — TELEPHONE (OUTPATIENT)
Dept: RHEUMATOLOGY | Facility: CLINIC | Age: 71
End: 2021-01-08

## 2021-01-11 PROBLEM — E03.9 HYPOTHYROIDISM: Status: ACTIVE | Noted: 2021-01-11

## 2021-01-13 DIAGNOSIS — G89.4 CHRONIC PAIN SYNDROME: Primary | ICD-10-CM

## 2021-01-14 RX ORDER — HYDROCODONE BITARTRATE AND ACETAMINOPHEN 10; 325 MG/1; MG/1
1 TABLET ORAL EVERY 8 HOURS PRN
Qty: 90 TABLET | Refills: 0 | Status: SHIPPED | OUTPATIENT
Start: 2021-01-14 | End: 2021-02-08 | Stop reason: SDUPTHER

## 2021-02-03 ENCOUNTER — PATIENT MESSAGE (OUTPATIENT)
Dept: PHARMACY | Facility: CLINIC | Age: 71
End: 2021-02-03

## 2021-02-08 DIAGNOSIS — G89.4 CHRONIC PAIN SYNDROME: ICD-10-CM

## 2021-02-08 RX ORDER — HYDROCODONE BITARTRATE AND ACETAMINOPHEN 10; 325 MG/1; MG/1
1 TABLET ORAL EVERY 8 HOURS PRN
Qty: 90 TABLET | Refills: 0 | Status: SHIPPED | OUTPATIENT
Start: 2021-02-08 | End: 2021-03-10 | Stop reason: SDUPTHER

## 2021-02-19 ENCOUNTER — SPECIALTY PHARMACY (OUTPATIENT)
Dept: PHARMACY | Facility: CLINIC | Age: 71
End: 2021-02-19

## 2021-02-26 RX ORDER — LEVOTHYROXINE SODIUM 125 UG/1
TABLET ORAL
Qty: 90 TABLET | Refills: 3 | Status: SHIPPED | OUTPATIENT
Start: 2021-02-26 | End: 2021-07-28 | Stop reason: SDUPTHER

## 2021-03-10 ENCOUNTER — LAB VISIT (OUTPATIENT)
Dept: LAB | Facility: HOSPITAL | Age: 71
End: 2021-03-10
Attending: INTERNAL MEDICINE
Payer: MEDICARE

## 2021-03-10 ENCOUNTER — OFFICE VISIT (OUTPATIENT)
Dept: RHEUMATOLOGY | Facility: CLINIC | Age: 71
End: 2021-03-10
Payer: MEDICARE

## 2021-03-10 VITALS
BODY MASS INDEX: 27.94 KG/M2 | HEIGHT: 67 IN | SYSTOLIC BLOOD PRESSURE: 107 MMHG | HEART RATE: 83 BPM | DIASTOLIC BLOOD PRESSURE: 62 MMHG | WEIGHT: 178 LBS

## 2021-03-10 DIAGNOSIS — L40.50 PSA (PSORIATIC ARTHRITIS): ICD-10-CM

## 2021-03-10 DIAGNOSIS — L40.50 PSORIATIC ARTHRITIS: ICD-10-CM

## 2021-03-10 DIAGNOSIS — F32.9 MAJOR DEPRESSION, CHRONIC: ICD-10-CM

## 2021-03-10 DIAGNOSIS — M47.817 LUMBAR AND SACRAL ARTHRITIS: ICD-10-CM

## 2021-03-10 DIAGNOSIS — R94.6 ABNORMAL RESULTS OF THYROID FUNCTION STUDIES: ICD-10-CM

## 2021-03-10 DIAGNOSIS — G89.4 CHRONIC PAIN SYNDROME: ICD-10-CM

## 2021-03-10 DIAGNOSIS — M05.711 RHEUMATOID ARTHRITIS INVOLVING RIGHT SHOULDER WITH POSITIVE RHEUMATOID FACTOR: ICD-10-CM

## 2021-03-10 DIAGNOSIS — D53.1 OTHER MEGALOBLASTIC ANEMIAS, NOT ELSEWHERE CLASSIFIED: ICD-10-CM

## 2021-03-10 DIAGNOSIS — M19.90 OSTEOARTHRITIS, UNSPECIFIED OSTEOARTHRITIS TYPE, UNSPECIFIED SITE: ICD-10-CM

## 2021-03-10 DIAGNOSIS — D64.9 ANEMIA, UNSPECIFIED TYPE: ICD-10-CM

## 2021-03-10 DIAGNOSIS — R11.2 NAUSEA AND VOMITING, INTRACTABILITY OF VOMITING NOT SPECIFIED, UNSPECIFIED VOMITING TYPE: ICD-10-CM

## 2021-03-10 DIAGNOSIS — M06.9 RHEUMATOID ARTHRITIS: ICD-10-CM

## 2021-03-10 LAB
BASOPHILS # BLD AUTO: 0.07 K/UL (ref 0–0.2)
BASOPHILS NFR BLD: 0.7 % (ref 0–1.9)
DIFFERENTIAL METHOD: ABNORMAL
EOSINOPHIL # BLD AUTO: 0.2 K/UL (ref 0–0.5)
EOSINOPHIL NFR BLD: 2.6 % (ref 0–8)
ERYTHROCYTE [DISTWIDTH] IN BLOOD BY AUTOMATED COUNT: 15.7 % (ref 11.5–14.5)
ERYTHROCYTE [SEDIMENTATION RATE] IN BLOOD BY WESTERGREN METHOD: 6 MM/HR (ref 0–36)
HCT VFR BLD AUTO: 32.9 % (ref 37–48.5)
HGB BLD-MCNC: 10.3 G/DL (ref 12–16)
IMM GRANULOCYTES # BLD AUTO: 0.02 K/UL (ref 0–0.04)
IMM GRANULOCYTES NFR BLD AUTO: 0.2 % (ref 0–0.5)
LYMPHOCYTES # BLD AUTO: 2.8 K/UL (ref 1–4.8)
LYMPHOCYTES NFR BLD: 29.6 % (ref 18–48)
MCH RBC QN AUTO: 27.4 PG (ref 27–31)
MCHC RBC AUTO-ENTMCNC: 31.3 G/DL (ref 32–36)
MCV RBC AUTO: 88 FL (ref 82–98)
MONOCYTES # BLD AUTO: 0.7 K/UL (ref 0.3–1)
MONOCYTES NFR BLD: 7.5 % (ref 4–15)
NEUTROPHILS # BLD AUTO: 5.6 K/UL (ref 1.8–7.7)
NEUTROPHILS NFR BLD: 59.4 % (ref 38–73)
NRBC BLD-RTO: 0 /100 WBC
PLATELET # BLD AUTO: 348 K/UL (ref 150–350)
PMV BLD AUTO: 10.8 FL (ref 9.2–12.9)
RBC # BLD AUTO: 3.76 M/UL (ref 4–5.4)
WBC # BLD AUTO: 9.34 K/UL (ref 3.9–12.7)

## 2021-03-10 PROCEDURE — 82085 ASSAY OF ALDOLASE: CPT | Performed by: INTERNAL MEDICINE

## 2021-03-10 PROCEDURE — 36415 COLL VENOUS BLD VENIPUNCTURE: CPT | Mod: PO | Performed by: INTERNAL MEDICINE

## 2021-03-10 PROCEDURE — 86800 THYROGLOBULIN ANTIBODY: CPT | Performed by: INTERNAL MEDICINE

## 2021-03-10 PROCEDURE — 84439 ASSAY OF FREE THYROXINE: CPT | Performed by: INTERNAL MEDICINE

## 2021-03-10 PROCEDURE — 83540 ASSAY OF IRON: CPT | Performed by: PHYSICIAN ASSISTANT

## 2021-03-10 PROCEDURE — 99215 OFFICE O/P EST HI 40 MIN: CPT | Mod: S$PBB,,, | Performed by: INTERNAL MEDICINE

## 2021-03-10 PROCEDURE — 82550 ASSAY OF CK (CPK): CPT | Performed by: INTERNAL MEDICINE

## 2021-03-10 PROCEDURE — 85025 COMPLETE CBC W/AUTO DIFF WBC: CPT | Performed by: INTERNAL MEDICINE

## 2021-03-10 PROCEDURE — 82728 ASSAY OF FERRITIN: CPT | Performed by: PHYSICIAN ASSISTANT

## 2021-03-10 PROCEDURE — 84481 FREE ASSAY (FT-3): CPT | Performed by: INTERNAL MEDICINE

## 2021-03-10 PROCEDURE — 96372 THER/PROPH/DIAG INJ SC/IM: CPT | Mod: PBBFAC,PO

## 2021-03-10 PROCEDURE — 80053 COMPREHEN METABOLIC PANEL: CPT | Performed by: INTERNAL MEDICINE

## 2021-03-10 PROCEDURE — 82607 VITAMIN B-12: CPT | Performed by: INTERNAL MEDICINE

## 2021-03-10 PROCEDURE — 99999 PR PBB SHADOW E&M-EST. PATIENT-LVL III: CPT | Mod: PBBFAC,,, | Performed by: INTERNAL MEDICINE

## 2021-03-10 PROCEDURE — 84443 ASSAY THYROID STIM HORMONE: CPT | Performed by: INTERNAL MEDICINE

## 2021-03-10 PROCEDURE — 85652 RBC SED RATE AUTOMATED: CPT | Performed by: INTERNAL MEDICINE

## 2021-03-10 PROCEDURE — 99213 OFFICE O/P EST LOW 20 MIN: CPT | Mod: PBBFAC,PO | Performed by: INTERNAL MEDICINE

## 2021-03-10 PROCEDURE — 99215 PR OFFICE/OUTPT VISIT, EST, LEVL V, 40-54 MIN: ICD-10-PCS | Mod: S$PBB,,, | Performed by: INTERNAL MEDICINE

## 2021-03-10 PROCEDURE — 99999 PR PBB SHADOW E&M-EST. PATIENT-LVL III: ICD-10-PCS | Mod: PBBFAC,,, | Performed by: INTERNAL MEDICINE

## 2021-03-10 PROCEDURE — 82746 ASSAY OF FOLIC ACID SERUM: CPT | Performed by: INTERNAL MEDICINE

## 2021-03-10 PROCEDURE — 86140 C-REACTIVE PROTEIN: CPT | Performed by: INTERNAL MEDICINE

## 2021-03-10 RX ORDER — HYDROCODONE BITARTRATE AND ACETAMINOPHEN 10; 325 MG/1; MG/1
1 TABLET ORAL EVERY 8 HOURS PRN
Qty: 90 TABLET | Refills: 0 | Status: SHIPPED | OUTPATIENT
Start: 2021-03-10 | End: 2021-04-09

## 2021-03-10 RX ORDER — ZOLPIDEM TARTRATE 10 MG/1
TABLET ORAL
Qty: 30 TABLET | Refills: 4 | Status: SHIPPED | OUTPATIENT
Start: 2021-03-10 | End: 2021-07-08 | Stop reason: SDUPTHER

## 2021-03-10 RX ORDER — HYDROCODONE BITARTRATE AND ACETAMINOPHEN 10; 325 MG/1; MG/1
1 TABLET ORAL EVERY 8 HOURS PRN
Qty: 90 TABLET | Refills: 0 | Status: SHIPPED | OUTPATIENT
Start: 2021-05-07 | End: 2021-03-10 | Stop reason: SDUPTHER

## 2021-03-10 RX ORDER — CYANOCOBALAMIN 1000 UG/ML
1000 INJECTION, SOLUTION INTRAMUSCULAR; SUBCUTANEOUS
Status: COMPLETED | OUTPATIENT
Start: 2021-03-10 | End: 2021-03-10

## 2021-03-10 RX ORDER — DICLOFENAC SODIUM 10 MG/G
2 GEL TOPICAL DAILY
Qty: 100 G | Refills: 11 | Status: SHIPPED | OUTPATIENT
Start: 2021-03-10 | End: 2022-10-27 | Stop reason: SDUPTHER

## 2021-03-10 RX ORDER — METHYLPREDNISOLONE ACETATE 80 MG/ML
160 INJECTION, SUSPENSION INTRA-ARTICULAR; INTRALESIONAL; INTRAMUSCULAR; SOFT TISSUE
Status: COMPLETED | OUTPATIENT
Start: 2021-03-10 | End: 2021-03-10

## 2021-03-10 RX ORDER — KETOROLAC TROMETHAMINE 30 MG/ML
60 INJECTION, SOLUTION INTRAMUSCULAR; INTRAVENOUS
Status: COMPLETED | OUTPATIENT
Start: 2021-03-10 | End: 2021-03-10

## 2021-03-10 RX ORDER — HYDROCODONE BITARTRATE AND ACETAMINOPHEN 10; 325 MG/1; MG/1
1 TABLET ORAL EVERY 8 HOURS PRN
Qty: 90 TABLET | Refills: 0 | Status: SHIPPED | OUTPATIENT
Start: 2021-04-08 | End: 2021-03-10 | Stop reason: SDUPTHER

## 2021-03-10 RX ADMIN — CYANOCOBALAMIN 1000 MCG: 1000 INJECTION, SOLUTION INTRAMUSCULAR at 05:03

## 2021-03-10 RX ADMIN — METHYLPREDNISOLONE ACETATE 160 MG: 80 INJECTION, SUSPENSION INTRA-ARTICULAR; INTRALESIONAL; INTRAMUSCULAR; SOFT TISSUE at 05:03

## 2021-03-10 RX ADMIN — KETOROLAC TROMETHAMINE 60 MG: 60 INJECTION, SOLUTION INTRAMUSCULAR at 05:03

## 2021-03-10 ASSESSMENT — ROUTINE ASSESSMENT OF PATIENT INDEX DATA (RAPID3)
FATIGUE SCORE: 1.1
MDHAQ FUNCTION SCORE: 0.9
PAIN SCORE: 6.5
PSYCHOLOGICAL DISTRESS SCORE: 2.2
TOTAL RAPID3 SCORE: 5
PATIENT GLOBAL ASSESSMENT SCORE: 5.5

## 2021-03-11 ENCOUNTER — DOCUMENTATION ONLY (OUTPATIENT)
Dept: RHEUMATOLOGY | Facility: CLINIC | Age: 71
End: 2021-03-11

## 2021-03-11 LAB
ALBUMIN SERPL BCP-MCNC: 4 G/DL (ref 3.5–5.2)
ALP SERPL-CCNC: 63 U/L (ref 55–135)
ALT SERPL W/O P-5'-P-CCNC: 15 U/L (ref 10–44)
ANION GAP SERPL CALC-SCNC: 7 MMOL/L (ref 8–16)
AST SERPL-CCNC: 22 U/L (ref 10–40)
BILIRUB SERPL-MCNC: 0.3 MG/DL (ref 0.1–1)
BUN SERPL-MCNC: 15 MG/DL (ref 8–23)
CALCIUM SERPL-MCNC: 9 MG/DL (ref 8.7–10.5)
CHLORIDE SERPL-SCNC: 105 MMOL/L (ref 95–110)
CO2 SERPL-SCNC: 24 MMOL/L (ref 23–29)
CREAT SERPL-MCNC: 0.8 MG/DL (ref 0.5–1.4)
CRP SERPL-MCNC: 3.1 MG/L (ref 0–8.2)
EST. GFR  (AFRICAN AMERICAN): >60 ML/MIN/1.73 M^2
EST. GFR  (NON AFRICAN AMERICAN): >60 ML/MIN/1.73 M^2
FERRITIN SERPL-MCNC: 3 NG/ML (ref 20–300)
FOLATE SERPL-MCNC: 9.1 NG/ML (ref 4–24)
GLUCOSE SERPL-MCNC: 78 MG/DL (ref 70–110)
IRON SERPL-MCNC: 16 UG/DL (ref 30–160)
POTASSIUM SERPL-SCNC: 4.4 MMOL/L (ref 3.5–5.1)
PROT SERPL-MCNC: 7.4 G/DL (ref 6–8.4)
SATURATED IRON: 4 % (ref 20–50)
SODIUM SERPL-SCNC: 136 MMOL/L (ref 136–145)
T3FREE SERPL-MCNC: 2.3 PG/ML (ref 2.3–4.2)
T4 FREE SERPL-MCNC: 1.26 NG/DL (ref 0.71–1.51)
THYROGLOB AB SERPL IA-ACNC: <4 IU/ML (ref 0–3.9)
THYROPEROXIDASE IGG SERPL-ACNC: <6 IU/ML
TOTAL IRON BINDING CAPACITY: 408 UG/DL (ref 250–450)
TRANSFERRIN SERPL-MCNC: 276 MG/DL (ref 200–375)
TSH SERPL DL<=0.005 MIU/L-ACNC: 0.56 UIU/ML (ref 0.4–4)
VIT B12 SERPL-MCNC: >2000 PG/ML (ref 210–950)

## 2021-03-12 ENCOUNTER — PATIENT MESSAGE (OUTPATIENT)
Dept: RHEUMATOLOGY | Facility: CLINIC | Age: 71
End: 2021-03-12

## 2021-03-12 LAB
ALDOLASE SERPL-CCNC: 4.2 U/L (ref 1.2–7.6)
CK SERPL-CCNC: 117 U/L (ref 20–180)

## 2021-03-29 DIAGNOSIS — L40.50 PSA (PSORIATIC ARTHRITIS): Primary | ICD-10-CM

## 2021-03-29 DIAGNOSIS — M15.9 PRIMARY OSTEOARTHRITIS INVOLVING MULTIPLE JOINTS: ICD-10-CM

## 2021-03-29 DIAGNOSIS — M06.9 RHEUMATOID ARTHRITIS, INVOLVING UNSPECIFIED SITE, UNSPECIFIED RHEUMATOID FACTOR PRESENCE: ICD-10-CM

## 2021-03-29 DIAGNOSIS — L40.50 PSORIATIC ARTHRITIS: ICD-10-CM

## 2021-03-29 RX ORDER — ADALIMUMAB 40MG/0.4ML
40 KIT SUBCUTANEOUS
Qty: 2 PEN | Refills: 6 | Status: SHIPPED | OUTPATIENT
Start: 2021-03-29 | End: 2021-11-11 | Stop reason: SDUPTHER

## 2021-04-08 ENCOUNTER — SPECIALTY PHARMACY (OUTPATIENT)
Dept: PHARMACY | Facility: CLINIC | Age: 71
End: 2021-04-08

## 2021-04-13 DIAGNOSIS — G89.4 CHRONIC PAIN SYNDROME: ICD-10-CM

## 2021-04-13 DIAGNOSIS — M47.817 LUMBAR AND SACRAL ARTHRITIS: ICD-10-CM

## 2021-04-13 RX ORDER — SUMATRIPTAN SUCCINATE 100 MG/1
100 TABLET ORAL ONCE
Qty: 18 TABLET | Refills: 1 | Status: SHIPPED | OUTPATIENT
Start: 2021-04-13 | End: 2021-07-08 | Stop reason: SDUPTHER

## 2021-05-06 ENCOUNTER — SPECIALTY PHARMACY (OUTPATIENT)
Dept: PHARMACY | Facility: CLINIC | Age: 71
End: 2021-05-06

## 2021-05-25 DIAGNOSIS — F32.9 MAJOR DEPRESSION, CHRONIC: ICD-10-CM

## 2021-05-25 RX ORDER — ESCITALOPRAM OXALATE 20 MG/1
20 TABLET ORAL DAILY
Qty: 90 TABLET | Refills: 1 | Status: SHIPPED | OUTPATIENT
Start: 2021-05-25 | End: 2021-07-28 | Stop reason: SDUPTHER

## 2021-06-01 DIAGNOSIS — G89.4 CHRONIC PAIN SYNDROME: Primary | ICD-10-CM

## 2021-06-03 RX ORDER — HYDROCODONE BITARTRATE AND ACETAMINOPHEN 10; 325 MG/1; MG/1
1 TABLET ORAL EVERY 8 HOURS PRN
Qty: 90 TABLET | Refills: 0 | Status: SHIPPED | OUTPATIENT
Start: 2021-06-03 | End: 2021-06-29 | Stop reason: SDUPTHER

## 2021-06-08 ENCOUNTER — PATIENT MESSAGE (OUTPATIENT)
Dept: PHARMACY | Facility: CLINIC | Age: 71
End: 2021-06-08

## 2021-06-08 ENCOUNTER — TELEPHONE (OUTPATIENT)
Dept: HEMATOLOGY/ONCOLOGY | Facility: CLINIC | Age: 71
End: 2021-06-08

## 2021-06-14 ENCOUNTER — SPECIALTY PHARMACY (OUTPATIENT)
Dept: PHARMACY | Facility: CLINIC | Age: 71
End: 2021-06-14

## 2021-06-29 DIAGNOSIS — G89.4 CHRONIC PAIN SYNDROME: ICD-10-CM

## 2021-06-29 DIAGNOSIS — L40.50 PSORIATIC ARTHRITIS: ICD-10-CM

## 2021-06-29 DIAGNOSIS — D50.9 IRON DEFICIENCY ANEMIA, UNSPECIFIED IRON DEFICIENCY ANEMIA TYPE: Primary | ICD-10-CM

## 2021-06-30 RX ORDER — HYDROCORTISONE 25 MG/G
OINTMENT TOPICAL 2 TIMES DAILY
Qty: 40 G | Refills: 3 | Status: SHIPPED | OUTPATIENT
Start: 2021-06-30 | End: 2021-12-28 | Stop reason: ALTCHOICE

## 2021-06-30 RX ORDER — HYDROCODONE BITARTRATE AND ACETAMINOPHEN 10; 325 MG/1; MG/1
1 TABLET ORAL EVERY 8 HOURS PRN
Qty: 90 TABLET | Refills: 0 | Status: SHIPPED | OUTPATIENT
Start: 2021-06-30 | End: 2021-07-08 | Stop reason: SDUPTHER

## 2021-06-30 RX ORDER — DICLOFENAC SODIUM 16.05 MG/ML
10 SOLUTION TOPICAL
Qty: 150 ML | Refills: 3 | Status: SHIPPED | OUTPATIENT
Start: 2021-06-30 | End: 2021-12-28 | Stop reason: ALTCHOICE

## 2021-07-07 ENCOUNTER — DOCUMENTATION ONLY (OUTPATIENT)
Dept: RHEUMATOLOGY | Facility: CLINIC | Age: 71
End: 2021-07-07

## 2021-07-08 ENCOUNTER — LAB VISIT (OUTPATIENT)
Dept: LAB | Facility: HOSPITAL | Age: 71
End: 2021-07-08
Payer: MEDICARE

## 2021-07-08 ENCOUNTER — OFFICE VISIT (OUTPATIENT)
Dept: RHEUMATOLOGY | Facility: CLINIC | Age: 71
End: 2021-07-08
Payer: MEDICARE

## 2021-07-08 VITALS
HEART RATE: 72 BPM | HEIGHT: 67 IN | SYSTOLIC BLOOD PRESSURE: 116 MMHG | BODY MASS INDEX: 27.31 KG/M2 | WEIGHT: 174 LBS | DIASTOLIC BLOOD PRESSURE: 72 MMHG

## 2021-07-08 DIAGNOSIS — F32.9 MAJOR DEPRESSION, CHRONIC: ICD-10-CM

## 2021-07-08 DIAGNOSIS — G89.4 CHRONIC PAIN SYNDROME: ICD-10-CM

## 2021-07-08 DIAGNOSIS — D53.1 OTHER MEGALOBLASTIC ANEMIAS, NOT ELSEWHERE CLASSIFIED: ICD-10-CM

## 2021-07-08 DIAGNOSIS — Z79.899 IMMUNOCOMPROMISED STATE DUE TO DRUG THERAPY: ICD-10-CM

## 2021-07-08 DIAGNOSIS — I10 ESSENTIAL HYPERTENSION: ICD-10-CM

## 2021-07-08 DIAGNOSIS — L40.50 PSORIATIC ARTHRITIS: ICD-10-CM

## 2021-07-08 DIAGNOSIS — M05.711 RHEUMATOID ARTHRITIS INVOLVING RIGHT SHOULDER WITH POSITIVE RHEUMATOID FACTOR: ICD-10-CM

## 2021-07-08 DIAGNOSIS — L40.50 PSORIATIC ARTHRITIS: Primary | ICD-10-CM

## 2021-07-08 DIAGNOSIS — M19.90 OSTEOARTHRITIS, UNSPECIFIED OSTEOARTHRITIS TYPE, UNSPECIFIED SITE: ICD-10-CM

## 2021-07-08 DIAGNOSIS — L40.50 PSA (PSORIATIC ARTHRITIS): ICD-10-CM

## 2021-07-08 DIAGNOSIS — D50.9 IRON DEFICIENCY ANEMIA, UNSPECIFIED IRON DEFICIENCY ANEMIA TYPE: ICD-10-CM

## 2021-07-08 DIAGNOSIS — R94.6 ABNORMAL RESULTS OF THYROID FUNCTION STUDIES: ICD-10-CM

## 2021-07-08 DIAGNOSIS — M47.817 LUMBAR AND SACRAL ARTHRITIS: ICD-10-CM

## 2021-07-08 DIAGNOSIS — E78.5 HYPERLIPIDEMIA, UNSPECIFIED HYPERLIPIDEMIA TYPE: ICD-10-CM

## 2021-07-08 DIAGNOSIS — D84.821 IMMUNOCOMPROMISED STATE DUE TO DRUG THERAPY: ICD-10-CM

## 2021-07-08 LAB — ERYTHROCYTE [SEDIMENTATION RATE] IN BLOOD BY WESTERGREN METHOD: 2 MM/HR (ref 0–20)

## 2021-07-08 PROCEDURE — 85025 COMPLETE CBC W/AUTO DIFF WBC: CPT | Performed by: INTERNAL MEDICINE

## 2021-07-08 PROCEDURE — 99214 OFFICE O/P EST MOD 30 MIN: CPT | Mod: 25,S$PBB,, | Performed by: PHYSICIAN ASSISTANT

## 2021-07-08 PROCEDURE — 99999 PR PBB SHADOW E&M-EST. PATIENT-LVL V: ICD-10-PCS | Mod: PBBFAC,,, | Performed by: PHYSICIAN ASSISTANT

## 2021-07-08 PROCEDURE — 82728 ASSAY OF FERRITIN: CPT | Performed by: INTERNAL MEDICINE

## 2021-07-08 PROCEDURE — 85651 RBC SED RATE NONAUTOMATED: CPT | Mod: PO | Performed by: INTERNAL MEDICINE

## 2021-07-08 PROCEDURE — 99214 PR OFFICE/OUTPT VISIT, EST, LEVL IV, 30-39 MIN: ICD-10-PCS | Mod: 25,S$PBB,, | Performed by: PHYSICIAN ASSISTANT

## 2021-07-08 PROCEDURE — 99215 OFFICE O/P EST HI 40 MIN: CPT | Mod: PBBFAC,PN | Performed by: PHYSICIAN ASSISTANT

## 2021-07-08 PROCEDURE — 96372 THER/PROPH/DIAG INJ SC/IM: CPT | Mod: PBBFAC,PN

## 2021-07-08 PROCEDURE — 80061 LIPID PANEL: CPT | Performed by: NURSE PRACTITIONER

## 2021-07-08 PROCEDURE — 80053 COMPREHEN METABOLIC PANEL: CPT | Performed by: INTERNAL MEDICINE

## 2021-07-08 PROCEDURE — 84466 ASSAY OF TRANSFERRIN: CPT | Performed by: INTERNAL MEDICINE

## 2021-07-08 PROCEDURE — 99999 PR PBB SHADOW E&M-EST. PATIENT-LVL V: CPT | Mod: PBBFAC,,, | Performed by: PHYSICIAN ASSISTANT

## 2021-07-08 PROCEDURE — 86140 C-REACTIVE PROTEIN: CPT | Performed by: INTERNAL MEDICINE

## 2021-07-08 PROCEDURE — 36415 COLL VENOUS BLD VENIPUNCTURE: CPT | Mod: PO | Performed by: NURSE PRACTITIONER

## 2021-07-08 RX ORDER — ZOLPIDEM TARTRATE 10 MG/1
TABLET ORAL
Qty: 30 TABLET | Refills: 4 | Status: SHIPPED | OUTPATIENT
Start: 2021-07-08 | End: 2021-07-28 | Stop reason: SDUPTHER

## 2021-07-08 RX ORDER — KETOROLAC TROMETHAMINE 30 MG/ML
60 INJECTION, SOLUTION INTRAMUSCULAR; INTRAVENOUS
Status: COMPLETED | OUTPATIENT
Start: 2021-07-08 | End: 2021-07-08

## 2021-07-08 RX ORDER — METHYLPREDNISOLONE ACETATE 80 MG/ML
160 INJECTION, SUSPENSION INTRA-ARTICULAR; INTRALESIONAL; INTRAMUSCULAR; SOFT TISSUE
Status: COMPLETED | OUTPATIENT
Start: 2021-07-08 | End: 2021-07-08

## 2021-07-08 RX ORDER — HYDROCODONE BITARTRATE AND ACETAMINOPHEN 10; 325 MG/1; MG/1
1 TABLET ORAL EVERY 8 HOURS PRN
Qty: 90 TABLET | Refills: 0 | Status: SHIPPED | OUTPATIENT
Start: 2021-07-30 | End: 2021-07-28 | Stop reason: SDUPTHER

## 2021-07-08 RX ORDER — MELOXICAM 15 MG/1
TABLET ORAL
Qty: 90 TABLET | Refills: 2 | Status: SHIPPED | OUTPATIENT
Start: 2021-07-08 | End: 2021-07-28 | Stop reason: SDUPTHER

## 2021-07-08 RX ORDER — CYANOCOBALAMIN 1000 UG/ML
1000 INJECTION, SOLUTION INTRAMUSCULAR; SUBCUTANEOUS
Status: COMPLETED | OUTPATIENT
Start: 2021-07-08 | End: 2021-07-08

## 2021-07-08 RX ORDER — SUMATRIPTAN SUCCINATE 100 MG/1
100 TABLET ORAL ONCE
Qty: 18 TABLET | Refills: 1 | Status: SHIPPED | OUTPATIENT
Start: 2021-07-08 | End: 2021-07-28 | Stop reason: SDUPTHER

## 2021-07-08 RX ORDER — LISINOPRIL 10 MG/1
TABLET ORAL
Qty: 90 TABLET | Refills: 2 | Status: SHIPPED | OUTPATIENT
Start: 2021-07-08 | End: 2021-07-28 | Stop reason: SDUPTHER

## 2021-07-08 RX ADMIN — CYANOCOBALAMIN 1000 MCG: 1000 INJECTION, SOLUTION INTRAMUSCULAR at 03:07

## 2021-07-08 RX ADMIN — METHYLPREDNISOLONE ACETATE 160 MG: 80 INJECTION, SUSPENSION INTRA-ARTICULAR; INTRALESIONAL; INTRAMUSCULAR; SOFT TISSUE at 03:07

## 2021-07-08 RX ADMIN — KETOROLAC TROMETHAMINE 60 MG: 30 INJECTION, SOLUTION INTRAMUSCULAR at 03:07

## 2021-07-09 LAB
ALBUMIN SERPL BCP-MCNC: 3.7 G/DL (ref 3.5–5.2)
ALP SERPL-CCNC: 62 U/L (ref 55–135)
ALT SERPL W/O P-5'-P-CCNC: 12 U/L (ref 10–44)
ANION GAP SERPL CALC-SCNC: 12 MMOL/L (ref 8–16)
ANISOCYTOSIS BLD QL SMEAR: SLIGHT
AST SERPL-CCNC: 18 U/L (ref 10–40)
BASOPHILS # BLD AUTO: 0.07 K/UL (ref 0–0.2)
BASOPHILS NFR BLD: 1.3 % (ref 0–1.9)
BILIRUB SERPL-MCNC: 0.4 MG/DL (ref 0.1–1)
BUN SERPL-MCNC: 14 MG/DL (ref 8–23)
CALCIUM SERPL-MCNC: 9 MG/DL (ref 8.7–10.5)
CHLORIDE SERPL-SCNC: 103 MMOL/L (ref 95–110)
CHOLEST SERPL-MCNC: 205 MG/DL (ref 120–199)
CHOLEST/HDLC SERPL: 2.9 {RATIO} (ref 2–5)
CO2 SERPL-SCNC: 20 MMOL/L (ref 23–29)
CREAT SERPL-MCNC: 0.8 MG/DL (ref 0.5–1.4)
CRP SERPL-MCNC: 3.5 MG/L (ref 0–8.2)
DIFFERENTIAL METHOD: ABNORMAL
EOSINOPHIL # BLD AUTO: 0.2 K/UL (ref 0–0.5)
EOSINOPHIL NFR BLD: 3.2 % (ref 0–8)
ERYTHROCYTE [DISTWIDTH] IN BLOOD BY AUTOMATED COUNT: 14.5 % (ref 11.5–14.5)
EST. GFR  (AFRICAN AMERICAN): >60 ML/MIN/1.73 M^2
EST. GFR  (NON AFRICAN AMERICAN): >60 ML/MIN/1.73 M^2
FERRITIN SERPL-MCNC: 4 NG/ML (ref 20–300)
GLUCOSE SERPL-MCNC: 92 MG/DL (ref 70–110)
HCT VFR BLD AUTO: 33.4 % (ref 37–48.5)
HDLC SERPL-MCNC: 70 MG/DL (ref 40–75)
HDLC SERPL: 34.1 % (ref 20–50)
HGB BLD-MCNC: 10.3 G/DL (ref 12–16)
HYPOCHROMIA BLD QL SMEAR: ABNORMAL
IMM GRANULOCYTES # BLD AUTO: 0.01 K/UL (ref 0–0.04)
IMM GRANULOCYTES NFR BLD AUTO: 0.2 % (ref 0–0.5)
IRON SERPL-MCNC: 36 UG/DL (ref 30–160)
LDLC SERPL CALC-MCNC: 107.4 MG/DL (ref 63–159)
LYMPHOCYTES # BLD AUTO: 2.2 K/UL (ref 1–4.8)
LYMPHOCYTES NFR BLD: 40.9 % (ref 18–48)
MCH RBC QN AUTO: 28.2 PG (ref 27–31)
MCHC RBC AUTO-ENTMCNC: 30.8 G/DL (ref 32–36)
MCV RBC AUTO: 92 FL (ref 82–98)
MONOCYTES # BLD AUTO: 0.4 K/UL (ref 0.3–1)
MONOCYTES NFR BLD: 8.1 % (ref 4–15)
NEUTROPHILS # BLD AUTO: 2.5 K/UL (ref 1.8–7.7)
NEUTROPHILS NFR BLD: 46.3 % (ref 38–73)
NONHDLC SERPL-MCNC: 135 MG/DL
NRBC BLD-RTO: 0 /100 WBC
PLATELET # BLD AUTO: 313 K/UL (ref 150–450)
PLATELET BLD QL SMEAR: ABNORMAL
PMV BLD AUTO: 11.1 FL (ref 9.2–12.9)
POTASSIUM SERPL-SCNC: 4.2 MMOL/L (ref 3.5–5.1)
PROT SERPL-MCNC: 7.1 G/DL (ref 6–8.4)
RBC # BLD AUTO: 3.65 M/UL (ref 4–5.4)
SATURATED IRON: 9 % (ref 20–50)
SODIUM SERPL-SCNC: 135 MMOL/L (ref 136–145)
TOTAL IRON BINDING CAPACITY: 408 UG/DL (ref 250–450)
TRANSFERRIN SERPL-MCNC: 276 MG/DL (ref 200–375)
TRIGL SERPL-MCNC: 138 MG/DL (ref 30–150)
WBC # BLD AUTO: 5.31 K/UL (ref 3.9–12.7)

## 2021-07-12 ENCOUNTER — DOCUMENTATION ONLY (OUTPATIENT)
Dept: RHEUMATOLOGY | Facility: CLINIC | Age: 71
End: 2021-07-12

## 2021-07-23 ENCOUNTER — SPECIALTY PHARMACY (OUTPATIENT)
Dept: PHARMACY | Facility: CLINIC | Age: 71
End: 2021-07-23

## 2021-07-28 DIAGNOSIS — G89.4 CHRONIC PAIN SYNDROME: ICD-10-CM

## 2021-07-28 DIAGNOSIS — I10 ESSENTIAL HYPERTENSION: ICD-10-CM

## 2021-07-28 DIAGNOSIS — M05.711 RHEUMATOID ARTHRITIS INVOLVING RIGHT SHOULDER WITH POSITIVE RHEUMATOID FACTOR: ICD-10-CM

## 2021-07-28 DIAGNOSIS — D53.1 OTHER MEGALOBLASTIC ANEMIAS, NOT ELSEWHERE CLASSIFIED: ICD-10-CM

## 2021-07-28 DIAGNOSIS — R94.6 ABNORMAL RESULTS OF THYROID FUNCTION STUDIES: ICD-10-CM

## 2021-07-28 DIAGNOSIS — F32.9 MAJOR DEPRESSION, CHRONIC: ICD-10-CM

## 2021-07-28 DIAGNOSIS — M47.817 LUMBAR AND SACRAL ARTHRITIS: ICD-10-CM

## 2021-07-28 DIAGNOSIS — M19.90 OSTEOARTHRITIS, UNSPECIFIED OSTEOARTHRITIS TYPE, UNSPECIFIED SITE: ICD-10-CM

## 2021-07-28 DIAGNOSIS — L40.50 PSA (PSORIATIC ARTHRITIS): ICD-10-CM

## 2021-07-28 DIAGNOSIS — L40.50 PSORIATIC ARTHRITIS: ICD-10-CM

## 2021-07-28 RX ORDER — HYDROCODONE BITARTRATE AND ACETAMINOPHEN 10; 325 MG/1; MG/1
1 TABLET ORAL EVERY 8 HOURS PRN
Qty: 90 TABLET | Refills: 0 | Status: SHIPPED | OUTPATIENT
Start: 2021-07-30 | End: 2021-09-01 | Stop reason: SDUPTHER

## 2021-07-28 RX ORDER — ZOLPIDEM TARTRATE 10 MG/1
TABLET ORAL
Qty: 30 TABLET | Refills: 4 | Status: SHIPPED | OUTPATIENT
Start: 2021-07-28 | End: 2021-11-11 | Stop reason: SDUPTHER

## 2021-07-28 RX ORDER — LEVOTHYROXINE SODIUM 125 UG/1
TABLET ORAL
Qty: 90 TABLET | Refills: 3 | Status: SHIPPED | OUTPATIENT
Start: 2021-07-28 | End: 2022-02-25

## 2021-07-28 RX ORDER — ESCITALOPRAM OXALATE 20 MG/1
20 TABLET ORAL DAILY
Qty: 90 TABLET | Refills: 1 | Status: SHIPPED | OUTPATIENT
Start: 2021-07-28 | End: 2021-09-01 | Stop reason: SDUPTHER

## 2021-07-28 RX ORDER — CYCLOBENZAPRINE HCL 10 MG
10 TABLET ORAL 3 TIMES DAILY PRN
Qty: 90 TABLET | Refills: 2 | Status: SHIPPED | OUTPATIENT
Start: 2021-07-28 | End: 2022-03-08 | Stop reason: SDUPTHER

## 2021-07-28 RX ORDER — CLOTRIMAZOLE AND BETAMETHASONE DIPROPIONATE 10; .64 MG/G; MG/G
CREAM TOPICAL
Qty: 45 G | Refills: 1 | Status: SHIPPED | OUTPATIENT
Start: 2021-07-28 | End: 2021-12-22

## 2021-07-28 RX ORDER — SUMATRIPTAN SUCCINATE 100 MG/1
100 TABLET ORAL ONCE
Qty: 18 TABLET | Refills: 1 | Status: SHIPPED | OUTPATIENT
Start: 2021-07-28 | End: 2021-09-01 | Stop reason: SDUPTHER

## 2021-07-28 RX ORDER — MELOXICAM 15 MG/1
TABLET ORAL
Qty: 90 TABLET | Refills: 2 | Status: SHIPPED | OUTPATIENT
Start: 2021-07-28 | End: 2021-11-11 | Stop reason: SDUPTHER

## 2021-07-28 RX ORDER — LISINOPRIL 10 MG/1
TABLET ORAL
Qty: 90 TABLET | Refills: 2 | Status: SHIPPED | OUTPATIENT
Start: 2021-07-28 | End: 2022-03-08 | Stop reason: SDUPTHER

## 2021-08-25 ENCOUNTER — SPECIALTY PHARMACY (OUTPATIENT)
Dept: PHARMACY | Facility: CLINIC | Age: 71
End: 2021-08-25

## 2021-09-01 DIAGNOSIS — F32.9 MAJOR DEPRESSION, CHRONIC: ICD-10-CM

## 2021-09-01 DIAGNOSIS — M06.9 RHEUMATOID ARTHRITIS, INVOLVING UNSPECIFIED SITE, UNSPECIFIED RHEUMATOID FACTOR PRESENCE: ICD-10-CM

## 2021-09-01 DIAGNOSIS — M47.817 LUMBAR AND SACRAL ARTHRITIS: ICD-10-CM

## 2021-09-01 DIAGNOSIS — L40.50 PSORIATIC ARTHRITIS: ICD-10-CM

## 2021-09-01 DIAGNOSIS — G89.4 CHRONIC PAIN SYNDROME: ICD-10-CM

## 2021-09-01 DIAGNOSIS — M05.711 RHEUMATOID ARTHRITIS INVOLVING RIGHT SHOULDER WITH POSITIVE RHEUMATOID FACTOR: Primary | ICD-10-CM

## 2021-09-01 RX ORDER — PREDNISONE 5 MG/1
TABLET ORAL
Qty: 90 TABLET | Refills: 4 | Status: SHIPPED | OUTPATIENT
Start: 2021-09-01 | End: 2021-12-28 | Stop reason: ALTCHOICE

## 2021-09-01 RX ORDER — ESCITALOPRAM OXALATE 20 MG/1
20 TABLET ORAL DAILY
Qty: 90 TABLET | Refills: 1 | Status: SHIPPED | OUTPATIENT
Start: 2021-09-01 | End: 2021-11-11 | Stop reason: SDUPTHER

## 2021-09-01 RX ORDER — SUMATRIPTAN SUCCINATE 100 MG/1
100 TABLET ORAL ONCE
Qty: 18 TABLET | Refills: 1 | Status: CANCELLED | OUTPATIENT
Start: 2021-09-01 | End: 2021-09-01

## 2021-09-01 RX ORDER — HYDROCODONE BITARTRATE AND ACETAMINOPHEN 10; 325 MG/1; MG/1
1 TABLET ORAL EVERY 8 HOURS PRN
Qty: 90 TABLET | Refills: 0 | Status: SHIPPED | OUTPATIENT
Start: 2021-09-01 | End: 2021-09-30 | Stop reason: SDUPTHER

## 2021-09-01 RX ORDER — HYDROCODONE BITARTRATE AND ACETAMINOPHEN 10; 325 MG/1; MG/1
1 TABLET ORAL EVERY 8 HOURS PRN
Qty: 90 TABLET | Refills: 0 | OUTPATIENT
Start: 2021-09-01

## 2021-09-01 RX ORDER — SUMATRIPTAN SUCCINATE 100 MG/1
100 TABLET ORAL ONCE
Qty: 18 TABLET | Refills: 1 | Status: SHIPPED | OUTPATIENT
Start: 2021-09-01 | End: 2022-04-11 | Stop reason: SDUPTHER

## 2021-09-22 ENCOUNTER — SPECIALTY PHARMACY (OUTPATIENT)
Dept: PHARMACY | Facility: CLINIC | Age: 71
End: 2021-09-22

## 2021-09-25 ENCOUNTER — LAB VISIT (OUTPATIENT)
Dept: FAMILY MEDICINE | Facility: CLINIC | Age: 71
End: 2021-09-25
Payer: MEDICARE

## 2021-09-25 DIAGNOSIS — Z20.822 ENCOUNTER FOR LABORATORY TESTING FOR COVID-19 VIRUS: ICD-10-CM

## 2021-09-25 PROCEDURE — U0003 INFECTIOUS AGENT DETECTION BY NUCLEIC ACID (DNA OR RNA); SEVERE ACUTE RESPIRATORY SYNDROME CORONAVIRUS 2 (SARS-COV-2) (CORONAVIRUS DISEASE [COVID-19]), AMPLIFIED PROBE TECHNIQUE, MAKING USE OF HIGH THROUGHPUT TECHNOLOGIES AS DESCRIBED BY CMS-2020-01-R: HCPCS | Performed by: FAMILY MEDICINE

## 2021-09-25 PROCEDURE — U0005 INFEC AGEN DETEC AMPLI PROBE: HCPCS | Performed by: FAMILY MEDICINE

## 2021-09-26 ENCOUNTER — PATIENT MESSAGE (OUTPATIENT)
Dept: FAMILY MEDICINE | Facility: CLINIC | Age: 71
End: 2021-09-26

## 2021-09-26 LAB — SARS-COV-2 RNA RESP QL NAA+PROBE: NOT DETECTED

## 2021-09-30 ENCOUNTER — DOCUMENTATION ONLY (OUTPATIENT)
Dept: FAMILY MEDICINE | Facility: CLINIC | Age: 71
End: 2021-09-30

## 2021-09-30 DIAGNOSIS — M47.817 LUMBAR AND SACRAL ARTHRITIS: ICD-10-CM

## 2021-09-30 DIAGNOSIS — F32.9 MAJOR DEPRESSION, CHRONIC: ICD-10-CM

## 2021-09-30 DIAGNOSIS — G89.4 CHRONIC PAIN SYNDROME: ICD-10-CM

## 2021-09-30 DIAGNOSIS — M05.711 RHEUMATOID ARTHRITIS INVOLVING RIGHT SHOULDER WITH POSITIVE RHEUMATOID FACTOR: ICD-10-CM

## 2021-09-30 DIAGNOSIS — L40.50 PSORIATIC ARTHRITIS: ICD-10-CM

## 2021-10-02 RX ORDER — HYDROCODONE BITARTRATE AND ACETAMINOPHEN 10; 325 MG/1; MG/1
1 TABLET ORAL EVERY 8 HOURS PRN
Qty: 90 TABLET | Refills: 0 | Status: SHIPPED | OUTPATIENT
Start: 2021-10-02 | End: 2021-11-01 | Stop reason: SDUPTHER

## 2021-10-21 ENCOUNTER — SPECIALTY PHARMACY (OUTPATIENT)
Dept: PHARMACY | Facility: CLINIC | Age: 71
End: 2021-10-21

## 2021-11-01 DIAGNOSIS — M47.817 LUMBAR AND SACRAL ARTHRITIS: ICD-10-CM

## 2021-11-01 DIAGNOSIS — G89.4 CHRONIC PAIN SYNDROME: ICD-10-CM

## 2021-11-01 DIAGNOSIS — M05.711 RHEUMATOID ARTHRITIS INVOLVING RIGHT SHOULDER WITH POSITIVE RHEUMATOID FACTOR: ICD-10-CM

## 2021-11-01 DIAGNOSIS — F32.9 MAJOR DEPRESSION, CHRONIC: ICD-10-CM

## 2021-11-01 DIAGNOSIS — L40.50 PSORIATIC ARTHRITIS: ICD-10-CM

## 2021-11-01 RX ORDER — HYDROCODONE BITARTRATE AND ACETAMINOPHEN 10; 325 MG/1; MG/1
1 TABLET ORAL EVERY 8 HOURS PRN
Qty: 90 TABLET | Refills: 0 | Status: SHIPPED | OUTPATIENT
Start: 2021-11-01 | End: 2021-11-11 | Stop reason: SDUPTHER

## 2021-11-11 ENCOUNTER — OFFICE VISIT (OUTPATIENT)
Dept: RHEUMATOLOGY | Facility: CLINIC | Age: 71
End: 2021-11-11
Payer: MEDICARE

## 2021-11-11 VITALS
HEIGHT: 67 IN | SYSTOLIC BLOOD PRESSURE: 128 MMHG | WEIGHT: 180 LBS | HEART RATE: 74 BPM | DIASTOLIC BLOOD PRESSURE: 78 MMHG | BODY MASS INDEX: 28.25 KG/M2

## 2021-11-11 DIAGNOSIS — D50.9 IRON DEFICIENCY ANEMIA, UNSPECIFIED IRON DEFICIENCY ANEMIA TYPE: ICD-10-CM

## 2021-11-11 DIAGNOSIS — M19.90 OSTEOARTHRITIS, UNSPECIFIED OSTEOARTHRITIS TYPE, UNSPECIFIED SITE: ICD-10-CM

## 2021-11-11 DIAGNOSIS — G89.4 CHRONIC PAIN SYNDROME: ICD-10-CM

## 2021-11-11 DIAGNOSIS — D53.1 OTHER MEGALOBLASTIC ANEMIAS, NOT ELSEWHERE CLASSIFIED: ICD-10-CM

## 2021-11-11 DIAGNOSIS — R94.6 ABNORMAL RESULTS OF THYROID FUNCTION STUDIES: ICD-10-CM

## 2021-11-11 DIAGNOSIS — L40.50 PSA (PSORIATIC ARTHRITIS): ICD-10-CM

## 2021-11-11 DIAGNOSIS — M05.711 RHEUMATOID ARTHRITIS INVOLVING RIGHT SHOULDER WITH POSITIVE RHEUMATOID FACTOR: ICD-10-CM

## 2021-11-11 DIAGNOSIS — F32.9 MAJOR DEPRESSION, CHRONIC: ICD-10-CM

## 2021-11-11 DIAGNOSIS — M47.817 LUMBAR AND SACRAL ARTHRITIS: ICD-10-CM

## 2021-11-11 DIAGNOSIS — I10 ESSENTIAL HYPERTENSION: ICD-10-CM

## 2021-11-11 DIAGNOSIS — L40.50 PSORIATIC ARTHRITIS: Primary | ICD-10-CM

## 2021-11-11 PROCEDURE — 99999 PR PBB SHADOW E&M-EST. PATIENT-LVL IV: ICD-10-PCS | Mod: PBBFAC,,, | Performed by: INTERNAL MEDICINE

## 2021-11-11 PROCEDURE — 96372 THER/PROPH/DIAG INJ SC/IM: CPT | Mod: PBBFAC,PN

## 2021-11-11 PROCEDURE — 99999 PR PBB SHADOW E&M-EST. PATIENT-LVL IV: CPT | Mod: PBBFAC,,, | Performed by: INTERNAL MEDICINE

## 2021-11-11 PROCEDURE — 99215 OFFICE O/P EST HI 40 MIN: CPT | Mod: 25,S$PBB,, | Performed by: INTERNAL MEDICINE

## 2021-11-11 PROCEDURE — 99215 PR OFFICE/OUTPT VISIT, EST, LEVL V, 40-54 MIN: ICD-10-PCS | Mod: 25,S$PBB,, | Performed by: INTERNAL MEDICINE

## 2021-11-11 PROCEDURE — 99214 OFFICE O/P EST MOD 30 MIN: CPT | Mod: PBBFAC,PN | Performed by: INTERNAL MEDICINE

## 2021-11-11 RX ORDER — CYANOCOBALAMIN 1000 UG/ML
1000 INJECTION, SOLUTION INTRAMUSCULAR; SUBCUTANEOUS
Status: COMPLETED | OUTPATIENT
Start: 2021-11-11 | End: 2021-11-11

## 2021-11-11 RX ORDER — KETOROLAC TROMETHAMINE 30 MG/ML
60 INJECTION, SOLUTION INTRAMUSCULAR; INTRAVENOUS
Status: COMPLETED | OUTPATIENT
Start: 2021-11-11 | End: 2021-11-11

## 2021-11-11 RX ORDER — METHYLPREDNISOLONE ACETATE 80 MG/ML
160 INJECTION, SUSPENSION INTRA-ARTICULAR; INTRALESIONAL; INTRAMUSCULAR; SOFT TISSUE
Status: COMPLETED | OUTPATIENT
Start: 2021-11-11 | End: 2021-11-11

## 2021-11-11 RX ORDER — ESCITALOPRAM OXALATE 20 MG/1
20 TABLET ORAL DAILY
Qty: 90 TABLET | Refills: 1 | Status: SHIPPED | OUTPATIENT
Start: 2021-11-11 | End: 2022-03-08 | Stop reason: SDUPTHER

## 2021-11-11 RX ORDER — ADALIMUMAB 40MG/0.4ML
40 KIT SUBCUTANEOUS
Qty: 2 PEN | Refills: 11 | Status: SHIPPED | OUTPATIENT
Start: 2021-11-11 | End: 2022-11-08 | Stop reason: SDUPTHER

## 2021-11-11 RX ORDER — HYDROCODONE BITARTRATE AND ACETAMINOPHEN 10; 325 MG/1; MG/1
1 TABLET ORAL EVERY 8 HOURS PRN
Qty: 90 TABLET | Refills: 0 | Status: SHIPPED | OUTPATIENT
Start: 2021-12-01 | End: 2021-11-11 | Stop reason: SDUPTHER

## 2021-11-11 RX ORDER — HYDROCODONE BITARTRATE AND ACETAMINOPHEN 10; 325 MG/1; MG/1
1 TABLET ORAL EVERY 8 HOURS PRN
Qty: 90 TABLET | Refills: 0 | Status: SHIPPED | OUTPATIENT
Start: 2022-01-01 | End: 2021-11-11 | Stop reason: SDUPTHER

## 2021-11-11 RX ORDER — ZOLPIDEM TARTRATE 10 MG/1
TABLET ORAL
Qty: 30 TABLET | Refills: 4 | Status: SHIPPED | OUTPATIENT
Start: 2021-11-11 | End: 2021-12-23

## 2021-11-11 RX ORDER — MELOXICAM 15 MG/1
TABLET ORAL
Qty: 90 TABLET | Refills: 2 | Status: SHIPPED | OUTPATIENT
Start: 2021-11-11 | End: 2021-12-28 | Stop reason: SDUPTHER

## 2021-11-11 RX ORDER — HYDROCODONE BITARTRATE AND ACETAMINOPHEN 10; 325 MG/1; MG/1
1 TABLET ORAL EVERY 8 HOURS PRN
Qty: 90 TABLET | Refills: 0 | Status: SHIPPED | OUTPATIENT
Start: 2022-02-01 | End: 2021-12-23 | Stop reason: SDUPTHER

## 2021-11-11 RX ADMIN — KETOROLAC TROMETHAMINE 60 MG: 60 INJECTION, SOLUTION INTRAMUSCULAR at 04:11

## 2021-11-11 RX ADMIN — METHYLPREDNISOLONE ACETATE 160 MG: 80 INJECTION, SUSPENSION INTRA-ARTICULAR; INTRALESIONAL; INTRAMUSCULAR; SOFT TISSUE at 04:11

## 2021-11-11 RX ADMIN — CYANOCOBALAMIN 1000 MCG: 1000 INJECTION INTRAMUSCULAR; SUBCUTANEOUS at 04:11

## 2021-11-11 ASSESSMENT — ROUTINE ASSESSMENT OF PATIENT INDEX DATA (RAPID3)
TOTAL RAPID3 SCORE: 4.5
MDHAQ FUNCTION SCORE: 0.9
PSYCHOLOGICAL DISTRESS SCORE: 2.2
PATIENT GLOBAL ASSESSMENT SCORE: 4.5
FATIGUE SCORE: 2.2
PAIN SCORE: 6

## 2021-11-26 ENCOUNTER — PATIENT MESSAGE (OUTPATIENT)
Dept: PHARMACY | Facility: CLINIC | Age: 71
End: 2021-11-26
Payer: MEDICAID

## 2021-12-07 ENCOUNTER — SPECIALTY PHARMACY (OUTPATIENT)
Dept: PHARMACY | Facility: CLINIC | Age: 71
End: 2021-12-07
Payer: MEDICAID

## 2021-12-23 DIAGNOSIS — M47.817 LUMBAR AND SACRAL ARTHRITIS: ICD-10-CM

## 2021-12-23 DIAGNOSIS — L40.50 PSORIATIC ARTHRITIS: ICD-10-CM

## 2021-12-23 DIAGNOSIS — G89.4 CHRONIC PAIN SYNDROME: ICD-10-CM

## 2021-12-23 DIAGNOSIS — D53.1 OTHER MEGALOBLASTIC ANEMIAS, NOT ELSEWHERE CLASSIFIED: ICD-10-CM

## 2021-12-23 DIAGNOSIS — L40.50 PSA (PSORIATIC ARTHRITIS): ICD-10-CM

## 2021-12-23 DIAGNOSIS — R94.6 ABNORMAL RESULTS OF THYROID FUNCTION STUDIES: ICD-10-CM

## 2021-12-23 DIAGNOSIS — M19.90 OSTEOARTHRITIS, UNSPECIFIED OSTEOARTHRITIS TYPE, UNSPECIFIED SITE: ICD-10-CM

## 2021-12-23 DIAGNOSIS — M05.711 RHEUMATOID ARTHRITIS INVOLVING RIGHT SHOULDER WITH POSITIVE RHEUMATOID FACTOR: ICD-10-CM

## 2021-12-23 DIAGNOSIS — F32.9 MAJOR DEPRESSION, CHRONIC: ICD-10-CM

## 2021-12-23 RX ORDER — HYDROCODONE BITARTRATE AND ACETAMINOPHEN 10; 325 MG/1; MG/1
1 TABLET ORAL EVERY 8 HOURS PRN
Qty: 90 TABLET | Refills: 0 | Status: SHIPPED | OUTPATIENT
Start: 2022-02-01 | End: 2022-03-03

## 2021-12-23 RX ORDER — ZOLPIDEM TARTRATE 10 MG/1
TABLET ORAL
Qty: 30 TABLET | Refills: 3 | Status: SHIPPED | OUTPATIENT
Start: 2021-12-23 | End: 2022-08-08

## 2021-12-31 ENCOUNTER — SPECIALTY PHARMACY (OUTPATIENT)
Dept: PHARMACY | Facility: CLINIC | Age: 71
End: 2021-12-31
Payer: MEDICAID

## 2022-01-28 ENCOUNTER — PATIENT MESSAGE (OUTPATIENT)
Dept: PHARMACY | Facility: CLINIC | Age: 72
End: 2022-01-28
Payer: MEDICARE

## 2022-02-03 ENCOUNTER — SPECIALTY PHARMACY (OUTPATIENT)
Dept: PHARMACY | Facility: CLINIC | Age: 72
End: 2022-02-03
Payer: MEDICARE

## 2022-02-03 NOTE — TELEPHONE ENCOUNTER
Specialty Pharmacy - Refill Coordination    Specialty Medication Orders Linked to Encounter    Flowsheet Row Most Recent Value   Medication #1 adalimumab (HUMIRA,CF, PEN) 40 mg/0.4 mL PnKt (Order#[id number], Rx#[id number])          Refill Questions - Documented Responses    Flowsheet Row Most Recent Value   Refill Screening Questions    Changes to allergies? No   Changes to medications? No   New conditions since last clinic visit? No   Unplanned office visit, urgent care, ED, or hospital admission in the last 4 weeks? No   How does patient/caregiver feel medication is working? Good   Financial problems or insurance changes? No   How many doses of your specialty medications were missed in the last 4 weeks? 0   Would patient like to speak to a pharmacist? No   When does the patient need to receive the medication? 02/11/22   Refill Delivery Questions    How will the patient receive the medication? Delivery Pam   When does the patient need to receive the medication? 02/11/22   Shipping Address Home   Address in [facility] confirmed and updated if neccessary? Yes   Expected Copay ($) 9.85   Is the patient able to afford the medication copay? Yes   Payment Method CC on file   Days supply of Refill 28   Supplies needed? No supplies needed   Refill activity completed? Yes   Refill activity plan Refill scheduled   Shipment/Pickup Date: 02/11/22          Current Outpatient Medications   Medication Sig    adalimumab (HUMIRA,CF, PEN) 40 mg/0.4 mL PnKt Inject 0.4 mLs (40 mg total) into the skin every 14 (fourteen) days.    cyclobenzaprine (FLEXERIL) 10 MG tablet Take 1 tablet (10 mg total) by mouth 3 (three) times daily as needed for Muscle spasms.    diclofenac sodium (VOLTAREN) 1 % Gel Apply 2 g topically once daily.    EScitalopram oxalate (LEXAPRO) 20 MG tablet Take 1 tablet (20 mg total) by mouth once daily.    estradioL (ESTRACE) 1 MG tablet TAKE 1 TABLET(1 MG) BY MOUTH EVERY DAY    fluconazole (DIFLUCAN) 50  MG Tab Take 50 mg by mouth daily as needed. As needed for yeast infections    HYDROcodone-acetaminophen (NORCO)  mg per tablet Take 1 tablet by mouth every 8 (eight) hours as needed for Pain.    levothyroxine (SYNTHROID) 125 MCG tablet TAKE 1 TABLET BY MOUTH EVERY MORNING 30 MINUTES BEFORE BREAKFAST    lisinopriL 10 MG tablet TAKE 1 TABLET(10 MG) BY MOUTH EVERY DAY    loratadine 10 mg Cap Take 1 tablet by mouth once daily.    meloxicam (MOBIC) 15 MG tablet TAKE 1 TABLET(15 MG) BY MOUTH EVERY DAY    omeprazole (PRILOSEC) 40 MG capsule Take 1 capsule (40 mg total) by mouth once daily.    prednisone 5 mg TbEC Take by mouth.    sumatriptan (IMITREX) 100 MG tablet Take 1 tablet (100 mg total) by mouth once. for 1 dose    zolpidem (AMBIEN) 10 mg Tab TAKE 1 TABLET(10 MG) BY MOUTH EVERY NIGHT AS NEEDED   Last reviewed on 1/25/2022  3:02 PM by Mile Eid MD    Review of patient's allergies indicates:   Allergen Reactions    Cimzia [certolizumab pegol]     Methotrexate analogues     Penicillins     Zyprexa [olanzapine]     Last reviewed on  1/25/2022 3:02 PM by Mile Eid      Tasks added this encounter   No tasks added.   Tasks due within next 3 months   1/28/2022 - Refill Call (Auto Added)     FANY MEHTA, PharmD  [facility]  [address] [place] [id number]  Phone: [phone]  Fax: [phone]

## 2022-02-24 ENCOUNTER — LAB VISIT (OUTPATIENT)
Dept: LAB | Facility: HOSPITAL | Age: 72
End: 2022-02-24
Attending: INTERNAL MEDICINE
Payer: MEDICARE

## 2022-02-24 DIAGNOSIS — M19.90 OSTEOARTHRITIS, UNSPECIFIED OSTEOARTHRITIS TYPE, UNSPECIFIED SITE: ICD-10-CM

## 2022-02-24 DIAGNOSIS — Z00.00 WELLNESS EXAMINATION: ICD-10-CM

## 2022-02-24 DIAGNOSIS — I10 ESSENTIAL HYPERTENSION: ICD-10-CM

## 2022-02-24 DIAGNOSIS — M05.711 RHEUMATOID ARTHRITIS INVOLVING RIGHT SHOULDER WITH POSITIVE RHEUMATOID FACTOR: ICD-10-CM

## 2022-02-24 DIAGNOSIS — G89.4 CHRONIC PAIN SYNDROME: ICD-10-CM

## 2022-02-24 DIAGNOSIS — E78.5 HYPERLIPIDEMIA, UNSPECIFIED HYPERLIPIDEMIA TYPE: ICD-10-CM

## 2022-02-24 DIAGNOSIS — L40.50 PSORIATIC ARTHRITIS: ICD-10-CM

## 2022-02-24 DIAGNOSIS — M47.817 LUMBAR AND SACRAL ARTHRITIS: ICD-10-CM

## 2022-02-24 DIAGNOSIS — E03.9 HYPOTHYROIDISM, UNSPECIFIED TYPE: ICD-10-CM

## 2022-02-24 LAB
ALBUMIN SERPL BCP-MCNC: 3.4 G/DL (ref 3.5–5.2)
ALP SERPL-CCNC: 66 U/L (ref 55–135)
ALT SERPL W/O P-5'-P-CCNC: 17 U/L (ref 10–44)
ANION GAP SERPL CALC-SCNC: 9 MMOL/L (ref 8–16)
ANION GAP SERPL CALC-SCNC: 9 MMOL/L (ref 8–16)
AST SERPL-CCNC: 17 U/L (ref 10–40)
BASOPHILS # BLD AUTO: 0.06 K/UL (ref 0–0.2)
BASOPHILS NFR BLD: 1 % (ref 0–1.9)
BILIRUB SERPL-MCNC: 0.4 MG/DL (ref 0.1–1)
BUN SERPL-MCNC: 16 MG/DL (ref 8–23)
BUN SERPL-MCNC: 16 MG/DL (ref 8–23)
CALCIUM SERPL-MCNC: 8.8 MG/DL (ref 8.7–10.5)
CALCIUM SERPL-MCNC: 8.8 MG/DL (ref 8.7–10.5)
CHLORIDE SERPL-SCNC: 102 MMOL/L (ref 95–110)
CHLORIDE SERPL-SCNC: 102 MMOL/L (ref 95–110)
CHOLEST SERPL-MCNC: 199 MG/DL (ref 120–199)
CHOLEST/HDLC SERPL: 2.9 {RATIO} (ref 2–5)
CO2 SERPL-SCNC: 25 MMOL/L (ref 23–29)
CO2 SERPL-SCNC: 25 MMOL/L (ref 23–29)
CREAT SERPL-MCNC: 0.7 MG/DL (ref 0.5–1.4)
CREAT SERPL-MCNC: 0.7 MG/DL (ref 0.5–1.4)
CRP SERPL-MCNC: 3.4 MG/L (ref 0–8.2)
DIFFERENTIAL METHOD: ABNORMAL
EOSINOPHIL # BLD AUTO: 0.3 K/UL (ref 0–0.5)
EOSINOPHIL NFR BLD: 4.2 % (ref 0–8)
ERYTHROCYTE [DISTWIDTH] IN BLOOD BY AUTOMATED COUNT: 15.2 % (ref 11.5–14.5)
EST. GFR  (AFRICAN AMERICAN): >60 ML/MIN/1.73 M^2
EST. GFR  (AFRICAN AMERICAN): >60 ML/MIN/1.73 M^2
EST. GFR  (NON AFRICAN AMERICAN): >60 ML/MIN/1.73 M^2
EST. GFR  (NON AFRICAN AMERICAN): >60 ML/MIN/1.73 M^2
GLUCOSE SERPL-MCNC: 89 MG/DL (ref 70–110)
GLUCOSE SERPL-MCNC: 89 MG/DL (ref 70–110)
HCT VFR BLD AUTO: 32.1 % (ref 37–48.5)
HDLC SERPL-MCNC: 69 MG/DL (ref 40–75)
HDLC SERPL: 34.7 % (ref 20–50)
HGB BLD-MCNC: 10.4 G/DL (ref 12–16)
IMM GRANULOCYTES # BLD AUTO: 0.01 K/UL (ref 0–0.04)
IMM GRANULOCYTES NFR BLD AUTO: 0.2 % (ref 0–0.5)
LDLC SERPL CALC-MCNC: 98 MG/DL (ref 63–159)
LYMPHOCYTES # BLD AUTO: 2.2 K/UL (ref 1–4.8)
LYMPHOCYTES NFR BLD: 35.8 % (ref 18–48)
MCH RBC QN AUTO: 28.7 PG (ref 27–31)
MCHC RBC AUTO-ENTMCNC: 32.4 G/DL (ref 32–36)
MCV RBC AUTO: 89 FL (ref 82–98)
MONOCYTES # BLD AUTO: 0.5 K/UL (ref 0.3–1)
MONOCYTES NFR BLD: 8.3 % (ref 4–15)
NEUTROPHILS # BLD AUTO: 3.2 K/UL (ref 1.8–7.7)
NEUTROPHILS NFR BLD: 50.5 % (ref 38–73)
NONHDLC SERPL-MCNC: 130 MG/DL
NRBC BLD-RTO: 0 /100 WBC
PLATELET # BLD AUTO: 278 K/UL (ref 150–450)
PMV BLD AUTO: 11.5 FL (ref 9.2–12.9)
POTASSIUM SERPL-SCNC: 4 MMOL/L (ref 3.5–5.1)
POTASSIUM SERPL-SCNC: 4 MMOL/L (ref 3.5–5.1)
PROT SERPL-MCNC: 6.9 G/DL (ref 6–8.4)
RBC # BLD AUTO: 3.62 M/UL (ref 4–5.4)
SODIUM SERPL-SCNC: 136 MMOL/L (ref 136–145)
SODIUM SERPL-SCNC: 136 MMOL/L (ref 136–145)
T4 FREE SERPL-MCNC: 1.14 NG/DL (ref 0.71–1.51)
TRIGL SERPL-MCNC: 160 MG/DL (ref 30–150)
TSH SERPL DL<=0.005 MIU/L-ACNC: 0.37 UIU/ML (ref 0.4–4)
WBC # BLD AUTO: 6.25 K/UL (ref 3.9–12.7)

## 2022-02-24 PROCEDURE — 80053 COMPREHEN METABOLIC PANEL: CPT | Performed by: INTERNAL MEDICINE

## 2022-02-24 PROCEDURE — 80061 LIPID PANEL: CPT | Performed by: NURSE PRACTITIONER

## 2022-02-24 PROCEDURE — 85025 COMPLETE CBC W/AUTO DIFF WBC: CPT | Performed by: INTERNAL MEDICINE

## 2022-02-24 PROCEDURE — 36415 COLL VENOUS BLD VENIPUNCTURE: CPT | Mod: PO | Performed by: INTERNAL MEDICINE

## 2022-02-24 PROCEDURE — 86140 C-REACTIVE PROTEIN: CPT | Performed by: INTERNAL MEDICINE

## 2022-02-24 PROCEDURE — 84439 ASSAY OF FREE THYROXINE: CPT | Performed by: NURSE PRACTITIONER

## 2022-02-24 PROCEDURE — 84443 ASSAY THYROID STIM HORMONE: CPT | Performed by: NURSE PRACTITIONER

## 2022-02-24 PROCEDURE — 85652 RBC SED RATE AUTOMATED: CPT | Performed by: INTERNAL MEDICINE

## 2022-02-25 LAB — ERYTHROCYTE [SEDIMENTATION RATE] IN BLOOD BY WESTERGREN METHOD: 23 MM/HR (ref 0–36)

## 2022-02-25 NOTE — PROGRESS NOTES
CBC reveals stable anemia. Try OTC iron daily to help. CMP is stable. Inflammatory markers are normal. Chol is stable but trigs could be better. Follow low fat, low alcohol diet. Increase exercise. Consider fish/krill oil to help lower. TSH is abnormal. If current dose of levothyroxine is 125mcg then I need to change to 112mcg and recheck TSH in about 6 weeks. Orders and medication placed.

## 2022-03-04 ENCOUNTER — SPECIALTY PHARMACY (OUTPATIENT)
Dept: PHARMACY | Facility: CLINIC | Age: 72
End: 2022-03-04
Payer: MEDICARE

## 2022-03-04 NOTE — TELEPHONE ENCOUNTER
Specialty Pharmacy - Refill Coordination    Specialty Medication Orders Linked to Encounter    Flowsheet Row Most Recent Value   Medication #1 adalimumab (HUMIRA,CF, PEN) 40 mg/0.4 mL PnKt (Order#[id number], Rx#[id number])          Refill Questions - Documented Responses    Flowsheet Row Most Recent Value   Patient Availability and HIPAA Verification    Does patient want to proceed with activity? Yes   HIPAA/medical authority confirmed? Yes   Relationship to patient of person spoken to? Self   Refill Screening Questions    Changes to allergies? No   Changes to medications? No   New conditions since last clinic visit? No   Unplanned office visit, urgent care, ED, or hospital admission in the last 4 weeks? No   How does patient/caregiver feel medication is working? Good   Financial problems or insurance changes? No   How many doses of your specialty medications were missed in the last 4 weeks? 0   Would patient like to speak to a pharmacist? No   When does the patient need to receive the medication? 03/11/22   Refill Delivery Questions    How will the patient receive the medication? Delivery Pam   When does the patient need to receive the medication? 03/11/22   Shipping Address Home   Address in [facility] confirmed and updated if neccessary? Yes   Expected Copay ($) 9.85   Is the patient able to afford the medication copay? Yes   Payment Method CC on file   Days supply of Refill 28   Supplies needed? No supplies needed   Refill activity completed? Yes   Refill activity plan Refill scheduled   Shipment/Pickup Date: 03/09/22          Current Outpatient Medications   Medication Sig    adalimumab (HUMIRA,CF, PEN) 40 mg/0.4 mL PnKt Inject 0.4 mLs (40 mg total) into the skin every 14 (fourteen) days.    cyclobenzaprine (FLEXERIL) 10 MG tablet Take 1 tablet (10 mg total) by mouth 3 (three) times daily as needed for Muscle spasms.    diclofenac sodium (VOLTAREN) 1 % Gel Apply 2 g topically once daily.     EScitalopram oxalate (LEXAPRO) 20 MG tablet Take 1 tablet (20 mg total) by mouth once daily.    estradioL (ESTRACE) 1 MG tablet TAKE 1 TABLET(1 MG) BY MOUTH EVERY DAY    fluconazole (DIFLUCAN) 50 MG Tab Take 50 mg by mouth daily as needed. As needed for yeast infections    furosemide (LASIX) 20 MG tablet Take 1 tablet (20 mg total) by mouth every other day.    levothyroxine (SYNTHROID) 112 MCG tablet Take 1 tablet (112 mcg total) by mouth before breakfast.    lisinopriL 10 MG tablet TAKE 1 TABLET(10 MG) BY MOUTH EVERY DAY    loratadine 10 mg Cap Take 1 tablet by mouth once daily.    meloxicam (MOBIC) 15 MG tablet TAKE 1 TABLET(15 MG) BY MOUTH EVERY DAY    omeprazole (PRILOSEC) 40 MG capsule Take 1 capsule (40 mg total) by mouth once daily.    prednisone 5 mg TbEC Take by mouth.    sumatriptan (IMITREX) 100 MG tablet Take 1 tablet (100 mg total) by mouth once. for 1 dose    zolpidem (AMBIEN) 10 mg Tab TAKE 1 TABLET(10 MG) BY MOUTH EVERY NIGHT AS NEEDED   Last reviewed on 1/25/2022  3:02 PM by Mile Eid MD    Review of patient's allergies indicates:   Allergen Reactions    Cimzia [certolizumab pegol]     Methotrexate analogues     Penicillins     Zyprexa [olanzapine]     Last reviewed on  2/25/2022 8:23 AM by Chula Beal      Tasks added this encounter   4/1/2022 - Refill Call (Auto Added)   Tasks due within next 3 months   No tasks due.     Eliza Garcia  [facility]  [address] [place] [id number]  Phone: [phone]  Fax: [phone]

## 2022-03-08 ENCOUNTER — OFFICE VISIT (OUTPATIENT)
Dept: RHEUMATOLOGY | Facility: CLINIC | Age: 72
End: 2022-03-08
Payer: MEDICARE

## 2022-03-08 VITALS
DIASTOLIC BLOOD PRESSURE: 77 MMHG | SYSTOLIC BLOOD PRESSURE: 117 MMHG | BODY MASS INDEX: 28.07 KG/M2 | HEART RATE: 80 BPM | WEIGHT: 179.25 LBS

## 2022-03-08 DIAGNOSIS — M47.817 LUMBAR AND SACRAL ARTHRITIS: ICD-10-CM

## 2022-03-08 DIAGNOSIS — G89.4 CHRONIC PAIN SYNDROME: ICD-10-CM

## 2022-03-08 DIAGNOSIS — L40.50 PSORIATIC ARTHRITIS: ICD-10-CM

## 2022-03-08 DIAGNOSIS — I10 ESSENTIAL HYPERTENSION: ICD-10-CM

## 2022-03-08 DIAGNOSIS — F32.9 MAJOR DEPRESSION, CHRONIC: ICD-10-CM

## 2022-03-08 DIAGNOSIS — D50.9 IRON DEFICIENCY ANEMIA, UNSPECIFIED IRON DEFICIENCY ANEMIA TYPE: Primary | ICD-10-CM

## 2022-03-08 DIAGNOSIS — G47.00 INSOMNIA, UNSPECIFIED TYPE: ICD-10-CM

## 2022-03-08 DIAGNOSIS — L40.50 PSORIATIC ARTHRITIS: Primary | ICD-10-CM

## 2022-03-08 PROCEDURE — 99213 OFFICE O/P EST LOW 20 MIN: CPT | Mod: S$GLB,,, | Performed by: PHYSICIAN ASSISTANT

## 2022-03-08 PROCEDURE — 3008F BODY MASS INDEX DOCD: CPT | Mod: CPTII,S$GLB,, | Performed by: PHYSICIAN ASSISTANT

## 2022-03-08 PROCEDURE — 3074F PR MOST RECENT SYSTOLIC BLOOD PRESSURE < 130 MM HG: ICD-10-PCS | Mod: CPTII,S$GLB,, | Performed by: PHYSICIAN ASSISTANT

## 2022-03-08 PROCEDURE — 1101F PT FALLS ASSESS-DOCD LE1/YR: CPT | Mod: CPTII,S$GLB,, | Performed by: PHYSICIAN ASSISTANT

## 2022-03-08 PROCEDURE — 1126F PR PAIN SEVERITY QUANTIFIED, NO PAIN PRESENT: ICD-10-PCS | Mod: CPTII,S$GLB,, | Performed by: PHYSICIAN ASSISTANT

## 2022-03-08 PROCEDURE — 3288F PR FALLS RISK ASSESSMENT DOCUMENTED: ICD-10-PCS | Mod: CPTII,S$GLB,, | Performed by: PHYSICIAN ASSISTANT

## 2022-03-08 PROCEDURE — 99215 OFFICE O/P EST HI 40 MIN: CPT | Mod: PBBFAC,PN | Performed by: PHYSICIAN ASSISTANT

## 2022-03-08 PROCEDURE — 4010F ACE/ARB THERAPY RXD/TAKEN: CPT | Mod: CPTII,S$GLB,, | Performed by: PHYSICIAN ASSISTANT

## 2022-03-08 PROCEDURE — 1160F RVW MEDS BY RX/DR IN RCRD: CPT | Mod: CPTII,,, | Performed by: PHYSICIAN ASSISTANT

## 2022-03-08 PROCEDURE — 3288F FALL RISK ASSESSMENT DOCD: CPT | Mod: CPTII,S$GLB,, | Performed by: PHYSICIAN ASSISTANT

## 2022-03-08 PROCEDURE — 1159F PR MEDICATION LIST DOCUMENTED IN MEDICAL RECORD: ICD-10-PCS | Mod: CPTII,,, | Performed by: PHYSICIAN ASSISTANT

## 2022-03-08 PROCEDURE — 99213 PR OFFICE/OUTPT VISIT, EST, LEVL III, 20-29 MIN: ICD-10-PCS | Mod: S$GLB,,, | Performed by: PHYSICIAN ASSISTANT

## 2022-03-08 PROCEDURE — 99999 PR PBB SHADOW E&M-EST. PATIENT-LVL V: ICD-10-PCS | Mod: PBBFAC,,, | Performed by: PHYSICIAN ASSISTANT

## 2022-03-08 PROCEDURE — 1101F PR PT FALLS ASSESS DOC 0-1 FALLS W/OUT INJ PAST YR: ICD-10-PCS | Mod: CPTII,S$GLB,, | Performed by: PHYSICIAN ASSISTANT

## 2022-03-08 PROCEDURE — 1159F MED LIST DOCD IN RCRD: CPT | Mod: CPTII,,, | Performed by: PHYSICIAN ASSISTANT

## 2022-03-08 PROCEDURE — 4010F PR ACE/ARB THEARPY RXD/TAKEN: ICD-10-PCS | Mod: CPTII,S$GLB,, | Performed by: PHYSICIAN ASSISTANT

## 2022-03-08 PROCEDURE — 1126F AMNT PAIN NOTED NONE PRSNT: CPT | Mod: CPTII,S$GLB,, | Performed by: PHYSICIAN ASSISTANT

## 2022-03-08 PROCEDURE — 3078F DIAST BP <80 MM HG: CPT | Mod: CPTII,S$GLB,, | Performed by: PHYSICIAN ASSISTANT

## 2022-03-08 PROCEDURE — 3008F PR BODY MASS INDEX (BMI) DOCUMENTED: ICD-10-PCS | Mod: CPTII,S$GLB,, | Performed by: PHYSICIAN ASSISTANT

## 2022-03-08 PROCEDURE — 1160F PR REVIEW ALL MEDS BY PRESCRIBER/CLIN PHARMACIST DOCUMENTED: ICD-10-PCS | Mod: CPTII,,, | Performed by: PHYSICIAN ASSISTANT

## 2022-03-08 PROCEDURE — 99999 PR PBB SHADOW E&M-EST. PATIENT-LVL V: CPT | Mod: PBBFAC,,, | Performed by: PHYSICIAN ASSISTANT

## 2022-03-08 PROCEDURE — 96372 THER/PROPH/DIAG INJ SC/IM: CPT | Mod: PBBFAC,PN

## 2022-03-08 PROCEDURE — 3078F PR MOST RECENT DIASTOLIC BLOOD PRESSURE < 80 MM HG: ICD-10-PCS | Mod: CPTII,S$GLB,, | Performed by: PHYSICIAN ASSISTANT

## 2022-03-08 PROCEDURE — 3074F SYST BP LT 130 MM HG: CPT | Mod: CPTII,S$GLB,, | Performed by: PHYSICIAN ASSISTANT

## 2022-03-08 RX ORDER — ZOLPIDEM TARTRATE 10 MG/1
10 TABLET ORAL NIGHTLY PRN
Qty: 30 TABLET | Refills: 3 | Status: SHIPPED | OUTPATIENT
Start: 2022-03-08 | End: 2022-08-08

## 2022-03-08 RX ORDER — HYDROCODONE BITARTRATE AND ACETAMINOPHEN 10; 325 MG/1; MG/1
1 TABLET ORAL EVERY 8 HOURS PRN
Qty: 90 TABLET | Refills: 0 | Status: SHIPPED | OUTPATIENT
Start: 2022-05-07 | End: 2022-07-11

## 2022-03-08 RX ORDER — HYDROCODONE BITARTRATE AND ACETAMINOPHEN 10; 325 MG/1; MG/1
1 TABLET ORAL EVERY 8 HOURS PRN
Qty: 90 TABLET | Refills: 0 | Status: SHIPPED | OUTPATIENT
Start: 2022-03-08 | End: 2022-07-11 | Stop reason: SDUPTHER

## 2022-03-08 RX ORDER — METHYLPREDNISOLONE ACETATE 40 MG/ML
160 INJECTION, SUSPENSION INTRA-ARTICULAR; INTRALESIONAL; INTRAMUSCULAR; SOFT TISSUE
Status: COMPLETED | OUTPATIENT
Start: 2022-03-08 | End: 2022-03-08

## 2022-03-08 RX ORDER — LISINOPRIL 10 MG/1
TABLET ORAL
Qty: 90 TABLET | Refills: 2 | Status: SHIPPED | OUTPATIENT
Start: 2022-03-08 | End: 2022-11-08 | Stop reason: SDUPTHER

## 2022-03-08 RX ORDER — KETOROLAC TROMETHAMINE 30 MG/ML
60 INJECTION, SOLUTION INTRAMUSCULAR; INTRAVENOUS
Status: COMPLETED | OUTPATIENT
Start: 2022-03-08 | End: 2022-03-08

## 2022-03-08 RX ORDER — ESCITALOPRAM OXALATE 20 MG/1
20 TABLET ORAL DAILY
Qty: 90 TABLET | Refills: 2 | Status: SHIPPED | OUTPATIENT
Start: 2022-03-08 | End: 2023-05-16 | Stop reason: SDUPTHER

## 2022-03-08 RX ORDER — CYCLOBENZAPRINE HCL 10 MG
10 TABLET ORAL 3 TIMES DAILY PRN
Qty: 90 TABLET | Refills: 2 | Status: SHIPPED | OUTPATIENT
Start: 2022-03-08 | End: 2022-11-08 | Stop reason: SDUPTHER

## 2022-03-08 RX ORDER — HYDROCODONE BITARTRATE AND ACETAMINOPHEN 10; 325 MG/1; MG/1
1 TABLET ORAL EVERY 8 HOURS PRN
Qty: 90 TABLET | Refills: 0 | Status: SHIPPED | OUTPATIENT
Start: 2022-04-07 | End: 2022-07-11

## 2022-03-08 RX ORDER — METHYLPREDNISOLONE ACETATE 80 MG/ML
160 INJECTION, SUSPENSION INTRA-ARTICULAR; INTRALESIONAL; INTRAMUSCULAR; SOFT TISSUE
Status: COMPLETED | OUTPATIENT
Start: 2022-03-08 | End: 2022-03-08

## 2022-03-08 RX ORDER — CYANOCOBALAMIN 1000 UG/ML
1000 INJECTION, SOLUTION INTRAMUSCULAR; SUBCUTANEOUS
Status: COMPLETED | OUTPATIENT
Start: 2022-03-08 | End: 2022-03-08

## 2022-03-08 RX ADMIN — METHYLPREDNISOLONE ACETATE 160 MG: 40 INJECTION, SUSPENSION INTRA-ARTICULAR; INTRALESIONAL; INTRAMUSCULAR; INTRASYNOVIAL; SOFT TISSUE at 10:03

## 2022-03-08 RX ADMIN — CYANOCOBALAMIN 1000 MCG: 1000 INJECTION INTRAMUSCULAR; SUBCUTANEOUS at 09:03

## 2022-03-08 RX ADMIN — KETOROLAC TROMETHAMINE 60 MG: 60 INJECTION, SOLUTION INTRAMUSCULAR at 09:03

## 2022-03-08 ASSESSMENT — ROUTINE ASSESSMENT OF PATIENT INDEX DATA (RAPID3)
MDHAQ FUNCTION SCORE: 1
FATIGUE SCORE: 1.1
PATIENT GLOBAL ASSESSMENT SCORE: 2
PSYCHOLOGICAL DISTRESS SCORE: 2.2
TOTAL RAPID3 SCORE: 3.94
PAIN SCORE: 6.5

## 2022-03-08 NOTE — TELEPHONE ENCOUNTER
Humira for PsA continuation of therapy request submitted to plan cmm Key: [id number] PA Case: [id number] Approved through 12/31/2022. Medicare LIS level 1 .     Rheum OV and labs reviewed pt is to continue Humira, refill appropriate.

## 2022-03-08 NOTE — PROGRESS NOTES
Subjective:       Patient ID: Rose Goff is a 71 y.o. female.    Chief Complaint: Disease Management (4 mth f/u)    Mrs. Goff is a 71 year old female who presents to clinic for follow up on psoriatic arthritis.  She is doing fair overall, but has good and bad days depending on the weather. Currently on Humira, mobic, norco, and prednisone PRN. She has intermittent joint pain involving MCPs, PIPs, wrists, hips, and knees. AM stiffness can last up to 2-3 hours. Overall, she feels Humira is working well. She has chronic low back pain secondary to OA and is taking norco TID PRN with good relief of pain. She is taking ambien for insomnia and this is working well.     1 infection since her last visit--sinusitis. No covid infection. Vaccinated and boosted for covid.    Had MULLEN and underwent cardiac eval---echo consistent with diastolic dysfunction grade 2, stress test normal. Added lasix 20 mg QOD. Sx have resolved.     Current tx:  1. Humira  2. Mobic  3. Norco    Prior treatments:   Enbrel, Cimzia, Simponi, MTX, Cosentyx     Review of Systems   Constitutional: Positive for activity change. Negative for appetite change, chills, fatigue and fever.   Eyes: Negative for visual disturbance.   Respiratory: Negative for cough.    Cardiovascular: Negative for chest pain and palpitations.   Gastrointestinal: Negative for abdominal pain and constipation.   Musculoskeletal: Positive for arthralgias, joint swelling and myalgias. Negative for neck pain and neck stiffness.   Skin: Negative for rash.   Allergic/Immunologic: Positive for immunocompromised state.   Neurological: Negative for dizziness, weakness, light-headedness and headaches.   Psychiatric/Behavioral: Negative for dysphoric mood and sleep disturbance. The patient is not nervous/anxious.          Objective:     Vitals:    03/08/22 0840   BP: 117/77   Pulse: 80       Past Medical History:   Diagnosis Date    Abnormal mammogram     Amaurosis fugax     Anemia      Back pain     Chest pain     Degenerative cervical disc     Degenerative disc disease, thoracic     Depression     Depression     Fatigue     GERD (gastroesophageal reflux disease)     Head ache     Hemorrhoids     Hyperlipidemia     Hypertension     Hypoglycemia     Hypothyroidism     Hypothyroidism     Insomnia     Menopausal state     Neck pain     Neuralgia     Nonrheumatic tricuspid valve disorder     Optic neuritis     Palpitations     PONV (postoperative nausea and vomiting)     Psoriasis     Psoriatic arthropathy     Rosacea     Thyroid nodule      Past Surgical History:   Procedure Laterality Date    ANKLE FRACTURE SURGERY      APPENDECTOMY      BREAST BIOPSY Left     Negative    HYSTERECTOMY      OOPHORECTOMY      RHIZOTOMY W/ RADIOFREQUENCY ABLATION Right     lumbar    thyroid tumor removed            Physical Exam   Constitutional: She is oriented to person, place, and time.   Eyes: Right conjunctiva is not injected. Left conjunctiva is not injected. Right eye exhibits normal extraocular motion. Left eye exhibits normal extraocular motion.   Neck: No JVD present. No thyromegaly present.   Cardiovascular: Normal rate and regular rhythm. Exam reveals no decreased pulses.   Pulmonary/Chest: She has no wheezes. She has no rhonchi. She has no rales.   Musculoskeletal:      Right shoulder: Tenderness present.      Left shoulder: Tenderness present.      Right elbow: Tenderness present.      Left elbow: Tenderness present.      Right wrist: Tenderness present.      Left wrist: Tenderness present.      Right knee: Swelling present. Tenderness present.      Left knee: Swelling present. Tenderness present.   Lymphadenopathy:     She has no cervical adenopathy.   Neurological: She is alert and oriented to person, place, and time. Gait normal.   Skin: No rash noted.   Psychiatric: Mood and affect normal.       Right Side Rheumatological Exam     Examination finds the 3rd MCP,  4th MCP and 5th MCP normal.    The patient is tender to palpation of the shoulder, elbow, wrist, knee, 1st PIP, 1st MCP, 2nd PIP, 2nd MCP, 3rd PIP, 4th PIP and 5th PIP    She has swelling of the knee, 1st MCP, 2nd PIP, 2nd MCP, 3rd PIP, 4th PIP and 5th PIP    Left Side Rheumatological Exam     Examination finds the 3rd MCP, 4th MCP and 5th MCP normal.    The patient is tender to palpation of the shoulder, elbow, wrist, knee, 1st PIP, 1st MCP, 2nd PIP, 2nd MCP, 3rd PIP, 4th PIP and 5th PIP.    She has swelling of the knee, 1st MCP and 2nd MCP          Labs reviewed:  Component      Latest Ref Rng & Units 2/24/2022 2/24/2022           9:47 AM  9:47 AM   WBC      3.90 - 12.70 K/uL  6.25   RBC      4.00 - 5.40 M/uL  3.62 (L)   Hemoglobin      12.0 - 16.0 g/dL  10.4 (L)   Hematocrit      37.0 - 48.5 %  32.1 (L)   MCV      82 - 98 fL  89   MCH      27.0 - 31.0 pg  28.7   MCHC      32.0 - 36.0 g/dL  32.4   RDW      11.5 - 14.5 %  15.2 (H)   Platelets      150 - 450 K/uL  278   MPV      9.2 - 12.9 fL  11.5   Immature Granulocytes      0.0 - 0.5 %  0.2   Gran # (ANC)      1.8 - 7.7 K/uL  3.2   Immature Grans (Abs)      0.00 - 0.04 K/uL  0.01   Lymph #      1.0 - 4.8 K/uL  2.2   Mono #      0.3 - 1.0 K/uL  0.5   Eos #      0.0 - 0.5 K/uL  0.3   Baso #      0.00 - 0.20 K/uL  0.06   nRBC      0 /100 WBC  0   Gran %      38.0 - 73.0 %  50.5   Lymph %      18.0 - 48.0 %  35.8   Mono %      4.0 - 15.0 %  8.3   Eosinophil %      0.0 - 8.0 %  4.2   Basophil %      0.0 - 1.9 %  1.0   Differential Method        Automated   Sodium      136 - 145 mmol/L 136 136   Potassium      3.5 - 5.1 mmol/L 4.0 4.0   Chloride      95 - 110 mmol/L 102 102   CO2      23 - 29 mmol/L 25 25   Glucose      70 - 110 mg/dL 89 89   BUN      8 - 23 mg/dL 16 16   Creatinine      0.5 - 1.4 mg/dL 0.7 0.7   Calcium      8.7 - 10.5 mg/dL 8.8 8.8   PROTEIN TOTAL      6.0 - 8.4 g/dL  6.9   Albumin      3.5 - 5.2 g/dL  3.4 (L)   BILIRUBIN TOTAL      0.1 - 1.0 mg/dL   0.4   Alkaline Phosphatase      55 - 135 U/L  66   AST      10 - 40 U/L  17   ALT      10 - 44 U/L  17   Anion Gap      8 - 16 mmol/L 9 9   eGFR if African American      >60 mL/min/1.73 m:2 >60.0 >60.0   eGFR if non African American      >60 mL/min/1.73 m:2 >60.0 >60.0   Cholesterol      120 - 199 mg/dL  199   Triglycerides      30 - 150 mg/dL  160 (H)   HDL      40 - 75 mg/dL  69   LDL Cholesterol External      63.0 - 159.0 mg/dL  98.0   HDL/Cholesterol Ratio      20.0 - 50.0 %  34.7   Total Cholesterol/HDL Ratio      2.0 - 5.0  2.9   Non-HDL Cholesterol      mg/dL  130   CRP      0.0 - 8.2 mg/L  3.4   Sed Rate      0 - 36 mm/Hr  23   TSH      0.400 - 4.000 uIU/mL  0.366 (L)   Free T4      0.71 - 1.51 ng/dL  1.14     Assessment:       1. Psoriatic arthritis    2. Essential hypertension    3. Lumbar and sacral arthritis    4. Major depression, chronic    5. Chronic pain syndrome            Plan:       Psoriatic arthritis  -     cyclobenzaprine (FLEXERIL) 10 MG tablet; Take 1 tablet (10 mg total) by mouth 3 (three) times daily as needed for Muscle spasms.  Dispense: 90 tablet; Refill: 2  -     ketorolac injection 60 mg  -     methylPREDNISolone acetate injection 160 mg  -     cyanocobalamin injection 1,000 mcg  -     CBC Auto Differential; Future; Expected date: 03/08/2022  -     Comprehensive Metabolic Panel; Future; Expected date: 03/08/2022  -     C-Reactive Protein; Future; Expected date: 03/08/2022  -     Sedimentation rate; Future; Expected date: 03/08/2022    Essential hypertension  -     lisinopriL 10 MG tablet; TAKE 1 TABLET(10 MG) BY MOUTH EVERY DAY  Dispense: 90 tablet; Refill: 2    Lumbar and sacral arthritis    Major depression, chronic  -     EScitalopram oxalate (LEXAPRO) 20 MG tablet; Take 1 tablet (20 mg total) by mouth once daily.  Dispense: 90 tablet; Refill: 2    Chronic pain syndrome          Assessment:  71 year old female with   Psoriatic arthritis, rheumatoid arthritis on Humira  --chronic  pain syndrome on norco  --insomnia on ambien  --iron deficiency anemia, stable  --grade 2 diastolic dysfunction    Plan:  1. toradol 60, depo 160, b12 today for flare  2. Cont. Humira   3. Cont. norco 10/325 TID per Dr. Benjamin. I have checked [place] prescription monitoring program site and no unusual or abnormal behavior has occurred pt understand the risk and benefits of taking opioid medications and has decided to continue the  Medication. Pended for MD  4. Cont. mobic 15 mg, cont. Flexeril PRN  5. Lisinopril refilled for pcp  6. Covid booster discussed 5 months following 3 rd dose      Follow up:   3 months Dr. Benjamin w/labs prior

## 2022-03-10 ENCOUNTER — PATIENT MESSAGE (OUTPATIENT)
Dept: RHEUMATOLOGY | Facility: CLINIC | Age: 72
End: 2022-03-10
Payer: MEDICARE

## 2022-04-01 ENCOUNTER — SPECIALTY PHARMACY (OUTPATIENT)
Dept: PHARMACY | Facility: CLINIC | Age: 72
End: 2022-04-01
Payer: MEDICARE

## 2022-04-05 ENCOUNTER — TELEPHONE (OUTPATIENT)
Dept: RHEUMATOLOGY | Facility: CLINIC | Age: 72
End: 2022-04-05

## 2022-04-05 DIAGNOSIS — G89.4 CHRONIC PAIN SYNDROME: ICD-10-CM

## 2022-04-05 DIAGNOSIS — L40.50 PSORIATIC ARTHRITIS: ICD-10-CM

## 2022-04-05 DIAGNOSIS — G47.00 INSOMNIA, UNSPECIFIED TYPE: ICD-10-CM

## 2022-04-05 RX ORDER — HYDROCODONE BITARTRATE AND ACETAMINOPHEN 10; 325 MG/1; MG/1
1 TABLET ORAL EVERY 8 HOURS PRN
Qty: 90 TABLET | Refills: 0 | Status: CANCELLED | OUTPATIENT
Start: 2022-04-05

## 2022-04-05 NOTE — TELEPHONE ENCOUNTER
----- Message from Lana Corrales sent at 4/5/2022  9:15 AM CDT -----  Regarding: refill  Contact: pt  Type:  RX Refill Request    Who Called:  pt  Refill or New Rx:  refill  RX Name and Strength:  HYDROcodone-acetaminophen (NORCO)  mg per tablet  How is the patient currently taking it? (ex. 1XDay):  n/a  Is this a 30 day or 90 day RX:  n/a    Preferred Pharmacy with phone number:    [email or web address][id number] - [facility] [address] [facility] [address] [facility] [address]  [address] [address] [address]  Phone: [phone] Fax: [phone]    Local or Mail Order:  local  Ordering Provider:  belinda Roblero Call Back Number:  [phone]    Additional Information:  please call to advise

## 2022-04-11 DIAGNOSIS — F32.9 MAJOR DEPRESSION, CHRONIC: ICD-10-CM

## 2022-04-11 DIAGNOSIS — G43.809 OTHER MIGRAINE WITHOUT STATUS MIGRAINOSUS, NOT INTRACTABLE: Primary | ICD-10-CM

## 2022-04-11 DIAGNOSIS — G89.4 CHRONIC PAIN SYNDROME: ICD-10-CM

## 2022-04-11 DIAGNOSIS — M47.817 LUMBAR AND SACRAL ARTHRITIS: ICD-10-CM

## 2022-04-11 DIAGNOSIS — M05.711 RHEUMATOID ARTHRITIS INVOLVING RIGHT SHOULDER WITH POSITIVE RHEUMATOID FACTOR: ICD-10-CM

## 2022-04-11 DIAGNOSIS — L40.50 PSORIATIC ARTHRITIS: ICD-10-CM

## 2022-04-11 RX ORDER — SUMATRIPTAN SUCCINATE 100 MG/1
100 TABLET ORAL ONCE AS NEEDED
Qty: 18 TABLET | Refills: 1 | Status: SHIPPED | OUTPATIENT
Start: 2022-04-11 | End: 2023-07-12 | Stop reason: SDUPTHER

## 2022-04-11 NOTE — TELEPHONE ENCOUNTER
Pharmacy requesting refill on Sumatriptan 100mg  Pt's LOV 03/08/2022  Pt's NOV 07/11/2022  Medication pending

## 2022-04-12 ENCOUNTER — TELEPHONE (OUTPATIENT)
Dept: RHEUMATOLOGY | Facility: CLINIC | Age: 72
End: 2022-04-12
Payer: MEDICARE

## 2022-04-12 NOTE — TELEPHONE ENCOUNTER
----- Message from Ehsan Licea sent at 4/12/2022 10:34 AM CDT -----  Type:  RX Refill Request    Who Called: Patient     Refill or New Rx:Refill     RX Name and Strength:HYDROcodone-acetaminophen (NORCO)  mg per tablet    How is the patient currently taking it? Route: Take 1 tablet by mouth every 8 (eight) hours as needed for Pain. - Oral    Is this a 30 day or 90 day RX:    Preferred Pharmacy with phone number:[id number] [address] [facility] [address]    Local or Mail Order:Local     Ordering Provider: Robin Benjamin MD    Would the patient rather a call back or a response via [facility]? Call back     Best Call Back Number:[phone] (mobile)     Additional Information:

## 2022-04-18 ENCOUNTER — TELEPHONE (OUTPATIENT)
Dept: RHEUMATOLOGY | Facility: CLINIC | Age: 72
End: 2022-04-18
Payer: MEDICARE

## 2022-04-18 ENCOUNTER — PATIENT MESSAGE (OUTPATIENT)
Dept: ADMINISTRATIVE | Facility: OTHER | Age: 72
End: 2022-04-18
Payer: MEDICARE

## 2022-04-18 NOTE — TELEPHONE ENCOUNTER
----- Message from Albina Harris sent at 4/18/2022 11:23 AM CDT -----  Regarding: refill  Contact: Patient/[phone] (mobile)  Type:  RX Refill Request     Who Called: Patient      Refill or New Rx:Refill      RX Name and Strength:HYDROcodone-acetaminophen (NORCO)  mg per tablet     How is the patient currently taking it? Route: Take 1 tablet by mouth every 8 (eight) hours as needed for Pain. - Oral     Is this a 30 day or 90 day RX:     Preferred Pharmacy with phone number:[id number] [facility] [address] [facility] [address]     Local or Mail Order:Local      Ordering Provider: Robin Benjamin MD     Would the patient rather a call back or a response via [facility]? Call back      Best Call Back Number:[phone] (mobile)     Patient is very upset because this is the 3rd call she is putting in for this refill. She has been completely out of her medication and is in need of it. She has been calling for 2 weeks. Please call to advise of the status. She states she is not even getting a call back from the office. Thanks.

## 2022-04-22 NOTE — TELEPHONE ENCOUNTER
----- Message from Mandy Sands sent at 4/22/2022 11:46 AM CDT -----  Contact: Self  Type:  RX Refill Request    Who Called:  Patient  Refill or New Rx:  New Rx  RX Name and Strength:  HYDROcodone-acetaminophen (NORCO)  mg per tablet  How is the patient currently taking it? (ex. 1XDay):  As directed  Is this a 30 day or 90 day RX:  30  Preferred Pharmacy with phone number:    [facility] #[id number] - [facility] [address] [facility] [address] AT [facility] [address]  [address] [place] [address]  Phone: [phone] Fax: [phone]  Local or Mail Order:  Local  Ordering Provider:  Dr. Sourav Roblero Call Back Number:  [phone] (home)   Additional Information:  Pt stated she had called in Monday 4/18 to request her medication be refilled, and she stated [facility] says they haven't received anything. I did look into her chart and it looks like this prescription was sent as 3 seperate rx's  for March, April and Mays refill. I don't know if this is accurate, but she never received her prescription for April. Please call pt back to confirm. Thank You

## 2022-04-22 NOTE — TELEPHONE ENCOUNTER
Called patient pharmacy to confirm that the medication was received. Pharmacy states the medication was received and patient picked up medication 4/22/2022    ----- Message from Mandy Sands sent at 4/22/2022 11:46 AM CDT -----  Contact: Self  Type:  RX Refill Request    Who Called:  Patient  Refill or New Rx:  New Rx  RX Name and Strength:  HYDROcodone-acetaminophen (NORCO)  mg per tablet  How is the patient currently taking it? (ex. 1XDay):  As directed  Is this a 30 day or 90 day RX:  30  Preferred Pharmacy with phone number:    [facility][id number] - [facility] [address] [facility] [address] AT [facility] [address]  [address] [place] [address]  Phone: [phone] Fax: [phone]  Local or Mail Order:  Local  Ordering Provider:  Dr. Sourav Roblero Call Back Number:  [phone] (home)   Additional Information:  Pt stated she had called in Monday 4/18 to request her medication be refilled, and she stated [facility] says they haven't received anything. I did look into her chart and it looks like this prescription was sent as 3 seperate rx's  for March, April and Mays refill. I don't know if this is accurate, but she never received her prescription for April. Please call pt back to confirm. Thank You

## 2022-04-26 ENCOUNTER — SPECIALTY PHARMACY (OUTPATIENT)
Dept: PHARMACY | Facility: CLINIC | Age: 72
End: 2022-04-26
Payer: MEDICARE

## 2022-04-26 NOTE — TELEPHONE ENCOUNTER
Specialty Pharmacy - Refill Coordination    Specialty Medication Orders Linked to Encounter    Flowsheet Row Most Recent Value   Medication #1 adalimumab (HUMIRA,CF, PEN) 40 mg/0.4 mL PnKt (Order#[id number], Rx#[id number])          Refill Questions - Documented Responses    Flowsheet Row Most Recent Value   Patient Availability and HIPAA Verification    Does patient want to proceed with activity? Yes   HIPAA/medical authority confirmed? Yes   Relationship to patient of person spoken to? Self   Refill Screening Questions    Changes to allergies? No   Changes to medications? No   New conditions since last clinic visit? No   Unplanned office visit, urgent care, ED, or hospital admission in the last 4 weeks? No   How does patient/caregiver feel medication is working? Good   Financial problems or insurance changes? No   How many doses of your specialty medications were missed in the last 4 weeks? 0   Would patient like to speak to a pharmacist? No   When does the patient need to receive the medication? 05/06/22   Refill Delivery Questions    How will the patient receive the medication? Delivery Pam   When does the patient need to receive the medication? 05/06/22   Shipping Address Home   Address in [facility] confirmed and updated if neccessary? Yes   Expected Copay ($) 0   Is the patient able to afford the medication copay? Yes   Payment Method zero copay   Days supply of Refill 28   Supplies needed? No supplies needed   Refill activity completed? Yes   Refill activity plan Refill scheduled   Shipment/Pickup Date: 04/27/22          Current Outpatient Medications   Medication Sig    adalimumab (HUMIRA,CF, PEN) 40 mg/0.4 mL PnKt Inject 0.4 mLs (40 mg total) into the skin every 14 (fourteen) days.    cyclobenzaprine (FLEXERIL) 10 MG tablet Take 1 tablet (10 mg total) by mouth 3 (three) times daily as needed for Muscle spasms.    diclofenac sodium (VOLTAREN) 1 % Gel Apply 2 g topically once daily.     EScitalopram oxalate (LEXAPRO) 20 MG tablet Take 1 tablet (20 mg total) by mouth once daily.    estradioL (ESTRACE) 1 MG tablet TAKE 1 TABLET(1 MG) BY MOUTH EVERY DAY    fluconazole (DIFLUCAN) 50 MG Tab Take 50 mg by mouth daily as needed. As needed for yeast infections    furosemide (LASIX) 20 MG tablet Take 1 tablet (20 mg total) by mouth every other day.    [START ON 5/7/2022] HYDROcodone-acetaminophen (NORCO)  mg per tablet Take 1 tablet by mouth every 8 (eight) hours as needed for Pain.    HYDROcodone-acetaminophen (NORCO)  mg per tablet Take 1 tablet by mouth every 8 (eight) hours as needed for Pain.    HYDROcodone-acetaminophen (NORCO)  mg per tablet Take 1 tablet by mouth every 8 (eight) hours as needed for Pain.    levothyroxine (SYNTHROID) 112 MCG tablet Take 1 tablet (112 mcg total) by mouth before breakfast.    levothyroxine (SYNTHROID) 125 MCG tablet TAKE 1 TABLET BY MOUTH EVERY MORNING 30 MINUTES BEFORE BREAKFAST    lisinopriL 10 MG tablet TAKE 1 TABLET(10 MG) BY MOUTH EVERY DAY    loratadine 10 mg Cap Take 1 tablet by mouth once daily.    meloxicam (MOBIC) 15 MG tablet TAKE 1 TABLET(15 MG) BY MOUTH EVERY DAY    omeprazole (PRILOSEC) 40 MG capsule Take 1 capsule (40 mg total) by mouth once daily.    prednisone 5 mg TbEC Take by mouth.    sumatriptan (IMITREX) 100 MG tablet Take 1 tablet (100 mg total) by mouth once as needed for Migraine.    zolpidem (AMBIEN) 10 mg Tab TAKE 1 TABLET(10 MG) BY MOUTH EVERY NIGHT AS NEEDED    zolpidem (AMBIEN) 10 mg Tab Take 1 tablet (10 mg total) by mouth nightly as needed (pain).   Last reviewed on 3/8/2022  9:22 AM by Codi Courtney PA-C    Review of patient's allergies indicates:   Allergen Reactions    Cimzia [certolizumab pegol]     Methotrexate analogues     Penicillins     Zyprexa [olanzapine]     Last reviewed on  3/8/2022 10:05 AM by Aundera Martinez      Tasks added this encounter   5/27/2022 - Refill Call (Auto Added)    Tasks due within next 3 months   No tasks due.     [facility]  [address] [facility] [address]  Phone: [phone]  Fax: [phone]

## 2022-05-20 DIAGNOSIS — G47.00 INSOMNIA, UNSPECIFIED TYPE: ICD-10-CM

## 2022-05-20 DIAGNOSIS — L40.50 PSORIATIC ARTHRITIS: ICD-10-CM

## 2022-05-20 DIAGNOSIS — G89.4 CHRONIC PAIN SYNDROME: ICD-10-CM

## 2022-05-20 RX ORDER — HYDROCODONE BITARTRATE AND ACETAMINOPHEN 10; 325 MG/1; MG/1
1 TABLET ORAL EVERY 8 HOURS PRN
Qty: 90 TABLET | Refills: 0 | Status: CANCELLED | OUTPATIENT
Start: 2022-05-20

## 2022-05-27 ENCOUNTER — SPECIALTY PHARMACY (OUTPATIENT)
Dept: PHARMACY | Facility: CLINIC | Age: 72
End: 2022-05-27
Payer: MEDICARE

## 2022-05-27 NOTE — TELEPHONE ENCOUNTER
Specialty Pharmacy - Refill Coordination    Specialty Medication Orders Linked to Encounter    Flowsheet Row Most Recent Value   Medication #1 adalimumab (HUMIRA,CF, PEN) 40 mg/0.4 mL PnKt (Order#[id number], Rx#[id number])          Refill Questions - Documented Responses    Flowsheet Row Most Recent Value   Patient Availability and HIPAA Verification    Does patient want to proceed with activity? Yes   HIPAA/medical authority confirmed? Yes   Relationship to patient of person spoken to? Self   Refill Screening Questions    Changes to allergies? No   Changes to medications? No   New conditions since last clinic visit? No   Unplanned office visit, urgent care, ED, or hospital admission in the last 4 weeks? No   How does patient/caregiver feel medication is working? Good   Financial problems or insurance changes? No   How many doses of your specialty medications were missed in the last 4 weeks? 0   Would patient like to speak to a pharmacist? No   When does the patient need to receive the medication? 06/03/22   Refill Delivery Questions    How will the patient receive the medication? Delivery Pam   When does the patient need to receive the medication? 06/03/22   Shipping Address Home   Address in [facility] confirmed and updated if neccessary? Yes   Expected Copay ($) 0   Is the patient able to afford the medication copay? Yes   Payment Method zero copay   Days supply of Refill 28   Supplies needed? No supplies needed   Refill activity completed? Yes   Refill activity plan Refill scheduled   Shipment/Pickup Date: 06/01/22          Current Outpatient Medications   Medication Sig    adalimumab (HUMIRA,CF, PEN) 40 mg/0.4 mL PnKt Inject 0.4 mLs (40 mg total) into the skin every 14 (fourteen) days.    cyclobenzaprine (FLEXERIL) 10 MG tablet Take 1 tablet (10 mg total) by mouth 3 (three) times daily as needed for Muscle spasms.    diclofenac sodium (VOLTAREN) 1 % Gel Apply 2 g topically once daily.     EScitalopram oxalate (LEXAPRO) 20 MG tablet Take 1 tablet (20 mg total) by mouth once daily.    estradioL (ESTRACE) 1 MG tablet TAKE 1 TABLET(1 MG) BY MOUTH EVERY DAY    fluconazole (DIFLUCAN) 50 MG Tab Take 50 mg by mouth daily as needed. As needed for yeast infections    furosemide (LASIX) 20 MG tablet TAKE 1 TABLET(20 MG) BY MOUTH EVERY OTHER DAY    HYDROcodone-acetaminophen (NORCO)  mg per tablet Take 1 tablet by mouth every 8 (eight) hours as needed for Pain.    HYDROcodone-acetaminophen (NORCO)  mg per tablet Take 1 tablet by mouth every 8 (eight) hours as needed for Pain.    HYDROcodone-acetaminophen (NORCO)  mg per tablet Take 1 tablet by mouth every 8 (eight) hours as needed for Pain.    levothyroxine (SYNTHROID) 112 MCG tablet Take 1 tablet (112 mcg total) by mouth before breakfast.    levothyroxine (SYNTHROID) 125 MCG tablet TAKE 1 TABLET BY MOUTH EVERY MORNING 30 MINUTES BEFORE BREAKFAST    lisinopriL 10 MG tablet TAKE 1 TABLET(10 MG) BY MOUTH EVERY DAY    loratadine 10 mg Cap Take 1 tablet by mouth once daily.    meloxicam (MOBIC) 15 MG tablet TAKE 1 TABLET(15 MG) BY MOUTH EVERY DAY    omeprazole (PRILOSEC) 40 MG capsule Take 1 capsule (40 mg total) by mouth once daily.    prednisone 5 mg TbEC Take by mouth.    sumatriptan (IMITREX) 100 MG tablet Take 1 tablet (100 mg total) by mouth once as needed for Migraine.    zolpidem (AMBIEN) 10 mg Tab TAKE 1 TABLET(10 MG) BY MOUTH EVERY NIGHT AS NEEDED    zolpidem (AMBIEN) 10 mg Tab Take 1 tablet (10 mg total) by mouth nightly as needed (pain).   Last reviewed on 3/8/2022  9:22 AM by Codi Courtney PA-C    Review of patient's allergies indicates:   Allergen Reactions    Cimzia [certolizumab pegol]     Methotrexate analogues     Penicillins     Zyprexa [olanzapine]     Last reviewed on  5/20/2022 2:39 PM by Becki Russell      Tasks added this encounter   6/24/2022 - Refill Call (Auto Added)   Tasks due within next 3  months   No tasks due.     Ashlyn James, Patient Care Assistant  [facility]  [address]  [facility] [address]  Phone: [phone]  Fax: [phone]

## 2022-06-22 ENCOUNTER — SPECIALTY PHARMACY (OUTPATIENT)
Dept: PHARMACY | Facility: CLINIC | Age: 72
End: 2022-06-22
Payer: MEDICARE

## 2022-06-22 NOTE — TELEPHONE ENCOUNTER
Specialty Pharmacy - Clinical Reassessment    Specialty Medication Orders Linked to Encounter    Flowsheet Row Most Recent Value   Medication #1 adalimumab (HUMIRA,CF, PEN) 40 mg/0.4 mL PnKt (Order#[id number], Rx#[id number])        Patient Diagnosis   L40.50 - Psoriatic arthritis    Specialty clinical pharmacist review completed for an annual review of reassessment. Reviewed the following areas: current med list, reports of adverse effects, adherence and progress towards therapeutic goals.    Recommendations: none at this time.    Tasks added this encounter   3/22/2023 - Clinical - Follow Up Assesement (Annual)   Tasks due within next 3 months   6/24/2022 - Refill Call (Auto Added)     Lesvia Kyle, PharmD  [facility]  [address] [facility] [address]  Phone: [phone]  Fax: [phone]

## 2022-06-30 ENCOUNTER — SPECIALTY PHARMACY (OUTPATIENT)
Dept: PHARMACY | Facility: CLINIC | Age: 72
End: 2022-06-30
Payer: MEDICARE

## 2022-06-30 NOTE — TELEPHONE ENCOUNTER
Specialty Pharmacy - Refill Coordination    Specialty Medication Orders Linked to Encounter    Flowsheet Row Most Recent Value   Medication #1 adalimumab (HUMIRA,CF, PEN) 40 mg/0.4 mL PnKt (Order#[id number], Rx#[id number])          Refill Questions - Documented Responses    Flowsheet Row Most Recent Value   Patient Availability and HIPAA Verification    Does patient want to proceed with activity? Yes   HIPAA/medical authority confirmed? Yes   Relationship to patient of person spoken to? Self   Refill Screening Questions    Changes to allergies? No   Changes to medications? No   New conditions since last clinic visit? No   Unplanned office visit, urgent care, ED, or hospital admission in the last 4 weeks? No   How does patient/caregiver feel medication is working? Excellent   Financial problems or insurance changes? No   How many doses of your specialty medications were missed in the last 4 weeks? 0   Would patient like to speak to a pharmacist? No   When does the patient need to receive the medication? 07/08/22   Refill Delivery Questions    How will the patient receive the medication? Delivery Pam   When does the patient need to receive the medication? 07/08/22   Shipping Address Home   Address in [facility] confirmed and updated if neccessary? Yes   Expected Copay ($) 0   Is the patient able to afford the medication copay? Yes   Payment Method zero copay   Days supply of Refill 28   Supplies needed? No supplies needed   Refill activity completed? Yes   Refill activity plan Refill scheduled   Shipment/Pickup Date: 07/08/22          Current Outpatient Medications   Medication Sig    adalimumab (HUMIRA,CF, PEN) 40 mg/0.4 mL PnKt Inject 0.4 mLs (40 mg total) into the skin every 14 (fourteen) days.    cyclobenzaprine (FLEXERIL) 10 MG tablet Take 1 tablet (10 mg total) by mouth 3 (three) times daily as needed for Muscle spasms.    diclofenac sodium (VOLTAREN) 1 % Gel Apply 2 g topically once daily.     EScitalopram oxalate (LEXAPRO) 20 MG tablet Take 1 tablet (20 mg total) by mouth once daily.    estradioL (ESTRACE) 1 MG tablet TAKE 1 TABLET(1 MG) BY MOUTH EVERY DAY    fluconazole (DIFLUCAN) 50 MG Tab Take 50 mg by mouth daily as needed. As needed for yeast infections    furosemide (LASIX) 20 MG tablet TAKE 1 TABLET(20 MG) BY MOUTH EVERY OTHER DAY    HYDROcodone-acetaminophen (NORCO)  mg per tablet Take 1 tablet by mouth every 8 (eight) hours as needed for Pain.    HYDROcodone-acetaminophen (NORCO)  mg per tablet Take 1 tablet by mouth every 8 (eight) hours as needed for Pain.    HYDROcodone-acetaminophen (NORCO)  mg per tablet Take 1 tablet by mouth every 8 (eight) hours as needed for Pain.    levothyroxine (SYNTHROID) 112 MCG tablet Take 1 tablet (112 mcg total) by mouth before breakfast.    levothyroxine (SYNTHROID) 125 MCG tablet TAKE 1 TABLET BY MOUTH EVERY MORNING 30 MINUTES BEFORE BREAKFAST    lisinopriL 10 MG tablet TAKE 1 TABLET(10 MG) BY MOUTH EVERY DAY    loratadine 10 mg Cap Take 1 tablet by mouth once daily.    meloxicam (MOBIC) 15 MG tablet TAKE 1 TABLET(15 MG) BY MOUTH EVERY DAY    omeprazole (PRILOSEC) 40 MG capsule Take 1 capsule (40 mg total) by mouth once daily.    prednisone 5 mg TbEC Take by mouth.    sumatriptan (IMITREX) 100 MG tablet Take 1 tablet (100 mg total) by mouth once as needed for Migraine.    zolpidem (AMBIEN) 10 mg Tab TAKE 1 TABLET(10 MG) BY MOUTH EVERY NIGHT AS NEEDED    zolpidem (AMBIEN) 10 mg Tab Take 1 tablet (10 mg total) by mouth nightly as needed (pain).   Last reviewed on 3/8/2022  9:22 AM by Codi Courtney PA-C    Review of patient's allergies indicates:   Allergen Reactions    Cimzia [certolizumab pegol]     Methotrexate analogues     Penicillins     Zyprexa [olanzapine]     Last reviewed on  5/20/2022 2:39 PM by Becki Russell      Tasks added this encounter   7/28/2022 - Refill Call (Auto Added)   Tasks due within next 3  months   No tasks due.     Haider Solano, PharmD  [facility]  [address]  [facility] [address]  Phone: [phone]  Fax: [phone]

## 2022-07-01 ENCOUNTER — LAB VISIT (OUTPATIENT)
Dept: LAB | Facility: HOSPITAL | Age: 72
End: 2022-07-01
Attending: PHYSICIAN ASSISTANT
Payer: MEDICARE

## 2022-07-01 DIAGNOSIS — L40.50 PSORIATIC ARTHRITIS: ICD-10-CM

## 2022-07-01 LAB
ALBUMIN SERPL BCP-MCNC: 3.7 G/DL (ref 3.5–5.2)
ALP SERPL-CCNC: 65 U/L (ref 55–135)
ALT SERPL W/O P-5'-P-CCNC: 22 U/L (ref 10–44)
ANION GAP SERPL CALC-SCNC: 8 MMOL/L (ref 8–16)
AST SERPL-CCNC: 33 U/L (ref 10–40)
BASOPHILS # BLD AUTO: 0.07 K/UL (ref 0–0.2)
BASOPHILS NFR BLD: 1.3 % (ref 0–1.9)
BILIRUB SERPL-MCNC: 0.6 MG/DL (ref 0.1–1)
BUN SERPL-MCNC: 11 MG/DL (ref 8–23)
CALCIUM SERPL-MCNC: 8.8 MG/DL (ref 8.7–10.5)
CHLORIDE SERPL-SCNC: 100 MMOL/L (ref 95–110)
CO2 SERPL-SCNC: 27 MMOL/L (ref 23–29)
CREAT SERPL-MCNC: 0.7 MG/DL (ref 0.5–1.4)
CRP SERPL-MCNC: 4.8 MG/L (ref 0–8.2)
DIFFERENTIAL METHOD: ABNORMAL
EOSINOPHIL # BLD AUTO: 0.3 K/UL (ref 0–0.5)
EOSINOPHIL NFR BLD: 4.5 % (ref 0–8)
ERYTHROCYTE [DISTWIDTH] IN BLOOD BY AUTOMATED COUNT: 14.9 % (ref 11.5–14.5)
ERYTHROCYTE [SEDIMENTATION RATE] IN BLOOD BY PHOTOMETRIC METHOD: 3 MM/HR (ref 0–36)
EST. GFR  (AFRICAN AMERICAN): >60 ML/MIN/1.73 M^2
EST. GFR  (NON AFRICAN AMERICAN): >60 ML/MIN/1.73 M^2
GLUCOSE SERPL-MCNC: 95 MG/DL (ref 70–110)
HCT VFR BLD AUTO: 32.9 % (ref 37–48.5)
HGB BLD-MCNC: 10.5 G/DL (ref 12–16)
IMM GRANULOCYTES # BLD AUTO: 0.01 K/UL (ref 0–0.04)
IMM GRANULOCYTES NFR BLD AUTO: 0.2 % (ref 0–0.5)
LYMPHOCYTES # BLD AUTO: 1.9 K/UL (ref 1–4.8)
LYMPHOCYTES NFR BLD: 33.3 % (ref 18–48)
MCH RBC QN AUTO: 28.7 PG (ref 27–31)
MCHC RBC AUTO-ENTMCNC: 31.9 G/DL (ref 32–36)
MCV RBC AUTO: 90 FL (ref 82–98)
MONOCYTES # BLD AUTO: 0.4 K/UL (ref 0.3–1)
MONOCYTES NFR BLD: 7.6 % (ref 4–15)
NEUTROPHILS # BLD AUTO: 3 K/UL (ref 1.8–7.7)
NEUTROPHILS NFR BLD: 53.1 % (ref 38–73)
NRBC BLD-RTO: 0 /100 WBC
PLATELET # BLD AUTO: 302 K/UL (ref 150–450)
PMV BLD AUTO: 11.2 FL (ref 9.2–12.9)
POTASSIUM SERPL-SCNC: 4.3 MMOL/L (ref 3.5–5.1)
PROT SERPL-MCNC: 7.1 G/DL (ref 6–8.4)
RBC # BLD AUTO: 3.66 M/UL (ref 4–5.4)
SODIUM SERPL-SCNC: 135 MMOL/L (ref 136–145)
WBC # BLD AUTO: 5.55 K/UL (ref 3.9–12.7)

## 2022-07-01 PROCEDURE — 80053 COMPREHEN METABOLIC PANEL: CPT | Performed by: PHYSICIAN ASSISTANT

## 2022-07-01 PROCEDURE — 36415 COLL VENOUS BLD VENIPUNCTURE: CPT | Mod: PO | Performed by: PHYSICIAN ASSISTANT

## 2022-07-01 PROCEDURE — 85652 RBC SED RATE AUTOMATED: CPT | Performed by: PHYSICIAN ASSISTANT

## 2022-07-01 PROCEDURE — 85025 COMPLETE CBC W/AUTO DIFF WBC: CPT | Performed by: PHYSICIAN ASSISTANT

## 2022-07-01 PROCEDURE — 86140 C-REACTIVE PROTEIN: CPT | Performed by: PHYSICIAN ASSISTANT

## 2022-07-11 ENCOUNTER — OFFICE VISIT (OUTPATIENT)
Dept: RHEUMATOLOGY | Facility: CLINIC | Age: 72
End: 2022-07-11
Payer: MEDICARE

## 2022-07-11 VITALS
HEIGHT: 67 IN | BODY MASS INDEX: 28.72 KG/M2 | SYSTOLIC BLOOD PRESSURE: 116 MMHG | HEART RATE: 76 BPM | WEIGHT: 183 LBS | DIASTOLIC BLOOD PRESSURE: 75 MMHG

## 2022-07-11 DIAGNOSIS — M80.08XA AGE-RELATED OSTEOPOROSIS WITH CURRENT PATHOLOGICAL FRACTURE, VERTEBRA(E), INITIAL ENCOUNTER FOR FRACTURE: ICD-10-CM

## 2022-07-11 DIAGNOSIS — M05.711 RHEUMATOID ARTHRITIS INVOLVING RIGHT SHOULDER WITH POSITIVE RHEUMATOID FACTOR: ICD-10-CM

## 2022-07-11 DIAGNOSIS — G47.00 INSOMNIA, UNSPECIFIED TYPE: ICD-10-CM

## 2022-07-11 DIAGNOSIS — G89.4 CHRONIC PAIN SYNDROME: Primary | ICD-10-CM

## 2022-07-11 DIAGNOSIS — Z00.00 ANNUAL PHYSICAL EXAM: ICD-10-CM

## 2022-07-11 DIAGNOSIS — L40.50 PSORIATIC ARTHRITIS: ICD-10-CM

## 2022-07-11 DIAGNOSIS — D64.9 ANEMIA, UNSPECIFIED TYPE: ICD-10-CM

## 2022-07-11 DIAGNOSIS — Z12.31 ENCOUNTER FOR SCREENING MAMMOGRAM FOR MALIGNANT NEOPLASM OF BREAST: ICD-10-CM

## 2022-07-11 PROCEDURE — 3074F SYST BP LT 130 MM HG: CPT | Mod: CPTII,S$GLB,, | Performed by: INTERNAL MEDICINE

## 2022-07-11 PROCEDURE — 3078F DIAST BP <80 MM HG: CPT | Mod: CPTII,S$GLB,, | Performed by: INTERNAL MEDICINE

## 2022-07-11 PROCEDURE — 99999 PR PBB SHADOW E&M-EST. PATIENT-LVL IV: CPT | Mod: PBBFAC,,, | Performed by: INTERNAL MEDICINE

## 2022-07-11 PROCEDURE — 1101F PT FALLS ASSESS-DOCD LE1/YR: CPT | Mod: CPTII,S$GLB,, | Performed by: INTERNAL MEDICINE

## 2022-07-11 PROCEDURE — 99999 PR PBB SHADOW E&M-EST. PATIENT-LVL IV: ICD-10-PCS | Mod: PBBFAC,,, | Performed by: INTERNAL MEDICINE

## 2022-07-11 PROCEDURE — 3008F BODY MASS INDEX DOCD: CPT | Mod: CPTII,S$GLB,, | Performed by: INTERNAL MEDICINE

## 2022-07-11 PROCEDURE — 3074F PR MOST RECENT SYSTOLIC BLOOD PRESSURE < 130 MM HG: ICD-10-PCS | Mod: CPTII,S$GLB,, | Performed by: INTERNAL MEDICINE

## 2022-07-11 PROCEDURE — 1125F AMNT PAIN NOTED PAIN PRSNT: CPT | Mod: CPTII,S$GLB,, | Performed by: INTERNAL MEDICINE

## 2022-07-11 PROCEDURE — 99215 PR OFFICE/OUTPT VISIT, EST, LEVL V, 40-54 MIN: ICD-10-PCS | Mod: 25,S$GLB,, | Performed by: INTERNAL MEDICINE

## 2022-07-11 PROCEDURE — 3078F PR MOST RECENT DIASTOLIC BLOOD PRESSURE < 80 MM HG: ICD-10-PCS | Mod: CPTII,S$GLB,, | Performed by: INTERNAL MEDICINE

## 2022-07-11 PROCEDURE — 1125F PR PAIN SEVERITY QUANTIFIED, PAIN PRESENT: ICD-10-PCS | Mod: CPTII,S$GLB,, | Performed by: INTERNAL MEDICINE

## 2022-07-11 PROCEDURE — 96372 PR INJECTION,THERAP/PROPH/DIAG2ST, IM OR SUBCUT: ICD-10-PCS | Mod: S$GLB,,, | Performed by: INTERNAL MEDICINE

## 2022-07-11 PROCEDURE — 4010F ACE/ARB THERAPY RXD/TAKEN: CPT | Mod: CPTII,S$GLB,, | Performed by: INTERNAL MEDICINE

## 2022-07-11 PROCEDURE — 1101F PR PT FALLS ASSESS DOC 0-1 FALLS W/OUT INJ PAST YR: ICD-10-PCS | Mod: CPTII,S$GLB,, | Performed by: INTERNAL MEDICINE

## 2022-07-11 PROCEDURE — 99215 OFFICE O/P EST HI 40 MIN: CPT | Mod: 25,S$GLB,, | Performed by: INTERNAL MEDICINE

## 2022-07-11 PROCEDURE — 4010F PR ACE/ARB THEARPY RXD/TAKEN: ICD-10-PCS | Mod: CPTII,S$GLB,, | Performed by: INTERNAL MEDICINE

## 2022-07-11 PROCEDURE — 3288F PR FALLS RISK ASSESSMENT DOCUMENTED: ICD-10-PCS | Mod: CPTII,S$GLB,, | Performed by: INTERNAL MEDICINE

## 2022-07-11 PROCEDURE — 96372 THER/PROPH/DIAG INJ SC/IM: CPT | Mod: S$GLB,,, | Performed by: INTERNAL MEDICINE

## 2022-07-11 PROCEDURE — 1159F PR MEDICATION LIST DOCUMENTED IN MEDICAL RECORD: ICD-10-PCS | Mod: CPTII,S$GLB,, | Performed by: INTERNAL MEDICINE

## 2022-07-11 PROCEDURE — 1159F MED LIST DOCD IN RCRD: CPT | Mod: CPTII,S$GLB,, | Performed by: INTERNAL MEDICINE

## 2022-07-11 PROCEDURE — 3008F PR BODY MASS INDEX (BMI) DOCUMENTED: ICD-10-PCS | Mod: CPTII,S$GLB,, | Performed by: INTERNAL MEDICINE

## 2022-07-11 PROCEDURE — 3288F FALL RISK ASSESSMENT DOCD: CPT | Mod: CPTII,S$GLB,, | Performed by: INTERNAL MEDICINE

## 2022-07-11 RX ORDER — CYANOCOBALAMIN 1000 UG/ML
1000 INJECTION, SOLUTION INTRAMUSCULAR; SUBCUTANEOUS
Status: COMPLETED | OUTPATIENT
Start: 2022-07-11 | End: 2022-07-11

## 2022-07-11 RX ORDER — METHYLPREDNISOLONE ACETATE 80 MG/ML
160 INJECTION, SUSPENSION INTRA-ARTICULAR; INTRALESIONAL; INTRAMUSCULAR; SOFT TISSUE
Status: COMPLETED | OUTPATIENT
Start: 2022-07-11 | End: 2022-07-11

## 2022-07-11 RX ORDER — ESTRADIOL 1 MG/1
TABLET ORAL
Qty: 90 TABLET | Refills: 1 | Status: SHIPPED | OUTPATIENT
Start: 2022-07-11 | End: 2023-02-23

## 2022-07-11 RX ORDER — HYDROCODONE BITARTRATE AND ACETAMINOPHEN 10; 325 MG/1; MG/1
1 TABLET ORAL EVERY 8 HOURS PRN
Qty: 90 TABLET | Refills: 0 | Status: SHIPPED | OUTPATIENT
Start: 2022-08-11 | End: 2022-09-10

## 2022-07-11 RX ORDER — HYDROCODONE BITARTRATE AND ACETAMINOPHEN 10; 325 MG/1; MG/1
1 TABLET ORAL EVERY 8 HOURS PRN
Qty: 90 TABLET | Refills: 0 | Status: SHIPPED | OUTPATIENT
Start: 2022-07-11 | End: 2022-09-27 | Stop reason: SDUPTHER

## 2022-07-11 RX ORDER — HYDROCODONE BITARTRATE AND ACETAMINOPHEN 10; 325 MG/1; MG/1
1 TABLET ORAL EVERY 8 HOURS PRN
Qty: 90 TABLET | Refills: 0 | Status: SHIPPED | OUTPATIENT
Start: 2022-09-09 | End: 2022-10-09

## 2022-07-11 RX ORDER — KETOROLAC TROMETHAMINE 30 MG/ML
60 INJECTION, SOLUTION INTRAMUSCULAR; INTRAVENOUS
Status: COMPLETED | OUTPATIENT
Start: 2022-07-11 | End: 2022-07-11

## 2022-07-11 RX ADMIN — KETOROLAC TROMETHAMINE 60 MG: 30 INJECTION, SOLUTION INTRAMUSCULAR; INTRAVENOUS at 05:07

## 2022-07-11 RX ADMIN — METHYLPREDNISOLONE ACETATE 160 MG: 80 INJECTION, SUSPENSION INTRA-ARTICULAR; INTRALESIONAL; INTRAMUSCULAR; SOFT TISSUE at 05:07

## 2022-07-11 RX ADMIN — CYANOCOBALAMIN 1000 MCG: 1000 INJECTION, SOLUTION INTRAMUSCULAR; SUBCUTANEOUS at 05:07

## 2022-07-11 NOTE — PROGRESS NOTES
Subjective:       Patient ID: Rose Goff is a 71 y.o. female.    Chief Complaint: Disease Management    Follow up: 71 year old female who presents to clinic for follow up on psoriatic arthritis.  She is doing fair overall, but has good and bad days depending on the weather. Currently on Humira, mobic, norco, and prednisone PRN. She has intermittent joint pain involving MCPs, PIPs, wrists, hips, and knees. AM stiffness can last up to 2-3 hours. Overall, she feels Humira is working well. She has chronic low back pain secondary to OA and is taking norco TID PRN with good relief of pain. She is taking ambien for insomnia and this is working well.     1 infection since her last visit--sinusitis. No covid infection. Vaccinated and boosted for covid.    Had MULLEN and underwent cardiac eval---echo consistent with diastolic dysfunction grade 2, stress test normal. Added lasix 20 mg QOD. Sx have resolved.     Current tx:  1. Humira  2. Mobic  3. Norco    Prior treatments:   Enbrel, Cimzia, Simponi, MTX, Cosentyx             She complains of joint swelling. Associated symptoms include fatigue and myalgias. Pertinent negatives include no fever or headaches.         Review of Systems   Constitutional: Positive for activity change and fatigue. Negative for appetite change, chills and fever.   Eyes: Negative for visual disturbance.   Respiratory: Negative for cough.    Cardiovascular: Negative for chest pain and palpitations.   Gastrointestinal: Negative for abdominal pain and constipation.   Musculoskeletal: Positive for arthralgias, joint swelling and myalgias. Negative for neck pain and neck stiffness.   Skin: Negative for rash.   Allergic/Immunologic: Positive for immunocompromised state.   Neurological: Negative for dizziness, weakness, light-headedness and headaches.   Psychiatric/Behavioral: Negative for dysphoric mood and sleep disturbance. The patient is not nervous/anxious.          Objective:     Vitals:     07/11/22 1530   BP: 116/75   Pulse: 76       Past Medical History:   Diagnosis Date    Abnormal mammogram     Amaurosis fugax     Anemia     Back pain     Chest pain     Degenerative cervical disc     Degenerative disc disease, thoracic     Depression     Depression     Fatigue     GERD (gastroesophageal reflux disease)     Head ache     Hemorrhoids     Hyperlipidemia     Hypertension     Hypoglycemia     Hypothyroidism     Hypothyroidism     Insomnia     Menopausal state     Neck pain     Neuralgia     Nonrheumatic tricuspid valve disorder     Optic neuritis     Palpitations     PONV (postoperative nausea and vomiting)     Psoriasis     Psoriatic arthropathy     Rosacea     Thyroid nodule      Past Surgical History:   Procedure Laterality Date    ANKLE FRACTURE SURGERY      APPENDECTOMY      BREAST BIOPSY Left     Negative    HYSTERECTOMY      OOPHORECTOMY      RHIZOTOMY W/ RADIOFREQUENCY ABLATION Right     lumbar    thyroid tumor removed            Physical Exam   Constitutional: She is oriented to person, place, and time.   Eyes: Right conjunctiva is not injected. Left conjunctiva is not injected. Right eye exhibits normal extraocular motion. Left eye exhibits normal extraocular motion.   Neck: No JVD present. No thyromegaly present.   Cardiovascular: Normal rate and regular rhythm. Exam reveals no decreased pulses.   Pulmonary/Chest: She has no wheezes. She has no rhonchi. She has no rales.   Musculoskeletal:      Right shoulder: Tenderness present.      Left shoulder: Tenderness present.      Right elbow: Tenderness present.      Left elbow: Tenderness present.      Right wrist: Tenderness present.      Left wrist: Tenderness present.      Right knee: Swelling present. Tenderness present.      Left knee: Swelling present. Tenderness present.   Lymphadenopathy:     She has no cervical adenopathy.   Neurological: She is alert and oriented to person, place, and time. Gait  normal.   Skin: No rash noted.   Psychiatric: Mood and affect normal.       Right Side Rheumatological Exam     Examination finds the 3rd MCP, 4th MCP and 5th MCP normal.    The patient is tender to palpation of the shoulder, elbow, wrist, knee, 1st PIP, 1st MCP, 2nd PIP, 2nd MCP, 3rd PIP, 4th PIP and 5th PIP    She has swelling of the knee, 1st MCP, 2nd PIP, 2nd MCP, 3rd PIP, 4th PIP and 5th PIP    Left Side Rheumatological Exam     Examination finds the 3rd MCP, 4th MCP and 5th MCP normal.    The patient is tender to palpation of the shoulder, elbow, wrist, knee, 1st PIP, 1st MCP, 2nd PIP, 2nd MCP, 3rd PIP, 4th PIP and 5th PIP.    She has swelling of the knee, 1st MCP and 2nd MCP          Results for orders placed or performed in visit on 07/01/22   CBC Auto Differential   Result Value Ref Range    WBC 5.55 3.90 - 12.70 K/uL    RBC 3.66 (L) 4.00 - 5.40 M/uL    Hemoglobin 10.5 (L) 12.0 - 16.0 g/dL    Hematocrit 32.9 (L) 37.0 - 48.5 %    MCV 90 82 - 98 fL    MCH 28.7 27.0 - 31.0 pg    MCHC 31.9 (L) 32.0 - 36.0 g/dL    RDW 14.9 (H) 11.5 - 14.5 %    Platelets 302 150 - 450 K/uL    MPV 11.2 9.2 - 12.9 fL    Immature Granulocytes 0.2 0.0 - 0.5 %    Gran # (ANC) 3.0 1.8 - 7.7 K/uL    Immature Grans (Abs) 0.01 0.00 - 0.04 K/uL    Lymph # 1.9 1.0 - 4.8 K/uL    Mono # 0.4 0.3 - 1.0 K/uL    Eos # 0.3 0.0 - 0.5 K/uL    Baso # 0.07 0.00 - 0.20 K/uL    nRBC 0 0 /100 WBC    Gran % 53.1 38.0 - 73.0 %    Lymph % 33.3 18.0 - 48.0 %    Mono % 7.6 4.0 - 15.0 %    Eosinophil % 4.5 0.0 - 8.0 %    Basophil % 1.3 0.0 - 1.9 %    Differential Method Automated    Comprehensive Metabolic Panel   Result Value Ref Range    Sodium 135 (L) 136 - 145 mmol/L    Potassium 4.3 3.5 - 5.1 mmol/L    Chloride 100 95 - 110 mmol/L    CO2 27 23 - 29 mmol/L    Glucose 95 70 - 110 mg/dL    BUN 11 8 - 23 mg/dL    Creatinine 0.7 0.5 - 1.4 mg/dL    Calcium 8.8 8.7 - 10.5 mg/dL    Total Protein 7.1 6.0 - 8.4 g/dL    Albumin 3.7 3.5 - 5.2 g/dL    Total Bilirubin  0.6 0.1 - 1.0 mg/dL    Alkaline Phosphatase 65 55 - 135 U/L    AST 33 10 - 40 U/L    ALT 22 10 - 44 U/L    Anion Gap 8 8 - 16 mmol/L    eGFR if African American >60.0 >60 mL/min/1.73 m^2    eGFR if non African American >60.0 >60 mL/min/1.73 m^2   C-Reactive Protein   Result Value Ref Range    CRP 4.8 0.0 - 8.2 mg/L   Sedimentation rate   Result Value Ref Range    Sed Rate 3 0 - 36 mm/Hr         Assessment:       1. Chronic pain syndrome    2. Psoriatic arthritis    3. Rheumatoid arthritis involving right shoulder with positive rheumatoid factor    4. Annual physical exam    5. Encounter for screening mammogram for malignant neoplasm of breast     6. Age-related osteoporosis with current pathological fracture, vertebra(e), initial encounter for fracture     7. Anemia, unspecified type            Plan:       Chronic pain syndrome  -     methylPREDNISolone acetate injection 160 mg  -     ketorolac injection 60 mg  -     cyanocobalamin injection 1,000 mcg  -     Iron and TIBC; Future; Expected date: 07/11/2022  -     Ferritin; Future; Expected date: 07/11/2022    Psoriatic arthritis  -     methylPREDNISolone acetate injection 160 mg  -     ketorolac injection 60 mg  -     cyanocobalamin injection 1,000 mcg  -     Iron and TIBC; Future; Expected date: 07/11/2022  -     Ferritin; Future; Expected date: 07/11/2022    Rheumatoid arthritis involving right shoulder with positive rheumatoid factor  -     methylPREDNISolone acetate injection 160 mg  -     ketorolac injection 60 mg  -     cyanocobalamin injection 1,000 mcg  -     Iron and TIBC; Future; Expected date: 07/11/2022  -     Ferritin; Future; Expected date: 07/11/2022    Annual physical exam  -     Mammo Digital Screening Bilat w/ Fish; Future; Expected date: 07/11/2022  -     DXA Bone Density Spine And Hip; Future; Expected date: 07/11/2022  -     Iron and TIBC; Future; Expected date: 07/11/2022  -     Ferritin; Future; Expected date: 07/11/2022    Encounter for  screening mammogram for malignant neoplasm of breast   -     Mammo Digital Screening Bilat w/ Fish; Future; Expected date: 07/11/2022  -     DXA Bone Density Spine And Hip; Future; Expected date: 07/11/2022  -     Iron and TIBC; Future; Expected date: 07/11/2022  -     Ferritin; Future; Expected date: 07/11/2022    Age-related osteoporosis with current pathological fracture, vertebra(e), initial encounter for fracture   -     DXA Bone Density Spine And Hip; Future; Expected date: 07/11/2022    Anemia, unspecified type  -     Iron and TIBC; Future; Expected date: 07/11/2022  -     Ferritin; Future; Expected date: 07/11/2022          Assessment:  71 year old female with   Psoriatic arthritis, rheumatoid arthritis on Humira  --chronic pain syndrome on norco  --insomnia on ambien  --iron deficiency anemia, stable  --grade 2 diastolic dysfunction    Plan:  1. toradol 60, depo 160, b12 today for flare  2. Cont. Humira   3. Cont. norco 10/325 TID p I have checked [place] prescription monitoring program site and no unusual or abnormal behavior has occurred pt understand the risk and benefits of taking opioid medications and has decided to continue the  Medication. 4. Cont. mobic 15 mg, cont. Flexeril PRN  5. Lisinopril refilled for pcp

## 2022-07-11 NOTE — PROGRESS NOTES
2 pt identifiers used  Allergies reviewed      Administered 2 cc ( 30 mg/ml ) of toradol to the left upper outer gluteal. Patient tolerated injections well. Informed of s/s to report verbalized understanding. No adverse reactions noted.       Administered 1 cc ( 1000 mcg/ml ) of b12 to the right upper outer arm. Informed of s/s to report verbalized understanding. No adverse reactions noted.      Administered 2 cc ( 80 mg/ml ) of depomedrol to the right upper outer gluteal. Informed of s/s to report verbalized understanding. No adverse reactions noted.      Left facility in stable condition.

## 2022-07-28 ENCOUNTER — PATIENT MESSAGE (OUTPATIENT)
Dept: PHARMACY | Facility: CLINIC | Age: 72
End: 2022-07-28
Payer: MEDICARE

## 2022-08-01 ENCOUNTER — SPECIALTY PHARMACY (OUTPATIENT)
Dept: PHARMACY | Facility: CLINIC | Age: 72
End: 2022-08-01
Payer: MEDICARE

## 2022-08-01 NOTE — TELEPHONE ENCOUNTER
Specialty Pharmacy - Refill Coordination    Specialty Medication Orders Linked to Encounter    Flowsheet Row Most Recent Value   Medication #1 adalimumab (HUMIRA,CF, PEN) 40 mg/0.4 mL PnKt (Order#[id number], Rx#[id number])          Refill Questions - Documented Responses    Flowsheet Row Most Recent Value   Patient Availability and HIPAA Verification    Does patient want to proceed with activity? Yes   HIPAA/medical authority confirmed? Yes   Relationship to patient of person spoken to? Self   Refill Screening Questions    Changes to allergies? No   Changes to medications? No   New conditions since last clinic visit? No   Unplanned office visit, urgent care, ED, or hospital admission in the last 4 weeks? No   How does patient/caregiver feel medication is working? Very good   Financial problems or insurance changes? No   How many doses of your specialty medications were missed in the last 4 weeks? 0   Would patient like to speak to a pharmacist? No   When does the patient need to receive the medication? 08/11/22   Refill Delivery Questions    How will the patient receive the medication? Delivery Pam   When does the patient need to receive the medication? 08/11/22   Shipping Address Home   Address in [facility] confirmed and updated if neccessary? Yes   Expected Copay ($) 0   Is the patient able to afford the medication copay? Yes   Payment Method zero copay   Days supply of Refill 28   Supplies needed? No supplies needed   Refill activity completed? Yes   Refill activity plan Refill scheduled   Shipment/Pickup Date: 08/11/22          Current Outpatient Medications   Medication Sig    adalimumab (HUMIRA,CF, PEN) 40 mg/0.4 mL PnKt Inject 0.4 mLs (40 mg total) into the skin every 14 (fourteen) days.    cyclobenzaprine (FLEXERIL) 10 MG tablet Take 1 tablet (10 mg total) by mouth 3 (three) times daily as needed for Muscle spasms.    diclofenac sodium (VOLTAREN) 1 % Gel Apply 2 g topically once daily.     EScitalopram oxalate (LEXAPRO) 20 MG tablet Take 1 tablet (20 mg total) by mouth once daily.    estradioL (ESTRACE) 1 MG tablet TAKE 1 TABLET(1 MG) BY MOUTH EVERY DAY    fluconazole (DIFLUCAN) 50 MG Tab Take 50 mg by mouth daily as needed. As needed for yeast infections    furosemide (LASIX) 20 MG tablet TAKE 1 TABLET(20 MG) BY MOUTH EVERY OTHER DAY    HYDROcodone-acetaminophen (NORCO)  mg per tablet Take 1 tablet by mouth every 8 (eight) hours as needed for Pain.    [START ON 8/11/2022] HYDROcodone-acetaminophen (NORCO)  mg per tablet Take 1 tablet by mouth every 8 (eight) hours as needed for Pain.    [START ON 9/9/2022] HYDROcodone-acetaminophen (NORCO)  mg per tablet Take 1 tablet by mouth every 8 (eight) hours as needed for Pain.    levothyroxine (SYNTHROID) 112 MCG tablet Take 1 tablet (112 mcg total) by mouth before breakfast.    levothyroxine (SYNTHROID) 125 MCG tablet TAKE 1 TABLET BY MOUTH EVERY MORNING 30 MINUTES BEFORE BREAKFAST    lisinopriL 10 MG tablet TAKE 1 TABLET(10 MG) BY MOUTH EVERY DAY    loratadine 10 mg Cap Take 1 tablet by mouth once daily.    meloxicam (MOBIC) 15 MG tablet TAKE 1 TABLET(15 MG) BY MOUTH EVERY DAY    omeprazole (PRILOSEC) 40 MG capsule Take 1 capsule (40 mg total) by mouth once daily.    prednisone 5 mg TbEC Take by mouth.    sumatriptan (IMITREX) 100 MG tablet Take 1 tablet (100 mg total) by mouth once as needed for Migraine.    zolpidem (AMBIEN) 10 mg Tab TAKE 1 TABLET(10 MG) BY MOUTH EVERY NIGHT AS NEEDED    zolpidem (AMBIEN) 10 mg Tab Take 1 tablet (10 mg total) by mouth nightly as needed (pain).   Last reviewed on 7/11/2022  3:32 PM by Therese Chapin MA    Review of patient's allergies indicates:   Allergen Reactions    Cimzia [certolizumab pegol]     Methotrexate analogues     Penicillins     Zyprexa [olanzapine]     Last reviewed on  7/11/2022 3:32 PM by Therese Chapin      Tasks added this encounter   9/1/2022 - Refill Call (Auto  Added)   Tasks due within next 3 months   No tasks due.     [facility]  [address]  [facility] [address]  Phone: [phone]  Fax: [phone]

## 2022-09-01 ENCOUNTER — PATIENT MESSAGE (OUTPATIENT)
Dept: PHARMACY | Facility: CLINIC | Age: 72
End: 2022-09-01
Payer: MEDICARE

## 2022-09-06 ENCOUNTER — SPECIALTY PHARMACY (OUTPATIENT)
Dept: PHARMACY | Facility: CLINIC | Age: 72
End: 2022-09-06
Payer: MEDICARE

## 2022-09-06 NOTE — TELEPHONE ENCOUNTER
Specialty Pharmacy - Refill Coordination    Specialty Medication Orders Linked to Encounter      Flowsheet Row Most Recent Value   Medication #1 adalimumab (HUMIRA,CF, PEN) 40 mg/0.4 mL PnKt (Order#[id number], Rx#[id number])            Refill Questions - Documented Responses      Flowsheet Row Most Recent Value   Patient Availability and HIPAA Verification    Does patient want to proceed with activity? Yes   HIPAA/medical authority confirmed? Yes   Relationship to patient of person spoken to? Self   Refill Screening Questions    Changes to allergies? No   Changes to medications? No   New conditions since last clinic visit? No   Unplanned office visit, urgent care, ED, or hospital admission in the last 4 weeks? No   How does patient/caregiver feel medication is working? Good   Financial problems or insurance changes? No   How many doses of your specialty medications were missed in the last 4 weeks? 0   Would patient like to speak to a pharmacist? No   When does the patient need to receive the medication? 09/16/22   Refill Delivery Questions    When does the patient need to receive the medication? 09/16/22   Shipping Address Home   Address in [facility] confirmed and updated if neccessary? Yes   Expected Copay ($) 0   Is the patient able to afford the medication copay? Yes   Payment Method zero copay   Days supply of Refill 28   Supplies needed? No supplies needed   Refill activity completed? Yes   Refill activity plan Refill scheduled   Shipment/Pickup Date: 09/08/22            Current Outpatient Medications   Medication Sig    adalimumab (HUMIRA,CF, PEN) 40 mg/0.4 mL PnKt Inject 0.4 mLs (40 mg total) into the skin every 14 (fourteen) days.    cyclobenzaprine (FLEXERIL) 10 MG tablet Take 1 tablet (10 mg total) by mouth 3 (three) times daily as needed for Muscle spasms.    diclofenac sodium (VOLTAREN) 1 % Gel Apply 2 g topically once daily.    EScitalopram oxalate (LEXAPRO) 20 MG tablet Take 1 tablet (20 mg  total) by mouth once daily.    estradioL (ESTRACE) 1 MG tablet TAKE 1 TABLET(1 MG) BY MOUTH EVERY DAY    fluconazole (DIFLUCAN) 50 MG Tab Take 50 mg by mouth daily as needed. As needed for yeast infections    furosemide (LASIX) 20 MG tablet TAKE 1 TABLET(20 MG) BY MOUTH EVERY OTHER DAY    HYDROcodone-acetaminophen (NORCO)  mg per tablet Take 1 tablet by mouth every 8 (eight) hours as needed for Pain.    HYDROcodone-acetaminophen (NORCO)  mg per tablet Take 1 tablet by mouth every 8 (eight) hours as needed for Pain.    [START ON 9/9/2022] HYDROcodone-acetaminophen (NORCO)  mg per tablet Take 1 tablet by mouth every 8 (eight) hours as needed for Pain.    levothyroxine (SYNTHROID) 112 MCG tablet Take 1 tablet (112 mcg total) by mouth before breakfast.    levothyroxine (SYNTHROID) 125 MCG tablet TAKE 1 TABLET BY MOUTH EVERY MORNING 30 MINUTES BEFORE BREAKFAST    lisinopriL 10 MG tablet TAKE 1 TABLET(10 MG) BY MOUTH EVERY DAY    loratadine 10 mg Cap Take 1 tablet by mouth once daily.    meloxicam (MOBIC) 15 MG tablet TAKE 1 TABLET(15 MG) BY MOUTH EVERY DAY    omeprazole (PRILOSEC) 40 MG capsule TAKE 1 CAPSULE(40 MG) BY MOUTH EVERY DAY    prednisone 5 mg TbEC Take by mouth.    sumatriptan (IMITREX) 100 MG tablet Take 1 tablet (100 mg total) by mouth once as needed for Migraine.    zolpidem (AMBIEN) 10 mg Tab TAKE 1 TABLET(10 MG) BY MOUTH EVERY NIGHT AS NEEDED FOR PAIN   Last reviewed on 7/11/2022  3:32 PM by Therese Chapin MA    Review of patient's allergies indicates:   Allergen Reactions    Cimzia [certolizumab pegol]     Methotrexate analogues     Penicillins     Zyprexa [olanzapine]     Last reviewed on  7/11/2022 3:32 PM by Therese Chapin      Tasks added this encounter   10/7/2022 - Refill Call (Auto Added)   Tasks due within next 3 months   No tasks due.     Madonna [facility]  [address] [place] [address]  Phone: [phone]  Fax:  [phone]

## 2022-09-14 ENCOUNTER — PATIENT MESSAGE (OUTPATIENT)
Dept: RHEUMATOLOGY | Facility: CLINIC | Age: 72
End: 2022-09-14
Payer: MEDICARE

## 2022-09-15 ENCOUNTER — PATIENT MESSAGE (OUTPATIENT)
Dept: RHEUMATOLOGY | Facility: CLINIC | Age: 72
End: 2022-09-15
Payer: MEDICARE

## 2022-09-27 ENCOUNTER — HOSPITAL ENCOUNTER (OUTPATIENT)
Dept: RADIOLOGY | Facility: HOSPITAL | Age: 72
Discharge: HOME | End: 2022-09-27
Attending: INTERNAL MEDICINE
Payer: MEDICARE

## 2022-09-27 DIAGNOSIS — Z00.00 ANNUAL PHYSICAL EXAM: ICD-10-CM

## 2022-09-27 DIAGNOSIS — M80.08XA AGE-RELATED OSTEOPOROSIS WITH CURRENT PATHOLOGICAL FRACTURE, VERTEBRA(E), INITIAL ENCOUNTER FOR FRACTURE: ICD-10-CM

## 2022-09-27 DIAGNOSIS — Z12.31 ENCOUNTER FOR SCREENING MAMMOGRAM FOR MALIGNANT NEOPLASM OF BREAST: ICD-10-CM

## 2022-09-27 PROCEDURE — 77080 DXA BONE DENSITY AXIAL: CPT | Mod: TC,PO

## 2022-09-27 PROCEDURE — 77080 DXA BONE DENSITY AXIAL: CPT | Mod: 26,,, | Performed by: RADIOLOGY

## 2022-09-27 PROCEDURE — 77080 DEXA BONE DENSITY SPINE HIP: ICD-10-PCS | Mod: 26,,, | Performed by: RADIOLOGY

## 2022-09-27 NOTE — PROGRESS NOTES
I have reviewed your bone density DEXA Scan.  It reveals minimal bone thinning which could benefit from 1200 mg of calcium daily and 800 international units of vitamin D daily are advised, as well as weight bearing activities such as walking and weightlifting can also help maintain bone and muscle strength.

## 2022-10-07 ENCOUNTER — SPECIALTY PHARMACY (OUTPATIENT)
Dept: PHARMACY | Facility: CLINIC | Age: 72
End: 2022-10-07
Payer: MEDICARE

## 2022-10-07 DIAGNOSIS — L40.50 PSORIATIC ARTHRITIS: Primary | ICD-10-CM

## 2022-10-07 NOTE — TELEPHONE ENCOUNTER
Specialty Pharmacy - Refill Coordination    Specialty Medication Orders Linked to Encounter      Flowsheet Row Most Recent Value   Medication #1 adalimumab (HUMIRA,CF, PEN) 40 mg/0.4 mL PnKt (Order#[id number], Rx#[id number])        Addressed in ivent    Refill Questions - Documented Responses      Flowsheet Row Most Recent Value   Patient Availability and HIPAA Verification    Does patient want to proceed with activity? Yes   HIPAA/medical authority confirmed? Yes   Relationship to patient of person spoken to? Self   Refill Screening Questions    Changes to allergies? No   Changes to medications? Yes  [ABX and cough syrup- competed and pt denies s/sx of infection at this time]   New conditions since last clinic visit? No   Unplanned office visit, urgent care, ED, or hospital admission in the last 4 weeks? No   How does patient/caregiver feel medication is working? Good   Financial problems or insurance changes? No   How many doses of your specialty medications were missed in the last 4 weeks? 1  [pt held off on her last dose (due 2 weeks ago) due to feeling sick. Pt reports resolution of symptoms and denies infection at this time]   Why were doses missed? Felt ill or sick   Would patient like to speak to a pharmacist? No   When does the patient need to receive the medication? 10/21/22   Refill Delivery Questions    How will the patient receive the medication? MEDRx   When does the patient need to receive the medication? 10/21/22   Shipping Address Home   Address in [facility] confirmed and updated if neccessary? Yes   Expected Copay ($) 0   Is the patient able to afford the medication copay? Yes   Payment Method zero copay   Days supply of Refill 28   Supplies needed? No supplies needed   Refill activity completed? Yes   Refill activity plan Refill scheduled   Shipment/Pickup Date: 10/13/22            Current Outpatient Medications   Medication Sig    adalimumab (HUMIRA,CF, PEN) 40 mg/0.4 mL PnKt Inject 0.4  mL (40 mg total) into the skin every 14 (fourteen) days.    cyclobenzaprine (FLEXERIL) 10 MG tablet Take 1 tablet (10 mg total) by mouth 3 (three) times daily as needed for Muscle spasms.    diclofenac sodium (VOLTAREN) 1 % Gel Apply 2 g topically once daily.    EScitalopram oxalate (LEXAPRO) 20 MG tablet Take 1 tablet (20 mg total) by mouth once daily.    estradioL (ESTRACE) 1 MG tablet TAKE 1 TABLET(1 MG) BY MOUTH EVERY DAY    fluconazole (DIFLUCAN) 50 MG Tab Take 50 mg by mouth daily as needed. As needed for yeast infections    furosemide (LASIX) 20 MG tablet TAKE 1 TABLET(20 MG) BY MOUTH EVERY OTHER DAY    HYDROcodone-acetaminophen (NORCO)  mg per tablet Take 1 tablet by mouth every 8 (eight) hours as needed for Pain.    HYDROcodone-acetaminophen (NORCO)  mg per tablet Take 1 tablet by mouth every 8 (eight) hours as needed for Pain.    levothyroxine (SYNTHROID) 112 MCG tablet Take 1 tablet (112 mcg total) by mouth before breakfast.    levothyroxine (SYNTHROID) 125 MCG tablet TAKE 1 TABLET BY MOUTH EVERY MORNING 30 MINUTES BEFORE BREAKFAST    lisinopriL 10 MG tablet TAKE 1 TABLET(10 MG) BY MOUTH EVERY DAY    loratadine 10 mg Cap Take 1 tablet by mouth once daily.    meloxicam (MOBIC) 15 MG tablet TAKE 1 TABLET(15 MG) BY MOUTH EVERY DAY    omeprazole (PRILOSEC) 40 MG capsule Take 1 capsule (40 mg total) by mouth once daily.    prednisone 5 mg TbEC Take by mouth.    sumatriptan (IMITREX) 100 MG tablet Take 1 tablet (100 mg total) by mouth once as needed for Migraine.    zolpidem (AMBIEN) 10 mg Tab TAKE 1 TABLET(10 MG) BY MOUTH EVERY NIGHT AS NEEDED FOR PAIN   Last reviewed on 7/11/2022  3:32 PM by Therese Chapin MA    Review of patient's allergies indicates:   Allergen Reactions    Cimzia [certolizumab pegol]     Methotrexate analogues     Penicillins     Zyprexa [olanzapine]     Last reviewed on  9/27/2022 4:20 PM by Becki Russell      Tasks added this encounter   11/11/2022 - Refill Call (Auto  Added)   Tasks due within next 3 months   No tasks due.     Marilee Beltran, PharmD  [facility]  [address]  [facility] [address]  Phone: [phone]  Fax: [phone]

## 2022-11-08 ENCOUNTER — OFFICE VISIT (OUTPATIENT)
Dept: RHEUMATOLOGY | Facility: CLINIC | Age: 72
End: 2022-11-08
Payer: MEDICARE

## 2022-11-08 ENCOUNTER — LAB VISIT (OUTPATIENT)
Dept: LAB | Facility: HOSPITAL | Age: 72
End: 2022-11-08
Payer: MEDICARE

## 2022-11-08 VITALS
HEIGHT: 67 IN | BODY MASS INDEX: 27.94 KG/M2 | WEIGHT: 178 LBS | SYSTOLIC BLOOD PRESSURE: 147 MMHG | DIASTOLIC BLOOD PRESSURE: 82 MMHG | HEART RATE: 70 BPM

## 2022-11-08 DIAGNOSIS — L40.50 PSORIATIC ARTHRITIS: Primary | ICD-10-CM

## 2022-11-08 DIAGNOSIS — D64.9 ANEMIA, UNSPECIFIED TYPE: ICD-10-CM

## 2022-11-08 DIAGNOSIS — G47.00 INSOMNIA, UNSPECIFIED TYPE: ICD-10-CM

## 2022-11-08 DIAGNOSIS — L40.50 PSORIATIC ARTHRITIS: ICD-10-CM

## 2022-11-08 DIAGNOSIS — I10 ESSENTIAL HYPERTENSION: ICD-10-CM

## 2022-11-08 DIAGNOSIS — G89.4 CHRONIC PAIN SYNDROME: ICD-10-CM

## 2022-11-08 DIAGNOSIS — M19.90 OSTEOARTHRITIS, UNSPECIFIED OSTEOARTHRITIS TYPE, UNSPECIFIED SITE: ICD-10-CM

## 2022-11-08 DIAGNOSIS — R12 HEARTBURN: ICD-10-CM

## 2022-11-08 DIAGNOSIS — M05.711 RHEUMATOID ARTHRITIS INVOLVING RIGHT SHOULDER WITH POSITIVE RHEUMATOID FACTOR: ICD-10-CM

## 2022-11-08 DIAGNOSIS — M85.80 OSTEOPENIA, UNSPECIFIED LOCATION: ICD-10-CM

## 2022-11-08 DIAGNOSIS — B02.9 HERPES ZOSTER WITHOUT COMPLICATION: ICD-10-CM

## 2022-11-08 DIAGNOSIS — M80.08XA AGE-RELATED OSTEOPOROSIS WITH CURRENT PATHOLOGICAL FRACTURE, VERTEBRA(E), INITIAL ENCOUNTER FOR FRACTURE: ICD-10-CM

## 2022-11-08 DIAGNOSIS — K21.9 GASTROESOPHAGEAL REFLUX DISEASE, UNSPECIFIED WHETHER ESOPHAGITIS PRESENT: ICD-10-CM

## 2022-11-08 DIAGNOSIS — Z00.00 ANNUAL PHYSICAL EXAM: ICD-10-CM

## 2022-11-08 LAB
25(OH)D3+25(OH)D2 SERPL-MCNC: 43 NG/ML (ref 30–96)
BASOPHILS # BLD AUTO: 0.07 K/UL (ref 0–0.2)
BASOPHILS NFR BLD: 0.9 % (ref 0–1.9)
DIFFERENTIAL METHOD: ABNORMAL
EOSINOPHIL # BLD AUTO: 0.2 K/UL (ref 0–0.5)
EOSINOPHIL NFR BLD: 2.4 % (ref 0–8)
ERYTHROCYTE [DISTWIDTH] IN BLOOD BY AUTOMATED COUNT: 16.5 % (ref 11.5–14.5)
FERRITIN SERPL-MCNC: 5 NG/ML (ref 20–300)
HBV CORE AB SERPL QL IA: NORMAL
HBV SURFACE AB SER-ACNC: <3 MIU/ML
HBV SURFACE AB SER-ACNC: NORMAL M[IU]/ML
HBV SURFACE AG SERPL QL IA: NORMAL
HCT VFR BLD AUTO: 34.1 % (ref 37–48.5)
HCV AB SERPL QL IA: NORMAL
HGB BLD-MCNC: 10.9 G/DL (ref 12–16)
IMM GRANULOCYTES # BLD AUTO: 0.01 K/UL (ref 0–0.04)
IMM GRANULOCYTES NFR BLD AUTO: 0.1 % (ref 0–0.5)
IRON SERPL-MCNC: 23 UG/DL (ref 30–160)
LYMPHOCYTES # BLD AUTO: 2 K/UL (ref 1–4.8)
LYMPHOCYTES NFR BLD: 26.3 % (ref 18–48)
MCH RBC QN AUTO: 29 PG (ref 27–31)
MCHC RBC AUTO-ENTMCNC: 32 G/DL (ref 32–36)
MCV RBC AUTO: 91 FL (ref 82–98)
MONOCYTES # BLD AUTO: 0.5 K/UL (ref 0.3–1)
MONOCYTES NFR BLD: 6.5 % (ref 4–15)
NEUTROPHILS # BLD AUTO: 4.8 K/UL (ref 1.8–7.7)
NEUTROPHILS NFR BLD: 63.8 % (ref 38–73)
NRBC BLD-RTO: 0 /100 WBC
PLATELET # BLD AUTO: 348 K/UL (ref 150–450)
PMV BLD AUTO: 11.2 FL (ref 9.2–12.9)
RBC # BLD AUTO: 3.76 M/UL (ref 4–5.4)
SATURATED IRON: 5 % (ref 20–50)
TOTAL IRON BINDING CAPACITY: 499 UG/DL (ref 250–450)
TRANSFERRIN SERPL-MCNC: 337 MG/DL (ref 200–375)
WBC # BLD AUTO: 7.58 K/UL (ref 3.9–12.7)

## 2022-11-08 PROCEDURE — 4010F ACE/ARB THERAPY RXD/TAKEN: CPT | Mod: CPTII,S$GLB,, | Performed by: PHYSICIAN ASSISTANT

## 2022-11-08 PROCEDURE — 3008F PR BODY MASS INDEX (BMI) DOCUMENTED: ICD-10-PCS | Mod: CPTII,S$GLB,, | Performed by: PHYSICIAN ASSISTANT

## 2022-11-08 PROCEDURE — 1125F PR PAIN SEVERITY QUANTIFIED, PAIN PRESENT: ICD-10-PCS | Mod: CPTII,S$GLB,, | Performed by: PHYSICIAN ASSISTANT

## 2022-11-08 PROCEDURE — 1125F AMNT PAIN NOTED PAIN PRSNT: CPT | Mod: CPTII,S$GLB,, | Performed by: PHYSICIAN ASSISTANT

## 2022-11-08 PROCEDURE — 3077F SYST BP >= 140 MM HG: CPT | Mod: CPTII,S$GLB,, | Performed by: PHYSICIAN ASSISTANT

## 2022-11-08 PROCEDURE — 3079F DIAST BP 80-89 MM HG: CPT | Mod: CPTII,S$GLB,, | Performed by: PHYSICIAN ASSISTANT

## 2022-11-08 PROCEDURE — 99215 OFFICE O/P EST HI 40 MIN: CPT | Mod: 25,S$GLB,, | Performed by: PHYSICIAN ASSISTANT

## 2022-11-08 PROCEDURE — 36415 COLL VENOUS BLD VENIPUNCTURE: CPT | Mod: PO | Performed by: PHYSICIAN ASSISTANT

## 2022-11-08 PROCEDURE — 82306 VITAMIN D 25 HYDROXY: CPT | Performed by: PHYSICIAN ASSISTANT

## 2022-11-08 PROCEDURE — 99999 PR PBB SHADOW E&M-EST. PATIENT-LVL IV: ICD-10-PCS | Mod: PBBFAC,,, | Performed by: PHYSICIAN ASSISTANT

## 2022-11-08 PROCEDURE — 96372 THER/PROPH/DIAG INJ SC/IM: CPT | Mod: S$GLB,,, | Performed by: PHYSICIAN ASSISTANT

## 2022-11-08 PROCEDURE — 96372 PR INJECTION,THERAP/PROPH/DIAG2ST, IM OR SUBCUT: ICD-10-PCS | Mod: S$GLB,,, | Performed by: PHYSICIAN ASSISTANT

## 2022-11-08 PROCEDURE — 84466 ASSAY OF TRANSFERRIN: CPT | Performed by: PHYSICIAN ASSISTANT

## 2022-11-08 PROCEDURE — 3077F PR MOST RECENT SYSTOLIC BLOOD PRESSURE >= 140 MM HG: ICD-10-PCS | Mod: CPTII,S$GLB,, | Performed by: PHYSICIAN ASSISTANT

## 2022-11-08 PROCEDURE — 4010F PR ACE/ARB THEARPY RXD/TAKEN: ICD-10-PCS | Mod: CPTII,S$GLB,, | Performed by: PHYSICIAN ASSISTANT

## 2022-11-08 PROCEDURE — 86480 TB TEST CELL IMMUN MEASURE: CPT | Performed by: PHYSICIAN ASSISTANT

## 2022-11-08 PROCEDURE — 1159F MED LIST DOCD IN RCRD: CPT | Mod: CPTII,S$GLB,, | Performed by: PHYSICIAN ASSISTANT

## 2022-11-08 PROCEDURE — 1159F PR MEDICATION LIST DOCUMENTED IN MEDICAL RECORD: ICD-10-PCS | Mod: CPTII,S$GLB,, | Performed by: PHYSICIAN ASSISTANT

## 2022-11-08 PROCEDURE — 85025 COMPLETE CBC W/AUTO DIFF WBC: CPT | Performed by: PHYSICIAN ASSISTANT

## 2022-11-08 PROCEDURE — 86704 HEP B CORE ANTIBODY TOTAL: CPT | Performed by: PHYSICIAN ASSISTANT

## 2022-11-08 PROCEDURE — 99215 PR OFFICE/OUTPT VISIT, EST, LEVL V, 40-54 MIN: ICD-10-PCS | Mod: 25,S$GLB,, | Performed by: PHYSICIAN ASSISTANT

## 2022-11-08 PROCEDURE — 86706 HEP B SURFACE ANTIBODY: CPT | Performed by: PHYSICIAN ASSISTANT

## 2022-11-08 PROCEDURE — 87340 HEPATITIS B SURFACE AG IA: CPT | Performed by: PHYSICIAN ASSISTANT

## 2022-11-08 PROCEDURE — 82728 ASSAY OF FERRITIN: CPT | Performed by: PHYSICIAN ASSISTANT

## 2022-11-08 PROCEDURE — 3008F BODY MASS INDEX DOCD: CPT | Mod: CPTII,S$GLB,, | Performed by: PHYSICIAN ASSISTANT

## 2022-11-08 PROCEDURE — 3079F PR MOST RECENT DIASTOLIC BLOOD PRESSURE 80-89 MM HG: ICD-10-PCS | Mod: CPTII,S$GLB,, | Performed by: PHYSICIAN ASSISTANT

## 2022-11-08 PROCEDURE — 99999 PR PBB SHADOW E&M-EST. PATIENT-LVL IV: CPT | Mod: PBBFAC,,, | Performed by: PHYSICIAN ASSISTANT

## 2022-11-08 PROCEDURE — 86803 HEPATITIS C AB TEST: CPT | Performed by: PHYSICIAN ASSISTANT

## 2022-11-08 RX ORDER — CYANOCOBALAMIN 1000 UG/ML
1000 INJECTION, SOLUTION INTRAMUSCULAR; SUBCUTANEOUS
Status: COMPLETED | OUTPATIENT
Start: 2022-11-08 | End: 2022-11-08

## 2022-11-08 RX ORDER — VALACYCLOVIR HYDROCHLORIDE 1 G/1
1000 TABLET, FILM COATED ORAL 2 TIMES DAILY
Qty: 60 TABLET | Refills: 2 | Status: SHIPPED | OUTPATIENT
Start: 2022-11-08 | End: 2023-04-10 | Stop reason: SDUPTHER

## 2022-11-08 RX ORDER — CYCLOBENZAPRINE HCL 10 MG
10 TABLET ORAL 3 TIMES DAILY PRN
Qty: 90 TABLET | Refills: 2 | Status: SHIPPED | OUTPATIENT
Start: 2022-11-08

## 2022-11-08 RX ORDER — METHYLPREDNISOLONE ACETATE 80 MG/ML
160 INJECTION, SUSPENSION INTRA-ARTICULAR; INTRALESIONAL; INTRAMUSCULAR; SOFT TISSUE
Status: COMPLETED | OUTPATIENT
Start: 2022-11-08 | End: 2022-11-08

## 2022-11-08 RX ORDER — ONDANSETRON 8 MG/1
8 TABLET, ORALLY DISINTEGRATING ORAL EVERY 6 HOURS PRN
Qty: 45 TABLET | Refills: 2 | Status: SHIPPED | OUTPATIENT
Start: 2022-11-08

## 2022-11-08 RX ORDER — ADALIMUMAB 40MG/0.4ML
40 KIT SUBCUTANEOUS
Qty: 2 PEN | Refills: 11 | Status: SHIPPED | OUTPATIENT
Start: 2022-11-08 | End: 2023-06-13

## 2022-11-08 RX ORDER — KETOROLAC TROMETHAMINE 30 MG/ML
60 INJECTION, SOLUTION INTRAMUSCULAR; INTRAVENOUS
Status: COMPLETED | OUTPATIENT
Start: 2022-11-08 | End: 2022-11-08

## 2022-11-08 RX ORDER — FUROSEMIDE 20 MG/1
20 TABLET ORAL EVERY OTHER DAY
Qty: 30 TABLET | Refills: 3 | Status: SHIPPED | OUTPATIENT
Start: 2022-11-08 | End: 2023-08-11 | Stop reason: SDUPTHER

## 2022-11-08 RX ORDER — PREDNISONE 5 MG/1
5 TABLET ORAL DAILY PRN
Qty: 60 TABLET | Refills: 2 | Status: SHIPPED | OUTPATIENT
Start: 2022-11-08 | End: 2022-12-08

## 2022-11-08 RX ORDER — LISINOPRIL 10 MG/1
TABLET ORAL
Qty: 90 TABLET | Refills: 3 | Status: SHIPPED | OUTPATIENT
Start: 2022-11-08 | End: 2023-11-08 | Stop reason: SDUPTHER

## 2022-11-08 RX ORDER — OMEPRAZOLE 40 MG/1
40 CAPSULE, DELAYED RELEASE ORAL DAILY
Qty: 90 CAPSULE | Refills: 3 | Status: SHIPPED | OUTPATIENT
Start: 2022-11-08 | End: 2023-11-15 | Stop reason: SDUPTHER

## 2022-11-08 RX ORDER — DICLOFENAC SODIUM 10 MG/G
2 GEL TOPICAL DAILY
Qty: 100 G | Refills: 5 | Status: SHIPPED | OUTPATIENT
Start: 2022-11-08

## 2022-11-08 RX ADMIN — CYANOCOBALAMIN 1000 MCG: 1000 INJECTION, SOLUTION INTRAMUSCULAR; SUBCUTANEOUS at 09:11

## 2022-11-08 RX ADMIN — METHYLPREDNISOLONE ACETATE 160 MG: 80 INJECTION, SUSPENSION INTRA-ARTICULAR; INTRALESIONAL; INTRAMUSCULAR; SOFT TISSUE at 09:11

## 2022-11-08 RX ADMIN — KETOROLAC TROMETHAMINE 60 MG: 30 INJECTION, SOLUTION INTRAMUSCULAR; INTRAVENOUS at 09:11

## 2022-11-08 ASSESSMENT — ROUTINE ASSESSMENT OF PATIENT INDEX DATA (RAPID3)
TOTAL RAPID3 SCORE: 4.72
MDHAQ FUNCTION SCORE: 0.8
FATIGUE SCORE: 1.1
PAIN SCORE: 6
PATIENT GLOBAL ASSESSMENT SCORE: 5.5
PSYCHOLOGICAL DISTRESS SCORE: 4.4

## 2022-11-08 NOTE — PROGRESS NOTES
Subjective:       Patient ID: Rose Goff is a 72 y.o. female.    Chief Complaint: Disease Management    Mrs. Goff is a 72 year old female who presents to clinic for follow up on psoriatic arthritis.  She is doing fair overall, but has good and bad days depending on the weather. Currently on Humira, mobic, norco, and prednisone PRN. She has intermittent joint pain involving MCPs, PIPs, wrists, hips, and knees. AM stiffness can last up to 2-3 hours. Overall, she feels Humira is working well. She has chronic low back pain secondary to OA and is taking norco TID PRN with good relief of pain. She is taking ambien for insomnia and this is working well.     She held Humira for 1 month for respiratory infection (negative for covid) and her psoriasis on the scalp returned. She reports increased joint pain off Humira as well.    She is interested in alternative pain control.     Had MULLEN and underwent cardiac eval---echo consistent with diastolic dysfunction grade 2, stress test normal. Added lasix 20 mg QOD. She stopped tx when her symptoms resolved, but she had to resume lasix again recently due to shortness of breath.     She is not able to tolerate oral iron due to constipation. Iron infusion was discussed at last visit with Dr. Benjamin pending lab results.    Current tx:  1. Humira  2. Mobic  3. Norco    Prior treatments:   Enbrel, Cimzia, Simponi, MTX, Cosentyx     Review of Systems   Constitutional:  Positive for activity change. Negative for appetite change, chills, fatigue and fever.   Eyes:  Negative for visual disturbance.   Respiratory:  Negative for cough.    Cardiovascular:  Negative for chest pain and palpitations.   Gastrointestinal:  Negative for abdominal pain and constipation.   Musculoskeletal:  Positive for arthralgias, joint swelling and myalgias. Negative for neck pain and neck stiffness.   Skin:  Negative for rash.   Allergic/Immunologic: Positive for immunocompromised state.   Neurological:   Negative for dizziness, weakness, light-headedness and headaches.   Psychiatric/Behavioral:  Negative for dysphoric mood and sleep disturbance. The patient is not nervous/anxious.        Objective:     Vitals:    11/08/22 0834   BP: (!) 147/82   Pulse: 70         Past Medical History:   Diagnosis Date    Abnormal mammogram     Amaurosis fugax     Anemia     Back pain     Chest pain     Degenerative cervical disc     Degenerative disc disease, thoracic     Depression     Depression     Fatigue     GERD (gastroesophageal reflux disease)     Head ache     Hemorrhoids     Hyperlipidemia     Hypertension     Hypoglycemia     Hypothyroidism     Hypothyroidism     Insomnia     Menopausal state     Neck pain     Neuralgia     Nonrheumatic tricuspid valve disorder     Optic neuritis     Palpitations     PONV (postoperative nausea and vomiting)     Psoriasis     Psoriatic arthropathy     Rosacea     Thyroid nodule      Past Surgical History:   Procedure Laterality Date    ANKLE FRACTURE SURGERY      APPENDECTOMY      BREAST BIOPSY Left     Negative    HYSTERECTOMY      OOPHORECTOMY      RHIZOTOMY W/ RADIOFREQUENCY ABLATION Right     lumbar    thyroid tumor removed            Physical Exam   Constitutional: She is oriented to person, place, and time.   Eyes: Right conjunctiva is not injected. Left conjunctiva is not injected.   Neck: No JVD present. No thyromegaly present.   Cardiovascular: Normal rate and regular rhythm. Exam reveals no decreased pulses.   Pulmonary/Chest: Effort normal.   Musculoskeletal:      Right shoulder: Tenderness present.      Left shoulder: Tenderness present.      Right elbow: Tenderness present.      Left elbow: Tenderness present.      Right wrist: Tenderness present.      Left wrist: Tenderness present.      Right knee: Swelling present. Tenderness present.      Left knee: Swelling present. Tenderness present.   Lymphadenopathy:     She has no cervical adenopathy.   Neurological: She is alert and  oriented to person, place, and time. Gait normal.   Skin: No rash noted.   Psychiatric: Mood and affect normal.       Right Side Rheumatological Exam     Examination finds the 3rd MCP, 4th MCP and 5th MCP normal.    The patient is tender to palpation of the shoulder, elbow, wrist, knee, 1st PIP, 1st MCP, 2nd PIP, 2nd MCP, 3rd PIP, 4th PIP and 5th PIP    She has swelling of the knee, 1st MCP, 2nd PIP, 2nd MCP, 3rd PIP, 4th PIP and 5th PIP    Left Side Rheumatological Exam     Examination finds the 3rd MCP, 4th MCP and 5th MCP normal.    The patient is tender to palpation of the shoulder, elbow, wrist, knee, 1st PIP, 1st MCP, 2nd PIP, 2nd MCP, 3rd PIP, 4th PIP and 5th PIP.    She has swelling of the knee, 1st MCP and 2nd MCP        Labs reviewed:  Component      Latest Ref Rng & Units 7/1/2022   WBC      3.90 - 12.70 K/uL 5.55   RBC      4.00 - 5.40 M/uL 3.66 (L)   Hemoglobin      12.0 - 16.0 g/dL 10.5 (L)   Hematocrit      37.0 - 48.5 % 32.9 (L)   MCV      82 - 98 fL 90   MCH      27.0 - 31.0 pg 28.7   MCHC      32.0 - 36.0 g/dL 31.9 (L)   RDW      11.5 - 14.5 % 14.9 (H)   Platelets      150 - 450 K/uL 302   MPV      9.2 - 12.9 fL 11.2   Immature Granulocytes      0.0 - 0.5 % 0.2   Gran # (ANC)      1.8 - 7.7 K/uL 3.0   Immature Grans (Abs)      0.00 - 0.04 K/uL 0.01   Lymph #      1.0 - 4.8 K/uL 1.9   Mono #      0.3 - 1.0 K/uL 0.4   Eos #      0.0 - 0.5 K/uL 0.3   Baso #      0.00 - 0.20 K/uL 0.07   nRBC      0 /100 WBC 0   Gran %      38.0 - 73.0 % 53.1   Lymph %      18.0 - 48.0 % 33.3   Mono %      4.0 - 15.0 % 7.6   Eosinophil %      0.0 - 8.0 % 4.5   Basophil %      0.0 - 1.9 % 1.3   Differential Method       Automated   Sodium      136 - 145 mmol/L 135 (L)   Potassium      3.5 - 5.1 mmol/L 4.3   Chloride      95 - 110 mmol/L 100   CO2      23 - 29 mmol/L 27   Glucose      70 - 110 mg/dL 95   BUN      8 - 23 mg/dL 11   Creatinine      0.5 - 1.4 mg/dL 0.7   Calcium      8.7 - 10.5 mg/dL 8.8   PROTEIN TOTAL       6.0 - 8.4 g/dL 7.1   Albumin      3.5 - 5.2 g/dL 3.7   BILIRUBIN TOTAL      0.1 - 1.0 mg/dL 0.6   Alkaline Phosphatase      55 - 135 U/L 65   AST      10 - 40 U/L 33   ALT      10 - 44 U/L 22   Anion Gap      8 - 16 mmol/L 8   eGFR if African American      >60 mL/min/1.73 m:2 >60.0   eGFR if non African American      >60 mL/min/1.73 m:2 >60.0   CRP      0.0 - 8.2 mg/L 4.8   Sed Rate      0 - 36 mm/Hr 3     Assessment:       1. Psoriatic arthritis    2. Anemia, unspecified type    3. Essential hypertension    4. Heartburn    5. Herpes zoster without complication    6. Chronic pain syndrome    7. Osteopenia, unspecified location    8. Osteoarthritis, unspecified osteoarthritis type, unspecified site    9. Gastroesophageal reflux disease, unspecified whether esophagitis present              Plan:       Psoriatic arthritis  -     cyclobenzaprine (FLEXERIL) 10 MG tablet; Take 1 tablet (10 mg total) by mouth 3 (three) times daily as needed for Muscle spasms.  Dispense: 90 tablet; Refill: 2  -     adalimumab (HUMIRA,CF, PEN) 40 mg/0.4 mL PnKt; Inject 0.4 mL (40 mg total) into the skin every 14 (fourteen) days.  Dispense: 2 pen; Refill: 11  -     predniSONE (DELTASONE) 5 MG tablet; Take 1 tablet (5 mg total) by mouth daily as needed (arthritis flare).  Dispense: 60 tablet; Refill: 2  -     Ambulatory referral/consult to Pain Clinic; Future; Expected date: 11/15/2022  -     ketorolac injection 60 mg  -     methylPREDNISolone acetate injection 160 mg  -     cyanocobalamin injection 1,000 mcg  -     Hepatitis B Surface Antigen; Future; Expected date: 11/08/2022  -     Hepatitis B Core Antibody, Total; Future; Expected date: 11/08/2022  -     HEPATITIS B SURFACE ANTIBODY; Future; Expected date: 11/08/2022  -     Hepatitis C Antibody; Future; Expected date: 11/08/2022  -     Quantiferon Gold TB; Future; Expected date: 11/08/2022  -     Vitamin D; Future; Expected date: 11/08/2022  -     diclofenac sodium (VOLTAREN) 1 % Gel;  Apply 2 g topically once daily.  Dispense: 100 g; Refill: 5    Anemia, unspecified type  -     Iron and TIBC; Future; Expected date: 11/08/2022  -     Ferritin; Future; Expected date: 11/08/2022  -     CBC Auto Differential; Future; Expected date: 11/08/2022    Essential hypertension  -     lisinopriL 10 MG tablet; TAKE 1 TABLET(10 MG) BY MOUTH EVERY DAY  Dispense: 90 tablet; Refill: 3  -     furosemide (LASIX) 20 MG tablet; Take 1 tablet (20 mg total) by mouth every other day.  Dispense: 30 tablet; Refill: 3    Heartburn    Herpes zoster without complication  -     valACYclovir (VALTREX) 1000 MG tablet; Take 1 tablet (1,000 mg total) by mouth 2 (two) times daily.  Dispense: 60 tablet; Refill: 2    Chronic pain syndrome    Osteopenia, unspecified location    Osteoarthritis, unspecified osteoarthritis type, unspecified site    Gastroesophageal reflux disease, unspecified whether esophagitis present  -     omeprazole (PRILOSEC) 40 MG capsule; Take 1 capsule (40 mg total) by mouth once daily.  Dispense: 90 capsule; Refill: 3  -     ondansetron (ZOFRAN-ODT) 8 MG TbDL; Take 1 tablet (8 mg total) by mouth every 6 (six) hours as needed (nausea).  Dispense: 45 tablet; Refill: 2          Assessment:  72 year old female with   Psoriatic arthritis, rheumatoid arthritis on Humira  --chronic pain syndrome on norco  --insomnia on ambien  --iron deficiency anemia, stable  --grade 2 diastolic dysfunction    Plan:  1. toradol 60, depo 160, b12 today for flare  2. Cont. Humira   3. Cont. norco 10/325 TID per Dr. Benjamin. I have checked [place] prescription monitoring program site and no unusual or abnormal behavior has occurred pt understand the risk and benefits of taking opioid medications and has decided to continue the  Medication. Pended for MD  4. Cont. mobic 15 mg, cont. Flexeril PRN  5. Lisinopril,lasix refilled for pcp  6. Referral for alternative pain control  7. Cont calcium and vitamin D for osteopenia  8. Voltaren gel,  good RX coupon  9. Consider knee injections at next visit  10. Ambien nightly prn per MD      Follow up:   3-4 months Dr. trevino

## 2022-11-08 NOTE — PROGRESS NOTES
2 Patient ID's verified and allergies reviewed           Administered 2 cc ( 60 mg/ml ) of toradol to the left upper outer gluteal. Patient tolerated injections well. Informed of s/s to report verbalized understanding. No adverse reactions noted. Left facility in stable condition.    Administered 2 cc ( 160 mg/ml ) of depomedrol to the right upper outer gluteal. Informed of s/s to report verbalized understanding. No adverse reactions noted.    Administered 1 cc ( 1000 mcg/ml ) of B 12  to the left arm Patient tolerated injections. Informed of s/s to report verbalized understanding. No adverse reactions noted. Left facility in stable condition.

## 2022-11-10 LAB
GAMMA INTERFERON BACKGROUND BLD IA-ACNC: 0.06 IU/ML
M TB IFN-G CD4+ BCKGRND COR BLD-ACNC: -0.01 IU/ML
MITOGEN IGNF BCKGRD COR BLD-ACNC: 9.94 IU/ML
TB GOLD PLUS: NEGATIVE
TB2 - NIL: -0.03 IU/ML

## 2022-11-11 ENCOUNTER — SPECIALTY PHARMACY (OUTPATIENT)
Dept: PHARMACY | Facility: CLINIC | Age: 72
End: 2022-11-11
Payer: MEDICARE

## 2022-11-11 RX ORDER — ZOLPIDEM TARTRATE 10 MG/1
10 TABLET ORAL NIGHTLY
Qty: 30 TABLET | Refills: 4 | Status: SHIPPED | OUTPATIENT
Start: 2022-11-11 | End: 2023-05-23

## 2022-11-11 RX ORDER — HYDROCODONE BITARTRATE AND ACETAMINOPHEN 10; 325 MG/1; MG/1
1 TABLET ORAL EVERY 8 HOURS PRN
Qty: 90 TABLET | Refills: 0 | Status: SHIPPED | OUTPATIENT
Start: 2022-12-06 | End: 2023-02-07

## 2022-11-11 RX ORDER — HYDROCODONE BITARTRATE AND ACETAMINOPHEN 10; 325 MG/1; MG/1
1 TABLET ORAL EVERY 8 HOURS PRN
Qty: 90 TABLET | Refills: 0 | Status: SHIPPED | OUTPATIENT
Start: 2023-01-05 | End: 2023-02-06 | Stop reason: SDUPTHER

## 2022-11-11 NOTE — TELEPHONE ENCOUNTER
Specialty Pharmacy - Refill Coordination    Specialty Medication Orders Linked to Encounter      Flowsheet Row Most Recent Value   Medication #1 adalimumab (HUMIRA,CF, PEN) 40 mg/0.4 mL PnKt (Order#[id number], Rx#[id number])            Refill Questions - Documented Responses      Flowsheet Row Most Recent Value   Patient Availability and HIPAA Verification    Does patient want to proceed with activity? Yes   HIPAA/medical authority confirmed? Yes   Relationship to patient of person spoken to? Self   Refill Screening Questions    Changes to allergies? No   Changes to medications? No   New conditions since last clinic visit? No   Unplanned office visit, urgent care, ED, or hospital admission in the last 4 weeks? No   How does patient/caregiver feel medication is working? Good   Financial problems or insurance changes? No   How many doses of your specialty medications were missed in the last 4 weeks? 0   Would patient like to speak to a pharmacist? No   When does the patient need to receive the medication? 11/18/22   Refill Delivery Questions    How will the patient receive the medication? MEDRx   When does the patient need to receive the medication? 11/18/22   Shipping Address Home   Address in [facility] confirmed and updated if neccessary? Yes   Expected Copay ($) 0   Is the patient able to afford the medication copay? Yes   Payment Method zero copay   Days supply of Refill 28   Supplies needed? No supplies needed   Refill activity completed? Yes   Refill activity plan Refill scheduled   Shipment/Pickup Date: 11/16/22            Current Outpatient Medications   Medication Sig    adalimumab (HUMIRA,CF, PEN) 40 mg/0.4 mL PnKt Inject 0.4 mL (40 mg total) into the skin every 14 (fourteen) days.    cyclobenzaprine (FLEXERIL) 10 MG tablet Take 1 tablet (10 mg total) by mouth 3 (three) times daily as needed for Muscle spasms.    diclofenac sodium (VOLTAREN) 1 % Gel Apply 2 g topically once daily.    EScitalopram  oxalate (LEXAPRO) 20 MG tablet Take 1 tablet (20 mg total) by mouth once daily.    estradioL (ESTRACE) 1 MG tablet TAKE 1 TABLET(1 MG) BY MOUTH EVERY DAY    fluconazole (DIFLUCAN) 50 MG Tab Take 50 mg by mouth daily as needed. As needed for yeast infections    furosemide (LASIX) 20 MG tablet Take 1 tablet (20 mg total) by mouth every other day.    HYDROcodone-acetaminophen (NORCO)  mg per tablet Take 1 tablet by mouth every 8 (eight) hours as needed for Pain.    levothyroxine (SYNTHROID) 112 MCG tablet Take 1 tablet (112 mcg total) by mouth before breakfast.    lisinopriL 10 MG tablet TAKE 1 TABLET(10 MG) BY MOUTH EVERY DAY    loratadine 10 mg Cap Take 1 tablet by mouth once daily.    meloxicam (MOBIC) 15 MG tablet TAKE 1 TABLET(15 MG) BY MOUTH EVERY DAY    omeprazole (PRILOSEC) 40 MG capsule Take 1 capsule (40 mg total) by mouth once daily.    ondansetron (ZOFRAN-ODT) 8 MG TbDL Take 1 tablet (8 mg total) by mouth every 6 (six) hours as needed (nausea).    predniSONE (DELTASONE) 5 MG tablet Take 1 tablet (5 mg total) by mouth daily as needed (arthritis flare).    sumatriptan (IMITREX) 100 MG tablet Take 1 tablet (100 mg total) by mouth once as needed for Migraine.    valACYclovir (VALTREX) 1000 MG tablet Take 1 tablet (1,000 mg total) by mouth 2 (two) times daily.    zolpidem (AMBIEN) 10 mg Tab TAKE 1 TABLET(10 MG) BY MOUTH EVERY NIGHT AS NEEDED FOR PAIN   Last reviewed on 11/8/2022  8:35 AM by Riddhi Valentine MA    Review of patient's allergies indicates:   Allergen Reactions    Cimzia [certolizumab pegol]     Methotrexate analogues     Penicillins     Zyprexa [olanzapine]     Last reviewed on  11/8/2022 8:31 AM by Codi Courtney      Tasks added this encounter   12/9/2022 - Refill Call (Auto Added)   Tasks due within next 3 months   No tasks due.     [facility]  [address] [place] [address]  Phone: [phone]  Fax: [phone]

## 2022-11-21 ENCOUNTER — TELEPHONE (OUTPATIENT)
Dept: RHEUMATOLOGY | Facility: CLINIC | Age: 72
End: 2022-11-21
Payer: MEDICARE

## 2022-11-21 DIAGNOSIS — D50.9 IRON DEFICIENCY ANEMIA, UNSPECIFIED IRON DEFICIENCY ANEMIA TYPE: ICD-10-CM

## 2022-11-21 RX ORDER — SODIUM CHLORIDE 0.9 % (FLUSH) 0.9 %
10 SYRINGE (ML) INJECTION
Status: CANCELLED | OUTPATIENT
Start: 2022-11-21

## 2022-11-21 RX ORDER — METHYLPREDNISOLONE SOD SUCC 125 MG
125 VIAL (EA) INJECTION ONCE AS NEEDED
Status: CANCELLED | OUTPATIENT
Start: 2022-11-21

## 2022-11-21 RX ORDER — SODIUM CHLORIDE 0.9 % (FLUSH) 0.9 %
10 SYRINGE (ML) INJECTION
Status: CANCELLED | OUTPATIENT
Start: 2022-11-28

## 2022-11-21 RX ORDER — HEPARIN 100 UNIT/ML
500 SYRINGE INTRAVENOUS
Status: CANCELLED | OUTPATIENT
Start: 2022-11-21

## 2022-11-21 RX ORDER — EPINEPHRINE 0.3 MG/.3ML
0.3 INJECTION SUBCUTANEOUS ONCE AS NEEDED
Status: CANCELLED | OUTPATIENT
Start: 2022-11-28

## 2022-11-21 RX ORDER — DIPHENHYDRAMINE HYDROCHLORIDE 50 MG/ML
50 INJECTION INTRAMUSCULAR; INTRAVENOUS ONCE AS NEEDED
Status: CANCELLED | OUTPATIENT
Start: 2022-11-28

## 2022-11-21 RX ORDER — HEPARIN 100 UNIT/ML
500 SYRINGE INTRAVENOUS
Status: CANCELLED | OUTPATIENT
Start: 2022-11-28

## 2022-11-21 RX ORDER — EPINEPHRINE 0.3 MG/.3ML
0.3 INJECTION SUBCUTANEOUS ONCE AS NEEDED
Status: CANCELLED | OUTPATIENT
Start: 2022-11-21

## 2022-11-21 RX ORDER — DIPHENHYDRAMINE HYDROCHLORIDE 50 MG/ML
50 INJECTION INTRAMUSCULAR; INTRAVENOUS ONCE AS NEEDED
Status: CANCELLED | OUTPATIENT
Start: 2022-11-21

## 2022-11-21 RX ORDER — METHYLPREDNISOLONE SOD SUCC 125 MG
125 VIAL (EA) INJECTION ONCE AS NEEDED
Status: CANCELLED | OUTPATIENT
Start: 2022-11-28

## 2022-11-21 NOTE — TELEPHONE ENCOUNTER
Venofer is dosed weekly as 200 mg in 100 mL of NaCl. Pt will need 3 infusions (total of 600 mg) of Venofer over 3 weeks. Entered therapy plan for [facility] and sent to pre-service dept for auth.    Patient BW = 80.7 kg (11/8/22)   Hg = 10.9 (11/8/22)   Elemental Iron in Venofer = 20 mg/mL   Hg deficit = 162.6 g   Iron deficit = 536.6 mg     Iron panel completed 11/8/22. The following were low: Hg=10.9, Serum Ferritin = 5 ng/mL, iron = 23 mcg/dL, and saturated Iron = 5% and the TIBC was elevated at 499 mcg/dL. Pt has tried and failed oral iron. She is not able to tolerate oral iron due to constipation

## 2022-11-21 NOTE — TELEPHONE ENCOUNTER
----- Message from Codi Courtney PA-C sent at 11/20/2022  9:51 PM CST -----  Please order iron infusion.

## 2022-11-23 ENCOUNTER — TELEPHONE (OUTPATIENT)
Dept: RHEUMATOLOGY | Facility: CLINIC | Age: 72
End: 2022-11-23
Payer: MEDICARE

## 2022-11-23 DIAGNOSIS — L40.50 PSORIATIC ARTHRITIS: ICD-10-CM

## 2022-11-23 DIAGNOSIS — B37.9 YEAST INFECTION: Primary | ICD-10-CM

## 2022-11-23 RX ORDER — SIMVASTATIN 40 MG/1
40 TABLET, FILM COATED ORAL NIGHTLY
Qty: 90 TABLET | Refills: 0 | Status: CANCELLED | OUTPATIENT
Start: 2022-11-23

## 2022-11-23 RX ORDER — DICLOFENAC SODIUM 10 MG/G
2 GEL TOPICAL DAILY
Qty: 100 G | Refills: 5 | Status: CANCELLED | OUTPATIENT
Start: 2022-11-23

## 2022-11-23 NOTE — TELEPHONE ENCOUNTER
----- Message from Coco Cadena sent at 11/22/2022 11:10 AM CST -----  Contact: [phone]  Type:  RX Refill Request    Who Called:  Pt   Refill or New Rx:  refill  RX Name and Strength:  diclofenac sodium (VOLTAREN) 1 % Gel  How is the patient currently taking it? (ex. 1XDay):  -  Is this a 30 day or 90 day RX:  30  Preferred Pharmacy with phone number:    [facility] #[id number] - [place] [place] - [address] [place] [address] [address] [address]  Phone: [phone] Fax: [phone]  Local or Mail Order:  local   Ordering Provider:  Sherwin Roblero Call Back Number:  [phone]    Type:  RX Refill Request    Refill or New Rx:  Not on chart   RX Name and Strength:  Simvastatin (Zocor) 40mg  How is the patient currently taking it? (ex. 1XDay):  Na  Is this a 30 day or 90 day RX:  NA  Preferred Pharmacy with phone number:    [facility] #[id number] - [place] [place] - [address] [facility] [address] [facility] [address] [facility]  [address] [place] [address]  Phone: [phone] Fax: [phone]  Local or Mail Order:  Local   Ordering Provider:  KAROLINE

## 2022-11-23 NOTE — TELEPHONE ENCOUNTER
----- Message from Coco Cadena sent at 11/22/2022 11:10 AM CST -----  Contact: [phone]  Type:  RX Refill Request    Who Called:  Pt   Refill or New Rx:  refill  RX Name and Strength:  diclofenac sodium (VOLTAREN) 1 % Gel  How is the patient currently taking it? (ex. 1XDay):  -  Is this a 30 day or 90 day RX:  30  Preferred Pharmacy with phone number:    [facility] #[id number] - [facility] [place] - [address] [facility] [address] [place] [address] & [address] [address]  Phone: [phone] Fax: [phone]  Local or Mail Order:  local   Ordering Provider:  Sherwin Roblero Call Back Number:  [phone]    Type:  RX Refill Request    Refill or New Rx:  Not on chart   RX Name and Strength:  Simvastatin (Zocor) 40mg  How is the patient currently taking it? (ex. 1XDay):  Na  Is this a 30 day or 90 day RX:  NA  Preferred Pharmacy with phone number:    [facility] #[id number] - [facility] [place] - [address] [facility] [address] [facility] [address] [facility]  [address] [place] [address]  Phone: [phone] Fax: [phone]  Local or Mail Order:  Local   Ordering Provider:  KAROLINE

## 2022-12-01 ENCOUNTER — INFUSION (OUTPATIENT)
Dept: INFUSION THERAPY | Facility: HOSPITAL | Age: 72
End: 2022-12-01
Attending: INTERNAL MEDICINE
Payer: MEDICARE

## 2022-12-01 VITALS
TEMPERATURE: 98 F | RESPIRATION RATE: 18 BRPM | BODY MASS INDEX: 27.94 KG/M2 | SYSTOLIC BLOOD PRESSURE: 139 MMHG | HEIGHT: 67 IN | HEART RATE: 72 BPM | WEIGHT: 178 LBS | DIASTOLIC BLOOD PRESSURE: 71 MMHG

## 2022-12-01 DIAGNOSIS — D50.9 IRON DEFICIENCY ANEMIA, UNSPECIFIED IRON DEFICIENCY ANEMIA TYPE: Primary | ICD-10-CM

## 2022-12-01 PROCEDURE — 96365 THER/PROPH/DIAG IV INF INIT: CPT | Mod: PN

## 2022-12-01 PROCEDURE — A4216 STERILE WATER/SALINE, 10 ML: HCPCS | Mod: PN | Performed by: PHYSICIAN ASSISTANT

## 2022-12-01 PROCEDURE — 25000003 PHARM REV CODE 250: Mod: PN | Performed by: PHYSICIAN ASSISTANT

## 2022-12-01 PROCEDURE — 63600175 PHARM REV CODE 636 W HCPCS: Mod: PN | Performed by: PHYSICIAN ASSISTANT

## 2022-12-01 RX ORDER — METHYLPREDNISOLONE SOD SUCC 125 MG
125 VIAL (EA) INJECTION ONCE AS NEEDED
Status: DISCONTINUED | OUTPATIENT
Start: 2022-12-01 | End: 2022-12-01 | Stop reason: HOSPADM

## 2022-12-01 RX ORDER — SODIUM CHLORIDE 9 MG/ML
1000 INJECTION, SOLUTION INTRAVENOUS
Status: DISCONTINUED | OUTPATIENT
Start: 2022-12-01 | End: 2022-12-01 | Stop reason: HOSPADM

## 2022-12-01 RX ORDER — DIPHENHYDRAMINE HYDROCHLORIDE 50 MG/ML
50 INJECTION INTRAMUSCULAR; INTRAVENOUS ONCE AS NEEDED
Status: CANCELLED | OUTPATIENT
Start: 2022-12-05

## 2022-12-01 RX ORDER — EPINEPHRINE 0.3 MG/.3ML
0.3 INJECTION SUBCUTANEOUS ONCE AS NEEDED
Status: DISCONTINUED | OUTPATIENT
Start: 2022-12-01 | End: 2022-12-01 | Stop reason: HOSPADM

## 2022-12-01 RX ORDER — HEPARIN 100 UNIT/ML
500 SYRINGE INTRAVENOUS
Status: DISCONTINUED | OUTPATIENT
Start: 2022-12-01 | End: 2022-12-01 | Stop reason: HOSPADM

## 2022-12-01 RX ORDER — DIPHENHYDRAMINE HYDROCHLORIDE 50 MG/ML
50 INJECTION INTRAMUSCULAR; INTRAVENOUS ONCE AS NEEDED
Status: DISCONTINUED | OUTPATIENT
Start: 2022-12-01 | End: 2022-12-01 | Stop reason: HOSPADM

## 2022-12-01 RX ORDER — FLUCONAZOLE 150 MG/1
150 TABLET ORAL DAILY
Qty: 5 TABLET | Refills: 2 | Status: SHIPPED | OUTPATIENT
Start: 2022-12-01 | End: 2022-12-06

## 2022-12-01 RX ORDER — SODIUM CHLORIDE 0.9 % (FLUSH) 0.9 %
10 SYRINGE (ML) INJECTION
Status: DISCONTINUED | OUTPATIENT
Start: 2022-12-01 | End: 2022-12-01 | Stop reason: HOSPADM

## 2022-12-01 RX ORDER — EPINEPHRINE 0.3 MG/.3ML
0.3 INJECTION SUBCUTANEOUS ONCE AS NEEDED
Status: CANCELLED | OUTPATIENT
Start: 2022-12-05

## 2022-12-01 RX ORDER — METHYLPREDNISOLONE SOD SUCC 125 MG
125 VIAL (EA) INJECTION ONCE AS NEEDED
Status: CANCELLED | OUTPATIENT
Start: 2022-12-05

## 2022-12-01 RX ORDER — HEPARIN 100 UNIT/ML
500 SYRINGE INTRAVENOUS
Status: CANCELLED | OUTPATIENT
Start: 2022-12-05

## 2022-12-01 RX ORDER — SODIUM CHLORIDE 0.9 % (FLUSH) 0.9 %
10 SYRINGE (ML) INJECTION
Status: CANCELLED | OUTPATIENT
Start: 2022-12-05

## 2022-12-01 RX ADMIN — IRON SUCROSE 200 MG: 20 INJECTION, SOLUTION INTRAVENOUS at 02:12

## 2022-12-01 RX ADMIN — Medication 10 ML: at 02:12

## 2022-12-01 RX ADMIN — SODIUM CHLORIDE: 0.9 INJECTION, SOLUTION INTRAVENOUS at 02:12

## 2022-12-01 NOTE — PLAN OF CARE
Problem: Adult Inpatient Plan of Care  Goal: Optimal Comfort and Wellbeing  Outcome: Ongoing, Progressing     Problem: Fatigue  Goal: Improved Activity Tolerance  Outcome: Ongoing, Progressing

## 2022-12-08 ENCOUNTER — INFUSION (OUTPATIENT)
Dept: INFUSION THERAPY | Facility: HOSPITAL | Age: 72
End: 2022-12-08
Attending: INTERNAL MEDICINE
Payer: MEDICARE

## 2022-12-08 VITALS
HEART RATE: 55 BPM | RESPIRATION RATE: 16 BRPM | DIASTOLIC BLOOD PRESSURE: 74 MMHG | TEMPERATURE: 98 F | SYSTOLIC BLOOD PRESSURE: 142 MMHG

## 2022-12-08 DIAGNOSIS — D50.9 IRON DEFICIENCY ANEMIA, UNSPECIFIED IRON DEFICIENCY ANEMIA TYPE: Primary | ICD-10-CM

## 2022-12-08 PROCEDURE — 63600175 PHARM REV CODE 636 W HCPCS: Mod: PN | Performed by: PHYSICIAN ASSISTANT

## 2022-12-08 PROCEDURE — 96365 THER/PROPH/DIAG IV INF INIT: CPT | Mod: PN

## 2022-12-08 PROCEDURE — 25000003 PHARM REV CODE 250: Mod: PN | Performed by: PHYSICIAN ASSISTANT

## 2022-12-08 RX ORDER — EPINEPHRINE 0.3 MG/.3ML
0.3 INJECTION SUBCUTANEOUS ONCE AS NEEDED
Status: DISCONTINUED | OUTPATIENT
Start: 2022-12-08 | End: 2022-12-08 | Stop reason: HOSPADM

## 2022-12-08 RX ORDER — DIPHENHYDRAMINE HYDROCHLORIDE 50 MG/ML
50 INJECTION INTRAMUSCULAR; INTRAVENOUS ONCE AS NEEDED
Status: DISCONTINUED | OUTPATIENT
Start: 2022-12-08 | End: 2022-12-08 | Stop reason: HOSPADM

## 2022-12-08 RX ORDER — METHYLPREDNISOLONE SOD SUCC 125 MG
125 VIAL (EA) INJECTION ONCE AS NEEDED
Status: DISCONTINUED | OUTPATIENT
Start: 2022-12-08 | End: 2022-12-08 | Stop reason: HOSPADM

## 2022-12-08 RX ORDER — EPINEPHRINE 0.3 MG/.3ML
0.3 INJECTION SUBCUTANEOUS ONCE AS NEEDED
Status: CANCELLED | OUTPATIENT
Start: 2022-12-15

## 2022-12-08 RX ORDER — DIPHENHYDRAMINE HYDROCHLORIDE 50 MG/ML
50 INJECTION INTRAMUSCULAR; INTRAVENOUS ONCE AS NEEDED
Status: CANCELLED | OUTPATIENT
Start: 2022-12-15

## 2022-12-08 RX ORDER — HEPARIN 100 UNIT/ML
500 SYRINGE INTRAVENOUS
Status: DISCONTINUED | OUTPATIENT
Start: 2022-12-08 | End: 2022-12-08 | Stop reason: HOSPADM

## 2022-12-08 RX ORDER — HEPARIN 100 UNIT/ML
500 SYRINGE INTRAVENOUS
Status: CANCELLED | OUTPATIENT
Start: 2022-12-15

## 2022-12-08 RX ORDER — METHYLPREDNISOLONE SOD SUCC 125 MG
125 VIAL (EA) INJECTION ONCE AS NEEDED
Status: CANCELLED | OUTPATIENT
Start: 2022-12-15

## 2022-12-08 RX ORDER — SODIUM CHLORIDE 0.9 % (FLUSH) 0.9 %
10 SYRINGE (ML) INJECTION
Status: DISCONTINUED | OUTPATIENT
Start: 2022-12-08 | End: 2022-12-08 | Stop reason: HOSPADM

## 2022-12-08 RX ORDER — SODIUM CHLORIDE 9 MG/ML
1000 INJECTION, SOLUTION INTRAVENOUS
Status: DISCONTINUED | OUTPATIENT
Start: 2022-12-08 | End: 2022-12-08 | Stop reason: HOSPADM

## 2022-12-08 RX ORDER — SODIUM CHLORIDE 0.9 % (FLUSH) 0.9 %
10 SYRINGE (ML) INJECTION
Status: CANCELLED | OUTPATIENT
Start: 2022-12-15

## 2022-12-08 RX ADMIN — IRON SUCROSE 200 MG: 20 INJECTION, SOLUTION INTRAVENOUS at 01:12

## 2022-12-08 RX ADMIN — SODIUM CHLORIDE: 9 INJECTION, SOLUTION INTRAVENOUS at 01:12

## 2022-12-08 NOTE — PLAN OF CARE
Pt tolerated venofer well today, monitored for 30 min post infusion. Reviewed follow-up appointments. All questions were answered, ambulated independently at d/c.

## 2022-12-12 ENCOUNTER — SPECIALTY PHARMACY (OUTPATIENT)
Dept: PHARMACY | Facility: CLINIC | Age: 72
End: 2022-12-12
Payer: MEDICARE

## 2022-12-12 NOTE — TELEPHONE ENCOUNTER
Specialty Pharmacy - Refill Coordination    Specialty Medication Orders Linked to Encounter      Flowsheet Row Most Recent Value   Medication #1 adalimumab (HUMIRA,CF, PEN) 40 mg/0.4 mL PnKt (Order#[id number], Rx#[id number])            Refill Questions - Documented Responses      Flowsheet Row Most Recent Value   Patient Availability and HIPAA Verification    Does patient want to proceed with activity? Yes   HIPAA/medical authority confirmed? Yes   Relationship to patient of person spoken to? Self   Refill Screening Questions    Changes to allergies? No   Changes to medications? No   New conditions since last clinic visit? No   Unplanned office visit, urgent care, ED, or hospital admission in the last 4 weeks? No   How does patient/caregiver feel medication is working? Very good   Financial problems or insurance changes? No   How many doses of your specialty medications were missed in the last 4 weeks? 0   Would patient like to speak to a pharmacist? No   When does the patient need to receive the medication? 12/16/22   Refill Delivery Questions    How will the patient receive the medication? MEDRx   When does the patient need to receive the medication? 12/16/22   Shipping Address Home   Address in [facility] confirmed and updated if neccessary? Yes   Expected Copay ($) 0   Is the patient able to afford the medication copay? Yes   Payment Method zero copay   Days supply of Refill 28   Supplies needed? Alcohol Swabs   Refill activity completed? Yes   Refill activity plan Refill scheduled   Shipment/Pickup Date: 12/14/22            Current Outpatient Medications   Medication Sig    adalimumab (HUMIRA,CF, PEN) 40 mg/0.4 mL PnKt Inject 0.4 mL (40 mg total) into the skin every 14 (fourteen) days.    cyclobenzaprine (FLEXERIL) 10 MG tablet Take 1 tablet (10 mg total) by mouth 3 (three) times daily as needed for Muscle spasms.    diclofenac sodium (VOLTAREN) 1 % Gel Apply 2 g topically once daily.    EScitalopram  oxalate (LEXAPRO) 20 MG tablet Take 1 tablet (20 mg total) by mouth once daily.    estradioL (ESTRACE) 1 MG tablet TAKE 1 TABLET(1 MG) BY MOUTH EVERY DAY    furosemide (LASIX) 20 MG tablet Take 1 tablet (20 mg total) by mouth every other day.    [START ON 1/5/2023] HYDROcodone-acetaminophen (NORCO)  mg per tablet Take 1 tablet by mouth every 8 (eight) hours as needed for Pain.    HYDROcodone-acetaminophen (NORCO)  mg per tablet Take 1 tablet by mouth every 8 (eight) hours as needed for Pain.    levothyroxine (SYNTHROID) 112 MCG tablet Take 1 tablet (112 mcg total) by mouth before breakfast.    lisinopriL 10 MG tablet TAKE 1 TABLET(10 MG) BY MOUTH EVERY DAY    loratadine 10 mg Cap Take 1 tablet by mouth once daily.    meloxicam (MOBIC) 15 MG tablet TAKE 1 TABLET(15 MG) BY MOUTH EVERY DAY    omeprazole (PRILOSEC) 40 MG capsule Take 1 capsule (40 mg total) by mouth once daily.    ondansetron (ZOFRAN-ODT) 8 MG TbDL Take 1 tablet (8 mg total) by mouth every 6 (six) hours as needed (nausea).    simvastatin (ZOCOR) 40 MG tablet Take 1 tablet (40 mg total) by mouth every evening.    sumatriptan (IMITREX) 100 MG tablet Take 1 tablet (100 mg total) by mouth once as needed for Migraine.    valACYclovir (VALTREX) 1000 MG tablet Take 1 tablet (1,000 mg total) by mouth 2 (two) times daily.    zolpidem (AMBIEN) 10 mg Tab Take 1 tablet (10 mg total) by mouth every evening.   Last reviewed on 12/8/2022  1:39 PM by Terri Wang, RN    Review of patient's allergies indicates:   Allergen Reactions    Cimzia [certolizumab pegol]     Methotrexate analogues     Penicillins     Zyprexa [olanzapine]     Last reviewed on  12/8/2022 1:37 PM by Terri Wang      Tasks added this encounter   1/6/2023 - Refill Call (Auto Added)   Tasks due within next 3 months   No tasks due.     Brandon Chung, PharmD  [facility]  [address] [address]  Phone: [phone]  Fax:  [phone]

## 2022-12-15 ENCOUNTER — INFUSION (OUTPATIENT)
Dept: INFUSION THERAPY | Facility: HOSPITAL | Age: 72
End: 2022-12-15
Attending: INTERNAL MEDICINE
Payer: MEDICARE

## 2022-12-15 VITALS
RESPIRATION RATE: 18 BRPM | BODY MASS INDEX: 27.68 KG/M2 | HEART RATE: 58 BPM | TEMPERATURE: 98 F | WEIGHT: 176.38 LBS | HEIGHT: 67 IN | DIASTOLIC BLOOD PRESSURE: 68 MMHG | SYSTOLIC BLOOD PRESSURE: 136 MMHG

## 2022-12-15 DIAGNOSIS — D50.9 IRON DEFICIENCY ANEMIA, UNSPECIFIED IRON DEFICIENCY ANEMIA TYPE: Primary | ICD-10-CM

## 2022-12-15 PROCEDURE — 25000003 PHARM REV CODE 250: Mod: PN | Performed by: PHYSICIAN ASSISTANT

## 2022-12-15 PROCEDURE — 63600175 PHARM REV CODE 636 W HCPCS: Mod: PN | Performed by: PHYSICIAN ASSISTANT

## 2022-12-15 PROCEDURE — 96365 THER/PROPH/DIAG IV INF INIT: CPT | Mod: PN

## 2022-12-15 RX ORDER — DIPHENHYDRAMINE HYDROCHLORIDE 50 MG/ML
50 INJECTION INTRAMUSCULAR; INTRAVENOUS ONCE AS NEEDED
Status: DISCONTINUED | OUTPATIENT
Start: 2022-12-15 | End: 2022-12-15 | Stop reason: HOSPADM

## 2022-12-15 RX ORDER — HEPARIN 100 UNIT/ML
500 SYRINGE INTRAVENOUS
Status: CANCELLED | OUTPATIENT
Start: 2022-12-15

## 2022-12-15 RX ORDER — EPINEPHRINE 0.3 MG/.3ML
0.3 INJECTION SUBCUTANEOUS ONCE AS NEEDED
Status: DISCONTINUED | OUTPATIENT
Start: 2022-12-15 | End: 2022-12-15 | Stop reason: HOSPADM

## 2022-12-15 RX ORDER — EPINEPHRINE 0.3 MG/.3ML
0.3 INJECTION SUBCUTANEOUS ONCE AS NEEDED
Status: CANCELLED | OUTPATIENT
Start: 2022-12-15

## 2022-12-15 RX ORDER — DIPHENHYDRAMINE HYDROCHLORIDE 50 MG/ML
50 INJECTION INTRAMUSCULAR; INTRAVENOUS ONCE AS NEEDED
Status: CANCELLED | OUTPATIENT
Start: 2022-12-15

## 2022-12-15 RX ORDER — METHYLPREDNISOLONE SOD SUCC 125 MG
125 VIAL (EA) INJECTION ONCE AS NEEDED
Status: DISCONTINUED | OUTPATIENT
Start: 2022-12-15 | End: 2022-12-15 | Stop reason: HOSPADM

## 2022-12-15 RX ORDER — SODIUM CHLORIDE 0.9 % (FLUSH) 0.9 %
10 SYRINGE (ML) INJECTION
Status: CANCELLED | OUTPATIENT
Start: 2022-12-15

## 2022-12-15 RX ORDER — SODIUM CHLORIDE 0.9 % (FLUSH) 0.9 %
10 SYRINGE (ML) INJECTION
Status: DISCONTINUED | OUTPATIENT
Start: 2022-12-15 | End: 2022-12-15 | Stop reason: HOSPADM

## 2022-12-15 RX ORDER — METHYLPREDNISOLONE SOD SUCC 125 MG
125 VIAL (EA) INJECTION ONCE AS NEEDED
Status: CANCELLED | OUTPATIENT
Start: 2022-12-15

## 2022-12-15 RX ORDER — HEPARIN 100 UNIT/ML
500 SYRINGE INTRAVENOUS
Status: DISCONTINUED | OUTPATIENT
Start: 2022-12-15 | End: 2022-12-15 | Stop reason: HOSPADM

## 2022-12-15 RX ADMIN — SODIUM CHLORIDE: 9 INJECTION, SOLUTION INTRAVENOUS at 11:12

## 2022-12-15 RX ADMIN — IRON SUCROSE 200 MG: 20 INJECTION, SOLUTION INTRAVENOUS at 11:12

## 2022-12-28 PROBLEM — D84.821 IMMUNOCOMPROMISED STATE DUE TO DRUG THERAPY: Status: ACTIVE | Noted: 2022-12-28

## 2022-12-28 PROBLEM — Z79.899 IMMUNOCOMPROMISED STATE DUE TO DRUG THERAPY: Status: ACTIVE | Noted: 2022-12-28

## 2023-01-10 ENCOUNTER — SPECIALTY PHARMACY (OUTPATIENT)
Dept: PHARMACY | Facility: CLINIC | Age: 73
End: 2023-01-10
Payer: MEDICARE

## 2023-01-10 NOTE — TELEPHONE ENCOUNTER
Specialty Pharmacy - Refill Coordination    Specialty Medication Orders Linked to Encounter      Flowsheet Row Most Recent Value   Medication #1 adalimumab (HUMIRA,CF, PEN) 40 mg/0.4 mL PnKt (Order#[id number], Rx#[id number])            Refill Questions - Documented Responses      Flowsheet Row Most Recent Value   Patient Availability and HIPAA Verification    Does patient want to proceed with activity? Yes   HIPAA/medical authority confirmed? Yes   Relationship to patient of person spoken to? Self   Refill Screening Questions    Changes to allergies? No   Changes to medications? No   New conditions since last clinic visit? No   Unplanned office visit, urgent care, ED, or hospital admission in the last 4 weeks? No   How does patient/caregiver feel medication is working? Good   Financial problems or insurance changes? No   How many doses of your specialty medications were missed in the last 4 weeks? 0   Would patient like to speak to a pharmacist? No   When does the patient need to receive the medication? 01/20/23   Refill Delivery Questions    How will the patient receive the medication? MEDRx   When does the patient need to receive the medication? 01/20/23   Shipping Address Home   Address in [facility] confirmed and updated if neccessary? Yes   Expected Copay ($) 0   Is the patient able to afford the medication copay? Yes   Payment Method zero copay   Days supply of Refill 28   Supplies needed? No supplies needed   Refill activity completed? Yes   Refill activity plan Refill scheduled   Shipment/Pickup Date: 01/17/23            Current Outpatient Medications   Medication Sig    adalimumab (HUMIRA,CF, PEN) 40 mg/0.4 mL PnKt Inject 0.4 mL (40 mg total) into the skin every 14 (fourteen) days.    cyclobenzaprine (FLEXERIL) 10 MG tablet Take 1 tablet (10 mg total) by mouth 3 (three) times daily as needed for Muscle spasms.    diclofenac sodium (VOLTAREN) 1 % Gel Apply 2 g topically once daily.    EScitalopram  oxalate (LEXAPRO) 20 MG tablet Take 1 tablet (20 mg total) by mouth once daily.    estradioL (ESTRACE) 1 MG tablet TAKE 1 TABLET(1 MG) BY MOUTH EVERY DAY    furosemide (LASIX) 20 MG tablet Take 1 tablet (20 mg total) by mouth every other day.    HYDROcodone-acetaminophen (NORCO)  mg per tablet Take 1 tablet by mouth every 8 (eight) hours as needed for Pain.    HYDROcodone-acetaminophen (NORCO)  mg per tablet Take 1 tablet by mouth every 8 (eight) hours as needed for Pain.    levothyroxine (SYNTHROID) 112 MCG tablet Take 1 tablet (112 mcg total) by mouth before breakfast.    lisinopriL 10 MG tablet TAKE 1 TABLET(10 MG) BY MOUTH EVERY DAY    loratadine 10 mg Cap Take 1 tablet by mouth once daily.    meloxicam (MOBIC) 15 MG tablet TAKE 1 TABLET(15 MG) BY MOUTH EVERY DAY    omeprazole (PRILOSEC) 40 MG capsule Take 1 capsule (40 mg total) by mouth once daily.    ondansetron (ZOFRAN-ODT) 8 MG TbDL Take 1 tablet (8 mg total) by mouth every 6 (six) hours as needed (nausea).    simvastatin (ZOCOR) 40 MG tablet Take 1 tablet (40 mg total) by mouth every evening.    sumatriptan (IMITREX) 100 MG tablet Take 1 tablet (100 mg total) by mouth once as needed for Migraine.    valACYclovir (VALTREX) 1000 MG tablet Take 1 tablet (1,000 mg total) by mouth 2 (two) times daily. (Patient taking differently: Take 1,000 mg by mouth 2 (two) times daily as needed.)    zolpidem (AMBIEN) 10 mg Tab Take 1 tablet (10 mg total) by mouth every evening.   Last reviewed on 12/28/2022 11:07 AM by Mulugeta Burleson MD    Review of patient's allergies indicates:   Allergen Reactions    Cimzia [certolizumab pegol]      Not effective     Methotrexate analogues      Not effective    Penicillins Hives    Zyprexa [olanzapine]      Not effective     Last reviewed on  1/9/2023 9:03 AM by Dayami Lemus      Tasks added this encounter   2/10/2023 - Refill Call (Auto Added)   Tasks due within next 3 months   3/22/2023 - Clinical - Follow Up  Nohemi (Annual)     [facility]  [address]  [facility] [address]  Phone: [phone]  Fax: [phone]

## 2023-02-06 DIAGNOSIS — M05.711 RHEUMATOID ARTHRITIS INVOLVING RIGHT SHOULDER WITH POSITIVE RHEUMATOID FACTOR: ICD-10-CM

## 2023-02-06 DIAGNOSIS — G89.4 CHRONIC PAIN SYNDROME: ICD-10-CM

## 2023-02-06 DIAGNOSIS — L40.50 PSORIATIC ARTHRITIS: ICD-10-CM

## 2023-02-07 RX ORDER — HYDROCODONE BITARTRATE AND ACETAMINOPHEN 10; 325 MG/1; MG/1
1 TABLET ORAL EVERY 8 HOURS PRN
Qty: 90 TABLET | Refills: 0 | Status: SHIPPED | OUTPATIENT
Start: 2023-02-07 | End: 2023-03-07 | Stop reason: SDUPTHER

## 2023-02-10 ENCOUNTER — LAB VISIT (OUTPATIENT)
Dept: LAB | Facility: HOSPITAL | Age: 73
End: 2023-02-10
Payer: MEDICARE

## 2023-02-10 ENCOUNTER — PATIENT MESSAGE (OUTPATIENT)
Dept: PHARMACY | Facility: CLINIC | Age: 73
End: 2023-02-10
Payer: MEDICARE

## 2023-02-10 DIAGNOSIS — D50.9 IRON DEFICIENCY ANEMIA, UNSPECIFIED IRON DEFICIENCY ANEMIA TYPE: ICD-10-CM

## 2023-02-10 DIAGNOSIS — Z13.6 SCREENING FOR CARDIOVASCULAR CONDITION: ICD-10-CM

## 2023-02-10 DIAGNOSIS — E78.5 HYPERLIPIDEMIA, UNSPECIFIED HYPERLIPIDEMIA TYPE: ICD-10-CM

## 2023-02-10 DIAGNOSIS — E03.9 HYPOTHYROIDISM, UNSPECIFIED TYPE: ICD-10-CM

## 2023-02-10 LAB
CHOLEST SERPL-MCNC: 225 MG/DL (ref 120–199)
CHOLEST/HDLC SERPL: 2.7 {RATIO} (ref 2–5)
HDLC SERPL-MCNC: 84 MG/DL (ref 40–75)
HDLC SERPL: 37.3 % (ref 20–50)
IRON SERPL-MCNC: 116 UG/DL (ref 30–160)
LDLC SERPL CALC-MCNC: 122.6 MG/DL (ref 63–159)
NONHDLC SERPL-MCNC: 141 MG/DL
SATURATED IRON: 34 % (ref 20–50)
TOTAL IRON BINDING CAPACITY: 342 UG/DL (ref 250–450)
TRANSFERRIN SERPL-MCNC: 231 MG/DL (ref 200–375)
TRIGL SERPL-MCNC: 92 MG/DL (ref 30–150)
TSH SERPL DL<=0.005 MIU/L-ACNC: 2.44 UIU/ML (ref 0.4–4)

## 2023-02-10 PROCEDURE — 36415 COLL VENOUS BLD VENIPUNCTURE: CPT | Mod: PO | Performed by: INTERNAL MEDICINE

## 2023-02-10 PROCEDURE — 84466 ASSAY OF TRANSFERRIN: CPT | Performed by: INTERNAL MEDICINE

## 2023-02-10 PROCEDURE — 80061 LIPID PANEL: CPT | Performed by: INTERNAL MEDICINE

## 2023-02-10 PROCEDURE — 84443 ASSAY THYROID STIM HORMONE: CPT | Performed by: INTERNAL MEDICINE

## 2023-02-13 ENCOUNTER — OFFICE VISIT (OUTPATIENT)
Dept: RHEUMATOLOGY | Facility: CLINIC | Age: 73
End: 2023-02-13
Payer: MEDICARE

## 2023-02-13 ENCOUNTER — PATIENT MESSAGE (OUTPATIENT)
Dept: PHARMACY | Facility: CLINIC | Age: 73
End: 2023-02-13
Payer: MEDICARE

## 2023-02-13 VITALS
BODY MASS INDEX: 26.53 KG/M2 | WEIGHT: 169 LBS | HEART RATE: 77 BPM | DIASTOLIC BLOOD PRESSURE: 93 MMHG | HEIGHT: 67 IN | SYSTOLIC BLOOD PRESSURE: 134 MMHG

## 2023-02-13 DIAGNOSIS — D50.9 IRON DEFICIENCY ANEMIA, UNSPECIFIED IRON DEFICIENCY ANEMIA TYPE: ICD-10-CM

## 2023-02-13 DIAGNOSIS — D84.821 IMMUNOCOMPROMISED STATE DUE TO DRUG THERAPY: ICD-10-CM

## 2023-02-13 DIAGNOSIS — R12 HEARTBURN: ICD-10-CM

## 2023-02-13 DIAGNOSIS — G89.4 CHRONIC PAIN SYNDROME: ICD-10-CM

## 2023-02-13 DIAGNOSIS — L40.50 PSORIATIC ARTHRITIS: Primary | ICD-10-CM

## 2023-02-13 DIAGNOSIS — M80.08XA AGE-RELATED OSTEOPOROSIS WITH CURRENT PATHOLOGICAL FRACTURE, VERTEBRA(E), INITIAL ENCOUNTER FOR FRACTURE: ICD-10-CM

## 2023-02-13 DIAGNOSIS — Z79.899 IMMUNOCOMPROMISED STATE DUE TO DRUG THERAPY: ICD-10-CM

## 2023-02-13 DIAGNOSIS — K21.9 GASTROESOPHAGEAL REFLUX DISEASE, UNSPECIFIED WHETHER ESOPHAGITIS PRESENT: ICD-10-CM

## 2023-02-13 DIAGNOSIS — M05.711 RHEUMATOID ARTHRITIS INVOLVING RIGHT SHOULDER WITH POSITIVE RHEUMATOID FACTOR: ICD-10-CM

## 2023-02-13 PROCEDURE — 3008F PR BODY MASS INDEX (BMI) DOCUMENTED: ICD-10-PCS | Mod: CPTII,S$GLB,, | Performed by: INTERNAL MEDICINE

## 2023-02-13 PROCEDURE — 1159F PR MEDICATION LIST DOCUMENTED IN MEDICAL RECORD: ICD-10-PCS | Mod: CPTII,S$GLB,, | Performed by: INTERNAL MEDICINE

## 2023-02-13 PROCEDURE — 3075F SYST BP GE 130 - 139MM HG: CPT | Mod: CPTII,S$GLB,, | Performed by: INTERNAL MEDICINE

## 2023-02-13 PROCEDURE — 1160F RVW MEDS BY RX/DR IN RCRD: CPT | Mod: CPTII,S$GLB,, | Performed by: INTERNAL MEDICINE

## 2023-02-13 PROCEDURE — 99999 PR PBB SHADOW E&M-EST. PATIENT-LVL III: ICD-10-PCS | Mod: PBBFAC,,, | Performed by: INTERNAL MEDICINE

## 2023-02-13 PROCEDURE — 3080F DIAST BP >= 90 MM HG: CPT | Mod: CPTII,S$GLB,, | Performed by: INTERNAL MEDICINE

## 2023-02-13 PROCEDURE — 3075F PR MOST RECENT SYSTOLIC BLOOD PRESS GE 130-139MM HG: ICD-10-PCS | Mod: CPTII,S$GLB,, | Performed by: INTERNAL MEDICINE

## 2023-02-13 PROCEDURE — 1159F MED LIST DOCD IN RCRD: CPT | Mod: CPTII,S$GLB,, | Performed by: INTERNAL MEDICINE

## 2023-02-13 PROCEDURE — 4010F ACE/ARB THERAPY RXD/TAKEN: CPT | Mod: CPTII,S$GLB,, | Performed by: INTERNAL MEDICINE

## 2023-02-13 PROCEDURE — 3008F BODY MASS INDEX DOCD: CPT | Mod: CPTII,S$GLB,, | Performed by: INTERNAL MEDICINE

## 2023-02-13 PROCEDURE — 99215 PR OFFICE/OUTPT VISIT, EST, LEVL V, 40-54 MIN: ICD-10-PCS | Mod: 25,S$GLB,, | Performed by: INTERNAL MEDICINE

## 2023-02-13 PROCEDURE — 4010F PR ACE/ARB THEARPY RXD/TAKEN: ICD-10-PCS | Mod: CPTII,S$GLB,, | Performed by: INTERNAL MEDICINE

## 2023-02-13 PROCEDURE — 1125F PR PAIN SEVERITY QUANTIFIED, PAIN PRESENT: ICD-10-PCS | Mod: CPTII,S$GLB,, | Performed by: INTERNAL MEDICINE

## 2023-02-13 PROCEDURE — 1160F PR REVIEW ALL MEDS BY PRESCRIBER/CLIN PHARMACIST DOCUMENTED: ICD-10-PCS | Mod: CPTII,S$GLB,, | Performed by: INTERNAL MEDICINE

## 2023-02-13 PROCEDURE — 3080F PR MOST RECENT DIASTOLIC BLOOD PRESSURE >= 90 MM HG: ICD-10-PCS | Mod: CPTII,S$GLB,, | Performed by: INTERNAL MEDICINE

## 2023-02-13 PROCEDURE — 1125F AMNT PAIN NOTED PAIN PRSNT: CPT | Mod: CPTII,S$GLB,, | Performed by: INTERNAL MEDICINE

## 2023-02-13 PROCEDURE — 99215 OFFICE O/P EST HI 40 MIN: CPT | Mod: 25,S$GLB,, | Performed by: INTERNAL MEDICINE

## 2023-02-13 PROCEDURE — 99999 PR PBB SHADOW E&M-EST. PATIENT-LVL III: CPT | Mod: PBBFAC,,, | Performed by: INTERNAL MEDICINE

## 2023-02-13 RX ORDER — ONDANSETRON HYDROCHLORIDE 8 MG/1
8 TABLET, FILM COATED ORAL EVERY 6 HOURS PRN
COMMUNITY
Start: 2022-11-08 | End: 2023-06-13

## 2023-02-13 ASSESSMENT — ROUTINE ASSESSMENT OF PATIENT INDEX DATA (RAPID3)
PAIN SCORE: 6
PATIENT GLOBAL ASSESSMENT SCORE: 4.5
PSYCHOLOGICAL DISTRESS SCORE: 2.2
FATIGUE SCORE: 1.1
MDHAQ FUNCTION SCORE: 0.7
TOTAL RAPID3 SCORE: 4.28

## 2023-02-13 NOTE — PROGRESS NOTES
2 pt identifiers used  Allergies reviewed      Administered 2 cc ( 30 mg/ml ) of toradol to the left upper outer gluteal. Patient tolerated injections well. Informed of s/s to report verbalized understanding. No adverse reactions noted. Left facility in stable condition.    Administered 2 cc ( 80 mg/ml ) of depomedrol to the right upper outer gluteal. Informed of s/s to report verbalized understanding. No adverse reactions noted.    Administered 1 cc ( 1000 mcg/ml ) of b12 to the right ventrogluteal. Informed of s/s to report verbalized understanding. No adverse reactions noted.

## 2023-02-14 RX ORDER — KETOROLAC TROMETHAMINE 30 MG/ML
60 INJECTION, SOLUTION INTRAMUSCULAR; INTRAVENOUS
Status: DISCONTINUED | OUTPATIENT
Start: 2023-02-14 | End: 2023-04-10

## 2023-02-14 RX ORDER — METHYLPREDNISOLONE ACETATE 80 MG/ML
160 INJECTION, SUSPENSION INTRA-ARTICULAR; INTRALESIONAL; INTRAMUSCULAR; SOFT TISSUE
Status: DISCONTINUED | OUTPATIENT
Start: 2023-02-14 | End: 2023-04-10

## 2023-02-14 RX ORDER — CYANOCOBALAMIN 1000 UG/ML
1000 INJECTION, SOLUTION INTRAMUSCULAR; SUBCUTANEOUS
Status: COMPLETED | OUTPATIENT
Start: 2023-02-14 | End: 2023-10-16

## 2023-02-14 NOTE — PROGRESS NOTES
Subjective:       Patient ID: Rose Goff is a 72 y.o. female.    Chief Complaint: Disease Management      Follow up: 72 year old female who presents to clinic for follow up on psoriatic arthritis.  She is doing fair overall, but has good and bad days depending on the weather. Currently on Humira, mobic, norco, and prednisone PRN. She has intermittent joint pain involving MCPs, PIPs, wrists, hips, and knees. AM stiffness can last up to 2-3 hours. Overall, she feels Humira is working well. She has chronic low back pain secondary to OA and is taking norco TID PRN with good relief of pain. She is taking ambien for insomnia and this is working well.      She held Humira for 1 month for respiratory infection (negative for covid) and her psoriasis on the scalp returned. She reports increased joint pain off Humira as well.     She is interested in alternative pain control.      Had MULLEN and underwent cardiac eval---echo consistent with diastolic dysfunction grade 2, stress test normal. Added lasix 20 mg QOD. She stopped tx when her symptoms resolved, but she had to resume lasix again recently due to shortness of breath.      She is not able to tolerate oral iron due to constipation. Iron infusion was discussed at last visit with Dr. Benjamin pending lab results.     Current tx:  1. Humira  2. Mobic  3. Norco  Prior treatments:   Enbrel, Cimzia, Simponi, MTX, Cosentyx     Review of Systems   Constitutional:  Positive for activity change. Negative for appetite change and chills.   Eyes:  Negative for visual disturbance.   Respiratory:  Negative for cough.    Cardiovascular:  Negative for chest pain and palpitations.   Gastrointestinal:  Negative for abdominal pain and constipation.   Musculoskeletal:  Positive for arthralgias. Negative for neck pain and neck stiffness.   Skin:  Negative for rash.   Allergic/Immunologic: Positive for immunocompromised state.   Neurological:  Negative for dizziness, weakness and  light-headedness.   Psychiatric/Behavioral:  Negative for dysphoric mood and sleep disturbance. The patient is not nervous/anxious.        Objective:     Vitals:    02/13/23 0830   BP: (!) 134/93   Pulse: 77         Past Medical History:   Diagnosis Date    Abnormal mammogram     Amaurosis fugax     Anemia     Back pain     Chest pain     Degenerative cervical disc     Degenerative disc disease, thoracic     Depression     Depression     Fatigue     GERD (gastroesophageal reflux disease)     Head ache     Hemorrhoids     Hyperlipidemia     Hypertension     Hypoglycemia     Hypothyroidism     Hypothyroidism     Insomnia     Menopausal state     Neck pain     Neuralgia     Nonrheumatic tricuspid valve disorder     Optic neuritis     Palpitations     PONV (postoperative nausea and vomiting)     Psoriasis     Psoriatic arthropathy     Rosacea     Thyroid nodule      Past Surgical History:   Procedure Laterality Date    ANKLE FRACTURE SURGERY      APPENDECTOMY      BREAST BIOPSY Left     Negative    HYSTERECTOMY      OOPHORECTOMY      RHIZOTOMY W/ RADIOFREQUENCY ABLATION Right     lumbar    thyroid tumor removed            Physical Exam   Constitutional: She is oriented to person, place, and time.   Eyes: Right conjunctiva is not injected. Left conjunctiva is not injected.   Neck: No JVD present. No thyromegaly present.   Cardiovascular: Normal rate and regular rhythm. Exam reveals no decreased pulses.   Pulmonary/Chest: Effort normal.   Musculoskeletal:      Right shoulder: Tenderness present.      Left shoulder: Tenderness present.      Right elbow: Tenderness present.      Left elbow: Tenderness present.      Right wrist: Tenderness present.      Left wrist: Tenderness present.      Right knee: Swelling present. Tenderness present.      Left knee: Swelling present. Tenderness present.   Lymphadenopathy:     She has no cervical adenopathy.   Neurological: She is alert and oriented to person, place, and time. Gait  normal.   Skin: No rash noted.   Psychiatric: Mood and affect normal.       Right Side Rheumatological Exam     Examination finds the 3rd MCP, 4th MCP and 5th MCP normal.    The patient is tender to palpation of the shoulder, elbow, wrist, knee, 1st PIP, 1st MCP, 2nd PIP, 2nd MCP, 3rd PIP, 4th PIP and 5th PIP    She has swelling of the knee, 1st MCP, 2nd PIP, 2nd MCP, 3rd PIP, 4th PIP and 5th PIP    Left Side Rheumatological Exam     Examination finds the 3rd MCP, 4th MCP and 5th MCP normal.    The patient is tender to palpation of the shoulder, elbow, wrist, knee, 1st PIP, 1st MCP, 2nd PIP, 2nd MCP, 3rd PIP, 4th PIP and 5th PIP.    She has swelling of the knee, 1st MCP and 2nd MCP        Labs reviewed:  Component      Latest Ref Rng & Units 7/1/2022   WBC      3.90 - 12.70 K/uL 5.55   RBC      4.00 - 5.40 M/uL 3.66 (L)   Hemoglobin      12.0 - 16.0 g/dL 10.5 (L)   Hematocrit      37.0 - 48.5 % 32.9 (L)   MCV      82 - 98 fL 90   MCH      27.0 - 31.0 pg 28.7   MCHC      32.0 - 36.0 g/dL 31.9 (L)   RDW      11.5 - 14.5 % 14.9 (H)   Platelets      150 - 450 K/uL 302   MPV      9.2 - 12.9 fL 11.2   Immature Granulocytes      0.0 - 0.5 % 0.2   Gran # (ANC)      1.8 - 7.7 K/uL 3.0   Immature Grans (Abs)      0.00 - 0.04 K/uL 0.01   Lymph #      1.0 - 4.8 K/uL 1.9   Mono #      0.3 - 1.0 K/uL 0.4   Eos #      0.0 - 0.5 K/uL 0.3   Baso #      0.00 - 0.20 K/uL 0.07   nRBC      0 /100 WBC 0   Gran %      38.0 - 73.0 % 53.1   Lymph %      18.0 - 48.0 % 33.3   Mono %      4.0 - 15.0 % 7.6   Eosinophil %      0.0 - 8.0 % 4.5   Basophil %      0.0 - 1.9 % 1.3   Differential Method       Automated   Sodium      136 - 145 mmol/L 135 (L)   Potassium      3.5 - 5.1 mmol/L 4.3   Chloride      95 - 110 mmol/L 100   CO2      23 - 29 mmol/L 27   Glucose      70 - 110 mg/dL 95   BUN      8 - 23 mg/dL 11   Creatinine      0.5 - 1.4 mg/dL 0.7   Calcium      8.7 - 10.5 mg/dL 8.8   PROTEIN TOTAL      6.0 - 8.4 g/dL 7.1   Albumin      3.5 -  5.2 g/dL 3.7   BILIRUBIN TOTAL      0.1 - 1.0 mg/dL 0.6   Alkaline Phosphatase      55 - 135 U/L 65   AST      10 - 40 U/L 33   ALT      10 - 44 U/L 22   Anion Gap      8 - 16 mmol/L 8   eGFR if African American      >60 mL/min/1.73 m:2 >60.0   eGFR if non African American      >60 mL/min/1.73 m:2 >60.0   CRP      0.0 - 8.2 mg/L 4.8   Sed Rate      0 - 36 mm/Hr 3     Assessment:       1. Psoriatic arthritis    2. Iron deficiency anemia, unspecified iron deficiency anemia type    3. Heartburn    4. Chronic pain syndrome    5. Age-related osteoporosis with current pathological fracture, vertebra(e), initial encounter for fracture     6. Rheumatoid arthritis involving right shoulder with positive rheumatoid factor    7. Gastroesophageal reflux disease, unspecified whether esophagitis present    8. Immunocompromised state due to drug therapy              Plan:       Psoriatic arthritis  -     Sedimentation rate; Future; Expected date: 02/22/2023  -     C-Reactive Protein; Future; Expected date: 02/22/2023  -     Comprehensive Metabolic Panel; Future; Expected date: 02/22/2023  -     CBC Auto Differential; Future; Expected date: 02/22/2023  -     Hepatitis C Antibody; Future; Expected date: 02/22/2023  -     Quantiferon Gold TB; Future; Expected date: 02/22/2023  -     Hepatitis B Surface Antigen; Future; Expected date: 02/22/2023  -     Hepatitis B Surface Antibody, Qual/Quant; Future; Expected date: 02/22/2023  -     Hepatitis B Core Antibody, Total; Future; Expected date: 02/22/2023    Iron deficiency anemia, unspecified iron deficiency anemia type  -     Sedimentation rate; Future; Expected date: 02/22/2023  -     C-Reactive Protein; Future; Expected date: 02/22/2023  -     Comprehensive Metabolic Panel; Future; Expected date: 02/22/2023  -     CBC Auto Differential; Future; Expected date: 02/22/2023  -     Hepatitis C Antibody; Future; Expected date: 02/22/2023  -     Quantiferon Gold TB; Future; Expected date:  02/22/2023  -     Hepatitis B Surface Antigen; Future; Expected date: 02/22/2023  -     Hepatitis B Surface Antibody, Qual/Quant; Future; Expected date: 02/22/2023  -     Hepatitis B Core Antibody, Total; Future; Expected date: 02/22/2023    Heartburn  -     Sedimentation rate; Future; Expected date: 02/22/2023  -     C-Reactive Protein; Future; Expected date: 02/22/2023  -     Comprehensive Metabolic Panel; Future; Expected date: 02/22/2023  -     CBC Auto Differential; Future; Expected date: 02/22/2023  -     Hepatitis C Antibody; Future; Expected date: 02/22/2023  -     Quantiferon Gold TB; Future; Expected date: 02/22/2023  -     Hepatitis B Surface Antigen; Future; Expected date: 02/22/2023  -     Hepatitis B Surface Antibody, Qual/Quant; Future; Expected date: 02/22/2023  -     Hepatitis B Core Antibody, Total; Future; Expected date: 02/22/2023    Chronic pain syndrome  -     cyanocobalamin injection 1,000 mcg  -     methylPREDNISolone acetate injection 160 mg  -     ketorolac injection 60 mg  -     Sedimentation rate; Future; Expected date: 02/22/2023  -     C-Reactive Protein; Future; Expected date: 02/22/2023  -     Comprehensive Metabolic Panel; Future; Expected date: 02/22/2023  -     CBC Auto Differential; Future; Expected date: 02/22/2023  -     Hepatitis C Antibody; Future; Expected date: 02/22/2023  -     Quantiferon Gold TB; Future; Expected date: 02/22/2023  -     Hepatitis B Surface Antigen; Future; Expected date: 02/22/2023  -     Hepatitis B Surface Antibody, Qual/Quant; Future; Expected date: 02/22/2023  -     Hepatitis B Core Antibody, Total; Future; Expected date: 02/22/2023    Age-related osteoporosis with current pathological fracture, vertebra(e), initial encounter for fracture   -     Sedimentation rate; Future; Expected date: 02/22/2023  -     C-Reactive Protein; Future; Expected date: 02/22/2023  -     Comprehensive Metabolic Panel; Future; Expected date: 02/22/2023  -     CBC Auto  Differential; Future; Expected date: 02/22/2023  -     Hepatitis C Antibody; Future; Expected date: 02/22/2023  -     Quantiferon Gold TB; Future; Expected date: 02/22/2023  -     Hepatitis B Surface Antigen; Future; Expected date: 02/22/2023  -     Hepatitis B Surface Antibody, Qual/Quant; Future; Expected date: 02/22/2023  -     Hepatitis B Core Antibody, Total; Future; Expected date: 02/22/2023    Rheumatoid arthritis involving right shoulder with positive rheumatoid factor  -     cyanocobalamin injection 1,000 mcg  -     methylPREDNISolone acetate injection 160 mg  -     ketorolac injection 60 mg  -     Sedimentation rate; Future; Expected date: 02/22/2023  -     C-Reactive Protein; Future; Expected date: 02/22/2023  -     Comprehensive Metabolic Panel; Future; Expected date: 02/22/2023  -     CBC Auto Differential; Future; Expected date: 02/22/2023  -     Hepatitis C Antibody; Future; Expected date: 02/22/2023  -     Quantiferon Gold TB; Future; Expected date: 02/22/2023  -     Hepatitis B Surface Antigen; Future; Expected date: 02/22/2023  -     Hepatitis B Surface Antibody, Qual/Quant; Future; Expected date: 02/22/2023  -     Hepatitis B Core Antibody, Total; Future; Expected date: 02/22/2023    Gastroesophageal reflux disease, unspecified whether esophagitis present  -     cyanocobalamin injection 1,000 mcg  -     methylPREDNISolone acetate injection 160 mg  -     ketorolac injection 60 mg  -     Sedimentation rate; Future; Expected date: 02/22/2023  -     C-Reactive Protein; Future; Expected date: 02/22/2023  -     Comprehensive Metabolic Panel; Future; Expected date: 02/22/2023  -     CBC Auto Differential; Future; Expected date: 02/22/2023  -     Hepatitis C Antibody; Future; Expected date: 02/22/2023  -     Quantiferon Gold TB; Future; Expected date: 02/22/2023  -     Hepatitis B Surface Antigen; Future; Expected date: 02/22/2023  -     Hepatitis B Surface Antibody, Qual/Quant; Future; Expected date:  02/22/2023  -     Hepatitis B Core Antibody, Total; Future; Expected date: 02/22/2023    Immunocompromised state due to drug therapy  -     cyanocobalamin injection 1,000 mcg  -     methylPREDNISolone acetate injection 160 mg  -     ketorolac injection 60 mg  -     Sedimentation rate; Future; Expected date: 02/22/2023  -     C-Reactive Protein; Future; Expected date: 02/22/2023  -     Comprehensive Metabolic Panel; Future; Expected date: 02/22/2023  -     CBC Auto Differential; Future; Expected date: 02/22/2023  -     Hepatitis C Antibody; Future; Expected date: 02/22/2023  -     Quantiferon Gold TB; Future; Expected date: 02/22/2023  -     Hepatitis B Surface Antigen; Future; Expected date: 02/22/2023  -     Hepatitis B Surface Antibody, Qual/Quant; Future; Expected date: 02/22/2023  -     Hepatitis B Core Antibody, Total; Future; Expected date: 02/22/2023            Assessment:  72 year old female with   Psoriatic arthritis, rheumatoid arthritis on Humira  --chronic pain syndrome on norco  --insomnia on ambien  --iron deficiency anemia, stable  --grade 2 diastolic dysfunction    Plan:  1. toradol 60, depo 160, b12 today for flare  2. Cont. Humira   3. Cont. norco 10/325 TID . I have checked [place] prescription monitoring program site and no unusual or abnormal behavior has occurred pt understand the risk and benefits of taking opioid medications and has decided to continue the  Medication.   4. Cont. mobic 15 mg, cont. Flexeril PRN  5. Lisinopril,lasix refilled for pcp      Follow up:   4 months with labs

## 2023-02-23 ENCOUNTER — SPECIALTY PHARMACY (OUTPATIENT)
Dept: PHARMACY | Facility: CLINIC | Age: 73
End: 2023-02-23
Payer: MEDICARE

## 2023-02-23 DIAGNOSIS — L40.50 PSORIATIC ARTHRITIS: Primary | ICD-10-CM

## 2023-02-23 NOTE — TELEPHONE ENCOUNTER
Specialty Pharmacy - Refill Coordination    Specialty Medication Orders Linked to Encounter      Flowsheet Row Most Recent Value   Medication #1 adalimumab (HUMIRA,CF, PEN) 40 mg/0.4 mL PnKt (Order#[id number], Rx#[id number])          Refill Questions - Documented Responses      Flowsheet Row Most Recent Value   Patient Availability and HIPAA Verification    Does patient want to proceed with activity? Unable to Reach          We have had multiple attempts to the patient and have been unsuccessful to reach the patient. We will stop reaching out to the patient but in the event that the patient needs the med and contacts us, we will communicate and begin dispensing for the patient. At your next visit with the patient, please review the importance of being in contact with our specialty pharmacy as a part of our care team.    Interventions added this encounter   Closed: [facility] Provider Intervention - Drug therapy adherence: adalimumab (HUMIRA,CF, PEN) 40 mg/0.4 mL PnKt     Marilee Beltran, ChecoD  [facility]  [address] [place] [id number]  Phone: [phone]  Fax: [phone]

## 2023-02-23 NOTE — TELEPHONE ENCOUNTER
Specialty Pharmacy - Refill Coordination    Specialty Medication Orders Linked to Encounter      Flowsheet Row Most Recent Value   Medication #1 adalimumab (HUMIRA,CF, PEN) 40 mg/0.4 mL PnKt (Order#[id number], Rx#[id number])            Refill Questions - Documented Responses      Flowsheet Row Most Recent Value   Patient Availability and HIPAA Verification    Does patient want to proceed with activity? Yes   HIPAA/medical authority confirmed? Yes   Relationship to patient of person spoken to? Self   Refill Screening Questions    Changes to allergies? No   Changes to medications? No   New conditions since last clinic visit? No   Unplanned office visit, urgent care, ED, or hospital admission in the last 4 weeks? No   How does patient/caregiver feel medication is working? Good   Financial problems or insurance changes? No   How many doses of your specialty medications were missed in the last 4 weeks? 0   Would patient like to speak to a pharmacist? No   When does the patient need to receive the medication? 03/03/23   Refill Delivery Questions    How will the patient receive the medication? [facility]   When does the patient need to receive the medication? 03/03/23   Shipping Address Home   Address in [facility] confirmed and updated if neccessary? Yes   Expected Copay ($) 0   Is the patient able to afford the medication copay? Yes   Payment Method zero copay   Days supply of Refill 28   Supplies needed? No supplies needed   Refill activity completed? Yes   Refill activity plan Refill scheduled   Shipment/Pickup Date: 03/01/23            Current Outpatient Medications   Medication Sig    adalimumab (HUMIRA,CF, PEN) 40 mg/0.4 mL PnKt Inject 0.4 mL (40 mg total) into the skin every 14 (fourteen) days.    cyclobenzaprine (FLEXERIL) 10 MG tablet Take 1 tablet (10 mg total) by mouth 3 (three) times daily as needed for Muscle spasms.    diclofenac sodium (VOLTAREN) 1 % Gel Apply 2 g topically once daily.    EScitalopram  oxalate (LEXAPRO) 20 MG tablet Take 1 tablet (20 mg total) by mouth once daily.    estradioL (ESTRACE) 1 MG tablet TAKE 1 TABLET(1 MG) BY MOUTH EVERY DAY    furosemide (LASIX) 20 MG tablet Take 1 tablet (20 mg total) by mouth every other day.    HYDROcodone-acetaminophen (NORCO)  mg per tablet Take 1 tablet by mouth every 8 (eight) hours as needed for Pain.    levothyroxine (SYNTHROID) 112 MCG tablet Take 1 tablet (112 mcg total) by mouth before breakfast.    lisinopriL 10 MG tablet TAKE 1 TABLET(10 MG) BY MOUTH EVERY DAY    loratadine 10 mg Cap Take 1 tablet by mouth once daily.    meloxicam (MOBIC) 15 MG tablet TAKE 1 TABLET(15 MG) BY MOUTH EVERY DAY    omeprazole (PRILOSEC) 40 MG capsule Take 1 capsule (40 mg total) by mouth once daily.    ondansetron (ZOFRAN) 8 MG tablet Take 8 mg by mouth every 6 (six) hours as needed.    ondansetron (ZOFRAN-ODT) 8 MG TbDL Take 1 tablet (8 mg total) by mouth every 6 (six) hours as needed (nausea).    simvastatin (ZOCOR) 40 MG tablet Take 1 tablet (40 mg total) by mouth every evening.    sumatriptan (IMITREX) 100 MG tablet Take 1 tablet (100 mg total) by mouth once as needed for Migraine.    valACYclovir (VALTREX) 1000 MG tablet Take 1 tablet (1,000 mg total) by mouth 2 (two) times daily. (Patient taking differently: Take 1,000 mg by mouth 2 (two) times daily as needed.)    zolpidem (AMBIEN) 10 mg Tab Take 1 tablet (10 mg total) by mouth every evening.   Last reviewed on 2/13/2023  8:49 AM by Robin Benjamin MD    Review of patient's allergies indicates:   Allergen Reactions    Cimzia [certolizumab pegol]      Not effective     Methotrexate analogues      Not effective    Penicillins Hives    Zyprexa [olanzapine]      Not effective     Last reviewed on  2/14/2023 2:12 PM by Robin Benjamin    Interventions added this encounter   Closed: [facility] Provider Intervention - Drug therapy adherence: adalimumab (HUMIRA,CF, PEN) 40 mg/0.4 mL PnKt     Tasks added this encounter   3/24/2023 -  Refill Call (Auto Added)   Tasks due within next 3 months   3/22/2023 - Clinical - Follow Up Assesement (Annual)     Marilee Beltran, PharmD  [facility]  [address]  [facility] [address]  Phone: [phone]  Fax: [phone]

## 2023-03-07 DIAGNOSIS — G89.4 CHRONIC PAIN SYNDROME: ICD-10-CM

## 2023-03-07 DIAGNOSIS — M05.711 RHEUMATOID ARTHRITIS INVOLVING RIGHT SHOULDER WITH POSITIVE RHEUMATOID FACTOR: ICD-10-CM

## 2023-03-07 DIAGNOSIS — L40.50 PSORIATIC ARTHRITIS: ICD-10-CM

## 2023-03-07 RX ORDER — HYDROCODONE BITARTRATE AND ACETAMINOPHEN 10; 325 MG/1; MG/1
1 TABLET ORAL EVERY 8 HOURS PRN
Qty: 90 TABLET | Refills: 0 | Status: SHIPPED | OUTPATIENT
Start: 2023-03-07 | End: 2023-04-10 | Stop reason: SDUPTHER

## 2023-03-07 NOTE — TELEPHONE ENCOUNTER
----- Message from Dayami Patel sent at 3/7/2023 11:26 AM CST -----  Regarding: refill  Contact: patient  Type:  RX Refill Request    Who Called:  patient  Refill or New Rx:  refill  RX Name and Strength:  HYDROcodone-acetaminophen (NORCO)  mg per tablet  How is the patient currently taking it? (ex. 1XDay):  3Xday  Is this a 30 day or 90 day RX:  30  Preferred Pharmacy with phone number:  .  [id number] - [facility] [address] [facility] [address]  [address] [place] [address]  Phone: [phone] Fax: [phone]  Local or Mail Order:  local  Ordering Provider:  Dr Sourav Roblero Call Back Number:  [phone] (home)   Additional Information:  Patient states the pharmacy did not get the prescription. Please call patient to advise.Thanks!

## 2023-03-27 ENCOUNTER — PATIENT MESSAGE (OUTPATIENT)
Dept: PHARMACY | Facility: CLINIC | Age: 73
End: 2023-03-27
Payer: MEDICARE

## 2023-03-27 ENCOUNTER — SPECIALTY PHARMACY (OUTPATIENT)
Dept: PHARMACY | Facility: CLINIC | Age: 73
End: 2023-03-27
Payer: MEDICARE

## 2023-03-27 NOTE — TELEPHONE ENCOUNTER
Specialty Pharmacy - Clinical Reassessment    Specialty Medication Orders Linked to Encounter      Flowsheet Row Most Recent Value   Medication #1 adalimumab (HUMIRA,CF, PEN) 40 mg/0.4 mL PnKt (Order#[id number], Rx#[id number])          Patient Diagnosis   L40.50 - Psoriatic arthritis    Rose Goff is a 72 y.o. female, who is followed by the specialty pharmacy service for management and education of her Humira.  She has been on therapy with Humira for 48 months.  I have reviewed her electronic medical record and current medication list and determined that specialty medication adjustment Is not needed at this time.    Patient has not experienced adverse events.  She Is adherent reporting 0 missed doses since last review.  Adherence has been encouraged with the following mechanism(s): habit.  She is meeting goals of therapy and will continue treatment.        2/23/2023 1/10/2023 12/12/2022 11/11/2022 10/7/2022 9/6/2022 8/1/2022   Follow Up Review   # of missed doses 0 0 0 0 1 0 0   Reason     [place] ill or sick     New Medications? No No No No Yes No No   New Conditions? No No No No No No No   New Allergies? No No No No No No No   Med Effective? Good Good Very good Good Good Good Very good   Urgent Care? No No No No No No No   Requested Pharmacist? No No No No No No No            Therapy is appropriate to continue.    Therapy is effective: Yes  On scale of 1 to 10, how does patient rank quality of life? (10 - Best): Unable to Assess  Recommendations: none at this time.  Review Method: Chart Review    Tasks added this encounter   12/27/2023 - Clinical - Follow Up Assesement (Annual)   Tasks due within next 3 months   3/24/2023 - Refill Call (Auto Added)     Bhavik Cazares, PharmD  [facility]  [address]  [facility] [address]  Phone: [phone]  Fax: [phone]

## 2023-04-04 ENCOUNTER — SPECIALTY PHARMACY (OUTPATIENT)
Dept: PHARMACY | Facility: CLINIC | Age: 73
End: 2023-04-04
Payer: MEDICARE

## 2023-04-04 NOTE — TELEPHONE ENCOUNTER
Outgoing call to pt regarding Humira refill. Pt reported 2 pens on hand for 4/6 and 4/20. Will need medication for 5/4. Pt stated she was sick in the beginning of March, which is why she has 2 injections on hand. Pt states she feels better and is back to getting on schedule; she stated she is just stressed. Pt said she works out now to relieve her stress and that has been helping. Asked pt if we could follow up with her on  4/27 for refill and she agreed.

## 2023-04-10 ENCOUNTER — CLINICAL SUPPORT (OUTPATIENT)
Dept: RHEUMATOLOGY | Facility: CLINIC | Age: 73
End: 2023-04-10
Payer: MEDICARE

## 2023-04-10 VITALS
SYSTOLIC BLOOD PRESSURE: 154 MMHG | BODY MASS INDEX: 26.53 KG/M2 | HEART RATE: 78 BPM | WEIGHT: 169.06 LBS | DIASTOLIC BLOOD PRESSURE: 83 MMHG | HEIGHT: 67 IN

## 2023-04-10 DIAGNOSIS — L50.9 HIVES: ICD-10-CM

## 2023-04-10 DIAGNOSIS — L40.50 PSORIATIC ARTHRITIS: ICD-10-CM

## 2023-04-10 DIAGNOSIS — B02.9 HERPES ZOSTER WITHOUT COMPLICATION: ICD-10-CM

## 2023-04-10 DIAGNOSIS — M05.711 RHEUMATOID ARTHRITIS INVOLVING RIGHT SHOULDER WITH POSITIVE RHEUMATOID FACTOR: ICD-10-CM

## 2023-04-10 DIAGNOSIS — G89.4 CHRONIC PAIN SYNDROME: ICD-10-CM

## 2023-04-10 DIAGNOSIS — R94.6 ABNORMAL RESULTS OF THYROID FUNCTION STUDIES: ICD-10-CM

## 2023-04-10 DIAGNOSIS — D84.821 IMMUNOCOMPROMISED STATE DUE TO DRUG THERAPY: Primary | ICD-10-CM

## 2023-04-10 DIAGNOSIS — Z79.899 IMMUNOCOMPROMISED STATE DUE TO DRUG THERAPY: Primary | ICD-10-CM

## 2023-04-10 PROCEDURE — 99999 PR PBB SHADOW E&M-EST. PATIENT-LVL III: CPT | Mod: PBBFAC,,, | Performed by: INTERNAL MEDICINE

## 2023-04-10 PROCEDURE — 96372 PR INJECTION,THERAP/PROPH/DIAG2ST, IM OR SUBCUT: ICD-10-PCS | Mod: S$GLB,,, | Performed by: INTERNAL MEDICINE

## 2023-04-10 PROCEDURE — 99999 PR PBB SHADOW E&M-EST. PATIENT-LVL III: ICD-10-PCS | Mod: PBBFAC,,, | Performed by: INTERNAL MEDICINE

## 2023-04-10 PROCEDURE — 99214 PR OFFICE/OUTPT VISIT, EST, LEVL IV, 30-39 MIN: ICD-10-PCS | Mod: 25,S$GLB,, | Performed by: INTERNAL MEDICINE

## 2023-04-10 PROCEDURE — 96372 THER/PROPH/DIAG INJ SC/IM: CPT | Mod: S$GLB,,, | Performed by: INTERNAL MEDICINE

## 2023-04-10 PROCEDURE — 99214 OFFICE O/P EST MOD 30 MIN: CPT | Mod: 25,S$GLB,, | Performed by: INTERNAL MEDICINE

## 2023-04-10 RX ORDER — HYDROCODONE BITARTRATE AND ACETAMINOPHEN 10; 325 MG/1; MG/1
1 TABLET ORAL EVERY 8 HOURS PRN
Qty: 90 TABLET | Refills: 0 | Status: SHIPPED | OUTPATIENT
Start: 2023-05-09 | End: 2023-06-08

## 2023-04-10 RX ORDER — METHYLPREDNISOLONE ACETATE 40 MG/ML
160 INJECTION, SUSPENSION INTRA-ARTICULAR; INTRALESIONAL; INTRAMUSCULAR; SOFT TISSUE
Status: COMPLETED | OUTPATIENT
Start: 2023-04-10 | End: 2023-04-10

## 2023-04-10 RX ORDER — HYDROXYZINE HYDROCHLORIDE 25 MG/1
25 TABLET, FILM COATED ORAL 3 TIMES DAILY PRN
Qty: 45 TABLET | Refills: 5 | Status: SHIPPED | OUTPATIENT
Start: 2023-04-10 | End: 2023-11-21

## 2023-04-10 RX ORDER — METHYLPREDNISOLONE ACETATE 80 MG/ML
80 INJECTION, SUSPENSION INTRA-ARTICULAR; INTRALESIONAL; INTRAMUSCULAR; SOFT TISSUE
Status: DISCONTINUED | OUTPATIENT
Start: 2023-04-10 | End: 2023-04-10

## 2023-04-10 RX ORDER — HYDROCODONE BITARTRATE AND ACETAMINOPHEN 10; 325 MG/1; MG/1
1 TABLET ORAL EVERY 8 HOURS PRN
Qty: 90 TABLET | Refills: 0 | Status: SHIPPED | OUTPATIENT
Start: 2023-06-09 | End: 2023-06-13 | Stop reason: SDUPTHER

## 2023-04-10 RX ORDER — METHYLPREDNISOLONE 4 MG/1
8 TABLET ORAL DAILY
Qty: 60 TABLET | Refills: 0 | Status: SHIPPED | OUTPATIENT
Start: 2023-04-10 | End: 2023-05-10

## 2023-04-10 RX ORDER — HYDROCODONE BITARTRATE AND ACETAMINOPHEN 10; 325 MG/1; MG/1
1 TABLET ORAL EVERY 8 HOURS PRN
Qty: 90 TABLET | Refills: 0 | Status: CANCELLED | OUTPATIENT
Start: 2023-04-10

## 2023-04-10 RX ORDER — KETOROLAC TROMETHAMINE 30 MG/ML
60 INJECTION, SOLUTION INTRAMUSCULAR; INTRAVENOUS
Status: COMPLETED | OUTPATIENT
Start: 2023-04-10 | End: 2023-04-10

## 2023-04-10 RX ORDER — LEVOCETIRIZINE DIHYDROCHLORIDE 5 MG/1
5 TABLET, FILM COATED ORAL NIGHTLY
Qty: 30 TABLET | Refills: 11 | Status: SHIPPED | OUTPATIENT
Start: 2023-04-10 | End: 2024-04-09

## 2023-04-10 RX ORDER — VALACYCLOVIR HYDROCHLORIDE 1 G/1
1000 TABLET, FILM COATED ORAL 2 TIMES DAILY
Qty: 60 TABLET | Refills: 2 | Status: SHIPPED | OUTPATIENT
Start: 2023-04-10 | End: 2024-04-09

## 2023-04-10 RX ORDER — HYDROCODONE BITARTRATE AND ACETAMINOPHEN 10; 325 MG/1; MG/1
1 TABLET ORAL EVERY 8 HOURS PRN
Qty: 90 TABLET | Refills: 0 | Status: SHIPPED | OUTPATIENT
Start: 2023-04-10 | End: 2023-06-13

## 2023-04-10 RX ORDER — CLOBETASOL PROPIONATE 0.46 MG/ML
SOLUTION TOPICAL 2 TIMES DAILY
Qty: 150 ML | Refills: 3 | Status: SHIPPED | OUTPATIENT
Start: 2023-04-10

## 2023-04-10 RX ADMIN — METHYLPREDNISOLONE ACETATE 160 MG: 40 INJECTION, SUSPENSION INTRA-ARTICULAR; INTRALESIONAL; INTRAMUSCULAR; SOFT TISSUE at 05:04

## 2023-04-10 RX ADMIN — KETOROLAC TROMETHAMINE 60 MG: 30 INJECTION, SOLUTION INTRAMUSCULAR; INTRAVENOUS at 05:04

## 2023-04-10 NOTE — TELEPHONE ENCOUNTER
----- Message from Esther Esposito, Patient Care Assistant sent at 4/10/2023  8:23 AM CDT -----  Contact: self  Pt is calling to speak with a nurse to be seen today. Please call back to advise [phone]  thanks

## 2023-04-10 NOTE — PROGRESS NOTES
Subjective:       Patient ID: Rose Goff is a 72 y.o. female.    Chief Complaint: Disease Management      Follow up: 72 year old female who presents to clinic for follow up on psoriatic arthritis.  She is doing fair overall, but has good and bad days depending on the weather. Currently on Humira, mobic, norco, and prednisone PRN. She has intermittent joint pain involving MCPs, PIPs, wrists, hips, and knees. AM stiffness can last up to 2-3 hours. Overall, she feels Humira is working well. She has chronic low back pain secondary to OA and is taking norco TID PRN with good relief of pain. She is taking ambien for insomnia and this is working well.      She held Humira for 1 month for respiratory infection (negative for covid) and her psoriasis on the scalp returned. She reports increased joint pain off Humira as well.     She is interested in alternative pain control.      Had MULLEN and underwent cardiac eval---echo consistent with diastolic dysfunction grade 2, stress test normal. Added lasix 20 mg QOD. She stopped tx when her symptoms resolved, but she had to resume lasix again recently due to shortness of breath.      She is not able to tolerate oral iron due to constipation. Iron infusion was discussed at last visit with Dr. Benjamin pending lab results.     Current tx:  1. Humira  2. Mobic  3. Norco  Prior treatments:   Enbrel, Cimzia, Simponi, MTX, Cosentyx       Review of Systems   Constitutional:  Positive for activity change. Negative for appetite change and chills.   Eyes:  Negative for visual disturbance.   Respiratory:  Negative for cough.    Cardiovascular:  Negative for chest pain and palpitations.   Gastrointestinal:  Negative for abdominal pain and constipation.   Musculoskeletal:  Positive for arthralgias. Negative for neck pain and neck stiffness.   Skin:  Negative for rash.   Allergic/Immunologic: Positive for immunocompromised state.   Neurological:  Negative for dizziness, weakness and  light-headedness.   Psychiatric/Behavioral:  Negative for dysphoric mood and sleep disturbance. The patient is not nervous/anxious.        Objective:     Vitals:    04/10/23 1608   BP: (!) 154/83   Pulse: 78         Past Medical History:   Diagnosis Date    Abnormal mammogram     Amaurosis fugax     Anemia     Back pain     Chest pain     Degenerative cervical disc     Degenerative disc disease, thoracic     Depression     Depression     Fatigue     GERD (gastroesophageal reflux disease)     Head ache     Hemorrhoids     Hyperlipidemia     Hypertension     Hypoglycemia     Hypothyroidism     Hypothyroidism     Insomnia     Menopausal state     Neck pain     Neuralgia     Nonrheumatic tricuspid valve disorder     Optic neuritis     Palpitations     PONV (postoperative nausea and vomiting)     Psoriasis     Psoriatic arthropathy     Rosacea     Thyroid nodule      Past Surgical History:   Procedure Laterality Date    ANKLE FRACTURE SURGERY      APPENDECTOMY      BREAST BIOPSY Left     Negative    HYSTERECTOMY      OOPHORECTOMY      RHIZOTOMY W/ RADIOFREQUENCY ABLATION Right     lumbar    thyroid tumor removed            Physical Exam   Constitutional: She is oriented to person, place, and time.   Eyes: Right conjunctiva is not injected. Left conjunctiva is not injected.   Neck: No JVD present. No thyromegaly present.   Cardiovascular: Normal rate and regular rhythm. Exam reveals no decreased pulses.   Pulmonary/Chest: Effort normal.   Musculoskeletal:      Right shoulder: Tenderness present.      Left shoulder: Tenderness present.      Right elbow: Tenderness present.      Left elbow: Tenderness present.      Right wrist: Tenderness present.      Left wrist: Tenderness present.      Right knee: Swelling present. Tenderness present.      Left knee: Swelling present. Tenderness present.   Lymphadenopathy:     She has no cervical adenopathy.   Neurological: She is alert and oriented to person, place, and time. Gait  normal.   Skin: No rash noted.   Psychiatric: Mood and affect normal.       Right Side Rheumatological Exam     Examination finds the 3rd MCP, 4th MCP and 5th MCP normal.    The patient is tender to palpation of the shoulder, elbow, wrist, knee, 1st PIP, 1st MCP, 2nd PIP, 2nd MCP, 3rd PIP, 4th PIP and 5th PIP    She has swelling of the knee, 1st MCP, 2nd PIP, 2nd MCP, 3rd PIP, 4th PIP and 5th PIP    Left Side Rheumatological Exam     Examination finds the 3rd MCP, 4th MCP and 5th MCP normal.    The patient is tender to palpation of the shoulder, elbow, wrist, knee, 1st PIP, 1st MCP, 2nd PIP, 2nd MCP, 3rd PIP, 4th PIP and 5th PIP.    She has swelling of the knee, 1st MCP and 2nd MCP            Assessment:       1. Immunocompromised state due to drug therapy    2. Hives    3. Psoriatic arthritis    4. Chronic pain syndrome    5. Herpes zoster without complication    6. Rheumatoid arthritis involving right shoulder with positive rheumatoid factor    7. Abnormal results of thyroid function studies                Plan:       Immunocompromised state due to drug therapy  -     Discontinue: methylPREDNISolone acetate injection 80 mg  -     hydrOXYzine HCL (ATARAX) 25 MG tablet; Take 1 tablet (25 mg total) by mouth 3 (three) times daily as needed for Itching.  Dispense: 45 tablet; Refill: 5  -     clobetasoL (TEMOVATE) 0.05 % external solution; Apply topically 2 (two) times daily.  Dispense: 150 mL; Refill: 3  -     methylPREDNISolone (MEDROL) 4 MG Tab; Take 2 tablets (8 mg total) by mouth once daily.  Dispense: 60 tablet; Refill: 0  -     levocetirizine (XYZAL) 5 MG tablet; Take 1 tablet (5 mg total) by mouth every evening.  Dispense: 30 tablet; Refill: 11  -     ketorolac injection 60 mg  -     T4, Free; Future; Expected date: 04/10/2023  -     TSH; Future; Expected date: 04/10/2023  -     Vitamin D; Future; Expected date: 04/10/2023  -     Cyclic Citrullinated Peptide Antibody, IgG; Future; Expected date: 04/10/2023  -      Rheumatoid Factor; Future; Expected date: 04/10/2023  -     Sedimentation rate; Future; Expected date: 04/10/2023  -     C-Reactive Protein; Future; Expected date: 04/10/2023  -     Comprehensive Metabolic Panel; Future; Expected date: 04/10/2023  -     CBC Auto Differential; Future; Expected date: 04/10/2023    Hives  -     Discontinue: methylPREDNISolone acetate injection 80 mg  -     hydrOXYzine HCL (ATARAX) 25 MG tablet; Take 1 tablet (25 mg total) by mouth 3 (three) times daily as needed for Itching.  Dispense: 45 tablet; Refill: 5  -     clobetasoL (TEMOVATE) 0.05 % external solution; Apply topically 2 (two) times daily.  Dispense: 150 mL; Refill: 3  -     methylPREDNISolone (MEDROL) 4 MG Tab; Take 2 tablets (8 mg total) by mouth once daily.  Dispense: 60 tablet; Refill: 0  -     levocetirizine (XYZAL) 5 MG tablet; Take 1 tablet (5 mg total) by mouth every evening.  Dispense: 30 tablet; Refill: 11  -     ketorolac injection 60 mg  -     methylPREDNISolone acetate injection 160 mg    Psoriatic arthritis  -     Discontinue: methylPREDNISolone acetate injection 80 mg  -     hydrOXYzine HCL (ATARAX) 25 MG tablet; Take 1 tablet (25 mg total) by mouth 3 (three) times daily as needed for Itching.  Dispense: 45 tablet; Refill: 5  -     clobetasoL (TEMOVATE) 0.05 % external solution; Apply topically 2 (two) times daily.  Dispense: 150 mL; Refill: 3  -     methylPREDNISolone (MEDROL) 4 MG Tab; Take 2 tablets (8 mg total) by mouth once daily.  Dispense: 60 tablet; Refill: 0  -     levocetirizine (XYZAL) 5 MG tablet; Take 1 tablet (5 mg total) by mouth every evening.  Dispense: 30 tablet; Refill: 11  -     ketorolac injection 60 mg  -     HYDROcodone-acetaminophen (NORCO)  mg per tablet; Take 1 tablet by mouth every 8 (eight) hours as needed for Pain.  Dispense: 90 tablet; Refill: 0  -     HYDROcodone-acetaminophen (NORCO)  mg per tablet; Take 1 tablet by mouth every 8 (eight) hours as needed for Pain.   Dispense: 90 tablet; Refill: 0  -     HYDROcodone-acetaminophen (NORCO)  mg per tablet; Take 1 tablet by mouth every 8 (eight) hours as needed for Pain.  Dispense: 90 tablet; Refill: 0  -     T4, Free; Future; Expected date: 04/10/2023  -     TSH; Future; Expected date: 04/10/2023  -     Vitamin D; Future; Expected date: 04/10/2023  -     Cyclic Citrullinated Peptide Antibody, IgG; Future; Expected date: 04/10/2023  -     Rheumatoid Factor; Future; Expected date: 04/10/2023  -     Sedimentation rate; Future; Expected date: 04/10/2023  -     C-Reactive Protein; Future; Expected date: 04/10/2023  -     Comprehensive Metabolic Panel; Future; Expected date: 04/10/2023  -     CBC Auto Differential; Future; Expected date: 04/10/2023    Chronic pain syndrome  -     Discontinue: methylPREDNISolone acetate injection 80 mg  -     hydrOXYzine HCL (ATARAX) 25 MG tablet; Take 1 tablet (25 mg total) by mouth 3 (three) times daily as needed for Itching.  Dispense: 45 tablet; Refill: 5  -     clobetasoL (TEMOVATE) 0.05 % external solution; Apply topically 2 (two) times daily.  Dispense: 150 mL; Refill: 3  -     methylPREDNISolone (MEDROL) 4 MG Tab; Take 2 tablets (8 mg total) by mouth once daily.  Dispense: 60 tablet; Refill: 0  -     levocetirizine (XYZAL) 5 MG tablet; Take 1 tablet (5 mg total) by mouth every evening.  Dispense: 30 tablet; Refill: 11  -     ketorolac injection 60 mg  -     HYDROcodone-acetaminophen (NORCO)  mg per tablet; Take 1 tablet by mouth every 8 (eight) hours as needed for Pain.  Dispense: 90 tablet; Refill: 0  -     HYDROcodone-acetaminophen (NORCO)  mg per tablet; Take 1 tablet by mouth every 8 (eight) hours as needed for Pain.  Dispense: 90 tablet; Refill: 0  -     HYDROcodone-acetaminophen (NORCO)  mg per tablet; Take 1 tablet by mouth every 8 (eight) hours as needed for Pain.  Dispense: 90 tablet; Refill: 0  -     T4, Free; Future; Expected date: 04/10/2023  -     TSH; Future;  Expected date: 04/10/2023  -     Vitamin D; Future; Expected date: 04/10/2023  -     Cyclic Citrullinated Peptide Antibody, IgG; Future; Expected date: 04/10/2023  -     Rheumatoid Factor; Future; Expected date: 04/10/2023  -     Sedimentation rate; Future; Expected date: 04/10/2023  -     C-Reactive Protein; Future; Expected date: 04/10/2023  -     Comprehensive Metabolic Panel; Future; Expected date: 04/10/2023  -     CBC Auto Differential; Future; Expected date: 04/10/2023    Herpes zoster without complication  -     valACYclovir (VALTREX) 1000 MG tablet; Take 1 tablet (1,000 mg total) by mouth 2 (two) times daily.  Dispense: 60 tablet; Refill: 2    Rheumatoid arthritis involving right shoulder with positive rheumatoid factor  -     HYDROcodone-acetaminophen (NORCO)  mg per tablet; Take 1 tablet by mouth every 8 (eight) hours as needed for Pain.  Dispense: 90 tablet; Refill: 0  -     HYDROcodone-acetaminophen (NORCO)  mg per tablet; Take 1 tablet by mouth every 8 (eight) hours as needed for Pain.  Dispense: 90 tablet; Refill: 0  -     HYDROcodone-acetaminophen (NORCO)  mg per tablet; Take 1 tablet by mouth every 8 (eight) hours as needed for Pain.  Dispense: 90 tablet; Refill: 0  -     T4, Free; Future; Expected date: 04/10/2023  -     TSH; Future; Expected date: 04/10/2023  -     Vitamin D; Future; Expected date: 04/10/2023  -     Cyclic Citrullinated Peptide Antibody, IgG; Future; Expected date: 04/10/2023  -     Rheumatoid Factor; Future; Expected date: 04/10/2023  -     Sedimentation rate; Future; Expected date: 04/10/2023  -     C-Reactive Protein; Future; Expected date: 04/10/2023  -     Comprehensive Metabolic Panel; Future; Expected date: 04/10/2023  -     CBC Auto Differential; Future; Expected date: 04/10/2023    Abnormal results of thyroid function studies  -     T4, Free; Future; Expected date: 04/10/2023  -     TSH; Future; Expected date: 04/10/2023  -     Vitamin D; Future;  Expected date: 04/10/2023  -     Cyclic Citrullinated Peptide Antibody, IgG; Future; Expected date: 04/10/2023  -     Rheumatoid Factor; Future; Expected date: 04/10/2023  -     Sedimentation rate; Future; Expected date: 04/10/2023  -     C-Reactive Protein; Future; Expected date: 04/10/2023  -     Comprehensive Metabolic Panel; Future; Expected date: 04/10/2023  -     CBC Auto Differential; Future; Expected date: 04/10/2023              Assessment:  72 year old female with   Psoriatic arthritis, rheumatoid arthritis on Humira  --chronic pain syndrome on norco  --insomnia on ambien  --iron deficiency anemia, stable  --grade 2 diastolic dysfunction    Plan:  1. toradol 60, depo 160,   2. Cont. Humira   3. Cont. norco 10/325 TID . I have checked [place] prescription monitoring program site and no unusual or abnormal behavior has occurred pt understand the risk and benefits of taking opioid medications and has decided to continue the  Medication.   4. Cont. mobic 15 mg, cont. Flexeril PRN    More than 50% of the  40 minute encounter was spent face to face counseling the patient regarding current status and future plan of care as well as side effects  of the medications. All questions were answered to patient's satisfaction also includes  non-face to face time preparing to see the patient (eg, review of tests), Obtaining and/or reviewing separately obtained history, Documenting clinical information in the electronic or other health record, Independently interpreting results

## 2023-04-10 NOTE — PROGRESS NOTES
2 pt identifiers used  Allergies reviewed      Administered 2 cc ( 30 mg/ml ) of toradol to the left upper outer gluteal. Patient tolerated injections well. Informed of s/s to report verbalized understanding. No adverse reactions noted. Left facility in stable condition.    Administered 2 cc ( 40 mg/ml ) of depomedrol to the right upper outer gluteal. Informed of s/s to report verbalized understanding. No adverse reactions noted.  Administered 2 cc ( 40 mg/ml ) of depomedrol to the right upper outer gluteal. Informed of s/s to report verbalized understanding. No adverse reactions noted.

## 2023-04-10 NOTE — TELEPHONE ENCOUNTER
----- Message from Esther Esposito, Patient Care Assistant sent at 4/10/2023  8:20 AM CDT -----  Contact: self  Type:  RX Refill Request    Who Called:  self  Refill or New Rx:  refill  RX Name and Strength:HYDROcodone-acetaminophen (NORCO)  mg per tablet    How is the patient currently taking it? (ex. 1XDay):  as directed  Is this a 30 day or 90 day RX:  30  Preferred Pharmacy with phone number:    [id number] - [facility] [address] [facility] [address]  [address] [place] [address]  Phone: [phone] Fax: [phone]  Local or Mail Order:  local  Ordering Provider:  Dr Sourav Roblero Call Back Number:  [phone]  Additional Information:  thanks

## 2023-04-10 NOTE — TELEPHONE ENCOUNTER
Returned patient call regarding need to be seen today. Patient stated she has what seemed like psoriasis in her ears. Stated applied baby oil to help and it started oozing. Stated she has what she though was shingles around her waist. Stated valtrex did not help. Stated patches are extremely itchy. Nurse advised to come to office for skin assessment. Patient stated she is on her way.

## 2023-04-27 NOTE — TELEPHONE ENCOUNTER
Specialty Pharmacy - Refill Coordination    Specialty Medication Orders Linked to Encounter      Flowsheet Row Most Recent Value   Medication #1 adalimumab (HUMIRA,CF, PEN) 40 mg/0.4 mL PnKt (Order#[id number], Rx#[id number])            Refill Questions - Documented Responses      Flowsheet Row Most Recent Value   Patient Availability and HIPAA Verification    Does patient want to proceed with activity? Yes   HIPAA/medical authority confirmed? Yes   Relationship to patient of person spoken to? Self   Refill Screening Questions    Changes to allergies? No   Changes to medications? No   New conditions since last clinic visit? No   Unplanned office visit, urgent care, ED, or hospital admission in the last 4 weeks? No   How does patient/caregiver feel medication is working? Very good   Financial problems or insurance changes? No   How many doses of your specialty medications were missed in the last 4 weeks? 0   Would patient like to speak to a pharmacist? No   When does the patient need to receive the medication? 05/04/23   Refill Delivery Questions    How will the patient receive the medication? MEDRx   When does the patient need to receive the medication? 05/04/23   Shipping Address Home   Address in [facility] confirmed and updated if neccessary? Yes   Expected Copay ($) 0   Is the patient able to afford the medication copay? Yes   Payment Method zero copay   Days supply of Refill 28   Supplies needed? No supplies needed   Refill activity completed? Yes   Refill activity plan Refill scheduled   Shipment/Pickup Date: 05/02/23            Current Outpatient Medications   Medication Sig    adalimumab (HUMIRA,CF, PEN) 40 mg/0.4 mL PnKt Inject 0.4 mL (40 mg total) into the skin every 14 (fourteen) days.    clobetasoL (TEMOVATE) 0.05 % external solution Apply topically 2 (two) times daily.    cyclobenzaprine (FLEXERIL) 10 MG tablet Take 1 tablet (10 mg total) by mouth 3 (three) times daily as needed for Muscle spasms.     diclofenac sodium (VOLTAREN) 1 % Gel Apply 2 g topically once daily.    EScitalopram oxalate (LEXAPRO) 20 MG tablet Take 1 tablet (20 mg total) by mouth once daily.    estradioL (ESTRACE) 1 MG tablet TAKE 1 TABLET(1 MG) BY MOUTH EVERY DAY    furosemide (LASIX) 20 MG tablet Take 1 tablet (20 mg total) by mouth every other day.    HYDROcodone-acetaminophen (NORCO)  mg per tablet Take 1 tablet by mouth every 8 (eight) hours as needed for Pain.    [START ON 5/9/2023] HYDROcodone-acetaminophen (NORCO)  mg per tablet Take 1 tablet by mouth every 8 (eight) hours as needed for Pain.    [START ON 6/9/2023] HYDROcodone-acetaminophen (NORCO)  mg per tablet Take 1 tablet by mouth every 8 (eight) hours as needed for Pain.    hydrOXYzine HCL (ATARAX) 25 MG tablet Take 1 tablet (25 mg total) by mouth 3 (three) times daily as needed for Itching.    levocetirizine (XYZAL) 5 MG tablet Take 1 tablet (5 mg total) by mouth every evening.    levothyroxine (SYNTHROID) 112 MCG tablet Take 1 tablet (112 mcg total) by mouth before breakfast.    lisinopriL 10 MG tablet TAKE 1 TABLET(10 MG) BY MOUTH EVERY DAY    loratadine 10 mg Cap Take 1 tablet by mouth once daily.    meloxicam (MOBIC) 15 MG tablet TAKE 1 TABLET(15 MG) BY MOUTH EVERY DAY    methylPREDNISolone (MEDROL) 4 MG Tab Take 2 tablets (8 mg total) by mouth once daily.    omeprazole (PRILOSEC) 40 MG capsule Take 1 capsule (40 mg total) by mouth once daily.    ondansetron (ZOFRAN) 8 MG tablet Take 8 mg by mouth every 6 (six) hours as needed.    ondansetron (ZOFRAN-ODT) 8 MG TbDL Take 1 tablet (8 mg total) by mouth every 6 (six) hours as needed (nausea).    simvastatin (ZOCOR) 40 MG tablet Take 1 tablet (40 mg total) by mouth every evening.    sumatriptan (IMITREX) 100 MG tablet Take 1 tablet (100 mg total) by mouth once as needed for Migraine.    valACYclovir (VALTREX) 1000 MG tablet Take 1 tablet (1,000 mg total) by mouth 2 (two) times daily.    zolpidem (AMBIEN)  10 mg Tab Take 1 tablet (10 mg total) by mouth every evening.   Last reviewed on 4/10/2023  4:09 PM by Therese Chapin MA    Review of patient's allergies indicates:   Allergen Reactions    Cimzia [certolizumab pegol]      Not effective     Methotrexate analogues      Not effective    Penicillins Hives    Zyprexa [olanzapine]      Not effective     Last reviewed on  4/10/2023 4:09 PM by Therese Chapin    Interventions added this encounter   Closed: [facility] Patient Intervention - Drug therapy adherence: adalimumab (HUMIRA,CF, PEN) 40 mg/0.4 mL PnKt     Tasks added this encounter   No tasks added.   Tasks due within next 3 months   No tasks due.     Hermes Canchola, PharmD  [facility]  [address] [place] [address]  Phone: [phone]  Fax: [phone]

## 2023-05-16 DIAGNOSIS — F32.9 MAJOR DEPRESSION, CHRONIC: ICD-10-CM

## 2023-05-16 RX ORDER — ESCITALOPRAM OXALATE 20 MG/1
20 TABLET ORAL DAILY
Qty: 90 TABLET | Refills: 2 | Status: SHIPPED | OUTPATIENT
Start: 2023-05-16 | End: 2024-02-06 | Stop reason: SDUPTHER

## 2023-05-16 NOTE — TELEPHONE ENCOUNTER
Pharmacy requesting refill on Escitalopram 20mg  Pt's LOV 02/13/2023  Pt's NOV 06/13/2023  Medication pending

## 2023-05-19 DIAGNOSIS — G47.00 INSOMNIA, UNSPECIFIED TYPE: ICD-10-CM

## 2023-05-22 RX ORDER — LEVOTHYROXINE SODIUM 125 UG/1
TABLET ORAL
Qty: 90 TABLET | Refills: 3 | OUTPATIENT
Start: 2023-05-22

## 2023-05-23 ENCOUNTER — SPECIALTY PHARMACY (OUTPATIENT)
Dept: PHARMACY | Facility: CLINIC | Age: 73
End: 2023-05-23
Payer: MEDICARE

## 2023-05-23 RX ORDER — ZOLPIDEM TARTRATE 10 MG/1
TABLET ORAL
Qty: 30 TABLET | Refills: 3 | Status: SHIPPED | OUTPATIENT
Start: 2023-05-23 | End: 2023-07-31

## 2023-05-23 NOTE — TELEPHONE ENCOUNTER
Specialty Pharmacy - Refill Coordination    Specialty Medication Orders Linked to Encounter      Flowsheet Row Most Recent Value   Medication #1 adalimumab (HUMIRA,CF, PEN) 40 mg/0.4 mL PnKt (Order#[id number], Rx#[id number])            Refill Questions - Documented Responses      Flowsheet Row Most Recent Value   Refill Screening Questions    Changes to allergies? No   Changes to medications? No   New conditions since last clinic visit? No   Unplanned office visit, urgent care, ED, or hospital admission in the last 4 weeks? No   How does patient/caregiver feel medication is working? Very good   Financial problems or insurance changes? No   How many doses of your specialty medications were missed in the last 4 weeks? 0   Would patient like to speak to a pharmacist? No   When does the patient need to receive the medication? 06/01/23   Refill Delivery Questions    How will the patient receive the medication? MEDRx   When does the patient need to receive the medication? 06/01/23   Shipping Address Home   Address in [facility] confirmed and updated if neccessary? Yes   Expected Copay ($) 0   Is the patient able to afford the medication copay? Yes   Payment Method zero copay   Days supply of Refill 28   Supplies needed? No supplies needed   Refill activity completed? Yes   Refill activity plan Refill scheduled   Shipment/Pickup Date: 05/30/23            Current Outpatient Medications   Medication Sig    adalimumab (HUMIRA,CF, PEN) 40 mg/0.4 mL PnKt Inject 0.4 mL (40 mg total) into the skin every 14 (fourteen) days.    clobetasoL (TEMOVATE) 0.05 % external solution Apply topically 2 (two) times daily.    cyclobenzaprine (FLEXERIL) 10 MG tablet Take 1 tablet (10 mg total) by mouth 3 (three) times daily as needed for Muscle spasms.    diclofenac sodium (VOLTAREN) 1 % Gel Apply 2 g topically once daily.    EScitalopram oxalate (LEXAPRO) 20 MG tablet Take 1 tablet (20 mg total) by mouth once daily.    estradioL  (ESTRACE) 1 MG tablet TAKE 1 TABLET(1 MG) BY MOUTH EVERY DAY    furosemide (LASIX) 20 MG tablet Take 1 tablet (20 mg total) by mouth every other day.    HYDROcodone-acetaminophen (NORCO)  mg per tablet Take 1 tablet by mouth every 8 (eight) hours as needed for Pain.    HYDROcodone-acetaminophen (NORCO)  mg per tablet Take 1 tablet by mouth every 8 (eight) hours as needed for Pain.    [START ON 6/9/2023] HYDROcodone-acetaminophen (NORCO)  mg per tablet Take 1 tablet by mouth every 8 (eight) hours as needed for Pain.    hydrOXYzine HCL (ATARAX) 25 MG tablet Take 1 tablet (25 mg total) by mouth 3 (three) times daily as needed for Itching.    levocetirizine (XYZAL) 5 MG tablet Take 1 tablet (5 mg total) by mouth every evening.    levothyroxine (SYNTHROID) 112 MCG tablet TAKE 1 TABLET(112 MCG) BY MOUTH BEFORE BREAKFAST    lisinopriL 10 MG tablet TAKE 1 TABLET(10 MG) BY MOUTH EVERY DAY    loratadine 10 mg Cap Take 1 tablet by mouth once daily.    meloxicam (MOBIC) 15 MG tablet TAKE 1 TABLET(15 MG) BY MOUTH EVERY DAY    omeprazole (PRILOSEC) 40 MG capsule Take 1 capsule (40 mg total) by mouth once daily.    ondansetron (ZOFRAN) 8 MG tablet Take 8 mg by mouth every 6 (six) hours as needed.    ondansetron (ZOFRAN-ODT) 8 MG TbDL Take 1 tablet (8 mg total) by mouth every 6 (six) hours as needed (nausea).    simvastatin (ZOCOR) 40 MG tablet Take 1 tablet (40 mg total) by mouth every evening.    sumatriptan (IMITREX) 100 MG tablet Take 1 tablet (100 mg total) by mouth once as needed for Migraine.    valACYclovir (VALTREX) 1000 MG tablet Take 1 tablet (1,000 mg total) by mouth 2 (two) times daily.    zolpidem (AMBIEN) 10 mg Tab TAKE 1 TABLET(10 MG) BY MOUTH EVERY EVENING   Last reviewed on 4/10/2023  4:09 PM by Therese Chapin MA    Review of patient's allergies indicates:   Allergen Reactions    Cimzia [certolizumab pegol]      Not effective     Methotrexate analogues      Not effective    Penicillins Hives     Zyprexa [olanzapine]      Not effective     Last reviewed on  5/22/2023 1:07 PM by Becki Garcia      Tasks added this encounter   No tasks added.   Tasks due within next 3 months   No tasks due.     Ashley Swenson, PharmD  [facility]  [address] [place], [place] [address]  [facility] [address]  Phone: [phone]  Fax: [phone]

## 2023-05-23 NOTE — TELEPHONE ENCOUNTER
Specialty Pharmacy - Refill Coordination    Specialty Medication Orders Linked to Encounter      Flowsheet Row Most Recent Value   Medication #1 adalimumab (HUMIRA,CF, PEN) 40 mg/0.4 mL PnKt (Order#[id number], Rx#[id number])                Current Outpatient Medications   Medication Sig    adalimumab (HUMIRA,CF, PEN) 40 mg/0.4 mL PnKt Inject 0.4 mL (40 mg total) into the skin every 14 (fourteen) days.    clobetasoL (TEMOVATE) 0.05 % external solution Apply topically 2 (two) times daily.    cyclobenzaprine (FLEXERIL) 10 MG tablet Take 1 tablet (10 mg total) by mouth 3 (three) times daily as needed for Muscle spasms.    diclofenac sodium (VOLTAREN) 1 % Gel Apply 2 g topically once daily.    EScitalopram oxalate (LEXAPRO) 20 MG tablet Take 1 tablet (20 mg total) by mouth once daily.    estradioL (ESTRACE) 1 MG tablet TAKE 1 TABLET(1 MG) BY MOUTH EVERY DAY    furosemide (LASIX) 20 MG tablet Take 1 tablet (20 mg total) by mouth every other day.    HYDROcodone-acetaminophen (NORCO)  mg per tablet Take 1 tablet by mouth every 8 (eight) hours as needed for Pain.    HYDROcodone-acetaminophen (NORCO)  mg per tablet Take 1 tablet by mouth every 8 (eight) hours as needed for Pain.    [START ON 6/9/2023] HYDROcodone-acetaminophen (NORCO)  mg per tablet Take 1 tablet by mouth every 8 (eight) hours as needed for Pain.    hydrOXYzine HCL (ATARAX) 25 MG tablet Take 1 tablet (25 mg total) by mouth 3 (three) times daily as needed for Itching.    levocetirizine (XYZAL) 5 MG tablet Take 1 tablet (5 mg total) by mouth every evening.    levothyroxine (SYNTHROID) 112 MCG tablet TAKE 1 TABLET(112 MCG) BY MOUTH BEFORE BREAKFAST    lisinopriL 10 MG tablet TAKE 1 TABLET(10 MG) BY MOUTH EVERY DAY    loratadine 10 mg Cap Take 1 tablet by mouth once daily.    meloxicam (MOBIC) 15 MG tablet TAKE 1 TABLET(15 MG) BY MOUTH EVERY DAY    omeprazole (PRILOSEC) 40 MG capsule Take 1 capsule (40 mg total) by mouth once daily.     ondansetron (ZOFRAN) 8 MG tablet Take 8 mg by mouth every 6 (six) hours as needed.    ondansetron (ZOFRAN-ODT) 8 MG TbDL Take 1 tablet (8 mg total) by mouth every 6 (six) hours as needed (nausea).    simvastatin (ZOCOR) 40 MG tablet Take 1 tablet (40 mg total) by mouth every evening.    sumatriptan (IMITREX) 100 MG tablet Take 1 tablet (100 mg total) by mouth once as needed for Migraine.    valACYclovir (VALTREX) 1000 MG tablet Take 1 tablet (1,000 mg total) by mouth 2 (two) times daily.    zolpidem (AMBIEN) 10 mg Tab TAKE 1 TABLET(10 MG) BY MOUTH EVERY EVENING   Last reviewed on 4/10/2023  4:09 PM by Therese Chapin MA    Review of patient's allergies indicates:   Allergen Reactions    Cimzia [certolizumab pegol]      Not effective     Methotrexate analogues      Not effective    Penicillins Hives    Zyprexa [olanzapine]      Not effective     Last reviewed on  5/22/2023 1:07 PM by Becki Garcia      Tasks added this encounter   No tasks added.   Tasks due within next 3 months   No tasks due.     Ashley Swenson, PharmD  [facility]  [address] [facility] [address]  Phone: [phone]  Fax: [phone]

## 2023-05-23 NOTE — TELEPHONE ENCOUNTER
Specialty Pharmacy - Refill Coordination    Specialty Medication Orders Linked to Encounter      Flowsheet Row Most Recent Value   Medication #1 adalimumab (HUMIRA,CF, PEN) 40 mg/0.4 mL PnKt (Order#[id number], Rx#[id number])            Refill Questions - Documented Responses      Flowsheet Row Most Recent Value   Refill Screening Questions    Changes to allergies? No   Changes to medications? No   New conditions since last clinic visit? No   Unplanned office visit, urgent care, ED, or hospital admission in the last 4 weeks? No   How does patient/caregiver feel medication is working? Very good   Financial problems or insurance changes? No   How many doses of your specialty medications were missed in the last 4 weeks? 0   Would patient like to speak to a pharmacist? No   When does the patient need to receive the medication? 06/01/23   Refill Delivery Questions    How will the patient receive the medication? MEDRx   When does the patient need to receive the medication? 06/01/23   Shipping Address Home   Address in [facility] confirmed and updated if neccessary? Yes   Expected Copay ($) 0   Is the patient able to afford the medication copay? Yes   Payment Method zero copay   Days supply of Refill 28   Supplies needed? No supplies needed   Refill activity completed? Yes   Refill activity plan Refill scheduled            Current Outpatient Medications   Medication Sig    adalimumab (HUMIRA,CF, PEN) 40 mg/0.4 mL PnKt Inject 0.4 mL (40 mg total) into the skin every 14 (fourteen) days.    clobetasoL (TEMOVATE) 0.05 % external solution Apply topically 2 (two) times daily.    cyclobenzaprine (FLEXERIL) 10 MG tablet Take 1 tablet (10 mg total) by mouth 3 (three) times daily as needed for Muscle spasms.    diclofenac sodium (VOLTAREN) 1 % Gel Apply 2 g topically once daily.    EScitalopram oxalate (LEXAPRO) 20 MG tablet Take 1 tablet (20 mg total) by mouth once daily.    estradioL (ESTRACE) 1 MG tablet TAKE 1 TABLET(1 MG)  BY MOUTH EVERY DAY    furosemide (LASIX) 20 MG tablet Take 1 tablet (20 mg total) by mouth every other day.    HYDROcodone-acetaminophen (NORCO)  mg per tablet Take 1 tablet by mouth every 8 (eight) hours as needed for Pain.    HYDROcodone-acetaminophen (NORCO)  mg per tablet Take 1 tablet by mouth every 8 (eight) hours as needed for Pain.    [START ON 6/9/2023] HYDROcodone-acetaminophen (NORCO)  mg per tablet Take 1 tablet by mouth every 8 (eight) hours as needed for Pain.    hydrOXYzine HCL (ATARAX) 25 MG tablet Take 1 tablet (25 mg total) by mouth 3 (three) times daily as needed for Itching.    levocetirizine (XYZAL) 5 MG tablet Take 1 tablet (5 mg total) by mouth every evening.    levothyroxine (SYNTHROID) 112 MCG tablet TAKE 1 TABLET(112 MCG) BY MOUTH BEFORE BREAKFAST    lisinopriL 10 MG tablet TAKE 1 TABLET(10 MG) BY MOUTH EVERY DAY    loratadine 10 mg Cap Take 1 tablet by mouth once daily.    meloxicam (MOBIC) 15 MG tablet TAKE 1 TABLET(15 MG) BY MOUTH EVERY DAY    omeprazole (PRILOSEC) 40 MG capsule Take 1 capsule (40 mg total) by mouth once daily.    ondansetron (ZOFRAN) 8 MG tablet Take 8 mg by mouth every 6 (six) hours as needed.    ondansetron (ZOFRAN-ODT) 8 MG TbDL Take 1 tablet (8 mg total) by mouth every 6 (six) hours as needed (nausea).    simvastatin (ZOCOR) 40 MG tablet Take 1 tablet (40 mg total) by mouth every evening.    sumatriptan (IMITREX) 100 MG tablet Take 1 tablet (100 mg total) by mouth once as needed for Migraine.    valACYclovir (VALTREX) 1000 MG tablet Take 1 tablet (1,000 mg total) by mouth 2 (two) times daily.    zolpidem (AMBIEN) 10 mg Tab TAKE 1 TABLET(10 MG) BY MOUTH EVERY EVENING   Last reviewed on 4/10/2023  4:09 PM by Therese Chapin MA    Review of patient's allergies indicates:   Allergen Reactions    Cimzia [certolizumab pegol]      Not effective     Methotrexate analogues      Not effective    Penicillins Hives    Zyprexa [olanzapine]      Not effective      Last reviewed on  5/22/2023 1:07 PM by Bceki Garcia    Interventions added this encounter   Closed: [facility] Patient Intervention - Patient educational resources: adalimumab (HUMIRA,CF, PEN) 40 mg/0.4 mL PnKt     Tasks added this encounter   No tasks added.   Tasks due within next 3 months   No tasks due.     Ashley Swenson, PharmD  [facility]  [address] [place] [id number]  Phone: [phone]  Fax: [phone]

## 2023-06-13 ENCOUNTER — LAB VISIT (OUTPATIENT)
Dept: LAB | Facility: HOSPITAL | Age: 73
End: 2023-06-13
Attending: INTERNAL MEDICINE
Payer: MEDICARE

## 2023-06-13 ENCOUNTER — OFFICE VISIT (OUTPATIENT)
Dept: RHEUMATOLOGY | Facility: CLINIC | Age: 73
End: 2023-06-13
Payer: MEDICARE

## 2023-06-13 VITALS
HEART RATE: 76 BPM | SYSTOLIC BLOOD PRESSURE: 151 MMHG | HEIGHT: 67 IN | BODY MASS INDEX: 27.62 KG/M2 | WEIGHT: 176 LBS | DIASTOLIC BLOOD PRESSURE: 82 MMHG

## 2023-06-13 DIAGNOSIS — L40.50 PSORIATIC ARTHRITIS: Primary | ICD-10-CM

## 2023-06-13 DIAGNOSIS — M05.711 RHEUMATOID ARTHRITIS INVOLVING RIGHT SHOULDER WITH POSITIVE RHEUMATOID FACTOR: ICD-10-CM

## 2023-06-13 DIAGNOSIS — L40.50 PSORIATIC ARTHRITIS: ICD-10-CM

## 2023-06-13 DIAGNOSIS — I51.89 DIASTOLIC DYSFUNCTION: ICD-10-CM

## 2023-06-13 DIAGNOSIS — D84.821 IMMUNOCOMPROMISED STATE DUE TO DRUG THERAPY: ICD-10-CM

## 2023-06-13 DIAGNOSIS — G89.4 CHRONIC PAIN SYNDROME: ICD-10-CM

## 2023-06-13 DIAGNOSIS — R00.2 PALPITATIONS: ICD-10-CM

## 2023-06-13 DIAGNOSIS — D50.9 IRON DEFICIENCY ANEMIA, UNSPECIFIED IRON DEFICIENCY ANEMIA TYPE: ICD-10-CM

## 2023-06-13 DIAGNOSIS — Z79.899 IMMUNOCOMPROMISED STATE DUE TO DRUG THERAPY: ICD-10-CM

## 2023-06-13 DIAGNOSIS — R94.6 ABNORMAL RESULTS OF THYROID FUNCTION STUDIES: ICD-10-CM

## 2023-06-13 LAB
25(OH)D3+25(OH)D2 SERPL-MCNC: 30 NG/ML (ref 30–96)
ALBUMIN SERPL BCP-MCNC: 3.9 G/DL (ref 3.5–5.2)
ALP SERPL-CCNC: 74 U/L (ref 55–135)
ALT SERPL W/O P-5'-P-CCNC: 24 U/L (ref 10–44)
ANION GAP SERPL CALC-SCNC: 10 MMOL/L (ref 8–16)
AST SERPL-CCNC: 23 U/L (ref 10–40)
BASOPHILS # BLD AUTO: 0.06 K/UL (ref 0–0.2)
BASOPHILS NFR BLD: 0.6 % (ref 0–1.9)
BILIRUB SERPL-MCNC: 0.6 MG/DL (ref 0.1–1)
BUN SERPL-MCNC: 17 MG/DL (ref 8–23)
CALCIUM SERPL-MCNC: 9 MG/DL (ref 8.7–10.5)
CCP AB SER IA-ACNC: 0.5 U/ML
CHLORIDE SERPL-SCNC: 103 MMOL/L (ref 95–110)
CO2 SERPL-SCNC: 24 MMOL/L (ref 23–29)
CREAT SERPL-MCNC: 0.8 MG/DL (ref 0.5–1.4)
CRP SERPL-MCNC: 3.1 MG/L (ref 0–8.2)
DIFFERENTIAL METHOD: ABNORMAL
EOSINOPHIL # BLD AUTO: 0.1 K/UL (ref 0–0.5)
EOSINOPHIL NFR BLD: 1.1 % (ref 0–8)
ERYTHROCYTE [DISTWIDTH] IN BLOOD BY AUTOMATED COUNT: 13 % (ref 11.5–14.5)
ERYTHROCYTE [SEDIMENTATION RATE] IN BLOOD BY PHOTOMETRIC METHOD: 10 MM/HR (ref 0–36)
EST. GFR  (NO RACE VARIABLE): >60 ML/MIN/1.73 M^2
FERRITIN SERPL-MCNC: 46 NG/ML (ref 20–300)
GLUCOSE SERPL-MCNC: 83 MG/DL (ref 70–110)
HCT VFR BLD AUTO: 39.6 % (ref 37–48.5)
HGB BLD-MCNC: 13.3 G/DL (ref 12–16)
IMM GRANULOCYTES # BLD AUTO: 0.04 K/UL (ref 0–0.04)
IMM GRANULOCYTES NFR BLD AUTO: 0.4 % (ref 0–0.5)
IRON SERPL-MCNC: 105 UG/DL (ref 30–160)
LYMPHOCYTES # BLD AUTO: 1.5 K/UL (ref 1–4.8)
LYMPHOCYTES NFR BLD: 14.9 % (ref 18–48)
MCH RBC QN AUTO: 32.9 PG (ref 27–31)
MCHC RBC AUTO-ENTMCNC: 33.6 G/DL (ref 32–36)
MCV RBC AUTO: 98 FL (ref 82–98)
MONOCYTES # BLD AUTO: 0.4 K/UL (ref 0.3–1)
MONOCYTES NFR BLD: 3.6 % (ref 4–15)
NEUTROPHILS # BLD AUTO: 8.1 K/UL (ref 1.8–7.7)
NEUTROPHILS NFR BLD: 79.4 % (ref 38–73)
NRBC BLD-RTO: 0 /100 WBC
PLATELET # BLD AUTO: 273 K/UL (ref 150–450)
PMV BLD AUTO: 10.5 FL (ref 9.2–12.9)
POTASSIUM SERPL-SCNC: 4.2 MMOL/L (ref 3.5–5.1)
PROT SERPL-MCNC: 7.5 G/DL (ref 6–8.4)
RBC # BLD AUTO: 4.04 M/UL (ref 4–5.4)
RHEUMATOID FACT SERPL-ACNC: <13 IU/ML (ref 0–15)
SATURATED IRON: 25 % (ref 20–50)
SODIUM SERPL-SCNC: 137 MMOL/L (ref 136–145)
T4 FREE SERPL-MCNC: 1.03 NG/DL (ref 0.71–1.51)
TOTAL IRON BINDING CAPACITY: 425 UG/DL (ref 250–450)
TRANSFERRIN SERPL-MCNC: 287 MG/DL (ref 200–375)
TSH SERPL DL<=0.005 MIU/L-ACNC: 3.62 UIU/ML (ref 0.4–4)
WBC # BLD AUTO: 10.25 K/UL (ref 3.9–12.7)

## 2023-06-13 PROCEDURE — 82728 ASSAY OF FERRITIN: CPT | Performed by: PHYSICIAN ASSISTANT

## 2023-06-13 PROCEDURE — 3079F DIAST BP 80-89 MM HG: CPT | Mod: CPTII,S$GLB,, | Performed by: PHYSICIAN ASSISTANT

## 2023-06-13 PROCEDURE — 86431 RHEUMATOID FACTOR QUANT: CPT | Performed by: INTERNAL MEDICINE

## 2023-06-13 PROCEDURE — 85025 COMPLETE CBC W/AUTO DIFF WBC: CPT | Performed by: INTERNAL MEDICINE

## 2023-06-13 PROCEDURE — 4010F ACE/ARB THERAPY RXD/TAKEN: CPT | Mod: CPTII,S$GLB,, | Performed by: PHYSICIAN ASSISTANT

## 2023-06-13 PROCEDURE — 99215 OFFICE O/P EST HI 40 MIN: CPT | Mod: 25,S$GLB,, | Performed by: PHYSICIAN ASSISTANT

## 2023-06-13 PROCEDURE — 99999 PR PBB SHADOW E&M-EST. PATIENT-LVL V: ICD-10-PCS | Mod: PBBFAC,,, | Performed by: PHYSICIAN ASSISTANT

## 2023-06-13 PROCEDURE — 1159F MED LIST DOCD IN RCRD: CPT | Mod: CPTII,S$GLB,, | Performed by: PHYSICIAN ASSISTANT

## 2023-06-13 PROCEDURE — 1125F AMNT PAIN NOTED PAIN PRSNT: CPT | Mod: CPTII,S$GLB,, | Performed by: PHYSICIAN ASSISTANT

## 2023-06-13 PROCEDURE — 3008F PR BODY MASS INDEX (BMI) DOCUMENTED: ICD-10-PCS | Mod: CPTII,S$GLB,, | Performed by: PHYSICIAN ASSISTANT

## 2023-06-13 PROCEDURE — 1159F PR MEDICATION LIST DOCUMENTED IN MEDICAL RECORD: ICD-10-PCS | Mod: CPTII,S$GLB,, | Performed by: PHYSICIAN ASSISTANT

## 2023-06-13 PROCEDURE — 86200 CCP ANTIBODY: CPT | Performed by: INTERNAL MEDICINE

## 2023-06-13 PROCEDURE — 3077F SYST BP >= 140 MM HG: CPT | Mod: CPTII,S$GLB,, | Performed by: PHYSICIAN ASSISTANT

## 2023-06-13 PROCEDURE — 85652 RBC SED RATE AUTOMATED: CPT | Performed by: INTERNAL MEDICINE

## 2023-06-13 PROCEDURE — 99999 PR PBB SHADOW E&M-EST. PATIENT-LVL V: CPT | Mod: PBBFAC,,, | Performed by: PHYSICIAN ASSISTANT

## 2023-06-13 PROCEDURE — 84439 ASSAY OF FREE THYROXINE: CPT | Performed by: INTERNAL MEDICINE

## 2023-06-13 PROCEDURE — 4010F PR ACE/ARB THEARPY RXD/TAKEN: ICD-10-PCS | Mod: CPTII,S$GLB,, | Performed by: PHYSICIAN ASSISTANT

## 2023-06-13 PROCEDURE — 3008F BODY MASS INDEX DOCD: CPT | Mod: CPTII,S$GLB,, | Performed by: PHYSICIAN ASSISTANT

## 2023-06-13 PROCEDURE — 3079F PR MOST RECENT DIASTOLIC BLOOD PRESSURE 80-89 MM HG: ICD-10-PCS | Mod: CPTII,S$GLB,, | Performed by: PHYSICIAN ASSISTANT

## 2023-06-13 PROCEDURE — 86140 C-REACTIVE PROTEIN: CPT | Performed by: INTERNAL MEDICINE

## 2023-06-13 PROCEDURE — 99215 PR OFFICE/OUTPT VISIT, EST, LEVL V, 40-54 MIN: ICD-10-PCS | Mod: 25,S$GLB,, | Performed by: PHYSICIAN ASSISTANT

## 2023-06-13 PROCEDURE — 3077F PR MOST RECENT SYSTOLIC BLOOD PRESSURE >= 140 MM HG: ICD-10-PCS | Mod: CPTII,S$GLB,, | Performed by: PHYSICIAN ASSISTANT

## 2023-06-13 PROCEDURE — 36415 COLL VENOUS BLD VENIPUNCTURE: CPT | Mod: PO | Performed by: INTERNAL MEDICINE

## 2023-06-13 PROCEDURE — 84466 ASSAY OF TRANSFERRIN: CPT | Performed by: PHYSICIAN ASSISTANT

## 2023-06-13 PROCEDURE — 1125F PR PAIN SEVERITY QUANTIFIED, PAIN PRESENT: ICD-10-PCS | Mod: CPTII,S$GLB,, | Performed by: PHYSICIAN ASSISTANT

## 2023-06-13 PROCEDURE — 80053 COMPREHEN METABOLIC PANEL: CPT | Performed by: INTERNAL MEDICINE

## 2023-06-13 PROCEDURE — 96372 PR INJECTION,THERAP/PROPH/DIAG2ST, IM OR SUBCUT: ICD-10-PCS | Mod: S$GLB,,, | Performed by: PHYSICIAN ASSISTANT

## 2023-06-13 PROCEDURE — 96372 THER/PROPH/DIAG INJ SC/IM: CPT | Mod: S$GLB,,, | Performed by: PHYSICIAN ASSISTANT

## 2023-06-13 PROCEDURE — 84443 ASSAY THYROID STIM HORMONE: CPT | Performed by: INTERNAL MEDICINE

## 2023-06-13 PROCEDURE — 82306 VITAMIN D 25 HYDROXY: CPT | Performed by: INTERNAL MEDICINE

## 2023-06-13 RX ORDER — METHYLPREDNISOLONE 4 MG/1
4 TABLET ORAL DAILY PRN
Qty: 30 TABLET | Refills: 3 | Status: SHIPPED | OUTPATIENT
Start: 2023-06-13 | End: 2024-01-22 | Stop reason: SDUPTHER

## 2023-06-13 RX ORDER — RISANKIZUMAB-RZAA 150 MG/ML
150 INJECTION SUBCUTANEOUS
Qty: 1 ML | Refills: 1 | Status: ACTIVE | OUTPATIENT
Start: 2023-06-13 | End: 2024-01-22

## 2023-06-13 RX ORDER — KETOROLAC TROMETHAMINE 30 MG/ML
60 INJECTION, SOLUTION INTRAMUSCULAR; INTRAVENOUS
Status: COMPLETED | OUTPATIENT
Start: 2023-06-13 | End: 2023-06-13

## 2023-06-13 RX ORDER — METHYLPREDNISOLONE ACETATE 80 MG/ML
160 INJECTION, SUSPENSION INTRA-ARTICULAR; INTRALESIONAL; INTRAMUSCULAR; SOFT TISSUE
Status: COMPLETED | OUTPATIENT
Start: 2023-06-13 | End: 2023-06-13

## 2023-06-13 RX ORDER — CYANOCOBALAMIN 1000 UG/ML
1000 INJECTION, SOLUTION INTRAMUSCULAR; SUBCUTANEOUS
Status: COMPLETED | OUTPATIENT
Start: 2023-06-13 | End: 2023-06-13

## 2023-06-13 RX ORDER — RISANKIZUMAB-RZAA 150 MG/ML
150 INJECTION SUBCUTANEOUS
Qty: 1 ML | Refills: 4 | Status: ACTIVE | OUTPATIENT
Start: 2023-06-13 | End: 2024-01-22

## 2023-06-13 RX ADMIN — METHYLPREDNISOLONE ACETATE 160 MG: 80 INJECTION, SUSPENSION INTRA-ARTICULAR; INTRALESIONAL; INTRAMUSCULAR; SOFT TISSUE at 10:06

## 2023-06-13 RX ADMIN — CYANOCOBALAMIN 1000 MCG: 1000 INJECTION, SOLUTION INTRAMUSCULAR; SUBCUTANEOUS at 10:06

## 2023-06-13 RX ADMIN — KETOROLAC TROMETHAMINE 60 MG: 30 INJECTION, SOLUTION INTRAMUSCULAR; INTRAVENOUS at 10:06

## 2023-06-13 NOTE — PROGRESS NOTES
Subjective:       Patient ID: Rose Goff is a 72 y.o. female.    Chief Complaint: Disease Management      Mrs. Goff is a 72 year old female who presents to clinic for follow up on psoriatic arthritis.  She is doing poorly. She had a flare of psoriasis in April due to increased stress and several missed doses of Humira and was treated with IM and oral steroids. She has been complaint with tx the last 2 months, but rash remains uncontrolled on her scalp, back, and arms. She has intermittent joint pain involving MCPs, PIPs, wrists, hips, and knees. AM stiffness can last up to 2-3 hours. She reports less knee pain since she started exercising with pilates. She has chronic low back pain secondary to OA and is taking norco TID PRN with good relief of pain. She is taking ambien for insomnia and this is working well.     She is due for repeat labs.    Current tx:  1. Humira  2. Mobic  3. Norco    Prior treatments:   Enbrel, Cimzia, Simponi, MTX, Cosentyx     Review of Systems   Constitutional:  Positive for activity change. Negative for appetite change, chills, fatigue and fever.   Eyes:  Negative for visual disturbance.   Respiratory:  Negative for cough.    Cardiovascular:  Negative for chest pain and palpitations.   Gastrointestinal:  Negative for abdominal pain and constipation.   Musculoskeletal:  Positive for arthralgias, joint swelling and myalgias. Negative for neck pain and neck stiffness.   Skin:  Positive for rash.   Allergic/Immunologic: Positive for immunocompromised state.   Neurological:  Negative for dizziness, weakness, light-headedness and headaches.   Psychiatric/Behavioral:  Negative for dysphoric mood and sleep disturbance. The patient is not nervous/anxious.        Objective:     Vitals:    06/13/23 0937   BP: (!) 151/82   Pulse: 76           Past Medical History:   Diagnosis Date    Abnormal mammogram     Amaurosis fugax     Anemia     Back pain     Chest pain      Degenerative cervical disc     Degenerative disc disease, thoracic     Depression     Depression     Fatigue     GERD (gastroesophageal reflux disease)     Head ache     Hemorrhoids     Hyperlipidemia     Hypertension     Hypoglycemia     Hypothyroidism     Hypothyroidism     Insomnia     Menopausal state     Neck pain     Neuralgia     Nonrheumatic tricuspid valve disorder     Optic neuritis     Palpitations     PONV (postoperative nausea and vomiting)     Psoriasis     Psoriatic arthropathy     Rosacea     Thyroid nodule      Past Surgical History:   Procedure Laterality Date    ANKLE FRACTURE SURGERY      APPENDECTOMY      BREAST BIOPSY Left     Negative    HYSTERECTOMY      OOPHORECTOMY      RHIZOTOMY W/ RADIOFREQUENCY ABLATION Right     lumbar    thyroid tumor removed            Physical Exam   Constitutional: She is oriented to person, place, and time.   Eyes: Right conjunctiva is not injected. Left conjunctiva is not injected.   Neck: No JVD present. No thyromegaly present.   Cardiovascular: Normal rate and regular rhythm. Exam reveals no decreased pulses.   Pulmonary/Chest: Effort normal.   Musculoskeletal:      Right shoulder: Tenderness present.      Left shoulder: Tenderness present.      Right elbow: Tenderness present.      Left elbow: Tenderness present.      Right wrist: Tenderness present.      Left wrist: Tenderness present.      Right knee: Swelling present. Tenderness present.      Left knee: Swelling present. Tenderness present.   Lymphadenopathy:     She has no cervical adenopathy.   Neurological: She is alert and oriented to person, place, and time. Gait normal.   Skin: No rash noted.   Psychiatric: Mood and affect normal.       Right Side Rheumatological Exam     Examination finds the 3rd MCP, 4th MCP and 5th MCP normal.    The patient is tender to palpation of the shoulder, elbow, wrist, knee, 1st PIP, 1st MCP, 2nd PIP, 2nd MCP, 3rd PIP, 4th PIP and 5th PIP    She has swelling of the knee,  1st MCP, 2nd PIP, 2nd MCP, 3rd PIP, 4th PIP and 5th PIP    Left Side Rheumatological Exam     Examination finds the 3rd MCP, 4th MCP and 5th MCP normal.    The patient is tender to palpation of the shoulder, elbow, wrist, knee, 1st PIP, 1st MCP, 2nd PIP, 2nd MCP, 3rd PIP, 4th PIP and 5th PIP.    She has swelling of the knee, 1st MCP and 2nd MCP        Labs reviewed:  Component      Latest Ref Rng & Units 2/10/2023 11/8/2022   WBC      3.90 - 12.70 K/uL  7.58   RBC      4.00 - 5.40 M/uL  3.76 (L)   Hemoglobin      12.0 - 16.0 g/dL  10.9 (L)   Hematocrit      37.0 - 48.5 %  34.1 (L)   MCV      82 - 98 fL  91   MCH      27.0 - 31.0 pg  29.0   MCHC      32.0 - 36.0 g/dL  32.0   RDW      11.5 - 14.5 %  16.5 (H)   Platelets      150 - 450 K/uL  348   MPV      9.2 - 12.9 fL  11.2   Immature Granulocytes      0.0 - 0.5 %  0.1   Gran # (ANC)      1.8 - 7.7 K/uL  4.8   Immature Grans (Abs)      0.00 - 0.04 K/uL  0.01   Lymph #      1.0 - 4.8 K/uL  2.0   Mono #      0.3 - 1.0 K/uL  0.5   Eos #      0.0 - 0.5 K/uL  0.2   Baso #      0.00 - 0.20 K/uL  0.07   nRBC      0 /100 WBC  0   Gran %      38.0 - 73.0 %  63.8   Lymph %      18.0 - 48.0 %  26.3   Mono %      4.0 - 15.0 %  6.5   Eosinophil %      0.0 - 8.0 %  2.4   Basophil %      0.0 - 1.9 %  0.9   Differential Method        Automated   Iron      30 - 160 ug/dL 116 23 (L)   Transferrin      200 - 375 mg/dL 231 337   TIBC      250 - 450 ug/dL 342 499 (H)   Saturated Iron      20 - 50 % 34 5 (L)   Ferritin      20.0 - 300.0 ng/mL  5 (L)     Assessment:       1. Psoriatic arthritis    2. Iron deficiency anemia, unspecified iron deficiency anemia type    3. Palpitations    4. Diastolic dysfunction    5. Immunocompromised state due to drug therapy    6. Chronic pain syndrome                Plan:       Psoriatic arthritis  -     ketorolac injection 60 mg  -     methylPREDNISolone acetate injection 160 mg  -     cyanocobalamin injection 1,000 mcg  -     methylPREDNISolone (MEDROL)  4 MG Tab; Take 1 tablet (4 mg total) by mouth daily as needed (arthritis flare).  Dispense: 30 tablet; Refill: 3  -     risankizumab-rzaa (SKYRIZI) 150 mg/mL PnIj; Inject 150 mg into the skin every 28 days.  Dispense: 1 mL; Refill: 1  -     risankizumab-rzaa (SKYRIZI) 150 mg/mL PnIj; Inject 150 mg into the skin every 12 weeks.  Dispense: 1 mL; Refill: 4    Iron deficiency anemia, unspecified iron deficiency anemia type  -     Iron and TIBC; Future; Expected date: 06/13/2023  -     Ferritin; Future; Expected date: 06/13/2023    Palpitations  -     Ambulatory referral/consult to Cardiology; Future; Expected date: 06/20/2023    Diastolic dysfunction  -     Ambulatory referral/consult to Cardiology; Future; Expected date: 06/20/2023    Immunocompromised state due to drug therapy    Chronic pain syndrome          Assessment:  72 year old female with   Psoriatic arthritis, rheumatoid arthritis on Humira  --chronic pain syndrome on norco  --insomnia on ambien  --iron deficiency anemia, stable  --grade 2 diastolic dysfunction    Plan:  1. toradol 60, depo 160, b12 today for flare  2. D/C humira. Start Skyrizi. Ok to continue Humira until skyrizi is approved and then hold 2 weeks between injections  3. Cont. norco 10/325 TID per Dr. Benjamin. I have checked [place] prescription monitoring program site and no unusual or abnormal behavior has occurred pt understand the risk and benefits of taking opioid medications and has decided to continue the  Medication. Pended for MD  4. Cont. mobic 15 mg  5. Cont ambien  6. Referral to Cardiology, pt wishes to change to [facility]  7. Ok to continue medrol 4 mg prn for flares   8. Check labs today    Follow up:   3-4 months Dr. Benjamin with repeat labs prior

## 2023-06-18 RX ORDER — HYDROCODONE BITARTRATE AND ACETAMINOPHEN 10; 325 MG/1; MG/1
1 TABLET ORAL EVERY 8 HOURS PRN
Qty: 90 TABLET | Refills: 0 | Status: SHIPPED | OUTPATIENT
Start: 2023-09-09 | End: 2023-09-19 | Stop reason: SDUPTHER

## 2023-06-18 RX ORDER — HYDROCODONE BITARTRATE AND ACETAMINOPHEN 10; 325 MG/1; MG/1
1 TABLET ORAL EVERY 8 HOURS PRN
Qty: 90 TABLET | Refills: 0 | Status: SHIPPED | OUTPATIENT
Start: 2023-08-10 | End: 2023-09-09

## 2023-06-18 RX ORDER — HYDROCODONE BITARTRATE AND ACETAMINOPHEN 10; 325 MG/1; MG/1
1 TABLET ORAL EVERY 8 HOURS PRN
Qty: 90 TABLET | Refills: 0 | Status: SHIPPED | OUTPATIENT
Start: 2023-07-11 | End: 2023-08-10

## 2023-06-20 ENCOUNTER — SPECIALTY PHARMACY (OUTPATIENT)
Dept: PHARMACY | Facility: CLINIC | Age: 73
End: 2023-06-20
Payer: MEDICARE

## 2023-06-20 NOTE — TELEPHONE ENCOUNTER
Outgoing call to pt regarding Humria refill. RX was discontinued in [place] pt confirmed and said she will be starting a new med. Routing to [facility] to be aware.

## 2023-06-21 ENCOUNTER — TELEPHONE (OUTPATIENT)
Dept: PHARMACY | Facility: CLINIC | Age: 73
End: 2023-06-21
Payer: MEDICARE

## 2023-06-21 NOTE — TELEPHONE ENCOUNTER
Alex Mancia, this is rIa King, clinical pharmacist with [facility] that is part of your care team.  We have begun working on your prescription that your doctor has sent us. Our next steps include:     Working with your insurance company to obtain approval for your medication  Working with you to ensure your medication is affordable     We will be calling you along the way with updates on your medication but if you have any concerns or receive information that you would like to discuss please reach us at [phone].    Welcome call outcome: Left voicemail

## 2023-06-22 ENCOUNTER — SPECIALTY PHARMACY (OUTPATIENT)
Dept: PHARMACY | Facility: CLINIC | Age: 73
End: 2023-06-22
Payer: MEDICARE

## 2023-06-22 NOTE — TELEPHONE ENCOUNTER
Alex BRUNO approved  PA Case: [id number]  1/1/23-12/31/23    $0 copay with [employer] LIS level 1.

## 2023-06-26 ENCOUNTER — PATIENT MESSAGE (OUTPATIENT)
Dept: PHARMACY | Facility: CLINIC | Age: 73
End: 2023-06-26
Payer: MEDICARE

## 2023-07-03 ENCOUNTER — SPECIALTY PHARMACY (OUTPATIENT)
Dept: PHARMACY | Facility: CLINIC | Age: 73
End: 2023-07-03
Payer: MEDICARE

## 2023-07-03 DIAGNOSIS — L40.50 PSORIATIC ARTHRITIS: Primary | ICD-10-CM

## 2023-07-03 RX ORDER — CHOLECALCIFEROL (VITAMIN D3) 25 MCG
1000 TABLET ORAL DAILY
COMMUNITY

## 2023-07-03 RX ORDER — CALCIUM CARBONATE 600 MG
600 TABLET ORAL ONCE
COMMUNITY

## 2023-07-03 NOTE — TELEPHONE ENCOUNTER
Specialty Pharmacy - Initial Clinical Assessment    Specialty Medication Orders Linked to Encounter      Flowsheet Row Most Recent Value   Medication #1 risankizumab-rzaa (SKYRIZI) 150 mg/mL PnIj (Order#[id number], Rx#[id number])   Medication #2 risankizumab-rzaa (SKYRIZI) 150 mg/mL PnIj (Order#[id number], Rx#[id number])          Patient Diagnosis   L40.50 - Psoriatic arthritis    Subjective    Rose Goff is a 72 y.o. female, who is followed by the specialty pharmacy service for management and education.    Recent Encounters       Date Type Provider Description    07/03/2023 Specialty Pharmacy Silvio Du, Abigail Initial Clinical Assessment    06/22/2023 Specialty Pharmacy Ira King, Abigail Referral Authorization    06/20/2023 Specialty Pharmacy Elvira Stewart Refill Coordination    05/23/2023 Specialty Pharmacy Ashley Swenson, Abigail Refill Coordination    04/04/2023 Specialty Pharmacy Keli James, Patient Care Assistant Refill Coordination            Current Outpatient Medications   Medication Sig    ascorbic acid, vitamin C, (VITAMIN C) 100 MG tablet Take 100 mg by mouth once daily.    calcium carbonate (OS-MARA) 600 mg calcium (1,500 mg) Tab Take 600 mg by mouth once.    vitamin D (VITAMIN D3) 1000 units Tab Take 1,000 Units by mouth once daily.    clobetasoL (TEMOVATE) 0.05 % external solution Apply topically 2 (two) times daily.    cyclobenzaprine (FLEXERIL) 10 MG tablet Take 1 tablet (10 mg total) by mouth 3 (three) times daily as needed for Muscle spasms.    diclofenac sodium (VOLTAREN) 1 % Gel Apply 2 g topically once daily.    EScitalopram oxalate (LEXAPRO) 20 MG tablet Take 1 tablet (20 mg total) by mouth once daily.    estradioL (ESTRACE) 1 MG tablet TAKE 1 TABLET(1 MG) BY MOUTH EVERY DAY    furosemide (LASIX) 20 MG tablet Take 1 tablet (20 mg total) by mouth every other day.    [START ON 9/9/2023] HYDROcodone-acetaminophen (NORCO)  mg per tablet Take 1 tablet by mouth every 8  (eight) hours as needed for Pain.    [START ON 8/10/2023] HYDROcodone-acetaminophen (NORCO)  mg per tablet Take 1 tablet by mouth every 8 (eight) hours as needed for Pain.    [START ON 7/11/2023] HYDROcodone-acetaminophen (NORCO)  mg per tablet Take 1 tablet by mouth every 8 (eight) hours as needed for Pain.    hydrOXYzine HCL (ATARAX) 25 MG tablet Take 1 tablet (25 mg total) by mouth 3 (three) times daily as needed for Itching.    levocetirizine (XYZAL) 5 MG tablet Take 1 tablet (5 mg total) by mouth every evening.    levothyroxine (SYNTHROID) 112 MCG tablet TAKE 1 TABLET(112 MCG) BY MOUTH BEFORE BREAKFAST    lisinopriL 10 MG tablet TAKE 1 TABLET(10 MG) BY MOUTH EVERY DAY    loratadine 10 mg Cap Take 1 tablet by mouth once daily.    meloxicam (MOBIC) 15 MG tablet TAKE 1 TABLET(15 MG) BY MOUTH EVERY DAY    methylPREDNISolone (MEDROL) 4 MG Tab Take 1 tablet (4 mg total) by mouth daily as needed (arthritis flare).    omeprazole (PRILOSEC) 40 MG capsule Take 1 capsule (40 mg total) by mouth once daily.    ondansetron (ZOFRAN-ODT) 8 MG TbDL Take 1 tablet (8 mg total) by mouth every 6 (six) hours as needed (nausea).    risankizumab-rzaa (SKYRIZI) 150 mg/mL PnIj Inject 150 mg (1 pen) into the skin at week 0 and week 4, then every 12 weeks.    risankizumab-rzaa (SKYRIZI) 150 mg/mL PnIj Inject 150 mg into the skin every 12 weeks.    simvastatin (ZOCOR) 40 MG tablet Take 1 tablet (40 mg total) by mouth every evening.    sumatriptan (IMITREX) 100 MG tablet Take 1 tablet (100 mg total) by mouth once as needed for Migraine.    valACYclovir (VALTREX) 1000 MG tablet Take 1 tablet (1,000 mg total) by mouth 2 (two) times daily.    zolpidem (AMBIEN) 10 mg Tab TAKE 1 TABLET(10 MG) BY MOUTH EVERY EVENING   Last reviewed on 7/3/2023 11:38 AM by Silvio Du, PharmD    Review of patient's allergies indicates:   Allergen Reactions    Cimzia [certolizumab pegol]      Not effective     Methotrexate analogues      Not  effective    Penicillins Hives    Zyprexa [olanzapine]      Not effective    Last reviewed on  7/3/2023 11:38 AM by Silvio Du    Drug Interactions    Clinically relevant drug interactions identified: yes   Interactions list: -None with Skyrizi  -Cyclobenzaprine/hydroxyzine/loratidine/Norco/zolpidem/cyclobenzaprine (Class D) - CNS depression       Drug management plan: -Cyclobenzaprine/hydroxyzine/loratidine/Norco/zolpidem/cyclobenzaprine: patient is aware of sedating effects of medications and reports only using these therapies as needed while avoiding the use of multiple agents as much as possible.          Medication Adherence    Adherence tools used: alarm, cell phone  Support network for adherence: family member         Assessment Questions - Documented Responses      Flowsheet Row Most Recent Value   Assessment    Medication Reconciliation completed for patient Yes   During the past 4 weeks, has patient missed any activities due to condition or medication? No   During the past 4 weeks, did patient have any of the following urgent care visits? None   Goals of Therapy Status Discussed (new start)   Status of the patients ability to self-administer: Is Able   All education points have been covered with patient? Yes, supplemental printed education provided   Welcome packet contents reviewed and discussed with patient? Yes   Assesment completed? Yes   Plan Therapy being initiated   Do you need to open a clinical intervention (i-vent)? No   Do you want to schedule first shipment? Yes   Medication #1 Assessment Info    Patient status New medication, Exisiting to OSP   Is this medication appropriate for the patient? Yes   Is this medication effective? Not yet started   Medication #2 Assessment Info    Patient status New medication, Exisiting to OSP   Is this medication appropriate for the patient? Yes   Is this medication effective? Not yet started          Refill Questions - Documented Responses   "    Flowsheet Row Most Recent Value   Patient Availability and HIPAA Verification    Does patient want to proceed with activity? Yes   HIPAA/medical authority confirmed? Yes   Relationship to patient of person spoken to? Self   Refill Screening Questions    When does the patient need to receive the medication? 07/13/23   Refill Delivery Questions    How will the patient receive the medication? MEDRx   When does the patient need to receive the medication? 07/13/23   Shipping Address Home   Address in [facility] confirmed and updated if neccessary? Yes   Expected Copay ($) 0   Is the patient able to afford the medication copay? Yes   Payment Method zero copay   Days supply of Refill 28   Supplies needed? No supplies needed   Refill activity completed? Yes   Refill activity plan Refill scheduled   Shipment/Pickup Date: 07/05/23            Objective    She has a past medical history of Abnormal mammogram, Amaurosis fugax, Anemia, Back pain, Chest pain, Degenerative cervical disc, Degenerative disc disease, thoracic, Depression, Depression, Fatigue, GERD (gastroesophageal reflux disease), Head ache, Hemorrhoids, Hyperlipidemia, Hypertension, Hypoglycemia, Hypothyroidism, Hypothyroidism, Insomnia, Menopausal state, Neck pain, Neuralgia, Nonrheumatic tricuspid valve disorder, Optic neuritis, Palpitations, PONV (postoperative nausea and vomiting), Psoriasis, Psoriatic arthropathy, Rosacea, and Thyroid nodule.    Tried/failed medications: Humira, MTX    BP Readings from Last 4 Encounters:   06/13/23 (!) 151/82   04/10/23 (!) 154/83   02/13/23 (!) 134/93   12/28/22 120/70     Ht Readings from Last 4 Encounters:   06/13/23 5' 7" (1.702 m)   04/10/23 5' 7.01" (1.702 m)   02/13/23 5' 7" (1.702 m)   12/28/22 5' 7" (1.702 m)     Wt Readings from Last 4 Encounters:   06/13/23 79.8 kg (176 lb)   04/10/23 76.7 kg (169 lb 1.5 oz)   02/13/23 76.7 kg (169 lb)   12/28/22 80.2 kg (176 lb 14.4 oz)       The goals of prescribed drug " therapy management include:  Supporting patient to meet the prescriber's medical treatment objectives  Improving or maintaining quality of life  Maintaining optimal therapy adherence  Minimizing and managing side effects      Goals of Therapy Status: Discussed (new start)    Assessment/Plan  Patient plans to start therapy on 07/13/23    -last dose of Humira q2w on 6/29  -will start Skyrizi loading dose on 7/13    Indication, dosage, appropriateness, effectiveness, safety and convenience of her specialty medication(s) were reviewed today.     Patient Education   Patient received education on the following:   Expectations and possible outcomes of therapy  Proper use, timely administration, and missed dose management  Duration of therapy  Side effects, including prevention, minimization, and management  Contraindications and safety precautions  New or changed medications, including prescribe and over the counter medications and supplements  Reviews recommended vaccinations, as appropriate  Storage, safe handling, and disposal      Tasks added this encounter   4/3/2024 - Clinical Assessment (1 year recurrence)   Tasks due within next 3 months   No tasks due.     Silvio Du, PharmD  [facility]  [address]  [facility] [address]  Phone: [phone]  Fax: [phone]

## 2023-07-12 DIAGNOSIS — G43.809 OTHER MIGRAINE WITHOUT STATUS MIGRAINOSUS, NOT INTRACTABLE: ICD-10-CM

## 2023-07-12 NOTE — TELEPHONE ENCOUNTER
----- Message from Jennie Whitaker sent at 7/12/2023 11:45 AM CDT -----  Regarding: refill  Contact: Patient  Type:  RX Refill Request    Who Called:  Patient   Refill or New Rx:  refill   RX Name and Strength:  sumatriptan (IMITREX) 100 MG tablet 18 tablet 1 4/11/2022 12/28/2022   Sig - Route: Take 1 tablet (100 mg total) by mouth once as needed for Migraine. - Oral   Sent to pharmacy as: sumatriptan (IMITREX) 100 MG tablet   E-Prescribing Status: Receipt confirmed by pharmacy (4/11/2022  2:43 PM CDT)       How is the patient currently taking it? (ex. 1XDay):  see above   Is this a 30 day or 90 day RX:  see above   Preferred Pharmacy with phone number:    [id number] - [place] [address] AT [facility] [address] [facility]  [address] [place] [address]  Phone: [phone] Fax: [phone]        Local or Mail Order:    Ordering Provider:    Best Call Back Number:  [phone] (home)     Additional Information:  Patient stated that she has only one tablet left of this medication. Patient is in need of a  refill. Please contact patient to advise. Thanks!

## 2023-07-15 RX ORDER — SUMATRIPTAN SUCCINATE 100 MG/1
100 TABLET ORAL ONCE AS NEEDED
Qty: 18 TABLET | Refills: 1 | Status: SHIPPED | OUTPATIENT
Start: 2023-07-15 | End: 2024-01-29

## 2023-07-30 DIAGNOSIS — G47.00 INSOMNIA, UNSPECIFIED TYPE: ICD-10-CM

## 2023-07-31 RX ORDER — ZOLPIDEM TARTRATE 10 MG/1
TABLET ORAL
Qty: 30 TABLET | Refills: 4 | Status: SHIPPED | OUTPATIENT
Start: 2023-07-31 | End: 2024-01-22 | Stop reason: SDUPTHER

## 2023-08-04 ENCOUNTER — PATIENT MESSAGE (OUTPATIENT)
Dept: PHARMACY | Facility: CLINIC | Age: 73
End: 2023-08-04
Payer: MEDICARE

## 2023-08-09 ENCOUNTER — SPECIALTY PHARMACY (OUTPATIENT)
Dept: PHARMACY | Facility: CLINIC | Age: 73
End: 2023-08-09
Payer: MEDICARE

## 2023-08-09 NOTE — TELEPHONE ENCOUNTER
Specialty Pharmacy - Refill Coordination    Specialty Medication Orders Linked to Encounter      Flowsheet Row Most Recent Value   Medication #1 risankizumab-rzaa (SKYRIZI) 150 mg/mL PnIj (Order#[id number], Rx#[id number])            Refill Questions - Documented Responses      Flowsheet Row Most Recent Value   Patient Availability and HIPAA Verification    Does patient want to proceed with activity? Yes   HIPAA/medical authority confirmed? Yes   Relationship to patient of person spoken to? Self   Refill Screening Questions    Changes to allergies? No   Changes to medications? No   New conditions since last clinic visit? No   Unplanned office visit, urgent care, ED, or hospital admission in the last 4 weeks? No   How does patient/caregiver feel medication is working? Good   Financial problems or insurance changes? No   How many doses of your specialty medications were missed in the last 4 weeks? 0   Would patient like to speak to a pharmacist? No   When does the patient need to receive the medication? 08/10/23   Refill Delivery Questions    How will the patient receive the medication? MEDRx   When does the patient need to receive the medication? 08/10/23   Shipping Address Home   Address in [facility] confirmed and updated if neccessary? Yes   Expected Copay ($) 0   Is the patient able to afford the medication copay? Yes   Payment Method zero copay   Days supply of Refill 84   Supplies needed? No supplies needed   Refill activity completed? Yes   Refill activity plan Refill scheduled   Shipment/Pickup Date: 08/09/23            Current Outpatient Medications   Medication Sig    ascorbic acid, vitamin C, (VITAMIN C) 100 MG tablet Take 100 mg by mouth once daily.    calcium carbonate (OS-MARA) 600 mg calcium (1,500 mg) Tab Take 600 mg by mouth once.    clobetasoL (TEMOVATE) 0.05 % external solution Apply topically 2 (two) times daily.    cyclobenzaprine (FLEXERIL) 10 MG tablet Take 1 tablet (10 mg total) by mouth  3 (three) times daily as needed for Muscle spasms.    diclofenac sodium (VOLTAREN) 1 % Gel Apply 2 g topically once daily.    EScitalopram oxalate (LEXAPRO) 20 MG tablet Take 1 tablet (20 mg total) by mouth once daily.    estradioL (ESTRACE) 1 MG tablet TAKE 1 TABLET(1 MG) BY MOUTH EVERY DAY    furosemide (LASIX) 20 MG tablet Take 1 tablet (20 mg total) by mouth every other day.    [START ON 9/9/2023] HYDROcodone-acetaminophen (NORCO)  mg per tablet Take 1 tablet by mouth every 8 (eight) hours as needed for Pain.    [START ON 8/10/2023] HYDROcodone-acetaminophen (NORCO)  mg per tablet Take 1 tablet by mouth every 8 (eight) hours as needed for Pain.    HYDROcodone-acetaminophen (NORCO)  mg per tablet Take 1 tablet by mouth every 8 (eight) hours as needed for Pain.    hydrOXYzine HCL (ATARAX) 25 MG tablet Take 1 tablet (25 mg total) by mouth 3 (three) times daily as needed for Itching.    levocetirizine (XYZAL) 5 MG tablet Take 1 tablet (5 mg total) by mouth every evening.    levothyroxine (SYNTHROID) 112 MCG tablet TAKE 1 TABLET(112 MCG) BY MOUTH BEFORE BREAKFAST    lisinopriL 10 MG tablet TAKE 1 TABLET(10 MG) BY MOUTH EVERY DAY    loratadine 10 mg Cap Take 1 tablet by mouth once daily.    meloxicam (MOBIC) 15 MG tablet TAKE 1 TABLET(15 MG) BY MOUTH EVERY DAY    methylPREDNISolone (MEDROL) 4 MG Tab Take 1 tablet (4 mg total) by mouth daily as needed (arthritis flare).    omeprazole (PRILOSEC) 40 MG capsule Take 1 capsule (40 mg total) by mouth once daily.    ondansetron (ZOFRAN-ODT) 8 MG TbDL Take 1 tablet (8 mg total) by mouth every 6 (six) hours as needed (nausea).    risankizumab-rzaa (SKYRIZI) 150 mg/mL PnIj Inject 150 mg (1 pen) into the skin at week 0 and week 4, then every 12 weeks.    risankizumab-rzaa (SKYRIZI) 150 mg/mL PnIj Inject 150 mg into the skin every 12 weeks.    simvastatin (ZOCOR) 40 MG tablet Take 1 tablet (40 mg total) by mouth every evening.    sumatriptan (IMITREX) 100 MG  tablet Take 1 tablet (100 mg total) by mouth once as needed for Migraine.    valACYclovir (VALTREX) 1000 MG tablet Take 1 tablet (1,000 mg total) by mouth 2 (two) times daily.    vitamin D (VITAMIN D3) 1000 units Tab Take 1,000 Units by mouth once daily.    zolpidem (AMBIEN) 10 mg Tab TAKE 1 TABLET(10 MG) BY MOUTH EVERY EVENING   Last reviewed on 7/3/2023 11:38 AM by Silvio Du, PharmD    Review of patient's allergies indicates:   Allergen Reactions    Cimzia [certolizumab pegol]      Not effective     Methotrexate analogues      Not effective    Penicillins Hives    Zyprexa [olanzapine]      Not effective     Last reviewed on  7/12/2023 2:04 PM by Becki Garcia      Tasks added this encounter   No tasks added.   Tasks due within next 3 months   8/10/2023 - Refill Coordination Outreach (1 time occurrence)     Annalee Wilson, PharmD  [facility]  [address] [place] [id number]  Phone: [phone]  Fax: [phone]

## 2023-08-11 DIAGNOSIS — I10 ESSENTIAL HYPERTENSION: ICD-10-CM

## 2023-08-11 RX ORDER — FUROSEMIDE 20 MG/1
20 TABLET ORAL EVERY OTHER DAY
Qty: 30 TABLET | Refills: 3 | Status: SHIPPED | OUTPATIENT
Start: 2023-08-11 | End: 2024-01-22 | Stop reason: SDUPTHER

## 2023-09-19 DIAGNOSIS — G89.4 CHRONIC PAIN SYNDROME: ICD-10-CM

## 2023-09-19 DIAGNOSIS — L40.50 PSORIATIC ARTHRITIS: ICD-10-CM

## 2023-09-19 DIAGNOSIS — M05.711 RHEUMATOID ARTHRITIS INVOLVING RIGHT SHOULDER WITH POSITIVE RHEUMATOID FACTOR: ICD-10-CM

## 2023-09-19 NOTE — TELEPHONE ENCOUNTER
----- Message from Tamara Solano sent at 9/18/2023  8:09 AM CDT -----  Contact: patient  Type:  Needs Medical Advice    Who Called: patient     Would the patient rather a call back or a response via [facility]? Call     Best Call Back Number: [phone] (home)     Additional Information: Patient would like to speak with the nurse in regards to getting her HYDROcodone-acetaminophen (NORCO)  mg per tablet sent to [facility] on [address] and [address].     Please call to advise

## 2023-09-20 RX ORDER — HYDROCODONE BITARTRATE AND ACETAMINOPHEN 10; 325 MG/1; MG/1
1 TABLET ORAL EVERY 8 HOURS PRN
Qty: 90 TABLET | Refills: 0 | Status: SHIPPED | OUTPATIENT
Start: 2023-09-20 | End: 2023-10-16 | Stop reason: SDUPTHER

## 2023-10-16 ENCOUNTER — OFFICE VISIT (OUTPATIENT)
Dept: RHEUMATOLOGY | Facility: CLINIC | Age: 73
End: 2023-10-16
Payer: MEDICARE

## 2023-10-16 ENCOUNTER — LAB VISIT (OUTPATIENT)
Dept: LAB | Facility: HOSPITAL | Age: 73
End: 2023-10-16
Attending: INTERNAL MEDICINE
Payer: MEDICARE

## 2023-10-16 VITALS
SYSTOLIC BLOOD PRESSURE: 132 MMHG | HEIGHT: 67 IN | WEIGHT: 168.44 LBS | HEART RATE: 85 BPM | BODY MASS INDEX: 26.44 KG/M2 | DIASTOLIC BLOOD PRESSURE: 85 MMHG

## 2023-10-16 DIAGNOSIS — L40.50 PSORIATIC ARTHRITIS: ICD-10-CM

## 2023-10-16 DIAGNOSIS — R12 HEARTBURN: ICD-10-CM

## 2023-10-16 DIAGNOSIS — M80.08XA AGE-RELATED OSTEOPOROSIS WITH CURRENT PATHOLOGICAL FRACTURE, VERTEBRA(E), INITIAL ENCOUNTER FOR FRACTURE: ICD-10-CM

## 2023-10-16 DIAGNOSIS — R71.8 OTHER ABNORMALITY OF RED BLOOD CELLS: ICD-10-CM

## 2023-10-16 DIAGNOSIS — K21.9 GASTROESOPHAGEAL REFLUX DISEASE, UNSPECIFIED WHETHER ESOPHAGITIS PRESENT: ICD-10-CM

## 2023-10-16 DIAGNOSIS — R19.7 DIARRHEA, UNSPECIFIED TYPE: ICD-10-CM

## 2023-10-16 DIAGNOSIS — D84.821 IMMUNOCOMPROMISED STATE DUE TO DRUG THERAPY: ICD-10-CM

## 2023-10-16 DIAGNOSIS — D50.9 IRON DEFICIENCY ANEMIA, UNSPECIFIED IRON DEFICIENCY ANEMIA TYPE: ICD-10-CM

## 2023-10-16 DIAGNOSIS — Z79.899 IMMUNOCOMPROMISED STATE DUE TO DRUG THERAPY: ICD-10-CM

## 2023-10-16 DIAGNOSIS — G89.4 CHRONIC PAIN SYNDROME: ICD-10-CM

## 2023-10-16 DIAGNOSIS — L03.031 CELLULITIS OF TOE OF RIGHT FOOT: ICD-10-CM

## 2023-10-16 DIAGNOSIS — M05.711 RHEUMATOID ARTHRITIS INVOLVING RIGHT SHOULDER WITH POSITIVE RHEUMATOID FACTOR: ICD-10-CM

## 2023-10-16 DIAGNOSIS — L40.50 PSORIATIC ARTHRITIS: Primary | ICD-10-CM

## 2023-10-16 LAB
ALBUMIN SERPL BCP-MCNC: 4.2 G/DL (ref 3.5–5.2)
ALP SERPL-CCNC: 59 U/L (ref 55–135)
ALT SERPL W/O P-5'-P-CCNC: 27 U/L (ref 10–44)
ANION GAP SERPL CALC-SCNC: 10 MMOL/L (ref 8–16)
AST SERPL-CCNC: 22 U/L (ref 10–40)
BASOPHILS # BLD AUTO: 0.07 K/UL (ref 0–0.2)
BASOPHILS NFR BLD: 0.8 % (ref 0–1.9)
BILIRUB SERPL-MCNC: 0.7 MG/DL (ref 0.1–1)
BUN SERPL-MCNC: 14 MG/DL (ref 8–23)
CALCIUM SERPL-MCNC: 9.1 MG/DL (ref 8.7–10.5)
CHLORIDE SERPL-SCNC: 105 MMOL/L (ref 95–110)
CO2 SERPL-SCNC: 22 MMOL/L (ref 23–29)
CREAT SERPL-MCNC: 0.7 MG/DL (ref 0.5–1.4)
CRP SERPL-MCNC: 3.1 MG/L (ref 0–8.2)
DIFFERENTIAL METHOD: ABNORMAL
EOSINOPHIL # BLD AUTO: 0.2 K/UL (ref 0–0.5)
EOSINOPHIL NFR BLD: 2.8 % (ref 0–8)
ERYTHROCYTE [DISTWIDTH] IN BLOOD BY AUTOMATED COUNT: 13.3 % (ref 11.5–14.5)
ERYTHROCYTE [SEDIMENTATION RATE] IN BLOOD BY PHOTOMETRIC METHOD: <2 MM/HR (ref 0–36)
EST. GFR  (NO RACE VARIABLE): >60 ML/MIN/1.73 M^2
GLUCOSE SERPL-MCNC: 96 MG/DL (ref 70–110)
HBV CORE AB SERPL QL IA: NORMAL
HBV SURFACE AG SERPL QL IA: NORMAL
HCT VFR BLD AUTO: 42.3 % (ref 37–48.5)
HCV AB SERPL QL IA: NORMAL
HGB BLD-MCNC: 13.5 G/DL (ref 12–16)
IMM GRANULOCYTES # BLD AUTO: 0.02 K/UL (ref 0–0.04)
IMM GRANULOCYTES NFR BLD AUTO: 0.2 % (ref 0–0.5)
IRON SERPL-MCNC: 109 UG/DL (ref 30–160)
LYMPHOCYTES # BLD AUTO: 1.9 K/UL (ref 1–4.8)
LYMPHOCYTES NFR BLD: 22.2 % (ref 18–48)
MCH RBC QN AUTO: 31.3 PG (ref 27–31)
MCHC RBC AUTO-ENTMCNC: 31.9 G/DL (ref 32–36)
MCV RBC AUTO: 98 FL (ref 82–98)
MONOCYTES # BLD AUTO: 0.6 K/UL (ref 0.3–1)
MONOCYTES NFR BLD: 7.3 % (ref 4–15)
NEUTROPHILS # BLD AUTO: 5.8 K/UL (ref 1.8–7.7)
NEUTROPHILS NFR BLD: 66.7 % (ref 38–73)
NRBC BLD-RTO: 0 /100 WBC
PLATELET # BLD AUTO: 290 K/UL (ref 150–450)
PMV BLD AUTO: 10.6 FL (ref 9.2–12.9)
POTASSIUM SERPL-SCNC: 3.9 MMOL/L (ref 3.5–5.1)
PROT SERPL-MCNC: 7.7 G/DL (ref 6–8.4)
RBC # BLD AUTO: 4.31 M/UL (ref 4–5.4)
SATURATED IRON: 30 % (ref 20–50)
SODIUM SERPL-SCNC: 137 MMOL/L (ref 136–145)
T3FREE SERPL-MCNC: 2.5 PG/ML (ref 2.3–4.2)
T4 FREE SERPL-MCNC: 1.27 NG/DL (ref 0.71–1.51)
THYROGLOB AB SERPL IA-ACNC: <4 IU/ML (ref 0–3.9)
THYROPEROXIDASE IGG SERPL-ACNC: <6 IU/ML
TOTAL IRON BINDING CAPACITY: 361 UG/DL (ref 250–450)
TRANSFERRIN SERPL-MCNC: 244 MG/DL (ref 200–375)
TRANSFERRIN SERPL-MCNC: 244 MG/DL (ref 200–375)
TSH SERPL DL<=0.005 MIU/L-ACNC: 2.4 UIU/ML (ref 0.4–4)
WBC # BLD AUTO: 8.66 K/UL (ref 3.9–12.7)

## 2023-10-16 PROCEDURE — 99215 PR OFFICE/OUTPT VISIT, EST, LEVL V, 40-54 MIN: ICD-10-PCS | Mod: 25,S$GLB,, | Performed by: INTERNAL MEDICINE

## 2023-10-16 PROCEDURE — 3079F DIAST BP 80-89 MM HG: CPT | Mod: CPTII,S$GLB,, | Performed by: INTERNAL MEDICINE

## 2023-10-16 PROCEDURE — 84443 ASSAY THYROID STIM HORMONE: CPT | Performed by: INTERNAL MEDICINE

## 2023-10-16 PROCEDURE — 99215 OFFICE O/P EST HI 40 MIN: CPT | Mod: 25,S$GLB,, | Performed by: INTERNAL MEDICINE

## 2023-10-16 PROCEDURE — 4010F PR ACE/ARB THEARPY RXD/TAKEN: ICD-10-PCS | Mod: CPTII,S$GLB,, | Performed by: INTERNAL MEDICINE

## 2023-10-16 PROCEDURE — 1159F PR MEDICATION LIST DOCUMENTED IN MEDICAL RECORD: ICD-10-PCS | Mod: CPTII,S$GLB,, | Performed by: INTERNAL MEDICINE

## 2023-10-16 PROCEDURE — 3079F PR MOST RECENT DIASTOLIC BLOOD PRESSURE 80-89 MM HG: ICD-10-PCS | Mod: CPTII,S$GLB,, | Performed by: INTERNAL MEDICINE

## 2023-10-16 PROCEDURE — 1101F PT FALLS ASSESS-DOCD LE1/YR: CPT | Mod: CPTII,S$GLB,, | Performed by: INTERNAL MEDICINE

## 2023-10-16 PROCEDURE — 3288F PR FALLS RISK ASSESSMENT DOCUMENTED: ICD-10-PCS | Mod: CPTII,S$GLB,, | Performed by: INTERNAL MEDICINE

## 2023-10-16 PROCEDURE — 84481 FREE ASSAY (FT-3): CPT | Performed by: INTERNAL MEDICINE

## 2023-10-16 PROCEDURE — 84439 ASSAY OF FREE THYROXINE: CPT | Performed by: INTERNAL MEDICINE

## 2023-10-16 PROCEDURE — 3008F BODY MASS INDEX DOCD: CPT | Mod: CPTII,S$GLB,, | Performed by: INTERNAL MEDICINE

## 2023-10-16 PROCEDURE — 3008F PR BODY MASS INDEX (BMI) DOCUMENTED: ICD-10-PCS | Mod: CPTII,S$GLB,, | Performed by: INTERNAL MEDICINE

## 2023-10-16 PROCEDURE — 86480 TB TEST CELL IMMUN MEASURE: CPT | Performed by: INTERNAL MEDICINE

## 2023-10-16 PROCEDURE — 86704 HEP B CORE ANTIBODY TOTAL: CPT | Performed by: INTERNAL MEDICINE

## 2023-10-16 PROCEDURE — 83540 ASSAY OF IRON: CPT | Performed by: INTERNAL MEDICINE

## 2023-10-16 PROCEDURE — 86803 HEPATITIS C AB TEST: CPT | Performed by: INTERNAL MEDICINE

## 2023-10-16 PROCEDURE — 80053 COMPREHEN METABOLIC PANEL: CPT | Performed by: INTERNAL MEDICINE

## 2023-10-16 PROCEDURE — 3075F PR MOST RECENT SYSTOLIC BLOOD PRESS GE 130-139MM HG: ICD-10-PCS | Mod: CPTII,S$GLB,, | Performed by: INTERNAL MEDICINE

## 2023-10-16 PROCEDURE — 99999 PR PBB SHADOW E&M-EST. PATIENT-LVL V: ICD-10-PCS | Mod: PBBFAC,,, | Performed by: INTERNAL MEDICINE

## 2023-10-16 PROCEDURE — 1125F PR PAIN SEVERITY QUANTIFIED, PAIN PRESENT: ICD-10-PCS | Mod: CPTII,S$GLB,, | Performed by: INTERNAL MEDICINE

## 2023-10-16 PROCEDURE — 96372 PR INJECTION,THERAP/PROPH/DIAG2ST, IM OR SUBCUT: ICD-10-PCS | Mod: S$GLB,,, | Performed by: INTERNAL MEDICINE

## 2023-10-16 PROCEDURE — 1101F PR PT FALLS ASSESS DOC 0-1 FALLS W/OUT INJ PAST YR: ICD-10-PCS | Mod: CPTII,S$GLB,, | Performed by: INTERNAL MEDICINE

## 2023-10-16 PROCEDURE — 96372 THER/PROPH/DIAG INJ SC/IM: CPT | Mod: S$GLB,,, | Performed by: INTERNAL MEDICINE

## 2023-10-16 PROCEDURE — 87340 HEPATITIS B SURFACE AG IA: CPT | Performed by: INTERNAL MEDICINE

## 2023-10-16 PROCEDURE — 86140 C-REACTIVE PROTEIN: CPT | Performed by: INTERNAL MEDICINE

## 2023-10-16 PROCEDURE — 86376 MICROSOMAL ANTIBODY EACH: CPT | Performed by: INTERNAL MEDICINE

## 2023-10-16 PROCEDURE — 86800 THYROGLOBULIN ANTIBODY: CPT | Performed by: INTERNAL MEDICINE

## 2023-10-16 PROCEDURE — 3288F FALL RISK ASSESSMENT DOCD: CPT | Mod: CPTII,S$GLB,, | Performed by: INTERNAL MEDICINE

## 2023-10-16 PROCEDURE — 4010F ACE/ARB THERAPY RXD/TAKEN: CPT | Mod: CPTII,S$GLB,, | Performed by: INTERNAL MEDICINE

## 2023-10-16 PROCEDURE — 86706 HEP B SURFACE ANTIBODY: CPT | Performed by: INTERNAL MEDICINE

## 2023-10-16 PROCEDURE — 99999 PR PBB SHADOW E&M-EST. PATIENT-LVL V: CPT | Mod: PBBFAC,,, | Performed by: INTERNAL MEDICINE

## 2023-10-16 PROCEDURE — 36415 COLL VENOUS BLD VENIPUNCTURE: CPT | Mod: PO | Performed by: INTERNAL MEDICINE

## 2023-10-16 PROCEDURE — 1125F AMNT PAIN NOTED PAIN PRSNT: CPT | Mod: CPTII,S$GLB,, | Performed by: INTERNAL MEDICINE

## 2023-10-16 PROCEDURE — 85652 RBC SED RATE AUTOMATED: CPT | Performed by: INTERNAL MEDICINE

## 2023-10-16 PROCEDURE — 84466 ASSAY OF TRANSFERRIN: CPT | Performed by: INTERNAL MEDICINE

## 2023-10-16 PROCEDURE — 3075F SYST BP GE 130 - 139MM HG: CPT | Mod: CPTII,S$GLB,, | Performed by: INTERNAL MEDICINE

## 2023-10-16 PROCEDURE — 85025 COMPLETE CBC W/AUTO DIFF WBC: CPT | Performed by: INTERNAL MEDICINE

## 2023-10-16 PROCEDURE — 1159F MED LIST DOCD IN RCRD: CPT | Mod: CPTII,S$GLB,, | Performed by: INTERNAL MEDICINE

## 2023-10-16 RX ORDER — SULFAMETHOXAZOLE AND TRIMETHOPRIM 800; 160 MG/1; MG/1
1 TABLET ORAL 2 TIMES DAILY
Qty: 20 TABLET | Refills: 0 | Status: SHIPPED | OUTPATIENT
Start: 2023-10-16 | End: 2023-10-26

## 2023-10-16 RX ORDER — HYDROCODONE BITARTRATE AND ACETAMINOPHEN 10; 325 MG/1; MG/1
1 TABLET ORAL EVERY 8 HOURS PRN
Qty: 90 TABLET | Refills: 0 | Status: SHIPPED | OUTPATIENT
Start: 2023-12-14 | End: 2024-01-04 | Stop reason: SDUPTHER

## 2023-10-16 RX ORDER — PREDNISONE 2.5 MG/1
5 TABLET ORAL DAILY
Qty: 60 TABLET | Refills: 5 | Status: SHIPPED | OUTPATIENT
Start: 2023-10-16 | End: 2024-01-22 | Stop reason: SDUPTHER

## 2023-10-16 RX ORDER — LIDOCAINE AND PRILOCAINE 25; 25 MG/G; MG/G
CREAM TOPICAL
Qty: 30 G | Refills: 3 | Status: SHIPPED | OUTPATIENT
Start: 2023-10-16

## 2023-10-16 RX ORDER — MUPIROCIN 20 MG/G
OINTMENT TOPICAL 3 TIMES DAILY
Qty: 22 G | Refills: 4 | Status: SHIPPED | OUTPATIENT
Start: 2023-10-16 | End: 2023-10-26

## 2023-10-16 RX ORDER — METHYLPREDNISOLONE ACETATE 80 MG/ML
160 INJECTION, SUSPENSION INTRA-ARTICULAR; INTRALESIONAL; INTRAMUSCULAR; SOFT TISSUE
Status: COMPLETED | OUTPATIENT
Start: 2023-10-16 | End: 2023-10-16

## 2023-10-16 RX ORDER — CYANOCOBALAMIN 1000 UG/ML
1000 INJECTION, SOLUTION INTRAMUSCULAR; SUBCUTANEOUS
Status: COMPLETED | OUTPATIENT
Start: 2023-10-16 | End: 2023-10-16

## 2023-10-16 RX ORDER — HYDROCODONE BITARTRATE AND ACETAMINOPHEN 10; 325 MG/1; MG/1
1 TABLET ORAL EVERY 8 HOURS PRN
Qty: 90 TABLET | Refills: 0 | Status: SHIPPED | OUTPATIENT
Start: 2023-11-15 | End: 2023-12-15

## 2023-10-16 RX ORDER — KETOROLAC TROMETHAMINE 30 MG/ML
60 INJECTION, SOLUTION INTRAMUSCULAR; INTRAVENOUS
Status: COMPLETED | OUTPATIENT
Start: 2023-10-16 | End: 2023-10-16

## 2023-10-16 RX ORDER — PREGABALIN 25 MG/1
25 CAPSULE ORAL 2 TIMES DAILY
Qty: 60 CAPSULE | Refills: 1 | Status: SHIPPED | OUTPATIENT
Start: 2023-10-16 | End: 2024-01-22 | Stop reason: SDUPTHER

## 2023-10-16 RX ORDER — HYDROCODONE BITARTRATE AND ACETAMINOPHEN 10; 325 MG/1; MG/1
1 TABLET ORAL EVERY 6 HOURS PRN
Qty: 120 TABLET | Refills: 0 | Status: SHIPPED | OUTPATIENT
Start: 2023-10-16 | End: 2023-11-15

## 2023-10-16 RX ADMIN — METHYLPREDNISOLONE ACETATE 160 MG: 80 INJECTION, SUSPENSION INTRA-ARTICULAR; INTRALESIONAL; INTRAMUSCULAR; SOFT TISSUE at 10:10

## 2023-10-16 RX ADMIN — CYANOCOBALAMIN 1000 MCG: 1000 INJECTION, SOLUTION INTRAMUSCULAR; SUBCUTANEOUS at 09:10

## 2023-10-16 RX ADMIN — KETOROLAC TROMETHAMINE 60 MG: 30 INJECTION, SOLUTION INTRAMUSCULAR; INTRAVENOUS at 10:10

## 2023-10-16 ASSESSMENT — ROUTINE ASSESSMENT OF PATIENT INDEX DATA (RAPID3)
FATIGUE SCORE: 1.1
MDHAQ FUNCTION SCORE: 0.8
PAIN SCORE: 6
PATIENT GLOBAL ASSESSMENT SCORE: 7
PSYCHOLOGICAL DISTRESS SCORE: 4.4
TOTAL RAPID3 SCORE: 5.22

## 2023-10-16 NOTE — PROGRESS NOTES
Subjective:       Patient ID: Rose Goff is a 73 y.o. female.    Chief Complaint: Disease Management      Follow up:73 year old female who presents to clinic for follow up on psoriatic arthritis.  She is doing poorly and she has diarrhea x 3-6  months she has had a total . She has been complaint with tx the last 3 months, but rash  has improved but pain is sig. . She has intermittent joint pain involving MCPs, PIPs, wrists, hips, and knees. AM stiffness can last up to 2-3 hours. She reports less knee pain since she started exercising with pilates. She has chronic low back pain secondary to OA and is taking norco TID PRN with good relief of pain. She is taking ambien for insomnia and this is working well.     She is due for repeat labs.    Current tx:  1. skyrizi  2. Mobic  3. Norco    Prior treatments:   Enbrel, Cimzia, Simponi, MTX, Cosentyx       Review of Systems   Constitutional:  Positive for activity change and fatigue. Negative for appetite change and chills.   Eyes:  Negative for visual disturbance.   Respiratory:  Negative for cough.    Cardiovascular:  Negative for chest pain and palpitations.   Gastrointestinal:  Negative for abdominal pain and constipation.   Musculoskeletal:  Positive for arthralgias. Negative for neck pain and neck stiffness.   Skin:  Positive for rash.   Allergic/Immunologic: Positive for immunocompromised state.   Neurological:  Negative for dizziness, weakness and light-headedness.   Psychiatric/Behavioral:  Negative for dysphoric mood and sleep disturbance. The patient is not nervous/anxious.          Objective:     Vitals:    10/16/23 0843   BP: 132/85   Pulse: 85           Past Medical History:   Diagnosis Date    Abnormal mammogram     Amaurosis fugax     Anemia     Back pain     Chest pain     Degenerative cervical disc     Degenerative disc disease, thoracic     Depression     Depression     Fatigue     GERD (gastroesophageal reflux disease)     Head  ache     Hemorrhoids     Hyperlipidemia     Hypertension     Hypoglycemia     Hypothyroidism     Hypothyroidism     Insomnia     Menopausal state     Neck pain     Neuralgia     Nonrheumatic tricuspid valve disorder     Optic neuritis     Palpitations     PONV (postoperative nausea and vomiting)     Psoriasis     Psoriatic arthropathy     Rosacea     Thyroid nodule      Past Surgical History:   Procedure Laterality Date    ANKLE FRACTURE SURGERY      APPENDECTOMY      BREAST BIOPSY Left     Negative    HYSTERECTOMY      OOPHORECTOMY      RHIZOTOMY W/ RADIOFREQUENCY ABLATION Right     lumbar    thyroid tumor removed            Physical Exam   Constitutional: She is oriented to person, place, and time.   Eyes: Right conjunctiva is not injected. Left conjunctiva is not injected.   Neck: No JVD present. No thyromegaly present.   Cardiovascular: Normal rate and regular rhythm. Exam reveals no decreased pulses.   Pulmonary/Chest: Effort normal.   Musculoskeletal:         General: Tenderness and deformity present.      Right shoulder: Tenderness present.      Left shoulder: Tenderness present.      Right elbow: Tenderness present.      Left elbow: Tenderness present.      Right wrist: Tenderness present.      Left wrist: Tenderness present.      Right knee: Swelling present. Tenderness present.      Left knee: Swelling present. Tenderness present.   Lymphadenopathy:     She has no cervical adenopathy.   Neurological: She is alert and oriented to person, place, and time. Gait normal.   Skin: No rash noted.   Psychiatric: Mood and affect normal.       Right Side Rheumatological Exam     Examination finds the 3rd MCP, 4th MCP and 5th MCP normal.    The patient is tender to palpation of the shoulder, elbow, wrist, knee, 1st PIP, 1st MCP, 2nd PIP, 2nd MCP, 3rd PIP, 4th PIP and 5th PIP    She has swelling of the knee, 1st MCP, 2nd PIP, 2nd MCP, 3rd PIP, 4th PIP and 5th PIP    Left Side Rheumatological Exam     Examination finds  the 3rd MCP, 4th MCP and 5th MCP normal.    The patient is tender to palpation of the shoulder, elbow, wrist, knee, 1st PIP, 1st MCP, 2nd PIP, 2nd MCP, 3rd PIP, 4th PIP and 5th PIP.    She has swelling of the knee, 1st MCP and 2nd MCP          Results for orders placed or performed in visit on 06/13/23   T4, Free   Result Value Ref Range    Free T4 1.03 0.71 - 1.51 ng/dL   TSH   Result Value Ref Range    TSH 3.618 0.400 - 4.000 uIU/mL   Vitamin D   Result Value Ref Range    Vit D, 25-Hydroxy 30 30 - 96 ng/mL   Cyclic Citrullinated Peptide Antibody, IgG   Result Value Ref Range    CCP Antibodies 0.5 <5.0 U/mL   Rheumatoid Factor   Result Value Ref Range    Rheumatoid Factor <13.0 0.0 - 15.0 IU/mL   Sedimentation rate   Result Value Ref Range    Sed Rate 10 0 - 36 mm/Hr   C-Reactive Protein   Result Value Ref Range    CRP 3.1 0.0 - 8.2 mg/L   Comprehensive Metabolic Panel   Result Value Ref Range    Sodium 137 136 - 145 mmol/L    Potassium 4.2 3.5 - 5.1 mmol/L    Chloride 103 95 - 110 mmol/L    CO2 24 23 - 29 mmol/L    Glucose 83 70 - 110 mg/dL    BUN 17 8 - 23 mg/dL    Creatinine 0.8 0.5 - 1.4 mg/dL    Calcium 9.0 8.7 - 10.5 mg/dL    Total Protein 7.5 6.0 - 8.4 g/dL    Albumin 3.9 3.5 - 5.2 g/dL    Total Bilirubin 0.6 0.1 - 1.0 mg/dL    Alkaline Phosphatase 74 55 - 135 U/L    AST 23 10 - 40 U/L    ALT 24 10 - 44 U/L    Anion Gap 10 8 - 16 mmol/L    eGFR >60.0 >60 mL/min/1.73 m^2   CBC Auto Differential   Result Value Ref Range    WBC 10.25 3.90 - 12.70 K/uL    RBC 4.04 4.00 - 5.40 M/uL    Hemoglobin 13.3 12.0 - 16.0 g/dL    Hematocrit 39.6 37.0 - 48.5 %    MCV 98 82 - 98 fL    MCH 32.9 (H) 27.0 - 31.0 pg    MCHC 33.6 32.0 - 36.0 g/dL    RDW 13.0 11.5 - 14.5 %    Platelets 273 150 - 450 K/uL    MPV 10.5 9.2 - 12.9 fL    Immature Granulocytes 0.4 0.0 - 0.5 %    Gran # (ANC) 8.1 (H) 1.8 - 7.7 K/uL    Immature Grans (Abs) 0.04 0.00 - 0.04 K/uL    Lymph # 1.5 1.0 - 4.8 K/uL    Mono # 0.4 0.3 - 1.0 K/uL    Eos # 0.1 0.0 -  0.5 K/uL    Baso # 0.06 0.00 - 0.20 K/uL    nRBC 0 0 /100 WBC    Gran % 79.4 (H) 38.0 - 73.0 %    Lymph % 14.9 (L) 18.0 - 48.0 %    Mono % 3.6 (L) 4.0 - 15.0 %    Eosinophil % 1.1 0.0 - 8.0 %    Basophil % 0.6 0.0 - 1.9 %    Differential Method Automated    Iron and TIBC   Result Value Ref Range    Iron 105 30 - 160 ug/dL    Transferrin 287 200 - 375 mg/dL    TIBC 425 250 - 450 ug/dL    Saturated Iron 25 20 - 50 %   Ferritin   Result Value Ref Range    Ferritin 46 20.0 - 300.0 ng/mL         Assessment:       1. Psoriatic arthritis    2. Immunocompromised state due to drug therapy    3. Chronic pain syndrome    4. Diarrhea, unspecified type    5. Cellulitis of toe of right foot    6. Rheumatoid arthritis involving right shoulder with positive rheumatoid factor    7. Other abnormality of red blood cells                  Plan:       Psoriatic arthritis  -     ketorolac injection 60 mg  -     methylPREDNISolone acetate injection 160 mg  -     cyanocobalamin injection 1,000 mcg  -     Pancreatic elastase, fecal; Future; Expected date: 10/16/2023  -     Calprotectin, Stool; Future; Expected date: 10/16/2023  -     CLOSTRIDIUM DIFFICILE; Future; Expected date: 10/16/2023  -     LIDOcaine-prilocaine (EMLA) cream; Apply topically as needed (pain).  Dispense: 30 g; Refill: 3  -     sulfamethoxazole-trimethoprim 800-160mg (BACTRIM DS) 800-160 mg Tab; Take 1 tablet by mouth 2 (two) times daily. for 10 days  Dispense: 20 tablet; Refill: 0  -     mupirocin (BACTROBAN) 2 % ointment; Apply topically 3 (three) times daily. for 10 days  Dispense: 22 g; Refill: 4  -     predniSONE (DELTASONE) 2.5 MG tablet; Take 2 tablets (5 mg total) by mouth once daily.  Dispense: 60 tablet; Refill: 5  -     pregabalin (LYRICA) 25 MG capsule; Take 1 capsule (25 mg total) by mouth 2 (two) times daily.  Dispense: 60 capsule; Refill: 1  -     Transferrin; Future; Expected date: 10/16/2023  -     Iron and TIBC; Future; Expected date: 10/16/2023  -      Anti-Thyroglobulin Antibody; Future; Expected date: 10/16/2023  -     T4, Free; Future; Expected date: 10/16/2023  -     Thyroid Peroxidase Antibody; Future; Expected date: 10/16/2023  -     TSH; Future; Expected date: 10/16/2023  -     T3, Free; Future; Expected date: 10/16/2023  -     HYDROcodone-acetaminophen (NORCO)  mg per tablet; Take 1 tablet by mouth every 6 (six) hours as needed for Pain.  Dispense: 120 tablet; Refill: 0  -     HYDROcodone-acetaminophen (NORCO)  mg per tablet; Take 1 tablet by mouth every 8 (eight) hours as needed for Pain.  Dispense: 90 tablet; Refill: 0  -     HYDROcodone-acetaminophen (NORCO)  mg per tablet; Take 1 tablet by mouth every 8 (eight) hours as needed for Pain.  Dispense: 90 tablet; Refill: 0    Immunocompromised state due to drug therapy  -     ketorolac injection 60 mg  -     methylPREDNISolone acetate injection 160 mg  -     cyanocobalamin injection 1,000 mcg  -     Pancreatic elastase, fecal; Future; Expected date: 10/16/2023  -     Calprotectin, Stool; Future; Expected date: 10/16/2023  -     CLOSTRIDIUM DIFFICILE; Future; Expected date: 10/16/2023  -     LIDOcaine-prilocaine (EMLA) cream; Apply topically as needed (pain).  Dispense: 30 g; Refill: 3  -     sulfamethoxazole-trimethoprim 800-160mg (BACTRIM DS) 800-160 mg Tab; Take 1 tablet by mouth 2 (two) times daily. for 10 days  Dispense: 20 tablet; Refill: 0  -     mupirocin (BACTROBAN) 2 % ointment; Apply topically 3 (three) times daily. for 10 days  Dispense: 22 g; Refill: 4  -     predniSONE (DELTASONE) 2.5 MG tablet; Take 2 tablets (5 mg total) by mouth once daily.  Dispense: 60 tablet; Refill: 5  -     pregabalin (LYRICA) 25 MG capsule; Take 1 capsule (25 mg total) by mouth 2 (two) times daily.  Dispense: 60 capsule; Refill: 1  -     Transferrin; Future; Expected date: 10/16/2023  -     Iron and TIBC; Future; Expected date: 10/16/2023  -     Anti-Thyroglobulin Antibody; Future; Expected date:  10/16/2023  -     T4, Free; Future; Expected date: 10/16/2023  -     Thyroid Peroxidase Antibody; Future; Expected date: 10/16/2023  -     TSH; Future; Expected date: 10/16/2023  -     T3, Free; Future; Expected date: 10/16/2023  -     HYDROcodone-acetaminophen (NORCO)  mg per tablet; Take 1 tablet by mouth every 6 (six) hours as needed for Pain.  Dispense: 120 tablet; Refill: 0  -     HYDROcodone-acetaminophen (NORCO)  mg per tablet; Take 1 tablet by mouth every 8 (eight) hours as needed for Pain.  Dispense: 90 tablet; Refill: 0  -     HYDROcodone-acetaminophen (NORCO)  mg per tablet; Take 1 tablet by mouth every 8 (eight) hours as needed for Pain.  Dispense: 90 tablet; Refill: 0    Chronic pain syndrome  -     ketorolac injection 60 mg  -     methylPREDNISolone acetate injection 160 mg  -     cyanocobalamin injection 1,000 mcg  -     Pancreatic elastase, fecal; Future; Expected date: 10/16/2023  -     Calprotectin, Stool; Future; Expected date: 10/16/2023  -     CLOSTRIDIUM DIFFICILE; Future; Expected date: 10/16/2023  -     LIDOcaine-prilocaine (EMLA) cream; Apply topically as needed (pain).  Dispense: 30 g; Refill: 3  -     sulfamethoxazole-trimethoprim 800-160mg (BACTRIM DS) 800-160 mg Tab; Take 1 tablet by mouth 2 (two) times daily. for 10 days  Dispense: 20 tablet; Refill: 0  -     mupirocin (BACTROBAN) 2 % ointment; Apply topically 3 (three) times daily. for 10 days  Dispense: 22 g; Refill: 4  -     predniSONE (DELTASONE) 2.5 MG tablet; Take 2 tablets (5 mg total) by mouth once daily.  Dispense: 60 tablet; Refill: 5  -     pregabalin (LYRICA) 25 MG capsule; Take 1 capsule (25 mg total) by mouth 2 (two) times daily.  Dispense: 60 capsule; Refill: 1  -     Transferrin; Future; Expected date: 10/16/2023  -     Iron and TIBC; Future; Expected date: 10/16/2023  -     Anti-Thyroglobulin Antibody; Future; Expected date: 10/16/2023  -     T4, Free; Future; Expected date: 10/16/2023  -     Thyroid  Peroxidase Antibody; Future; Expected date: 10/16/2023  -     TSH; Future; Expected date: 10/16/2023  -     T3, Free; Future; Expected date: 10/16/2023  -     HYDROcodone-acetaminophen (NORCO)  mg per tablet; Take 1 tablet by mouth every 6 (six) hours as needed for Pain.  Dispense: 120 tablet; Refill: 0  -     HYDROcodone-acetaminophen (NORCO)  mg per tablet; Take 1 tablet by mouth every 8 (eight) hours as needed for Pain.  Dispense: 90 tablet; Refill: 0  -     HYDROcodone-acetaminophen (NORCO)  mg per tablet; Take 1 tablet by mouth every 8 (eight) hours as needed for Pain.  Dispense: 90 tablet; Refill: 0    Diarrhea, unspecified type  -     ketorolac injection 60 mg  -     methylPREDNISolone acetate injection 160 mg  -     cyanocobalamin injection 1,000 mcg  -     Pancreatic elastase, fecal; Future; Expected date: 10/16/2023  -     Calprotectin, Stool; Future; Expected date: 10/16/2023  -     CLOSTRIDIUM DIFFICILE; Future; Expected date: 10/16/2023  -     LIDOcaine-prilocaine (EMLA) cream; Apply topically as needed (pain).  Dispense: 30 g; Refill: 3  -     sulfamethoxazole-trimethoprim 800-160mg (BACTRIM DS) 800-160 mg Tab; Take 1 tablet by mouth 2 (two) times daily. for 10 days  Dispense: 20 tablet; Refill: 0  -     mupirocin (BACTROBAN) 2 % ointment; Apply topically 3 (three) times daily. for 10 days  Dispense: 22 g; Refill: 4  -     predniSONE (DELTASONE) 2.5 MG tablet; Take 2 tablets (5 mg total) by mouth once daily.  Dispense: 60 tablet; Refill: 5  -     pregabalin (LYRICA) 25 MG capsule; Take 1 capsule (25 mg total) by mouth 2 (two) times daily.  Dispense: 60 capsule; Refill: 1  -     Transferrin; Future; Expected date: 10/16/2023  -     Iron and TIBC; Future; Expected date: 10/16/2023  -     Anti-Thyroglobulin Antibody; Future; Expected date: 10/16/2023  -     T4, Free; Future; Expected date: 10/16/2023  -     Thyroid Peroxidase Antibody; Future; Expected date: 10/16/2023  -     TSH;  Future; Expected date: 10/16/2023  -     T3, Free; Future; Expected date: 10/16/2023  -     HYDROcodone-acetaminophen (NORCO)  mg per tablet; Take 1 tablet by mouth every 6 (six) hours as needed for Pain.  Dispense: 120 tablet; Refill: 0  -     HYDROcodone-acetaminophen (NORCO)  mg per tablet; Take 1 tablet by mouth every 8 (eight) hours as needed for Pain.  Dispense: 90 tablet; Refill: 0  -     HYDROcodone-acetaminophen (NORCO)  mg per tablet; Take 1 tablet by mouth every 8 (eight) hours as needed for Pain.  Dispense: 90 tablet; Refill: 0    Cellulitis of toe of right foot  -     LIDOcaine-prilocaine (EMLA) cream; Apply topically as needed (pain).  Dispense: 30 g; Refill: 3  -     sulfamethoxazole-trimethoprim 800-160mg (BACTRIM DS) 800-160 mg Tab; Take 1 tablet by mouth 2 (two) times daily. for 10 days  Dispense: 20 tablet; Refill: 0  -     mupirocin (BACTROBAN) 2 % ointment; Apply topically 3 (three) times daily. for 10 days  Dispense: 22 g; Refill: 4  -     predniSONE (DELTASONE) 2.5 MG tablet; Take 2 tablets (5 mg total) by mouth once daily.  Dispense: 60 tablet; Refill: 5  -     pregabalin (LYRICA) 25 MG capsule; Take 1 capsule (25 mg total) by mouth 2 (two) times daily.  Dispense: 60 capsule; Refill: 1  -     Transferrin; Future; Expected date: 10/16/2023  -     Iron and TIBC; Future; Expected date: 10/16/2023  -     Anti-Thyroglobulin Antibody; Future; Expected date: 10/16/2023  -     T4, Free; Future; Expected date: 10/16/2023  -     Thyroid Peroxidase Antibody; Future; Expected date: 10/16/2023  -     TSH; Future; Expected date: 10/16/2023  -     T3, Free; Future; Expected date: 10/16/2023  -     HYDROcodone-acetaminophen (NORCO)  mg per tablet; Take 1 tablet by mouth every 6 (six) hours as needed for Pain.  Dispense: 120 tablet; Refill: 0  -     HYDROcodone-acetaminophen (NORCO)  mg per tablet; Take 1 tablet by mouth every 8 (eight) hours as needed for Pain.  Dispense: 90  tablet; Refill: 0  -     HYDROcodone-acetaminophen (NORCO)  mg per tablet; Take 1 tablet by mouth every 8 (eight) hours as needed for Pain.  Dispense: 90 tablet; Refill: 0    Rheumatoid arthritis involving right shoulder with positive rheumatoid factor  -     pregabalin (LYRICA) 25 MG capsule; Take 1 capsule (25 mg total) by mouth 2 (two) times daily.  Dispense: 60 capsule; Refill: 1  -     Transferrin; Future; Expected date: 10/16/2023  -     Iron and TIBC; Future; Expected date: 10/16/2023  -     Anti-Thyroglobulin Antibody; Future; Expected date: 10/16/2023  -     T4, Free; Future; Expected date: 10/16/2023  -     Thyroid Peroxidase Antibody; Future; Expected date: 10/16/2023  -     TSH; Future; Expected date: 10/16/2023  -     T3, Free; Future; Expected date: 10/16/2023  -     HYDROcodone-acetaminophen (NORCO)  mg per tablet; Take 1 tablet by mouth every 6 (six) hours as needed for Pain.  Dispense: 120 tablet; Refill: 0  -     HYDROcodone-acetaminophen (NORCO)  mg per tablet; Take 1 tablet by mouth every 8 (eight) hours as needed for Pain.  Dispense: 90 tablet; Refill: 0  -     HYDROcodone-acetaminophen (NORCO)  mg per tablet; Take 1 tablet by mouth every 8 (eight) hours as needed for Pain.  Dispense: 90 tablet; Refill: 0    Other abnormality of red blood cells  -     pregabalin (LYRICA) 25 MG capsule; Take 1 capsule (25 mg total) by mouth 2 (two) times daily.  Dispense: 60 capsule; Refill: 1  -     Transferrin; Future; Expected date: 10/16/2023  -     Iron and TIBC; Future; Expected date: 10/16/2023  -     Anti-Thyroglobulin Antibody; Future; Expected date: 10/16/2023  -     T4, Free; Future; Expected date: 10/16/2023  -     Thyroid Peroxidase Antibody; Future; Expected date: 10/16/2023  -     TSH; Future; Expected date: 10/16/2023  -     T3, Free; Future; Expected date: 10/16/2023  -     HYDROcodone-acetaminophen (NORCO)  mg per tablet; Take 1 tablet by mouth every 6 (six) hours  as needed for Pain.  Dispense: 120 tablet; Refill: 0  -     HYDROcodone-acetaminophen (NORCO)  mg per tablet; Take 1 tablet by mouth every 8 (eight) hours as needed for Pain.  Dispense: 90 tablet; Refill: 0  -     HYDROcodone-acetaminophen (NORCO)  mg per tablet; Take 1 tablet by mouth every 8 (eight) hours as needed for Pain.  Dispense: 90 tablet; Refill: 0            Assessment:  72 year old female with   Psoriatic arthritis, rheumatoid arthritis on Humira  --chronic pain syndrome on norco  --insomnia on ambien  --iron deficiency anemia, stable  --grade 2 diastolic dysfunction    Plan:  1. toradol 60, depo 160, b12 today for flare  2. Continue Skyrizi.   3. Cont. norco 10/325 TID py. I have checked [place] prescription monitoring program site and no unusual or abnormal behavior has occurred pt understand the risk and benefits of taking opioid medications and has decided to continue the  Medication. Pended for MD  4. Cont. mobic 15 mg  5. Cont ambien  6. Referral to Cardiology, pt wishes to change to [facility]  7. Ok to continue medrol 4 mg prn for flares   8. Check labs today and stool    More than 50% of the  40 minute encounter was spent face to face counseling the patient regarding current status and future plan of care as well as side effects  of the medications. All questions were answered to patient's satisfaction also includes  non-face to face time preparing to see the patient (eg, review of tests), Obtaining and/or reviewing separately obtained history, Documenting clinical information in the electronic or other health record, Independently interpreting results

## 2023-10-17 LAB
GAMMA INTERFERON BACKGROUND BLD IA-ACNC: 0.03 IU/ML
M TB IFN-G CD4+ BCKGRND COR BLD-ACNC: 0.02 IU/ML
M TB IFN-G CD4+ BCKGRND COR BLD-ACNC: 0.02 IU/ML
MITOGEN IGNF BCKGRD COR BLD-ACNC: 9.97 IU/ML
TB GOLD PLUS: NEGATIVE

## 2023-10-20 LAB
HBV SURFACE AB SER QL IA: NEGATIVE
HBV SURFACE AB SERPL IA-ACNC: <3 MIU/ML

## 2023-11-08 DIAGNOSIS — I10 ESSENTIAL HYPERTENSION: ICD-10-CM

## 2023-11-08 NOTE — TELEPHONE ENCOUNTER
Pharmacy requesting refill on Lisinopril 10mg  Pt's LOV 10/16/2023  Pt's NOV 02/19/2024  Medication pending

## 2023-11-09 RX ORDER — LISINOPRIL 10 MG/1
TABLET ORAL
Qty: 90 TABLET | Refills: 3 | Status: SHIPPED | OUTPATIENT
Start: 2023-11-09

## 2023-11-15 DIAGNOSIS — K21.9 GASTROESOPHAGEAL REFLUX DISEASE, UNSPECIFIED WHETHER ESOPHAGITIS PRESENT: ICD-10-CM

## 2023-11-15 RX ORDER — OMEPRAZOLE 40 MG/1
40 CAPSULE, DELAYED RELEASE ORAL DAILY
Qty: 90 CAPSULE | Refills: 3 | Status: SHIPPED | OUTPATIENT
Start: 2023-11-15

## 2023-11-15 NOTE — TELEPHONE ENCOUNTER
Pharmacy requesting refill on Omeprazole 40mg  Pt's LOV 10/16/2023  Pt's NOV 02/19/2024  Medication pending

## 2023-11-21 DIAGNOSIS — L50.9 HIVES: ICD-10-CM

## 2023-11-21 DIAGNOSIS — L40.50 PSORIATIC ARTHRITIS: ICD-10-CM

## 2023-11-21 DIAGNOSIS — D84.821 IMMUNOCOMPROMISED STATE DUE TO DRUG THERAPY: ICD-10-CM

## 2023-11-21 DIAGNOSIS — Z79.899 IMMUNOCOMPROMISED STATE DUE TO DRUG THERAPY: ICD-10-CM

## 2023-11-21 DIAGNOSIS — G89.4 CHRONIC PAIN SYNDROME: ICD-10-CM

## 2023-11-21 RX ORDER — HYDROXYZINE HYDROCHLORIDE 25 MG/1
TABLET, FILM COATED ORAL
Qty: 45 TABLET | Refills: 5 | Status: SHIPPED | OUTPATIENT
Start: 2023-11-21 | End: 2023-12-05

## 2023-11-29 ENCOUNTER — TELEPHONE (OUTPATIENT)
Dept: RHEUMATOLOGY | Facility: CLINIC | Age: 73
End: 2023-11-29
Payer: MEDICARE

## 2023-11-29 NOTE — TELEPHONE ENCOUNTER
----- Message from Therese Sanchez sent at 11/29/2023 10:18 AM CST -----  Type: Appointment Request    Caller is requesting appointment.      Name of Caller:pt    When is the first available appointment?na    Symptoms:na    Would the patient rather a call back or a response via [facility]? Call back    Best Call Back Number:[phone]      Additional Information: pt is looking to schedule her wellness shots, she said b12 and 1 other but she can't remember which one.   Please call Back to advise. Thanks!

## 2023-11-29 NOTE — TELEPHONE ENCOUNTER
Pt requesting nurse injections    LOV 10/16/2023  Rec'd Vit B12, Ketorolac 60mg, and Methylprednisolone 160mg

## 2023-11-30 NOTE — TELEPHONE ENCOUNTER
----- Message from Irene Figueroa sent at 11/30/2023 11:32 AM CST -----  Regarding: nurse visit needed  The pt dropped by today, and let me know she had sent a message yesterday 11/29/23, requesting a nurse injection visit.     I told her that it takes 24-72 hrs to get back to her with the appt.     Please leave a message for her if she does not answer the phone    Thanks

## 2023-12-12 ENCOUNTER — TELEPHONE (OUTPATIENT)
Dept: RHEUMATOLOGY | Facility: CLINIC | Age: 73
End: 2023-12-12
Payer: MEDICAID

## 2023-12-12 NOTE — TELEPHONE ENCOUNTER
----- Message from Marbella Santillan sent at 12/12/2023 11:29 AM CST -----  Regarding: injection  Contact: patient  Type: Needs Medical Advice  Who Called:  patient  Symptoms (please be specific):    How long has patient had these symptoms:    Pharmacy name and phone #:    Best Call Back Number: [phone]    Additional Information: needs to schedule her injection call to advise thanks!

## 2023-12-13 ENCOUNTER — TELEPHONE (OUTPATIENT)
Dept: RHEUMATOLOGY | Facility: CLINIC | Age: 73
End: 2023-12-13

## 2023-12-13 NOTE — TELEPHONE ENCOUNTER
----- Message from Harriett Krishnamurthy sent at 12/13/2023 10:04 AM CST -----  Contact: Self  Type: Needs Medical Advice    Who Called:  Patient  What is this regarding?:  She just had mouth surgery and wants to reschedule to Friday instead at 11:30.  Best Call Back Number:  [phone]  Additional Information:  Please call the patient back at the phone number listed above to advise. Thank you!

## 2024-01-04 DIAGNOSIS — L03.031 CELLULITIS OF TOE OF RIGHT FOOT: ICD-10-CM

## 2024-01-04 DIAGNOSIS — Z79.899 IMMUNOCOMPROMISED STATE DUE TO DRUG THERAPY: ICD-10-CM

## 2024-01-04 DIAGNOSIS — D84.821 IMMUNOCOMPROMISED STATE DUE TO DRUG THERAPY: ICD-10-CM

## 2024-01-04 DIAGNOSIS — G89.4 CHRONIC PAIN SYNDROME: ICD-10-CM

## 2024-01-04 DIAGNOSIS — R71.8 OTHER ABNORMALITY OF RED BLOOD CELLS: ICD-10-CM

## 2024-01-04 DIAGNOSIS — M05.711 RHEUMATOID ARTHRITIS INVOLVING RIGHT SHOULDER WITH POSITIVE RHEUMATOID FACTOR: ICD-10-CM

## 2024-01-04 DIAGNOSIS — L40.50 PSORIATIC ARTHRITIS: ICD-10-CM

## 2024-01-04 DIAGNOSIS — R19.7 DIARRHEA, UNSPECIFIED TYPE: ICD-10-CM

## 2024-01-04 NOTE — TELEPHONE ENCOUNTER
----- Message from Ham Llanes sent at 1/4/2024  9:41 AM CST -----  Contact: Self  Type:  RX Refill Request    Who Called:  Patient  Refill or New Rx:  Refill  RX Name and Strength:  HYDROcodone-acetaminophen (NORCO)  mg per tablet  How is the patient currently taking it? (ex. 1XDay):  as directed  Is this a 30 day or 90 day RX:  30  Preferred Pharmacy with phone number:    [id number] - [facility] [id number] [place] [address] AT [facility] [address]  [address] [place] [address]  Phone: [phone] Fax: [phone]        Local or Mail Order:  Local  Ordering Provider:  Sourav Roblero Call Back Number:  [phone]   Additional Information:   Please call. Also wanted to francis Horacio

## 2024-01-07 RX ORDER — HYDROCODONE BITARTRATE AND ACETAMINOPHEN 10; 325 MG/1; MG/1
1 TABLET ORAL EVERY 8 HOURS PRN
Qty: 90 TABLET | Refills: 0 | Status: SHIPPED | OUTPATIENT
Start: 2024-01-07 | End: 2024-01-22 | Stop reason: SDUPTHER

## 2024-01-22 ENCOUNTER — OFFICE VISIT (OUTPATIENT)
Dept: RHEUMATOLOGY | Facility: CLINIC | Age: 74
End: 2024-01-22
Payer: COMMERCIAL

## 2024-01-22 VITALS
DIASTOLIC BLOOD PRESSURE: 88 MMHG | HEIGHT: 67 IN | BODY MASS INDEX: 26.64 KG/M2 | WEIGHT: 169.75 LBS | SYSTOLIC BLOOD PRESSURE: 160 MMHG | HEART RATE: 73 BPM

## 2024-01-22 DIAGNOSIS — Z79.899 IMMUNOCOMPROMISED STATE DUE TO DRUG THERAPY: ICD-10-CM

## 2024-01-22 DIAGNOSIS — G47.00 INSOMNIA, UNSPECIFIED TYPE: ICD-10-CM

## 2024-01-22 DIAGNOSIS — G89.4 CHRONIC PAIN SYNDROME: ICD-10-CM

## 2024-01-22 DIAGNOSIS — L40.50 PSORIATIC ARTHRITIS: Primary | ICD-10-CM

## 2024-01-22 DIAGNOSIS — M05.711 RHEUMATOID ARTHRITIS INVOLVING RIGHT SHOULDER WITH POSITIVE RHEUMATOID FACTOR: ICD-10-CM

## 2024-01-22 DIAGNOSIS — R71.8 OTHER ABNORMALITY OF RED BLOOD CELLS: ICD-10-CM

## 2024-01-22 DIAGNOSIS — D84.821 IMMUNOCOMPROMISED STATE DUE TO DRUG THERAPY: ICD-10-CM

## 2024-01-22 DIAGNOSIS — M19.90 OSTEOARTHRITIS, UNSPECIFIED OSTEOARTHRITIS TYPE, UNSPECIFIED SITE: ICD-10-CM

## 2024-01-22 DIAGNOSIS — I10 ESSENTIAL HYPERTENSION: ICD-10-CM

## 2024-01-22 PROCEDURE — 96372 THER/PROPH/DIAG INJ SC/IM: CPT | Mod: S$GLB,,, | Performed by: INTERNAL MEDICINE

## 2024-01-22 PROCEDURE — 1159F MED LIST DOCD IN RCRD: CPT | Mod: CPTII,S$GLB,, | Performed by: INTERNAL MEDICINE

## 2024-01-22 PROCEDURE — 3077F SYST BP >= 140 MM HG: CPT | Mod: CPTII,S$GLB,, | Performed by: INTERNAL MEDICINE

## 2024-01-22 PROCEDURE — 3008F BODY MASS INDEX DOCD: CPT | Mod: CPTII,S$GLB,, | Performed by: INTERNAL MEDICINE

## 2024-01-22 PROCEDURE — 1101F PT FALLS ASSESS-DOCD LE1/YR: CPT | Mod: CPTII,S$GLB,, | Performed by: INTERNAL MEDICINE

## 2024-01-22 PROCEDURE — 99999 PR PBB SHADOW E&M-EST. PATIENT-LVL IV: CPT | Mod: PBBFAC,,, | Performed by: INTERNAL MEDICINE

## 2024-01-22 PROCEDURE — 99215 OFFICE O/P EST HI 40 MIN: CPT | Mod: 25,S$GLB,, | Performed by: INTERNAL MEDICINE

## 2024-01-22 PROCEDURE — 1160F RVW MEDS BY RX/DR IN RCRD: CPT | Mod: CPTII,S$GLB,, | Performed by: INTERNAL MEDICINE

## 2024-01-22 PROCEDURE — 3079F DIAST BP 80-89 MM HG: CPT | Mod: CPTII,S$GLB,, | Performed by: INTERNAL MEDICINE

## 2024-01-22 PROCEDURE — 1125F AMNT PAIN NOTED PAIN PRSNT: CPT | Mod: CPTII,S$GLB,, | Performed by: INTERNAL MEDICINE

## 2024-01-22 PROCEDURE — 3288F FALL RISK ASSESSMENT DOCD: CPT | Mod: CPTII,S$GLB,, | Performed by: INTERNAL MEDICINE

## 2024-01-22 RX ORDER — POTASSIUM CHLORIDE 750 MG/1
10 TABLET, EXTENDED RELEASE ORAL DAILY
Qty: 30 TABLET | Refills: 3 | Status: SHIPPED | OUTPATIENT
Start: 2024-01-22 | End: 2024-02-21

## 2024-01-22 RX ORDER — ONDANSETRON 4 MG/1
4 TABLET, FILM COATED ORAL EVERY 6 HOURS PRN
COMMUNITY
Start: 2023-10-31 | End: 2024-01-22 | Stop reason: SDUPTHER

## 2024-01-22 RX ORDER — KETOROLAC TROMETHAMINE 30 MG/ML
60 INJECTION, SOLUTION INTRAMUSCULAR; INTRAVENOUS
Status: COMPLETED | OUTPATIENT
Start: 2024-01-22 | End: 2024-01-22

## 2024-01-22 RX ORDER — FUROSEMIDE 20 MG/1
20 TABLET ORAL EVERY OTHER DAY
Qty: 30 TABLET | Refills: 3 | Status: ON HOLD | OUTPATIENT
Start: 2024-01-22 | End: 2024-05-19 | Stop reason: CLARIF

## 2024-01-22 RX ORDER — HYDROCODONE BITARTRATE AND ACETAMINOPHEN 10; 325 MG/1; MG/1
1 TABLET ORAL EVERY 8 HOURS PRN
Qty: 90 TABLET | Refills: 0 | Status: SHIPPED | OUTPATIENT
Start: 2024-03-01 | End: 2024-03-31

## 2024-01-22 RX ORDER — ONDANSETRON 4 MG/1
4 TABLET, FILM COATED ORAL EVERY 6 HOURS PRN
Qty: 60 TABLET | Refills: 3 | Status: SHIPPED | OUTPATIENT
Start: 2024-01-22 | End: 2024-05-19 | Stop reason: CLARIF

## 2024-01-22 RX ORDER — PREDNISONE 2.5 MG/1
5 TABLET ORAL DAILY
Qty: 60 TABLET | Refills: 5 | Status: SHIPPED | OUTPATIENT
Start: 2024-01-22 | End: 2024-05-19 | Stop reason: CLARIF

## 2024-01-22 RX ORDER — METHYLPREDNISOLONE ACETATE 80 MG/ML
160 INJECTION, SUSPENSION INTRA-ARTICULAR; INTRALESIONAL; INTRAMUSCULAR; SOFT TISSUE
Status: COMPLETED | OUTPATIENT
Start: 2024-01-22 | End: 2024-01-22

## 2024-01-22 RX ORDER — ZOLPIDEM TARTRATE 10 MG/1
TABLET ORAL
Qty: 30 TABLET | Refills: 4 | Status: SHIPPED | OUTPATIENT
Start: 2024-01-22 | End: 2024-05-16 | Stop reason: SDUPTHER

## 2024-01-22 RX ORDER — CYANOCOBALAMIN 1000 UG/ML
1000 INJECTION, SOLUTION INTRAMUSCULAR; SUBCUTANEOUS
Status: COMPLETED | OUTPATIENT
Start: 2024-01-22 | End: 2024-01-22

## 2024-01-22 RX ORDER — PREGABALIN 25 MG/1
25 CAPSULE ORAL 2 TIMES DAILY
Qty: 60 CAPSULE | Refills: 4 | Status: SHIPPED | OUTPATIENT
Start: 2024-01-22 | End: 2024-05-16 | Stop reason: SDUPTHER

## 2024-01-22 RX ORDER — HYDROCODONE BITARTRATE AND ACETAMINOPHEN 10; 325 MG/1; MG/1
1 TABLET ORAL EVERY 8 HOURS PRN
Qty: 90 TABLET | Refills: 0 | Status: SHIPPED | OUTPATIENT
Start: 2024-02-02 | End: 2024-01-22 | Stop reason: SDUPTHER

## 2024-01-22 RX ORDER — METHYLPREDNISOLONE 4 MG/1
4 TABLET ORAL DAILY PRN
Qty: 30 TABLET | Refills: 3 | Status: SHIPPED | OUTPATIENT
Start: 2024-01-22 | End: 2024-05-19 | Stop reason: CLARIF

## 2024-01-22 RX ORDER — HYDROCODONE BITARTRATE AND ACETAMINOPHEN 10; 325 MG/1; MG/1
1 TABLET ORAL EVERY 8 HOURS PRN
Qty: 90 TABLET | Refills: 0 | Status: SHIPPED | OUTPATIENT
Start: 2024-04-02 | End: 2024-05-02

## 2024-01-22 RX ADMIN — KETOROLAC TROMETHAMINE 60 MG: 30 INJECTION, SOLUTION INTRAMUSCULAR; INTRAVENOUS at 03:01

## 2024-01-22 RX ADMIN — METHYLPREDNISOLONE ACETATE 160 MG: 80 INJECTION, SUSPENSION INTRA-ARTICULAR; INTRALESIONAL; INTRAMUSCULAR; SOFT TISSUE at 03:01

## 2024-01-22 RX ADMIN — CYANOCOBALAMIN 1000 MCG: 1000 INJECTION, SOLUTION INTRAMUSCULAR; SUBCUTANEOUS at 03:01

## 2024-01-22 ASSESSMENT — ROUTINE ASSESSMENT OF PATIENT INDEX DATA (RAPID3)
PATIENT GLOBAL ASSESSMENT SCORE: 5
FATIGUE SCORE: 1.1
MDHAQ FUNCTION SCORE: 0.9
PSYCHOLOGICAL DISTRESS SCORE: 2.2
PAIN SCORE: 5
TOTAL RAPID3 SCORE: 4.33

## 2024-01-22 NOTE — LETTER
January 22, 2024        Rahul Urrutia MD  [address] [facility]  [address]  [facility], [facility] [address]             [facility]  [address] [place] [address]  Phone: [phone]  Fax: [phone]   Patient: Rose Goff   MR Number: [id number]   YOB: 1950   Date of Visit: 1/22/2024     Dear Dr. Urrutia,     {[id number]}    Sincerely,      Robin Benjamin MD            CC    No Recipients    Enclosure

## 2024-01-22 NOTE — PROGRESS NOTES
Subjective:       Patient ID: Rose Goff is a 73 y.o. female.    Chief Complaint: Disease Management      Follow up:73 year old female who presents to clinic for follow up on psoriatic arthritis.  She found a mass under her R sternocleidomastoid muscle.. . She has intermittent joint pain involving MCPs, PIPs, wrists, hips, and knees. AM stiffness can last up to 2-3 hours. She reports less knee pain since she started exercising with pilates. She has chronic low back pain secondary to OA and is taking norco TID PRN with good relief of pain. She is taking ambien for insomnia and this is working well.     She is due for repeat labs.    Current tx:  1. skyrizi  2. Mobic  3. Norco    Prior treatments:   Enbrel, Cimzia, Simponi, MTX, Cosentyx       Review of Systems   Constitutional:  Positive for activity change. Negative for appetite change and chills.   Eyes:  Negative for visual disturbance.   Respiratory:  Negative for cough.    Cardiovascular:  Negative for chest pain and palpitations.   Gastrointestinal:  Negative for abdominal pain and constipation.   Musculoskeletal:  Positive for arthralgias. Negative for neck pain and neck stiffness.   Skin:  Positive for rash.   Allergic/Immunologic: Positive for immunocompromised state.   Neurological:  Negative for dizziness, weakness and light-headedness.   Psychiatric/Behavioral:  Negative for dysphoric mood and sleep disturbance. The patient is not nervous/anxious.          Objective:     Vitals:    01/22/24 1611   BP: (!) 160/88   Pulse: 73           Past Medical History:   Diagnosis Date    Abnormal mammogram     Amaurosis fugax     Anemia     Back pain     Cancer     Chest pain     Degenerative cervical disc     Degenerative disc disease, thoracic     Depression     Depression     Fatigue     GERD (gastroesophageal reflux disease)     Head ache     Hemorrhoids     Hyperlipidemia     Hypertension     Hypoglycemia     Hypothyroidism      Hypothyroidism     Insomnia     Menopausal state     Neck pain     Neuralgia     Nonrheumatic tricuspid valve disorder     Optic neuritis     Palpitations     PONV (postoperative nausea and vomiting)     Psoriasis     Psoriatic arthropathy     Rosacea     Thyroid nodule      Past Surgical History:   Procedure Laterality Date    ANKLE FRACTURE SURGERY      APPENDECTOMY      BREAST BIOPSY Left     Negative    HYSTERECTOMY      INSERTION OF TUNNELED CENTRAL VENOUS CATHETER (CVC) WITH SUBCUTANEOUS PORT Left 1/30/2024    Procedure: [id number];  Surgeon: Vincenzo Fierro MD;  Location: [facility];  Service: General;  Laterality: Left;    OOPHORECTOMY      RHIZOTOMY W/ RADIOFREQUENCY ABLATION Right     lumbar    thyroid tumor removed            Physical Exam   Constitutional: She is oriented to person, place, and time.   Eyes: Right conjunctiva is not injected. Left conjunctiva is not injected.   Neck: No JVD present. No thyromegaly present.   Cardiovascular: Normal rate and regular rhythm. Exam reveals no decreased pulses.   Pulmonary/Chest: Effort normal.   Musculoskeletal:         General: Tenderness and deformity present.      Right shoulder: Tenderness present.      Left shoulder: Tenderness present.      Right elbow: Tenderness present.      Left elbow: Tenderness present.      Right wrist: Tenderness present.      Left wrist: Tenderness present.      Right knee: Swelling present. Tenderness present.      Left knee: Swelling present. Tenderness present.   Lymphadenopathy:     She has no cervical adenopathy.   Neurological: She is alert and oriented to person, place, and time. Gait normal.   Skin: No rash noted.   Psychiatric: Mood and affect normal.       Right Side Rheumatological Exam     Examination finds the 3rd MCP, 4th MCP and 5th MCP normal.    The patient is tender to palpation of the shoulder, elbow, wrist, knee, 1st PIP, 1st MCP, 2nd PIP, 2nd MCP, 3rd PIP, 4th PIP and 5th PIP    She has swelling of  the knee, 1st MCP, 2nd PIP, 2nd MCP, 3rd PIP, 4th PIP and 5th PIP    Left Side Rheumatological Exam     Examination finds the 3rd MCP, 4th MCP and 5th MCP normal.    The patient is tender to palpation of the shoulder, elbow, wrist, knee, 1st PIP, 1st MCP, 2nd PIP, 2nd MCP, 3rd PIP, 4th PIP and 5th PIP.    She has swelling of the knee, 1st MCP and 2nd MCP          Results for orders placed or performed in visit on 10/16/23   Sedimentation rate   Result Value Ref Range    Sed Rate <2 0 - 36 mm/Hr   C-Reactive Protein   Result Value Ref Range    CRP 3.1 0.0 - 8.2 mg/L   Comprehensive Metabolic Panel   Result Value Ref Range    Sodium 137 136 - 145 mmol/L    Potassium 3.9 3.5 - 5.1 mmol/L    Chloride 105 95 - 110 mmol/L    CO2 22 (L) 23 - 29 mmol/L    Glucose 96 70 - 110 mg/dL    BUN 14 8 - 23 mg/dL    Creatinine 0.7 0.5 - 1.4 mg/dL    Calcium 9.1 8.7 - 10.5 mg/dL    Total Protein 7.7 6.0 - 8.4 g/dL    Albumin 4.2 3.5 - 5.2 g/dL    Total Bilirubin 0.7 0.1 - 1.0 mg/dL    Alkaline Phosphatase 59 55 - 135 U/L    AST 22 10 - 40 U/L    ALT 27 10 - 44 U/L    eGFR >60.0 >60 mL/min/1.73 m^2    Anion Gap 10 8 - 16 mmol/L   CBC Auto Differential   Result Value Ref Range    WBC 8.66 3.90 - 12.70 K/uL    RBC 4.31 4.00 - 5.40 M/uL    Hemoglobin 13.5 12.0 - 16.0 g/dL    Hematocrit 42.3 37.0 - 48.5 %    MCV 98 82 - 98 fL    MCH 31.3 (H) 27.0 - 31.0 pg    MCHC 31.9 (L) 32.0 - 36.0 g/dL    RDW 13.3 11.5 - 14.5 %    Platelets 290 150 - 450 K/uL    MPV 10.6 9.2 - 12.9 fL    Immature Granulocytes 0.2 0.0 - 0.5 %    Gran # (ANC) 5.8 1.8 - 7.7 K/uL    Immature Grans (Abs) 0.02 0.00 - 0.04 K/uL    Lymph # 1.9 1.0 - 4.8 K/uL    Mono # 0.6 0.3 - 1.0 K/uL    Eos # 0.2 0.0 - 0.5 K/uL    Baso # 0.07 0.00 - 0.20 K/uL    nRBC 0 0 /100 WBC    Gran % 66.7 38.0 - 73.0 %    Lymph % 22.2 18.0 - 48.0 %    Mono % 7.3 4.0 - 15.0 %    Eosinophil % 2.8 0.0 - 8.0 %    Basophil % 0.8 0.0 - 1.9 %    Differential Method Automated    Hepatitis C Antibody   Result  Value Ref Range    Hepatitis C Ab Non-reactive Non-reactive   Quantiferon Gold TB   Result Value Ref Range    NIL 0.26084 IU/mL    TB1 - Nil 0.020 IU/mL    TB2 - Nil 0.021 IU/mL    Mitogen - Nil 9.966 IU/mL    TB Gold Plus Negative Negative   Hepatitis B Surface Antigen   Result Value Ref Range    Hepatitis B Surface Ag Non-reactive Non-reactive   Hepatitis B Surface Antibody, Qual/Quant   Result Value Ref Range    Hep. B Surf Ab, Qual Negative     Hep. B Surf Ab, Quant. <3 mIU/mL   Hepatitis B Core Antibody, Total   Result Value Ref Range    Hep B Core Total Ab Non-reactive Non-reactive   Transferrin   Result Value Ref Range    Transferrin 244 200 - 375 mg/dL   Iron and TIBC   Result Value Ref Range    Iron 109 30 - 160 ug/dL    Transferrin 244 200 - 375 mg/dL    TIBC 361 250 - 450 ug/dL    Saturated Iron 30 20 - 50 %   Anti-Thyroglobulin Antibody   Result Value Ref Range    Thyroglobulin Ab Screen <4.0 0.0 - 3.9 IU/mL   T4, Free   Result Value Ref Range    Free T4 1.27 0.71 - 1.51 ng/dL   Thyroid Peroxidase Antibody   Result Value Ref Range    Thyroperoxidase Antibodies <6.0 <6.0 IU/mL   TSH   Result Value Ref Range    TSH 2.404 0.400 - 4.000 uIU/mL   T3, Free   Result Value Ref Range    T3, Free 2.5 2.3 - 4.2 pg/mL     *Note: Due to a large number of results and/or encounters for the requested time period, some results have not been displayed. A complete set of results can be found in Results Review.         Assessment:       1. Psoriatic arthritis    2. Immunocompromised state due to drug therapy    3. Chronic pain syndrome    4. Rheumatoid arthritis involving right shoulder with positive rheumatoid factor    5. Osteoarthritis, unspecified osteoarthritis type, unspecified site    6. Insomnia, unspecified type    7. Other abnormality of red blood cells    8. Essential hypertension                    Plan:       Psoriatic arthritis  -     pregabalin (LYRICA) 25 MG capsule; Take 1 capsule (25 mg total) by mouth 2  (two) times daily.  Dispense: 60 capsule; Refill: 4  -     predniSONE (DELTASONE) 2.5 MG tablet; Take 2 tablets (5 mg total) by mouth once daily.  Dispense: 60 tablet; Refill: 5  -     methylPREDNISolone (MEDROL) 4 MG Tab; Take 1 tablet (4 mg total) by mouth daily as needed (arthritis flare). (Patient not taking: Reported on 1/26/2024)  Dispense: 30 tablet; Refill: 3  -     Discontinue: HYDROcodone-acetaminophen (NORCO)  mg per tablet; Take 1 tablet by mouth every 8 (eight) hours as needed for Pain.  Dispense: 90 tablet; Refill: 0  -     HYDROcodone-acetaminophen (NORCO)  mg per tablet; Take 1 tablet by mouth every 8 (eight) hours as needed for Pain.  Dispense: 90 tablet; Refill: 0  -     HYDROcodone-acetaminophen (NORCO)  mg per tablet; Take 1 tablet by mouth every 8 (eight) hours as needed for Pain.  Dispense: 90 tablet; Refill: 0  -     ketorolac injection 60 mg  -     methylPREDNISolone acetate injection 160 mg  -     cyanocobalamin injection 1,000 mcg    Immunocompromised state due to drug therapy  -     pregabalin (LYRICA) 25 MG capsule; Take 1 capsule (25 mg total) by mouth 2 (two) times daily.  Dispense: 60 capsule; Refill: 4  -     predniSONE (DELTASONE) 2.5 MG tablet; Take 2 tablets (5 mg total) by mouth once daily.  Dispense: 60 tablet; Refill: 5  -     Discontinue: HYDROcodone-acetaminophen (NORCO)  mg per tablet; Take 1 tablet by mouth every 8 (eight) hours as needed for Pain.  Dispense: 90 tablet; Refill: 0  -     HYDROcodone-acetaminophen (NORCO)  mg per tablet; Take 1 tablet by mouth every 8 (eight) hours as needed for Pain.  Dispense: 90 tablet; Refill: 0  -     HYDROcodone-acetaminophen (NORCO)  mg per tablet; Take 1 tablet by mouth every 8 (eight) hours as needed for Pain.  Dispense: 90 tablet; Refill: 0  -     ketorolac injection 60 mg  -     methylPREDNISolone acetate injection 160 mg  -     cyanocobalamin injection 1,000 mcg    Chronic pain syndrome  -      pregabalin (LYRICA) 25 MG capsule; Take 1 capsule (25 mg total) by mouth 2 (two) times daily.  Dispense: 60 capsule; Refill: 4  -     predniSONE (DELTASONE) 2.5 MG tablet; Take 2 tablets (5 mg total) by mouth once daily.  Dispense: 60 tablet; Refill: 5  -     Discontinue: HYDROcodone-acetaminophen (NORCO)  mg per tablet; Take 1 tablet by mouth every 8 (eight) hours as needed for Pain.  Dispense: 90 tablet; Refill: 0  -     HYDROcodone-acetaminophen (NORCO)  mg per tablet; Take 1 tablet by mouth every 8 (eight) hours as needed for Pain.  Dispense: 90 tablet; Refill: 0  -     HYDROcodone-acetaminophen (NORCO)  mg per tablet; Take 1 tablet by mouth every 8 (eight) hours as needed for Pain.  Dispense: 90 tablet; Refill: 0  -     ketorolac injection 60 mg  -     methylPREDNISolone acetate injection 160 mg  -     cyanocobalamin injection 1,000 mcg    Rheumatoid arthritis involving right shoulder with positive rheumatoid factor  -     pregabalin (LYRICA) 25 MG capsule; Take 1 capsule (25 mg total) by mouth 2 (two) times daily.  Dispense: 60 capsule; Refill: 4  -     Discontinue: HYDROcodone-acetaminophen (NORCO)  mg per tablet; Take 1 tablet by mouth every 8 (eight) hours as needed for Pain.  Dispense: 90 tablet; Refill: 0  -     HYDROcodone-acetaminophen (NORCO)  mg per tablet; Take 1 tablet by mouth every 8 (eight) hours as needed for Pain.  Dispense: 90 tablet; Refill: 0  -     HYDROcodone-acetaminophen (NORCO)  mg per tablet; Take 1 tablet by mouth every 8 (eight) hours as needed for Pain.  Dispense: 90 tablet; Refill: 0  -     ketorolac injection 60 mg  -     methylPREDNISolone acetate injection 160 mg  -     cyanocobalamin injection 1,000 mcg    Osteoarthritis, unspecified osteoarthritis type, unspecified site  -     ketorolac injection 60 mg  -     methylPREDNISolone acetate injection 160 mg  -     cyanocobalamin injection 1,000 mcg    Insomnia, unspecified type  -     zolpidem  (AMBIEN) 10 mg Tab; TAKE 1 TABLET(10 MG) BY MOUTH EVERY EVENING  Dispense: 30 tablet; Refill: 4  -     ketorolac injection 60 mg  -     methylPREDNISolone acetate injection 160 mg  -     cyanocobalamin injection 1,000 mcg    Other abnormality of red blood cells  -     pregabalin (LYRICA) 25 MG capsule; Take 1 capsule (25 mg total) by mouth 2 (two) times daily.  Dispense: 60 capsule; Refill: 4  -     Discontinue: HYDROcodone-acetaminophen (NORCO)  mg per tablet; Take 1 tablet by mouth every 8 (eight) hours as needed for Pain.  Dispense: 90 tablet; Refill: 0  -     HYDROcodone-acetaminophen (NORCO)  mg per tablet; Take 1 tablet by mouth every 8 (eight) hours as needed for Pain.  Dispense: 90 tablet; Refill: 0  -     ketorolac injection 60 mg  -     methylPREDNISolone acetate injection 160 mg  -     cyanocobalamin injection 1,000 mcg    Essential hypertension  -     furosemide (LASIX) 20 MG tablet; Take 1 tablet (20 mg total) by mouth every other day.  Dispense: 30 tablet; Refill: 3  -     potassium chloride SA (K-DUR,KLOR-CON M) 10 MEQ tablet; Take 1 tablet (10 mEq total) by mouth once daily.  Dispense: 30 tablet; Refill: 3  -     ketorolac injection 60 mg  -     methylPREDNISolone acetate injection 160 mg  -     cyanocobalamin injection 1,000 mcg    Other orders  -     ondansetron (ZOFRAN) 4 MG tablet; Take 1 tablet (4 mg total) by mouth every 6 (six) hours as needed for Nausea. (Patient not taking: Reported on 1/26/2024)  Dispense: 60 tablet; Refill: 3              Assessment:  72 year old female with   Psoriatic arthritis, rheumatoid arthritis on Humira  --chronic pain syndrome on norco  --insomnia on ambien  --iron deficiency anemia, stable  --grade 2 diastolic dysfunction    Plan:  1. toradol 60, depo 160, b12 today for flare  2.Hold for now  Alex.  While prepping for treatment of neck CA  3. Cont. norco 10/325 TID . I have checked [place] prescription monitoring program site and no unusual or  abnormal behavior has occurred pt understand the risk and benefits of taking opioid medications and has decided to continue the  Medication. Pended for MD  4. Cont. mobic 15 mg  5. Cont ambien  6. If she flares during treatment we will add acthar gel for her RA and PSA    Pt is cleared for any treatment that is appropiate for her neck CA  Alex was DC in prep for  tx  We are unsure if it is necessary to hold estrogen therapy. From my research it appears safe but pt will discuss w/ heme/onc  If chemo is nedded recommend [place] cooling system if infact she will loose her hair during treatment.  More than 50% of the  40 minute encounter was spent face to face counseling the patient regarding current status and future plan of care as well as side effects  of the medications. All questions were answered to patient's satisfaction also includes  non-face to face time preparing to see the patient (eg, review of tests), Obtaining and/or reviewing separately obtained history, Documenting clinical information in the electronic or other health record, Independently interpreting results

## 2024-01-30 PROBLEM — C09.9 TONSILLAR CANCER: Status: ACTIVE | Noted: 2024-01-30

## 2024-01-31 ENCOUNTER — DOCUMENTATION ONLY (OUTPATIENT)
Dept: ADMINISTRATIVE | Facility: OTHER | Age: 74
End: 2024-01-31
Payer: COMMERCIAL

## 2024-01-31 NOTE — PROGRESS NOTES
RADIATION ONCOLOGY CONSULTATION  [facility]      NAME: Rose Goff    : 1950 SEX: F MR#: [id number]   DIAGNOSIS: Oropharyngeal squamous cell carcinoma   REFERRING PHYSICIAN: Clay Manley M.D.   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  The patient is being seen in consultation for consideration of radiotherapy at the request of Dr. Manley.    HISTORY OF PRESENT ILLNESS:  The patient reports first noting a right neck mass and photographs taken of her in late November, with her shortly thereafter developing a sore throat.  She saw her primary care physician, Dr. Burleson, on 2023, at which time she was diagnosed with tonsillitis and prescribed Keflex.  She states that the sore throat improved; however, the neck mass continued to increase in size.     She saw Dr. Tijerina on 2024, complaining of persistent odynophagia and right throat fullness, as well as increased size of a cervical lymph node. The right tonsil was noted to be significantly larger than the left with superior ulceration, and a 4 x 6 cm right neck firm lymph node which was non-tender and not fixed.  It was noted the patient was scheduled to follow up with Dr. Manley the following day, and a CT of the neck was ordered.     She saw Dr. Manley on 2024.  It was noted the patient had undergo a biopsy five days prior, with the procedure note not available.  Pathology demonstrated invasive poorly differentiated squamous cell carcinoma which was non-keratinizing, and p16 positive and p40 positive with the Ki-67 of 90%.  There was a 1.5 x 3 cm right palatine tonsil mass noted on physical examination.  There was a 4 x 3 cm right upper neck mass at the level II and V transitions.  It was felt that her  disease was consistent with [id number] squamous cell carcinoma, and she was referred to [facility].  It was noted that she would require a PET scan.       The patient saw Dr. Benjamin of Rheumatology for her possible psoriatic arthritis on 01/22/2024, at which she was prescribed Lyrica, prednisone and Norco; however, the patient reports that she has not started any of the drugs.  She saw Dr. Urrutia on 01/24/2024, who recommended a PET scan.  The patient was referred to Radiation Oncology at that time for consideration of concurrent chemoradiation, and she was referred to Surgery for Port-A-Cath placement.  She is scheduled to undergo port placement by Dr. Fierro tomorrow, 01/30/2024.    REVIEW OF SYSTEMS:  The patient reports continued increased size of the right neck mass which is minimally tender intermittently.  She denies any other lymphadenopathy or masses.  She reports progressively worsening dysphagia and odynophagia, and her diet is primarily liquid and soft foods due to recent dental surgery.  She reports the sensation of difficulty breathing when lying on her right, as well as intermittent right ear pain for the past two weeks.  She reports an increase in anxiety with the diagnosis, chronic constipation, and hand, knee and hip pain secondary to arthritis.     She denies any high fevers, shaking chills, drenching night sweats or recent weight loss.  She denies any recent changes in vision or hearing, shortness of breath at rest, dyspnea on exertion, cough, chest pain, abdominal pain, nausea, vomiting, diarrhea, bright-red blood per rectum or urinary symptoms.  She denies any focal numbness, tingling, weakness, severe headaches, diplopia or ataxia.  All other systems reviewed and negative.    PAST MEDICAL HISTORY:  Oropharyngeal squamous cell carcinoma as above, hypertension, hypercholesterolemia, hypothyroidism, possible psoriatic arthritis, gastroesophageal reflux disease, depression,  migraines, history of sinusitis, degenerative joint disease, seasonal allergies, panic attacks.    PAST SURGICAL HISTORY:  Status post partial thyroidectomy for benign pathology, status post maxillary bone grafting, status post left ankle ORIF, status post appendectomy, status post DALE/BSO, status post L-spine rhizotomy and radiofrequency ablation.    PAST RADIATION THERAPY:  None.    MEDICATIONS:  Vitamin C; calcium; cyclobenzaprine; Flexeril; Voltaren; Lexapro; Lasix; Norco; Xyzal; Synthroid; lisinopril; Claritin; Mobic; Prilosec.    ALLERGIES:  Penicillin causes hives.    FAMILY HISTORY:  No family history of malignancy.    SOCIAL HISTORY:  The patient has a less-than 10-pack year history of smoking cigarettes, and reports quitting 44 years ago.  She has two alcoholic beverages per week and denies illicit drug use.  She lives in [place], is , has three daughters, and an adopted son.  She is retired hairdresser and .    PHYSICAL EXAMINATION:  VITAL SIGNS:  Blood pressure 171/89, heart rate 67, temperature 98.1, height 5 feet 7 inches, weight 167 pounds, oxygen saturation 96% on room air.  ECOG score of 1.   GENERAL:  The patient is a pleasant anxious-appearing  female in no acute distress.   HEENT:  Normocephalic, atraumatic.  Extraocular muscles are intact.  Pupils equally round and reactive to light.  Sclerae anicteric.  Oral cavity and oropharynx demonstrate an approximate 2 cm exophytic right palatine tonsil mass with ulceration without involvement of the soft palate or extension to midline.  Mucous membranes are moderately moist.  The patient is partially edentulous with dentition otherwise in state of good repair.  On palpitation, the buccal mucosa, floor of mouth and oral tongue are soft without palpable masses.  Base of tongue and oropharyngeal wall examination limited secondary to exagerated gag reflex but no obvious extension to either.   NECK:  No preauricular,  postauricular, left cervical, bilateral supraclavicular or infraclavicular lymphadenopathy.  There is an approximate 4 x 3 cm ovoid, mobile non-tender right level II lymph node.  There is a well-healed necklace scar from previous thyroid surgery.   HEART:  Regular rate and rhythm.  Normal S1, S2.  No murmurs, gallops or rubs.   LUNGS:  Clear to auscultation bilaterally.  No wheezes, rhonchi or rales.   BACK:  No spinal or costovertebral angle tenderness.   ABDOMEN:  Soft, nontender and nondistended.  No masses or hepatosplenomegaly.   EXTREMITIES:  Warm and well perfused.  No clubbing, cyanosis or edema.   NEUROLOGIC:  Cranial nerves two through 12 grossly intact.  Motor and sensory intact bilaterally.  Finger-nose-finger and gait within normal limits.  2+ patellar deep tendon reflexes bilaterally.  No clonus.       Fiberoptic endoscopy of the nasal cavity, nasopharynx, oropharynx, hypopharynx and larynx is clear without suspicious masses or ulcerations.  The vocal cords move symmetrically on phonation without evidence of impaired mobility or fixation.    LABORATORIES:  COVID testing on 01/29/2024, negative.    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     Based upon this patient's history and presentation, I believe that she may be an appropriate candidate for external beam radiation therapy.  We briefly discussed the diagnosis of p16 positive oropharyngeal squamous cell carcinoma and its overall management.     My first recommendation is for completion of staging consisting of a PET scan, to which I will add a diagnostic CT of the neck and chest with IV contrast in order to establish the extent of local and/or regional disease, as well as to rule out distant metastases.  I plan to obtain the imaging in the radiation treatment position so that it might  be utilized not only for diagnostic purposes, but also for tumor targeting during the anticipated radiation treatment planning.     The patient understands that should there be evidence of distant metastatic disease, for which there is currently no evidence, would likely be palliative systemic therapy.  She has already met with Dr. Urrutia and will defer systemic therapy discussion to him.     Should the patient be presenting with a locally advanced head and neck cancer without evidence of distant metastases, then I explained to her that the standard of care is for a course of definitive external beam radiation therapy.  In Ms. Goff's case, given the right cervical adenopathy, she will almost certainly also require concurrent chemotherapy with irradiation in order to act as a radiosensitizer.  I explained to her that I would anticipate treating her over approximately six and a half weeks to the primary tonsil tumor and all lymph nodes in bilateral neck, with the start of treatment to be coordinated with concurrent chemotherapy under the direction of Dr. Urrutia.     I have placed a referral to [facility] for evaluation prior to initiation of treatment, and plan to refer her back after she completes anticipated chemoradiation.  I have also placed a referral to Oncologic Dentistry in [place] for evaluation prior to treatment so that she may undergo any necessary dental extractions as soon as possible in order to minimize potential treatment delay.     I emphasized to the patient the importance of maintaining adequate hydration and nutrition during the course of treatment.  I do not recommend prophylactic PEG tube placement; however, should the patient be determined to be unable to adequately maintain her weight during the course of treatment, will have a relatively low threshold to recommend G-tube placement.  The patient also has a questionable history of psoriatic arthritis that seems to be relatively  controlled and for which she is currently taking no medications; however, will plan to reach out to her rheumatologist, Dr. Benjamin, in order to ensure that there are no contraindications to radiation as there is the potential for increased side effects and complications in patients receiving radiation with poorly controlled connective tissue disease.     The risks, benefits, side effects, alternatives to, indications for and mechanisms of external beam radiation therapy were explained in full to the patient.  She and her friend who accompanied her to today's visit asked multiple appropriate questions, all of which were answered to their apparent satisfaction.  This conversation included a discussion of possible short-term side effects,including but limited to dermatitis, including skin tenderness and erythema, pruritus, desquamation, local hair loss, dysphagia, odynophagia, dysgeusia, xerostomia, thickened secretions and fatigue.      We also discussed the possible long-term side effects, including but not limited to residual hyperpigmentation, soft tissue fibrosis, hypothyroidism, long-term swallowing dysfunction, long-term dysgeusia and/or xerostomia, future dental complications, submental lymphedema and low risk of osteoradionecrosis.  The patient agreed with the proposed treatment plan and informed consent was obtained.     I will continue to follow Ms. Goff as she undergoes Port-A-Cath placement tomorrow, and is scheduled for Oncologic dental evaluation this Wednesday, 01/31/2024.  She has also been scheduled to undergo PET/CT-guided simulation in the supine treatment position with the use of an Aquaplast head and shoulders mask for custom immobilization this Thursday, 02/01/2024.  Following any necessary dental extractions, a treatment plan will be designed, after which she would return to [facility] to undergo verification films and to initiate an anticipated six and a half week course  of definitive intensity-modulated radiotherapy with concurrent chemotherapy.     Thank you very much, Dr. Manley, for this consultation and the opportunity to participate in the care of this patient.  Please do not hesitate to contact me should you have any questions, concerns and/or require additional information.        LELE Coleman MD

## 2024-02-06 DIAGNOSIS — F32.9 MAJOR DEPRESSION, CHRONIC: ICD-10-CM

## 2024-02-06 RX ORDER — ESCITALOPRAM OXALATE 20 MG/1
20 TABLET ORAL DAILY
Qty: 90 TABLET | Refills: 2 | Status: SHIPPED | OUTPATIENT
Start: 2024-02-06 | End: 2024-05-16 | Stop reason: SDUPTHER

## 2024-02-06 NOTE — TELEPHONE ENCOUNTER
Pharmacy requesting refill on Escitalopram 20mg  Pt's LOV 01/22/2024  Pt's NOV 05/16/2024  Medication pending

## 2024-04-13 DIAGNOSIS — L50.9 HIVES: ICD-10-CM

## 2024-04-13 DIAGNOSIS — Z79.899 IMMUNOCOMPROMISED STATE DUE TO DRUG THERAPY: ICD-10-CM

## 2024-04-13 DIAGNOSIS — L40.50 PSORIATIC ARTHRITIS: ICD-10-CM

## 2024-04-13 DIAGNOSIS — G89.4 CHRONIC PAIN SYNDROME: ICD-10-CM

## 2024-04-13 DIAGNOSIS — D84.821 IMMUNOCOMPROMISED STATE DUE TO DRUG THERAPY: ICD-10-CM

## 2024-04-18 RX ORDER — CLOBETASOL PROPIONATE 0.46 MG/ML
1 SOLUTION TOPICAL 2 TIMES DAILY
Qty: 150 ML | Refills: 3 | Status: SHIPPED | OUTPATIENT
Start: 2024-04-18

## 2024-05-16 ENCOUNTER — OFFICE VISIT (OUTPATIENT)
Dept: RHEUMATOLOGY | Facility: CLINIC | Age: 74
End: 2024-05-16
Payer: COMMERCIAL

## 2024-05-16 DIAGNOSIS — B37.9 YEAST INFECTION: Primary | ICD-10-CM

## 2024-05-16 DIAGNOSIS — D84.821 IMMUNOCOMPROMISED STATE DUE TO DRUG THERAPY: ICD-10-CM

## 2024-05-16 DIAGNOSIS — G89.4 CHRONIC PAIN SYNDROME: ICD-10-CM

## 2024-05-16 DIAGNOSIS — F32.9 MAJOR DEPRESSION, CHRONIC: ICD-10-CM

## 2024-05-16 DIAGNOSIS — M05.711 RHEUMATOID ARTHRITIS INVOLVING RIGHT SHOULDER WITH POSITIVE RHEUMATOID FACTOR: ICD-10-CM

## 2024-05-16 DIAGNOSIS — C09.9 TONSILLAR CANCER: ICD-10-CM

## 2024-05-16 DIAGNOSIS — R71.8 OTHER ABNORMALITY OF RED BLOOD CELLS: ICD-10-CM

## 2024-05-16 DIAGNOSIS — L40.50 PSORIATIC ARTHRITIS: ICD-10-CM

## 2024-05-16 DIAGNOSIS — G47.00 INSOMNIA, UNSPECIFIED TYPE: ICD-10-CM

## 2024-05-16 DIAGNOSIS — Z79.899 IMMUNOCOMPROMISED STATE DUE TO DRUG THERAPY: ICD-10-CM

## 2024-05-16 PROCEDURE — 1159F MED LIST DOCD IN RCRD: CPT | Mod: CPTII,95,, | Performed by: INTERNAL MEDICINE

## 2024-05-16 PROCEDURE — 4010F ACE/ARB THERAPY RXD/TAKEN: CPT | Mod: CPTII,95,, | Performed by: INTERNAL MEDICINE

## 2024-05-16 PROCEDURE — 1160F RVW MEDS BY RX/DR IN RCRD: CPT | Mod: CPTII,95,, | Performed by: INTERNAL MEDICINE

## 2024-05-16 PROCEDURE — 99215 OFFICE O/P EST HI 40 MIN: CPT | Mod: 95,,, | Performed by: INTERNAL MEDICINE

## 2024-05-16 RX ORDER — LIDOCAINE HYDROCHLORIDE 20 MG/ML
SOLUTION OROPHARYNGEAL EVERY 6 HOURS
Qty: 100 ML | Refills: 3 | Status: SHIPPED | OUTPATIENT
Start: 2024-05-16 | End: 2024-06-14

## 2024-05-16 RX ORDER — ESCITALOPRAM OXALATE 20 MG/1
20 TABLET ORAL DAILY
Qty: 90 TABLET | Refills: 2 | Status: SHIPPED | OUTPATIENT
Start: 2024-05-16 | End: 2024-06-14

## 2024-05-16 RX ORDER — PROMETHAZINE HYDROCHLORIDE AND DEXTROMETHORPHAN HYDROBROMIDE 6.25; 15 MG/5ML; MG/5ML
5 SYRUP ORAL EVERY 4 HOURS PRN
Qty: 240 ML | Refills: 2 | Status: ON HOLD | OUTPATIENT
Start: 2024-05-16 | End: 2024-05-21 | Stop reason: HOSPADM

## 2024-05-16 RX ORDER — NYSTATIN 100000 [USP'U]/ML
6 SUSPENSION ORAL 4 TIMES DAILY
Qty: 240 ML | Refills: 0 | Status: SHIPPED | OUTPATIENT
Start: 2024-05-16 | End: 2024-05-26

## 2024-05-16 RX ORDER — CYANOCOBALAMIN 1000 UG/ML
1000 INJECTION, SOLUTION INTRAMUSCULAR; SUBCUTANEOUS
Qty: 10 ML | Refills: 3 | Status: ON HOLD | OUTPATIENT
Start: 2024-05-16 | End: 2024-05-19 | Stop reason: CLARIF

## 2024-05-16 RX ORDER — HYDROCODONE BITARTRATE AND ACETAMINOPHEN 10; 325 MG/1; MG/1
1 TABLET ORAL EVERY 6 HOURS PRN
Qty: 120 TABLET | Refills: 0 | Status: ON HOLD | OUTPATIENT
Start: 2024-07-12 | End: 2024-05-21 | Stop reason: HOSPADM

## 2024-05-16 RX ORDER — NEEDLES, DISPOSABLE 25GX5/8"
1 NEEDLE, DISPOSABLE MISCELLANEOUS WEEKLY
Qty: 25 EACH | Refills: 3 | Status: SHIPPED | OUTPATIENT
Start: 2024-05-16

## 2024-05-16 RX ORDER — ZOLPIDEM TARTRATE 10 MG/1
TABLET ORAL
Qty: 30 TABLET | Refills: 4 | Status: SHIPPED | OUTPATIENT
Start: 2024-05-16 | End: 2024-05-19 | Stop reason: CLARIF

## 2024-05-16 RX ORDER — HYDROCODONE BITARTRATE AND ACETAMINOPHEN 10; 325 MG/1; MG/1
1 TABLET ORAL EVERY 6 HOURS PRN
Qty: 120 TABLET | Refills: 0 | Status: ON HOLD | OUTPATIENT
Start: 2024-06-14 | End: 2024-05-21 | Stop reason: HOSPADM

## 2024-05-16 RX ORDER — HYDROCODONE BITARTRATE AND ACETAMINOPHEN 10; 325 MG/1; MG/1
1 TABLET ORAL EVERY 6 HOURS PRN
Qty: 120 TABLET | Refills: 0 | Status: SHIPPED | OUTPATIENT
Start: 2024-05-16 | End: 2024-06-10 | Stop reason: SDUPTHER

## 2024-05-16 RX ORDER — CARIPRAZINE 1.5 MG/1
1.5 CAPSULE, GELATIN COATED ORAL DAILY
Qty: 30 CAPSULE | Refills: 5 | Status: ON HOLD | OUTPATIENT
Start: 2024-05-16 | End: 2024-05-19 | Stop reason: CLARIF

## 2024-05-16 RX ORDER — PREGABALIN 25 MG/1
25 CAPSULE ORAL 2 TIMES DAILY
Qty: 60 CAPSULE | Refills: 4 | Status: SHIPPED | OUTPATIENT
Start: 2024-05-16 | End: 2024-05-19 | Stop reason: CLARIF

## 2024-05-16 NOTE — PROGRESS NOTES
Subjective:     Patient ID:  Rose Goff    Chief Complaint:  Disease Management     History of Present Illness:  Pt is a 73 y.o. female w/  Follow up:73 year old female who presents to clinic for follow up on psoriatic arthritis. 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 She has intermittent joint pain involving MCPs, PIPs, wrists, hips, and knees. AM stiffness can last up to 2-3 hours. She reports less knee pain since she started exercising with pilates. She has chronic low back pain secondary to OA and is taking norco TID PRN with good relief of pain. She is taking ambien for insomnia and this is working well.      She is due for repeat labs.     Current tx:  1. Skyrizi- held  2. Mobic  3. Norco    - Diagnosis/es:  - Positive serologies:  - Infectious screening labs:  - Previous Treatments:  - Current Treatments:     Interval History:   Hospitalization since last office visit: No    Patient Active Problem List    Diagnosis Date Noted    Odynophagia 05/21/2024    Moderate malnutrition 05/20/2024    Generalized weakness 05/20/2024    Syncope 05/19/2024    Orthostatic hypotension 05/19/2024    Dehydration 05/19/2024    Anemia 05/19/2024    ACP (advance care planning) 05/19/2024    Hyponatremia 05/19/2024    Cough 05/19/2024    Chronic diastolic CHF (congestive heart failure) 05/19/2024    Fall 05/19/2024    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    Immunocompromised state due to drug therapy 12/28/2022    Iron deficiency anemia 11/21/2022    Hypothyroidism 01/11/2021    Psoriatic arthritis 01/08/2019    Hyperlipidemia 04/14/2017    Essential hypertension 04/14/2017    Hypoglycemia 04/14/2017     Past Surgical History:   Procedure Laterality Date    ANKLE FRACTURE SURGERY      APPENDECTOMY      BREAST BIOPSY Left     Negative    HYSTERECTOMY      INSERTION OF TUNNELED CENTRAL VENOUS CATHETER (CVC) WITH SUBCUTANEOUS PORT Left 1/30/2024    Procedure: [id number];  Surgeon: Vincenzo Fierro MD;   Location: [facility] OR;  Service: General;  Laterality: Left;    OOPHORECTOMY      RHIZOTOMY W/ RADIOFREQUENCY ABLATION Right     lumbar    thyroid tumor removed       Social History     Tobacco Use    Smoking status: Former     Current packs/day: 0.00     Types: Cigarettes     Quit date: 1978     Years since quittin.4    Smokeless tobacco: Never   Substance Use Topics    Alcohol use: Not Currently    Drug use: Never     Family History   Problem Relation Name Age of Onset    Diabetes Mother      Hyperlipidemia Mother      Stroke Mother      Heart attack Mother      Heart disease Mother      Heart disease Father      Heart disease Maternal Grandmother      Bipolar disorder Daughter       Review of patient's allergies indicates:   Allergen Reactions    Cimzia [certolizumab pegol]      Not effective     Methotrexate analogues      Not effective    Penicillins Hives    Zyprexa [olanzapine]      Not effective        Review of Systems   Review of Systems   Constitutional:  Positive for chills and fatigue. Negative for activity change, appetite change, diaphoresis, fever and unexpected weight change.   HENT:  Negative for congestion, dental problem, ear discharge, ear pain, facial swelling, mouth sores, nosebleeds, postnasal drip, rhinorrhea, sinus pressure, sneezing, sore throat, tinnitus, trouble swallowing and voice change.    Eyes:  Negative for photophobia, pain, discharge, redness and itching.   Respiratory:  Positive for cough. Negative for apnea, chest tightness, shortness of breath and wheezing.    Cardiovascular:  Positive for leg swelling. Negative for chest pain and palpitations.   Gastrointestinal:  Positive for abdominal pain. Negative for abdominal distention, constipation, diarrhea, nausea and vomiting.   Endocrine: Negative for cold intolerance, heat intolerance, polydipsia and polyuria.   Genitourinary:  Negative for decreased urine volume, difficulty urinating, dysuria, flank pain, frequency,  "hematuria and urgency.   Musculoskeletal:  Positive for arthralgias, back pain, gait problem, joint swelling, myalgias, neck pain and neck stiffness.   Skin:  Negative for pallor, rash and wound.   Allergic/Immunologic: Negative for immunocompromised state.   Neurological:  Negative for dizziness, tremors, numbness and headaches.   Hematological:  Negative for adenopathy. Does not bruise/bleed easily.   Psychiatric/Behavioral:  Negative for sleep disturbance. The patient is not nervous/anxious.         Current Medications:  Current Outpatient Medications   Medication Instructions    acetaminophen (TYLENOL) 650 mg, Oral, Every 6 hours PRN    blunt needle, disposable 18 x 1 1/2 " Ndle 1 Syringe, Misc.(Non-Drug; Combo Route), Weekly    clobetasoL (TEMOVATE) 0.05 % external solution 1 application , 2 times daily, Apply to affected area    dextromethorphan-guaiFENesin  mg/5 ml (ROBITUSSIN-DM)  mg/5 mL liquid 5 mLs, Oral, Every 4 hours PRN    EScitalopram oxalate (LEXAPRO) 20 mg, Oral, Daily    estradioL (ESTRACE) 1 mg, Oral    HYDROcodone-acetaminophen (NORCO)  mg per tablet 1 tablet, Oral, Every 6 hours PRN    levothyroxine (SYNTHROID) 112 MCG tablet TAKE 1 TABLET(112 MCG) BY MOUTH BEFORE BREAKFAST    LIDOcaine viscous HCl 2% (LIDOCAINE VISCOUS) 2 % Soln Mucous Membrane, Every 6 hours    MAGOX 400 mg (241.3 mg magnesium) tablet 1 tablet, Oral, 2 times daily    needle, disp, 30 gauge 30 gauge x 1/2" Ndle 1 each, Misc.(Non-Drug; Combo Route), Weekly    NON FORMULARY MEDICATION 1 application , Topical (Top), As needed (PRN), **SWEET CREAM**    nystatin (MYCOSTATIN) 600,000 Units, Oral, 4 times daily    omeprazole (PRILOSEC) 40 mg, Oral, Daily    pot sorb-maltodex-aloe-manpoly Mwsh 10-20 mLs, Oral, Before meals & nightly, SWISH AND SWALLOW    potassium chloride (K-TAB) 20 mEq 20 mEq, Oral, 2 times daily    simvastatin (ZOCOR) 40 mg, Oral, Nightly    syringe, disposable, 1 mL Syrg 1 Syringe, Misc.(Non-Drug; " "Combo Route), Weekly         Objective:     There were no vitals filed for this visit.   There is no height or weight on file to calculate BMI.     Physical Examinations:  Physical Exam   Constitutional: She is oriented to person, place, and time.   Neurological: She is alert and oriented to person, place, and time.   Psychiatric: Mood, affect and judgment normal.        Disease Assessment Scores:  Patient's Global Assessment of arthritis (0-10): 4  Physician's Global Assessment of arthritis (0-10): 4  Number of Tender Joints (0-28): 10  Number of Swollen Joints (0-28): 12         No data to display                Monitoring Lab Results:  Lab Results   Component Value Date    WBC 5.56 05/20/2024    RBC 2.75 (L) 05/20/2024    HGB 8.9 (L) 05/20/2024    HCT 26.6 (L) 05/20/2024    MCV 97 05/20/2024    MCH 32.4 (H) 05/20/2024    MCHC 33.5 05/20/2024    RDW 17.5 (H) 05/20/2024     05/20/2024        Lab Results   Component Value Date     (L) 05/20/2024    K 3.5 05/20/2024     05/20/2024    CO2 25 05/20/2024    GLU 91 05/20/2024    BUN 7 05/20/2024    CREATININE 0.48 (L) 05/20/2024    CALCIUM 8.2 (L) 05/20/2024    PROT 5.7 (L) 05/20/2024    ALBUMIN 3.0 (L) 05/20/2024    BILITOT 0.5 05/20/2024    ALKPHOS 70 05/20/2024    AST 34 05/20/2024    ALT 16 05/20/2024    ANIONGAP 3 (L) 05/20/2024    EGFRNORACEVR >60 05/20/2024       Lab Results   Component Value Date    SEDRATE <2 10/16/2023    CRP 3.1 10/16/2023        Lab Results   Component Value Date    CUAYZXHK09NM 30 06/13/2023    BKGSWQMR43 >2000 (H) 03/10/2021        Lab Results   Component Value Date    CHOL 225 (H) 02/10/2023    HDL 84 (H) 02/10/2023    LDLCALC 122.6 02/10/2023    TRIG 92 02/10/2023       Lab Results   Component Value Date    RF <13.0 06/13/2023    CCPANTIBODIE 0.5 06/13/2023     No results found for: "ANASCREEN", "ANATITER", "ANAPATTE", "DSDNA", "SMRNPAB", "SSAANTIBODY", "SSBANTIBODY", "VUQ62KK", "JO1AB"  No results found for: " ""HLABB27"    Infectious Disease Screening:  Lab Results   Component Value Date    HEPBSAG Non-reactive 10/16/2023    HEPBCAB Non-reactive 10/16/2023    HEPBSAB <3.00 11/08/2022    HEPBSAB Non-reactive 11/08/2022    HEPBSURFABQU Negative 10/16/2023    HEPBSURFABQU <3 10/16/2023    HEPBIGM Negative 05/20/2020     Lab Results   Component Value Date    HEPCAB Non-reactive 10/16/2023     Lab Results   Component Value Date    TBGOLDPLUS Negative 10/16/2023     No results found for: "QUANTIFERON", "SVCMT", "QUANTAGVALUE", "QUANTNILVALU", "QUANTMITOGEN", "QFTTBAG", "QINT"     Imaging: DEXA, Xrays, MRIs, CTs, etc    Old & Outside Medical Records:  Reviewed old and all outside medical records available in Care Everywhere     Assessment:     Encounter Diagnoses   Name Primary?    Yeast infection Yes    Psoriatic arthritis     Immunocompromised state due to drug therapy     Chronic pain syndrome     Tonsillar cancer     Rheumatoid arthritis involving right shoulder with positive rheumatoid factor     Other abnormality of red blood cells     Insomnia, unspecified type     Major depression, chronic        Plan:      Encounter Diagnoses   Name Primary?    Yeast infection Yes    Psoriatic arthritis     Immunocompromised state due to drug therapy     Chronic pain syndrome     Tonsillar cancer     Rheumatoid arthritis involving right shoulder with positive rheumatoid factor     Other abnormality of red blood cells     Insomnia, unspecified type     Major depression, chronic      Diagnoses and all orders for this visit:    Yeast infection  -     nystatin (MYCOSTATIN) 100,000 unit/mL suspension; Take 6 mLs (600,000 Units total) by mouth 4 (four) times daily. for 10 days  -     LIDOcaine viscous HCl 2% (LIDOCAINE VISCOUS) 2 % Soln; by Mucous Membrane route every 6 (six) hours.  -     Discontinue: promethazine-dextromethorphan (PROMETHAZINE-DM) 6.25-15 mg/5 mL Syrp; Take 5 mLs by mouth every 4 (four) hours as needed (cough).  -     " "HYDROcodone-acetaminophen (NORCO)  mg per tablet; Take 1 tablet by mouth every 6 (six) hours as needed for Pain.  -     Discontinue: HYDROcodone-acetaminophen (NORCO)  mg per tablet; Take 1 tablet by mouth every 6 (six) hours as needed for Pain.  -     Discontinue: HYDROcodone-acetaminophen (NORCO)  mg per tablet; Take 1 tablet by mouth every 6 (six) hours as needed for Pain.    Psoriatic arthritis  -     nystatin (MYCOSTATIN) 100,000 unit/mL suspension; Take 6 mLs (600,000 Units total) by mouth 4 (four) times daily. for 10 days  -     LIDOcaine viscous HCl 2% (LIDOCAINE VISCOUS) 2 % Soln; by Mucous Membrane route every 6 (six) hours.  -     Discontinue: promethazine-dextromethorphan (PROMETHAZINE-DM) 6.25-15 mg/5 mL Syrp; Take 5 mLs by mouth every 4 (four) hours as needed (cough).  -     HYDROcodone-acetaminophen (NORCO)  mg per tablet; Take 1 tablet by mouth every 6 (six) hours as needed for Pain.  -     Discontinue: HYDROcodone-acetaminophen (NORCO)  mg per tablet; Take 1 tablet by mouth every 6 (six) hours as needed for Pain.  -     Discontinue: HYDROcodone-acetaminophen (NORCO)  mg per tablet; Take 1 tablet by mouth every 6 (six) hours as needed for Pain.  -     Discontinue: cyanocobalamin 1,000 mcg/mL injection; Inject 1 mL (1,000 mcg total) into the skin every 7 days. (Patient not taking: Reported on 5/19/2024)  -     needle, disp, 30 gauge 30 gauge x 1/2" Ndle; 1 each by Misc.(Non-Drug; Combo Route) route once a week.  -     syringe, disposable, 1 mL Syrg; 1 Syringe by Misc.(Non-Drug; Combo Route) route once a week.  -     blunt needle, disposable 18 x 1 1/2 " Ndle; 1 Syringe by Misc.(Non-Drug; Combo Route) route once a week.  -     Discontinue: pregabalin (LYRICA) 25 MG capsule; Take 1 capsule (25 mg total) by mouth 2 (two) times daily. (Patient not taking: Reported on 5/19/2024)  -     Discontinue: zolpidem (AMBIEN) 10 mg Tab; TAKE 1 TABLET(10 MG) BY MOUTH EVERY " "EVENING  -     Discontinue: cariprazine (VRAYLAR) 1.5 mg Cap; Take 1 capsule (1.5 mg total) by mouth once daily. (Patient not taking: Reported on 5/19/2024)  -     EScitalopram oxalate (LEXAPRO) 20 MG tablet; Take 1 tablet (20 mg total) by mouth once daily.    Immunocompromised state due to drug therapy  -     nystatin (MYCOSTATIN) 100,000 unit/mL suspension; Take 6 mLs (600,000 Units total) by mouth 4 (four) times daily. for 10 days  -     LIDOcaine viscous HCl 2% (LIDOCAINE VISCOUS) 2 % Soln; by Mucous Membrane route every 6 (six) hours.  -     Discontinue: promethazine-dextromethorphan (PROMETHAZINE-DM) 6.25-15 mg/5 mL Syrp; Take 5 mLs by mouth every 4 (four) hours as needed (cough).  -     HYDROcodone-acetaminophen (NORCO)  mg per tablet; Take 1 tablet by mouth every 6 (six) hours as needed for Pain.  -     Discontinue: HYDROcodone-acetaminophen (NORCO)  mg per tablet; Take 1 tablet by mouth every 6 (six) hours as needed for Pain.  -     Discontinue: HYDROcodone-acetaminophen (NORCO)  mg per tablet; Take 1 tablet by mouth every 6 (six) hours as needed for Pain.  -     Discontinue: cyanocobalamin 1,000 mcg/mL injection; Inject 1 mL (1,000 mcg total) into the skin every 7 days. (Patient not taking: Reported on 5/19/2024)  -     needle, disp, 30 gauge 30 gauge x 1/2" Ndle; 1 each by Misc.(Non-Drug; Combo Route) route once a week.  -     syringe, disposable, 1 mL Syrg; 1 Syringe by Misc.(Non-Drug; Combo Route) route once a week.  -     blunt needle, disposable 18 x 1 1/2 " Ndle; 1 Syringe by Misc.(Non-Drug; Combo Route) route once a week.  -     Discontinue: pregabalin (LYRICA) 25 MG capsule; Take 1 capsule (25 mg total) by mouth 2 (two) times daily. (Patient not taking: Reported on 5/19/2024)  -     Discontinue: zolpidem (AMBIEN) 10 mg Tab; TAKE 1 TABLET(10 MG) BY MOUTH EVERY EVENING  -     Discontinue: cariprazine (VRAYLAR) 1.5 mg Cap; Take 1 capsule (1.5 mg total) by mouth once daily. (Patient " "not taking: Reported on 5/19/2024)  -     EScitalopram oxalate (LEXAPRO) 20 MG tablet; Take 1 tablet (20 mg total) by mouth once daily.    Chronic pain syndrome  -     nystatin (MYCOSTATIN) 100,000 unit/mL suspension; Take 6 mLs (600,000 Units total) by mouth 4 (four) times daily. for 10 days  -     LIDOcaine viscous HCl 2% (LIDOCAINE VISCOUS) 2 % Soln; by Mucous Membrane route every 6 (six) hours.  -     Discontinue: promethazine-dextromethorphan (PROMETHAZINE-DM) 6.25-15 mg/5 mL Syrp; Take 5 mLs by mouth every 4 (four) hours as needed (cough).  -     HYDROcodone-acetaminophen (NORCO)  mg per tablet; Take 1 tablet by mouth every 6 (six) hours as needed for Pain.  -     Discontinue: HYDROcodone-acetaminophen (NORCO)  mg per tablet; Take 1 tablet by mouth every 6 (six) hours as needed for Pain.  -     Discontinue: HYDROcodone-acetaminophen (NORCO)  mg per tablet; Take 1 tablet by mouth every 6 (six) hours as needed for Pain.  -     Discontinue: cyanocobalamin 1,000 mcg/mL injection; Inject 1 mL (1,000 mcg total) into the skin every 7 days. (Patient not taking: Reported on 5/19/2024)  -     needle, disp, 30 gauge 30 gauge x 1/2" Ndle; 1 each by Misc.(Non-Drug; Combo Route) route once a week.  -     syringe, disposable, 1 mL Syrg; 1 Syringe by Misc.(Non-Drug; Combo Route) route once a week.  -     blunt needle, disposable 18 x 1 1/2 " Ndle; 1 Syringe by Misc.(Non-Drug; Combo Route) route once a week.  -     Discontinue: pregabalin (LYRICA) 25 MG capsule; Take 1 capsule (25 mg total) by mouth 2 (two) times daily. (Patient not taking: Reported on 5/19/2024)  -     Discontinue: zolpidem (AMBIEN) 10 mg Tab; TAKE 1 TABLET(10 MG) BY MOUTH EVERY EVENING  -     Discontinue: cariprazine (VRAYLAR) 1.5 mg Cap; Take 1 capsule (1.5 mg total) by mouth once daily. (Patient not taking: Reported on 5/19/2024)  -     EScitalopram oxalate (LEXAPRO) 20 MG tablet; Take 1 tablet (20 mg total) by mouth once " "daily.    Tonsillar cancer  -     nystatin (MYCOSTATIN) 100,000 unit/mL suspension; Take 6 mLs (600,000 Units total) by mouth 4 (four) times daily. for 10 days  -     LIDOcaine viscous HCl 2% (LIDOCAINE VISCOUS) 2 % Soln; by Mucous Membrane route every 6 (six) hours.  -     Discontinue: promethazine-dextromethorphan (PROMETHAZINE-DM) 6.25-15 mg/5 mL Syrp; Take 5 mLs by mouth every 4 (four) hours as needed (cough).  -     HYDROcodone-acetaminophen (NORCO)  mg per tablet; Take 1 tablet by mouth every 6 (six) hours as needed for Pain.  -     Discontinue: HYDROcodone-acetaminophen (NORCO)  mg per tablet; Take 1 tablet by mouth every 6 (six) hours as needed for Pain.  -     Discontinue: HYDROcodone-acetaminophen (NORCO)  mg per tablet; Take 1 tablet by mouth every 6 (six) hours as needed for Pain.  -     Discontinue: cyanocobalamin 1,000 mcg/mL injection; Inject 1 mL (1,000 mcg total) into the skin every 7 days. (Patient not taking: Reported on 5/19/2024)  -     needle, disp, 30 gauge 30 gauge x 1/2" Ndle; 1 each by Misc.(Non-Drug; Combo Route) route once a week.  -     syringe, disposable, 1 mL Syrg; 1 Syringe by Misc.(Non-Drug; Combo Route) route once a week.  -     blunt needle, disposable 18 x 1 1/2 " Ndle; 1 Syringe by Misc.(Non-Drug; Combo Route) route once a week.  -     Discontinue: pregabalin (LYRICA) 25 MG capsule; Take 1 capsule (25 mg total) by mouth 2 (two) times daily. (Patient not taking: Reported on 5/19/2024)  -     Discontinue: zolpidem (AMBIEN) 10 mg Tab; TAKE 1 TABLET(10 MG) BY MOUTH EVERY EVENING  -     Discontinue: cariprazine (VRAYLAR) 1.5 mg Cap; Take 1 capsule (1.5 mg total) by mouth once daily. (Patient not taking: Reported on 5/19/2024)  -     EScitalopram oxalate (LEXAPRO) 20 MG tablet; Take 1 tablet (20 mg total) by mouth once daily.    Rheumatoid arthritis involving right shoulder with positive rheumatoid factor  -     nystatin (MYCOSTATIN) 100,000 unit/mL suspension; Take " "6 mLs (600,000 Units total) by mouth 4 (four) times daily. for 10 days  -     LIDOcaine viscous HCl 2% (LIDOCAINE VISCOUS) 2 % Soln; by Mucous Membrane route every 6 (six) hours.  -     Discontinue: promethazine-dextromethorphan (PROMETHAZINE-DM) 6.25-15 mg/5 mL Syrp; Take 5 mLs by mouth every 4 (four) hours as needed (cough).  -     HYDROcodone-acetaminophen (NORCO)  mg per tablet; Take 1 tablet by mouth every 6 (six) hours as needed for Pain.  -     Discontinue: HYDROcodone-acetaminophen (NORCO)  mg per tablet; Take 1 tablet by mouth every 6 (six) hours as needed for Pain.  -     Discontinue: HYDROcodone-acetaminophen (NORCO)  mg per tablet; Take 1 tablet by mouth every 6 (six) hours as needed for Pain.  -     Discontinue: cyanocobalamin 1,000 mcg/mL injection; Inject 1 mL (1,000 mcg total) into the skin every 7 days. (Patient not taking: Reported on 5/19/2024)  -     needle, disp, 30 gauge 30 gauge x 1/2" Ndle; 1 each by Misc.(Non-Drug; Combo Route) route once a week.  -     syringe, disposable, 1 mL Syrg; 1 Syringe by Misc.(Non-Drug; Combo Route) route once a week.  -     blunt needle, disposable 18 x 1 1/2 " Ndle; 1 Syringe by Misc.(Non-Drug; Combo Route) route once a week.  -     Discontinue: pregabalin (LYRICA) 25 MG capsule; Take 1 capsule (25 mg total) by mouth 2 (two) times daily. (Patient not taking: Reported on 5/19/2024)  -     Discontinue: zolpidem (AMBIEN) 10 mg Tab; TAKE 1 TABLET(10 MG) BY MOUTH EVERY EVENING  -     Discontinue: cariprazine (VRAYLAR) 1.5 mg Cap; Take 1 capsule (1.5 mg total) by mouth once daily. (Patient not taking: Reported on 5/19/2024)  -     EScitalopram oxalate (LEXAPRO) 20 MG tablet; Take 1 tablet (20 mg total) by mouth once daily.    Other abnormality of red blood cells  -     Discontinue: pregabalin (LYRICA) 25 MG capsule; Take 1 capsule (25 mg total) by mouth 2 (two) times daily. (Patient not taking: Reported on 5/19/2024)    Insomnia, unspecified type  -   "   Discontinue: zolpidem (AMBIEN) 10 mg Tab; TAKE 1 TABLET(10 MG) BY MOUTH EVERY EVENING    Major depression, chronic  -     EScitalopram oxalate (LEXAPRO) 20 MG tablet; Take 1 tablet (20 mg total) by mouth once daily.       1. Refills meds  2. Labs ordered  3. F/u 4 months     Follow-up 4 months    The patient location is: home  The chief complaint leading to consultation is: psa, ra    Visit type: audiovisual    Face to Face time with patient: 53   minutes of total time spent on the encounter, which includes face to face time and non-face to face time preparing to see the patient (eg, review of tests), Obtaining and/or reviewing separately obtained history, Documenting clinical information in the electronic or other health record, Independently interpreting results (not separately reported) and communicating results to the patient/family/caregiver, or Care coordination (not separately reported).         Each patient to whom he or she provides medical services by telemedicine is:  (1) informed of the relationship between the physician and patient and the respective role of any other health care provider with respect to management of the patient; and (2) notified that he or she may decline to receive medical services by telemedicine and may withdraw from such care at any time.    Notes:

## 2024-05-19 PROBLEM — I50.32 CHRONIC DIASTOLIC CHF (CONGESTIVE HEART FAILURE): Status: ACTIVE | Noted: 2024-05-19

## 2024-05-19 PROBLEM — I95.1 ORTHOSTATIC HYPOTENSION: Status: ACTIVE | Noted: 2024-05-19

## 2024-05-19 PROBLEM — D64.9 ANEMIA: Status: ACTIVE | Noted: 2024-05-19

## 2024-05-19 PROBLEM — R05.9 COUGH: Status: ACTIVE | Noted: 2024-05-19
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
## 2024-05-19 PROBLEM — R55 SYNCOPE: Status: ACTIVE | Noted: 2024-05-19

## 2024-05-19 PROBLEM — E46 MALNUTRITION: Status: ACTIVE | Noted: 2024-05-19

## 2024-05-19 PROBLEM — E87.1 HYPONATREMIA: Status: ACTIVE | Noted: 2024-05-19

## 2024-05-19 PROBLEM — Z71.89 ACP (ADVANCE CARE PLANNING): Status: ACTIVE | Noted: 2024-05-19

## 2024-05-19 PROBLEM — E86.0 DEHYDRATION: Status: ACTIVE | Noted: 2024-05-19

## 2024-05-20 PROBLEM — E44.0 MODERATE MALNUTRITION: Status: ACTIVE | Noted: 2024-05-20

## 2024-05-20 PROBLEM — R53.1 GENERALIZED WEAKNESS: Status: ACTIVE | Noted: 2024-05-20

## 2024-05-21 PROBLEM — R13.10 ODYNOPHAGIA: Status: ACTIVE | Noted: 2024-05-21

## 2024-06-10 DIAGNOSIS — L40.50 PSORIATIC ARTHRITIS: ICD-10-CM

## 2024-06-10 DIAGNOSIS — D84.821 IMMUNOCOMPROMISED STATE DUE TO DRUG THERAPY: ICD-10-CM

## 2024-06-10 DIAGNOSIS — M05.711 RHEUMATOID ARTHRITIS INVOLVING RIGHT SHOULDER WITH POSITIVE RHEUMATOID FACTOR: ICD-10-CM

## 2024-06-10 DIAGNOSIS — B37.9 YEAST INFECTION: ICD-10-CM

## 2024-06-10 DIAGNOSIS — Z79.899 IMMUNOCOMPROMISED STATE DUE TO DRUG THERAPY: ICD-10-CM

## 2024-06-10 DIAGNOSIS — C09.9 TONSILLAR CANCER: ICD-10-CM

## 2024-06-10 DIAGNOSIS — G89.4 CHRONIC PAIN SYNDROME: ICD-10-CM

## 2024-06-10 RX ORDER — HYDROCODONE BITARTRATE AND ACETAMINOPHEN 10; 325 MG/1; MG/1
1 TABLET ORAL EVERY 6 HOURS PRN
Qty: 120 TABLET | Refills: 0 | Status: SHIPPED | OUTPATIENT
Start: 2024-06-10 | End: 2024-06-17 | Stop reason: SDUPTHER

## 2024-06-10 NOTE — TELEPHONE ENCOUNTER
----- Message from Susanne Castelan sent at 6/10/2024  9:42 AM CDT -----  Regarding: refill  Type:  RX Refill Request    Who Called: pt    Refill or New Rx:refill    RX Name and Strength:HYDROcodone-acetaminophen (NORCO)  mg per tablet 120 tablet 0 5/16/2024 6/15/2024   Sig - Route: Take 1 tablet by mouth every 6 (six) hours as needed for Pain. - Oral   Sent to pharmacy as: HYDROcodone-acetaminophen (NORCO)  mg per tablet   Earliest Fill Date: 5/16/2024   Notes to Pharmacy: Quantity prescribed more than 7 day supply? Yes, quantity medically necessary   E-Prescribing Status: Receipt confirmed by pharmacy (5/16/2024  6:55 PM CDT)   No prior authorization was found for this prescription.   Found prior authorization for another prescription for the same medication: Approved         How is the patient currently taking it? (ex. 1XDay):see above    Is this a 30 day or 90 day RX:see above    Preferred Pharmacy with phone number:    [id number] - [facility] [address] [address] [facility] [address] [facility] [facility]  [address] [place] [id number]  Phone: [phone] Fax: [phone]      Local or Mail Order:local    Ordering Provider:belinda Roblero Call Back Number:[phone]      Additional Information: pt st that [facility] told her that they had accidentally deleted her scpipt, and it needs to be resent.  Please call to discuss.

## 2024-06-17 DIAGNOSIS — G89.4 CHRONIC PAIN SYNDROME: ICD-10-CM

## 2024-06-17 DIAGNOSIS — D84.821 IMMUNOCOMPROMISED STATE DUE TO DRUG THERAPY: ICD-10-CM

## 2024-06-17 DIAGNOSIS — Z79.899 IMMUNOCOMPROMISED STATE DUE TO DRUG THERAPY: ICD-10-CM

## 2024-06-17 DIAGNOSIS — M05.711 RHEUMATOID ARTHRITIS INVOLVING RIGHT SHOULDER WITH POSITIVE RHEUMATOID FACTOR: ICD-10-CM

## 2024-06-17 DIAGNOSIS — L40.50 PSORIATIC ARTHRITIS: ICD-10-CM

## 2024-06-17 DIAGNOSIS — B37.9 YEAST INFECTION: ICD-10-CM

## 2024-06-17 DIAGNOSIS — C09.9 TONSILLAR CANCER: ICD-10-CM

## 2024-06-17 NOTE — TELEPHONE ENCOUNTER
----- Message from Marietta Stallings sent at 6/17/2024  9:34 AM CDT -----  Regarding: refill  Contact: Bethany  Type:  RX Refill Request    Who Called: Bethany   Refill or New Rx:refill  RX Name and Strength:HYDROcodone-acetaminophen (NORCO)  mg per tablet  How is the patient currently taking it? (ex. 1XDay):Take 1 tablet by mouth every 6 (six) hours as needed for Pain  Is this a 30 day or 90 day RX:30  Preferred Pharmacy with phone number:  [facility] #[id number] - [facility] [address] [facility] [address] [facility] [address] [facility] [address] [facility] [facility]  [address] [place] [id number]  Phone: [phone] Fax: [phone]  Local or Mail Order:local  Ordering Provider:Sourav  Would the patient rather a call back or a response via [facility]? Call back  Best Call Back Number:[phone]    Additional Information: sts the pt is requesting a refill

## 2024-06-19 RX ORDER — HYDROCODONE BITARTRATE AND ACETAMINOPHEN 10; 325 MG/1; MG/1
1 TABLET ORAL EVERY 6 HOURS PRN
Qty: 120 TABLET | Refills: 0 | Status: SHIPPED | OUTPATIENT
Start: 2024-06-19 | End: 2024-07-19

## 2024-06-19 NOTE — TELEPHONE ENCOUNTER
----- Message from Izabel Feliicano sent at 6/19/2024  9:47 AM CDT -----  Contact: self  Type:  RX Refill Request    Who Called:  patient   Refill or New Rx:  refill  RX Name and Strength:  HYDROcodone-acetaminophen (NORCO)  mg per tablet  How is the patient currently taking it? (ex. 1XDay):  as directed  Is this a 30 day or 90 day RX:  30  Preferred Pharmacy with phone number:    [facility] #[id number] - [facility] [address] [address] AT [facility] [address]  [address] [place] [address]  Phone: [phone] Fax: [phone]  Local or Mail Order:  local  Ordering Provider:  Dr Sourav Roblero Call Back Number:  [phone]  Additional Information:  pt is still being told that they did not rec this medicine that you sent over on 6/10/24 please call the pharm and then call pt back to advise and thanks

## 2024-07-14 DIAGNOSIS — D84.821 IMMUNOCOMPROMISED STATE DUE TO DRUG THERAPY: ICD-10-CM

## 2024-07-14 DIAGNOSIS — M05.711 RHEUMATOID ARTHRITIS INVOLVING RIGHT SHOULDER WITH POSITIVE RHEUMATOID FACTOR: ICD-10-CM

## 2024-07-14 DIAGNOSIS — Z79.899 IMMUNOCOMPROMISED STATE DUE TO DRUG THERAPY: ICD-10-CM

## 2024-07-14 DIAGNOSIS — G89.4 CHRONIC PAIN SYNDROME: ICD-10-CM

## 2024-07-14 DIAGNOSIS — C09.9 TONSILLAR CANCER: ICD-10-CM

## 2024-07-14 DIAGNOSIS — B37.9 YEAST INFECTION: ICD-10-CM

## 2024-07-14 DIAGNOSIS — L40.50 PSORIATIC ARTHRITIS: ICD-10-CM

## 2024-07-15 PROBLEM — R63.4 WEIGHT LOSS: Status: ACTIVE | Noted: 2024-07-15

## 2024-07-15 PROBLEM — B37.0 ORAL THRUSH: Status: ACTIVE | Noted: 2024-07-15

## 2024-07-15 PROBLEM — R13.10 DYSPHAGIA: Status: ACTIVE | Noted: 2024-07-15

## 2024-07-16 PROBLEM — E43 SEVERE MALNUTRITION: Status: ACTIVE | Noted: 2024-07-16

## 2024-07-16 RX ORDER — NYSTATIN 100000 [USP'U]/ML
SUSPENSION ORAL
Qty: 240 ML | Refills: 0 | OUTPATIENT
Start: 2024-07-16 | End: 2024-07-19

## 2024-07-22 PROBLEM — R10.10 PAIN OF UPPER ABDOMEN: Status: ACTIVE | Noted: 2024-07-22

## 2024-07-24 PROBLEM — Z73.6 LIMITATION OF ACTIVITY DUE TO DISABILITY: Status: ACTIVE | Noted: 2024-07-24

## 2024-07-24 PROBLEM — K56.609 SMALL BOWEL OBSTRUCTION: Status: ACTIVE | Noted: 2024-07-22

## 2024-07-29 PROBLEM — E87.6 HYPOKALEMIA: Status: ACTIVE | Noted: 2024-07-29

## 2024-08-07 PROBLEM — J02.9 SORE THROAT: Status: ACTIVE | Noted: 2024-08-07

## 2024-08-07 PROBLEM — H92.03 OTALGIA OF BOTH EARS: Status: ACTIVE | Noted: 2024-08-07

## 2024-08-07 PROBLEM — J32.0 MAXILLARY SINUSITIS: Status: ACTIVE | Noted: 2024-08-07

## 2024-08-22 DIAGNOSIS — G89.4 CHRONIC PAIN SYNDROME: Primary | ICD-10-CM

## 2024-08-22 NOTE — TELEPHONE ENCOUNTER
----- Message from Gonzales Beltran sent at 8/22/2024 11:41 AM CDT -----  Type:  RX Refill Request    Who Called:  Patient  Refill or New Rx:  Refill  RX Name and Strength:  Hydrocodone- 325mg   How is the patient currently taking it? (ex. 1XDay):  as directed  Is this a 30 day or 90 day RX:  as directed    Preferred Pharmacy with phone number:    [facility] #[id number] - [facility] [address] [address] AT [facility] [address]  [address] [place] [address]  Phone: [phone] Fax: [phone]      Local or Mail Order:  Local  Ordering Provider:  Sourav Roblero Call Back Number:  [phone]    Additional Information:  Please advise

## 2024-08-27 RX ORDER — OXYCODONE AND ACETAMINOPHEN 5; 325 MG/1; MG/1
1 TABLET ORAL EVERY 6 HOURS PRN
Qty: 20 TABLET | Refills: 0 | Status: SHIPPED | OUTPATIENT
Start: 2024-08-27

## 2024-09-03 DIAGNOSIS — D84.821 IMMUNOCOMPROMISED STATE DUE TO DRUG THERAPY: ICD-10-CM

## 2024-09-03 DIAGNOSIS — L40.50 PSORIATIC ARTHRITIS: ICD-10-CM

## 2024-09-03 DIAGNOSIS — G89.4 CHRONIC PAIN SYNDROME: Primary | ICD-10-CM

## 2024-09-03 DIAGNOSIS — Z79.899 IMMUNOCOMPROMISED STATE DUE TO DRUG THERAPY: ICD-10-CM

## 2024-09-03 RX ORDER — HYDROCODONE BITARTRATE AND ACETAMINOPHEN 10; 325 MG/1; MG/1
1 TABLET ORAL EVERY 8 HOURS PRN
Qty: 90 TABLET | Refills: 0 | Status: SHIPPED | OUTPATIENT
Start: 2024-09-03

## 2024-09-03 NOTE — TELEPHONE ENCOUNTER
----- Message from Radha Jean sent at 9/3/2024  9:22 AM CDT -----  Type:  Needs Medical Advice    Who Called:  Pt    hydrocodone    Pharmacy name and phone #:     [facility] #[id number] - [place], [place] - [address] [facility] [address]  [address] [facility] [address]  [facility] [address]  Phone: [phone] Fax: [phone]    Would the patient rather a call back or a response via [facility]?  Call back    Best Call Back Number:  [phone]    Additional Information:  Pt is calling because oxyCODONE-acetaminophen (PERCOCET) 5-325 mg per tablet makes her hallucinate. She was getting Hydrocodone sent in instead.   Please call back to advise. Thanks!

## 2024-09-20 ENCOUNTER — OFFICE VISIT (OUTPATIENT)
Dept: RHEUMATOLOGY | Facility: CLINIC | Age: 74
End: 2024-09-20
Payer: COMMERCIAL

## 2024-09-20 VITALS
SYSTOLIC BLOOD PRESSURE: 130 MMHG | HEART RATE: 86 BPM | BODY MASS INDEX: 16.23 KG/M2 | WEIGHT: 101 LBS | DIASTOLIC BLOOD PRESSURE: 71 MMHG | HEIGHT: 66 IN

## 2024-09-20 DIAGNOSIS — L40.50 PSORIATIC ARTHRITIS: ICD-10-CM

## 2024-09-20 DIAGNOSIS — B37.0 ORAL THRUSH: ICD-10-CM

## 2024-09-20 DIAGNOSIS — D84.821 IMMUNOCOMPROMISED STATE DUE TO DRUG THERAPY: ICD-10-CM

## 2024-09-20 DIAGNOSIS — L40.50 PSORIATIC ARTHRITIS: Primary | ICD-10-CM

## 2024-09-20 DIAGNOSIS — G47.00 INSOMNIA, UNSPECIFIED TYPE: Primary | ICD-10-CM

## 2024-09-20 DIAGNOSIS — G89.4 CHRONIC PAIN SYNDROME: ICD-10-CM

## 2024-09-20 DIAGNOSIS — G47.00 INSOMNIA, UNSPECIFIED TYPE: ICD-10-CM

## 2024-09-20 DIAGNOSIS — Z79.899 IMMUNOCOMPROMISED STATE DUE TO DRUG THERAPY: ICD-10-CM

## 2024-09-20 PROCEDURE — 99999 PR PBB SHADOW E&M-EST. PATIENT-LVL III: CPT | Mod: PBBFAC,,, | Performed by: PHYSICIAN ASSISTANT

## 2024-09-20 RX ORDER — HYDROCODONE BITARTRATE AND ACETAMINOPHEN 10; 325 MG/1; MG/1
1 TABLET ORAL EVERY 8 HOURS PRN
Qty: 90 TABLET | Refills: 0 | Status: SHIPPED | OUTPATIENT
Start: 2024-11-02

## 2024-09-20 RX ORDER — CYANOCOBALAMIN 1000 UG/ML
1000 INJECTION, SOLUTION INTRAMUSCULAR; SUBCUTANEOUS
Status: COMPLETED | OUTPATIENT
Start: 2024-09-20 | End: 2024-09-20

## 2024-09-20 RX ORDER — FLUCONAZOLE 100 MG/1
TABLET ORAL
Qty: 8 TABLET | Refills: 1 | Status: SHIPPED | OUTPATIENT
Start: 2024-09-20

## 2024-09-20 RX ORDER — KETOROLAC TROMETHAMINE 30 MG/ML
30 INJECTION, SOLUTION INTRAMUSCULAR; INTRAVENOUS
Status: DISCONTINUED | OUTPATIENT
Start: 2024-09-20 | End: 2024-09-20

## 2024-09-20 RX ORDER — HYDROCODONE BITARTRATE AND ACETAMINOPHEN 10; 325 MG/1; MG/1
1 TABLET ORAL EVERY 8 HOURS PRN
Qty: 90 TABLET | Refills: 0 | Status: SHIPPED | OUTPATIENT
Start: 2024-10-03

## 2024-09-20 RX ORDER — METHYLPREDNISOLONE ACETATE 80 MG/ML
160 INJECTION, SUSPENSION INTRA-ARTICULAR; INTRALESIONAL; INTRAMUSCULAR; SOFT TISSUE
Status: COMPLETED | OUTPATIENT
Start: 2024-09-20 | End: 2024-09-20

## 2024-09-20 RX ORDER — HYDROCODONE BITARTRATE AND ACETAMINOPHEN 10; 325 MG/1; MG/1
1 TABLET ORAL EVERY 8 HOURS PRN
Qty: 90 TABLET | Refills: 0 | Status: SHIPPED | OUTPATIENT
Start: 2024-12-02

## 2024-09-20 RX ORDER — ZOLPIDEM TARTRATE 10 MG/1
10 TABLET ORAL NIGHTLY
Qty: 30 TABLET | Refills: 4 | Status: SHIPPED | OUTPATIENT
Start: 2024-10-03

## 2024-09-20 RX ORDER — KETOROLAC TROMETHAMINE 30 MG/ML
30 INJECTION, SOLUTION INTRAMUSCULAR; INTRAVENOUS
Status: COMPLETED | OUTPATIENT
Start: 2024-09-20 | End: 2024-09-20

## 2024-09-20 RX ORDER — FLUCONAZOLE 100 MG/1
100 TABLET ORAL EVERY OTHER DAY
COMMUNITY
Start: 2024-08-30

## 2024-09-20 RX ADMIN — KETOROLAC TROMETHAMINE 30 MG: 30 INJECTION, SOLUTION INTRAMUSCULAR; INTRAVENOUS at 01:09

## 2024-09-20 RX ADMIN — CYANOCOBALAMIN 1000 MCG: 1000 INJECTION, SOLUTION INTRAMUSCULAR; SUBCUTANEOUS at 01:09

## 2024-09-20 RX ADMIN — METHYLPREDNISOLONE ACETATE 160 MG: 80 INJECTION, SUSPENSION INTRA-ARTICULAR; INTRALESIONAL; INTRAMUSCULAR; SOFT TISSUE at 01:09

## 2024-09-20 ASSESSMENT — ROUTINE ASSESSMENT OF PATIENT INDEX DATA (RAPID3)
FATIGUE SCORE: 1.1
MDHAQ FUNCTION SCORE: 1.3
PAIN SCORE: 6
PSYCHOLOGICAL DISTRESS SCORE: 2.2
TOTAL RAPID3 SCORE: 6.11
PATIENT GLOBAL ASSESSMENT SCORE: 8

## 2024-09-20 NOTE — PROGRESS NOTES
Subjective:       Patient ID: Rose Goff is a 74 y.o. female.    Chief Complaint: Disease Management      Mrs. Goff is a 74 year old female who presents to clinic for follow up on psoriatic arthritis.  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 She was admitted to [facility] 7/22/24 for small bowel obstruction with ischemia requiring small bowel resection. She is slowly recovering and gaining weight. She is still having difficulty with eating and dehydration related to swallowing issues. She was seen in the ER recently for near syncope related to dehydration.  She plans to est with Palliative Care next week. She complains of persistent thrush due to dry mouth. Nystatin helps some but burns her mouth.     She complains of increased joint pain off biologics. She has pain in her hands and R hip. Psoriasis is stable with use of clobetasol at this time.     She has chronic low back pain secondary to OA and is taking norco TID PRN with good relief of pain. She is taking ambien for insomnia and this is working well.     We reviewed her recent labs consistent with hyponatremia and anemia.     Current tx:  1. Humira  2. Mobic  3. Norco    Prior treatments:   Enbrel, Cimzia, Simponi, MTX, Cosentyx       Review of Systems   Constitutional:  Positive for activity change. Negative for appetite change, chills, fatigue and fever.   Eyes:  Negative for visual disturbance.   Respiratory:  Negative for cough.    Cardiovascular:  Negative for chest pain and palpitations.   Gastrointestinal:  Negative for abdominal pain and constipation.   Musculoskeletal:  Positive for arthralgias, joint swelling and myalgias. Negative for neck pain and neck stiffness.   Skin:  Positive for rash.   Allergic/Immunologic: Positive for immunocompromised state.   Neurological:  Negative for dizziness, weakness, light-headedness and headaches.    Psychiatric/Behavioral:  Negative for dysphoric mood and sleep disturbance. The patient is not nervous/anxious.          Objective:     Vitals:    09/20/24 1227   BP: 130/71   Pulse: 86           Past Medical History:   Diagnosis Date    Abnormal mammogram     Amaurosis fugax     Anemia     Back pain     Cancer     Chest pain     Chronic diastolic CHF (congestive heart failure)     Degenerative cervical disc     Degenerative disc disease, thoracic     Depression     Fatigue     GERD (gastroesophageal reflux disease)     Head ache     Hemorrhoids     Hyperlipidemia     Hypertension     Hypoglycemia     Hypothyroidism     Insomnia     Menopausal state     Neck pain     Neuralgia     Nonrheumatic tricuspid valve disorder     Optic neuritis     Palpitations     PONV (postoperative nausea and vomiting)     Psoriasis     Psoriatic arthropathy     Rosacea     Thyroid nodule     Tonsillar cancer     chemo RT     Past Surgical History:   Procedure Laterality Date    ANKLE FRACTURE SURGERY      APPENDECTOMY      BREAST BIOPSY Left     Negative    ESOPHAGEAL DILATION N/A 7/18/2024    Procedure: DILATION, ESOPHAGUS;  Surgeon: Ivan Jaimes MD;  Location: [facility];  Service: Endoscopy;  Laterality: N/A;    ESOPHAGOGASTRODUODENOSCOPY N/A 7/18/2024    Procedure: EGD (ESOPHAGOGASTRODUODENOSCOPY);  Surgeon: Ivan Jaimes MD;  Location: [facility] ENDO;  Service: Endoscopy;  Laterality: N/A;    EXCISION, SMALL INTESTINE  7/22/2024    Procedure: EXCISION, SMALL INTESTINE;  Surgeon: Lilian Leo MD;  Location: [facility] OR;  Service: General;;    HYSTERECTOMY      INSERTION OF TUNNELED CENTRAL VENOUS CATHETER (CVC) WITH SUBCUTANEOUS PORT Left 1/30/2024    Procedure: [id number];  Surgeon: Vincenzo Fierro MD;  Location: [facility] OR;  Service: General;  Laterality: Left;    LAPAROTOMY, EXPLORATORY N/A 7/22/2024    Procedure: LAPAROTOMY, EXPLORATORY;  Surgeon: Lilian Leo MD;  Location: [facility] OR;  Service:  General;  Laterality: N/A;    OOPHORECTOMY      RHIZOTOMY W/ RADIOFREQUENCY ABLATION Right     lumbar    thyroid tumor removed            Physical Exam   Constitutional: She is oriented to person, place, and time.   Eyes: Right conjunctiva is not injected. Left conjunctiva is not injected.   Cardiovascular: Normal rate and regular rhythm. Exam reveals no decreased pulses.   Pulmonary/Chest: Effort normal.   Musculoskeletal:      Right shoulder: Tenderness present.      Left shoulder: Tenderness present.      Right elbow: Tenderness present.      Left elbow: Tenderness present.      Right wrist: Tenderness present.      Left wrist: Tenderness present.      Right knee: Swelling present. Tenderness present.      Left knee: Swelling present. Tenderness present.   Neurological: She is alert and oriented to person, place, and time.   Skin: Rash noted.   Psychiatric: Mood and affect normal.       Right Side Rheumatological Exam     The patient is tender to palpation of the shoulder, elbow, wrist, knee, 1st PIP, 1st MCP, 2nd PIP, 2nd MCP, 3rd PIP, 3rd MCP, 4th PIP, 4th MCP, 5th PIP and 5th MCP    She has swelling of the knee, 1st PIP, 1st MCP, 2nd PIP, 2nd MCP, 3rd PIP, 3rd MCP, 4th PIP, 4th MCP, 5th PIP and 5th MCP    Left Side Rheumatological Exam     The patient is tender to palpation of the shoulder, elbow, wrist, knee, 1st PIP, 1st MCP, 2nd PIP, 2nd MCP, 3rd PIP, 3rd MCP, 4th PIP, 4th MCP, 5th PIP and 5th MCP.    She has swelling of the knee, 1st PIP, 1st MCP, 2nd PIP, 2nd MCP, 3rd PIP, 3rd MCP, 4th PIP, 4th MCP, 5th PIP and 5th MCP          Labs reviewed:  Component      Latest Ref [facility] 8/13/2024   WBC      3.90 - 12.70 K/uL 7.11    RBC      4.00 - 5.40 M/uL 2.86 (L)    Hemoglobin      12.0 - 16.0 g/dL 9.5 (L)    Hematocrit      37.0 - 48.5 % 28.1 (L)    MCV      82 - 98 fL 98    MCH      27.0 - 31.0 pg 33.2 (H)    MCHC      32.0 - 36.0 g/dL 33.8    RDW      11.5 - 14.5 % 12.3    Platelet Count      150 - 450 K/uL 252     MPV      9.2 - 12.9 fL 9.1 (L)    Immature Granulocytes      0.0 - 0.5 % 0.4    Gran # (ANC)      1.8 - 7.7 K/uL 5.9    Immature Grans (Abs)      0.00 - 0.04 K/uL 0.03    Lymph #      1.0 - 4.8 K/uL 0.6 (L)    Mono #      0.3 - 1.0 K/uL 0.5    Eos #      0.0 - 0.5 K/uL 0.0    Baso #      0.00 - 0.20 K/uL 0.05    nRBC      0 /100 WBC 0    Gran %      38.0 - 73.0 % 83.5 (H)    Lymph %      18.0 - 48.0 % 8.0 (L)    Mono %      4.0 - 15.0 % 7.0    Eos %      0.0 - 8.0 % 0.4    Basophil %      0.0 - 1.9 % 0.7    Differential Method Automated    Sodium      136 - 145 mmol/L 130 (L)    Potassium      3.5 - 5.1 mmol/L 3.9    Chloride      95 - 110 mmol/L 99    CO2      22 - 31 mmol/L 22    Glucose      70 - 110 mg/dL 103    BUN      7 - 18 mg/dL 22 (H)    Creatinine      0.50 - 1.40 mg/dL 0.62    Calcium      8.4 - 10.2 mg/dL 9.1    PROTEIN TOTAL      6.0 - 8.4 g/dL 6.5    Albumin      3.5 - 5.2 g/dL 3.8    BILIRUBIN TOTAL      0.2 - 1.3 mg/dL 0.6    ALP      38 - 145 U/L 96    AST      14 - 36 U/L 35    ALT      0 - 35 U/L 33    Anion Gap      5 - 12 mmol/L 9    eGFR      >60 mL/min/1.73 m^2 >60       Assessment:       1. Psoriatic arthritis    2. Oral thrush    3. Chronic pain syndrome    4. Insomnia, unspecified type                Plan:       Psoriatic arthritis  -     Discontinue: ketorolac injection 30 mg  -     methylPREDNISolone acetate injection 160 mg  -     cyanocobalamin injection 1,000 mcg  -     ketorolac injection 30 mg    Oral thrush  -     fluconazole (DIFLUCAN) 100 MG tablet; 2 tabs PO daily x 1 then 1 tab PO daily x 6 days  Dispense: 8 tablet; Refill: 1    Chronic pain syndrome    Insomnia, unspecified type            Psoriatic arthritis, rheumatoid arthritis on Humira  --chronic pain syndrome on norco  --insomnia on ambien  --iron deficiency anemia, stable  --grade 2 diastolic dysfunction    Plan:  1. toradol 30, depo 160, b12 today for flare  2. Diflucan for oral thrush 200 mg x 1 then 100 mg daily x  6 days  3. Cont. norco 10/325 TID per Dr. Benjamin. I have checked [place] prescription monitoring program site and no unusual or abnormal behavior has occurred pt understand the risk and benefits of taking opioid medications and has decided to continue the  Medication. Pended for MD  4. Cont. mobic 15 mg  5. Cont ambien  6. Cont prednisone 2.5-5mg daily prn  7. Consider otezla in the future        Follow up:   3-4 months Dr. Benjamin with repeat labs prior

## 2024-10-15 ENCOUNTER — DOCUMENT SCAN (OUTPATIENT)
Dept: HOME HEALTH SERVICES | Facility: HOSPITAL | Age: 74
End: 2024-10-15
Payer: COMMERCIAL

## 2024-10-31 ENCOUNTER — TELEPHONE (OUTPATIENT)
Dept: RHEUMATOLOGY | Facility: CLINIC | Age: 74
End: 2024-10-31
Payer: COMMERCIAL

## 2024-11-19 ENCOUNTER — TELEPHONE (OUTPATIENT)
Dept: RHEUMATOLOGY | Facility: CLINIC | Age: 74
End: 2024-11-19
Payer: COMMERCIAL

## 2024-11-19 NOTE — TELEPHONE ENCOUNTER
----- Message from Riddhi sent at 11/19/2024 10:56 AM CST -----  Type:  Sooner Appointment Request    Caller is requesting a sooner appointment.  Caller declined first available appointment listed below.  Caller will not accept being placed on the waitlist and is requesting a message be sent to doctor.    Name of Caller:  pt   When is the first available appointment?  Needs resched   Symptoms:  follow up / lab   Would the patient rather a call back or a response via [facility]? Call   Best Call Back Number: [phone]      Additional Information:  please advise

## 2024-11-19 NOTE — TELEPHONE ENCOUNTER
Spoke to the pt and states that she is feeling weak and has been having diarrhea for a few weeks now. States that she has had 6in of her intestines removed and was informed diarrhea is common. I informed her to go to urgent care or talk to her oncologist. States understanding  ----- Message from Riddhi sent at 11/19/2024 10:56 AM CST -----  Type:  Sooner Appointment Request    Caller is requesting a sooner appointment.  Caller declined first available appointment listed below.  Caller will not accept being placed on the waitlist and is requesting a message be sent to doctor.    Name of Caller:  pt   When is the first available appointment?  Needs resched   Symptoms:  follow up / lab   Would the patient rather a call back or a response via [facility]? Call   Best Call Back Number: [phone]      Additional Information:  please advise

## 2024-12-04 RX ORDER — IXEKIZUMAB 80 MG/ML
INJECTION, SOLUTION SUBCUTANEOUS
Status: CANCELLED | OUTPATIENT
Start: 2024-12-04

## 2024-12-04 NOTE — TELEPHONE ENCOUNTER
----- Message from Pau sent at 12/4/2024  9:21 AM CST -----  Regarding: Call back  Type:  Needs Medical Advice    Who Called: Pt    Symptoms (please be specific): psoriasis     How long has patient had these symptoms:  1wk    Pharmacy name and phone #:    [facility] #[id number] - [facility] [id number] [place] [address] AT [facility] [address]  [address] [place] [address]  Phone: [phone] Fax: [phone]    Would the patient rather a call back or a response via [facility]? Call back    Best Call Back Number: [phone]    Additional Information: Pt is requesting a call back and also meds to help with psoriasis. Thank you

## 2024-12-24 ENCOUNTER — TELEPHONE (OUTPATIENT)
Dept: RHEUMATOLOGY | Facility: CLINIC | Age: 74
End: 2024-12-24
Payer: COMMERCIAL

## 2024-12-24 NOTE — TELEPHONE ENCOUNTER
----- Message from Shelbi sent at 12/24/2024  8:49 AM CST -----  Contact: pt  Type:  Sooner Apoointment Request    Caller is requesting a sooner appointment.  Caller declined first available appointment listed below.  Caller will not accept being placed on the waitlist and is requesting a message be sent to doctor.    Name of Caller: pt    When is the first available appointment? Department book     Symptoms: injection    Would the patient rather a call back or a response via [facility]?  Call back    Best Call Back Number: [phone]    Additional Information: is hoping to do it on Friday 12/27    Please call to advise    Thanks      Pt also wants to know if can change the 01/16 follow up to a vv instead    Thanks

## 2024-12-24 NOTE — TELEPHONE ENCOUNTER
Pt requesting injections    LOV 09/20/2024    NOV 01/16/2025    Rec'd injections 09/20/2024:    Medications Administered    cyanocobalamin (vitamin B-12) 1000 mcg  ketorolac tromethamine 30 mg  methylprednisolone acetate 160 mg

## 2025-01-06 DIAGNOSIS — Z79.899 IMMUNOCOMPROMISED STATE DUE TO DRUG THERAPY: ICD-10-CM

## 2025-01-06 DIAGNOSIS — G89.4 CHRONIC PAIN SYNDROME: ICD-10-CM

## 2025-01-06 DIAGNOSIS — D84.821 IMMUNOCOMPROMISED STATE DUE TO DRUG THERAPY: ICD-10-CM

## 2025-01-06 DIAGNOSIS — L40.50 PSORIATIC ARTHRITIS: ICD-10-CM

## 2025-01-06 RX ORDER — HYDROCODONE BITARTRATE AND ACETAMINOPHEN 10; 325 MG/1; MG/1
1 TABLET ORAL EVERY 8 HOURS PRN
Qty: 90 TABLET | Refills: 0 | Status: SHIPPED | OUTPATIENT
Start: 2025-01-06

## 2025-01-09 ENCOUNTER — CLINICAL SUPPORT (OUTPATIENT)
Dept: RHEUMATOLOGY | Facility: CLINIC | Age: 75
End: 2025-01-09
Payer: COMMERCIAL

## 2025-01-09 VITALS — SYSTOLIC BLOOD PRESSURE: 165 MMHG | DIASTOLIC BLOOD PRESSURE: 75 MMHG

## 2025-01-09 DIAGNOSIS — L40.50 PSORIATIC ARTHRITIS: Primary | ICD-10-CM

## 2025-01-09 DIAGNOSIS — D84.821 IMMUNOCOMPROMISED STATE DUE TO DRUG THERAPY: ICD-10-CM

## 2025-01-09 DIAGNOSIS — M05.711 RHEUMATOID ARTHRITIS INVOLVING RIGHT SHOULDER WITH POSITIVE RHEUMATOID FACTOR: ICD-10-CM

## 2025-01-09 DIAGNOSIS — Z79.899 IMMUNOCOMPROMISED STATE DUE TO DRUG THERAPY: ICD-10-CM

## 2025-01-09 DIAGNOSIS — M19.90 OSTEOARTHRITIS, UNSPECIFIED OSTEOARTHRITIS TYPE, UNSPECIFIED SITE: ICD-10-CM

## 2025-01-09 PROCEDURE — 99999 PR PBB SHADOW E&M-EST. PATIENT-LVL III: CPT | Mod: PBBFAC,,,

## 2025-01-09 RX ORDER — METHYLPREDNISOLONE ACETATE 80 MG/ML
160 INJECTION, SUSPENSION INTRA-ARTICULAR; INTRALESIONAL; INTRAMUSCULAR; SOFT TISSUE
Status: COMPLETED | OUTPATIENT
Start: 2025-01-09 | End: 2025-01-09

## 2025-01-09 RX ORDER — CYANOCOBALAMIN 1000 UG/ML
1000 INJECTION, SOLUTION INTRAMUSCULAR; SUBCUTANEOUS
Status: COMPLETED | OUTPATIENT
Start: 2025-01-09 | End: 2025-01-09

## 2025-01-09 RX ADMIN — CYANOCOBALAMIN 1000 MCG: 1000 INJECTION, SOLUTION INTRAMUSCULAR; SUBCUTANEOUS at 11:01

## 2025-01-09 RX ADMIN — METHYLPREDNISOLONE ACETATE 160 MG: 80 INJECTION, SUSPENSION INTRA-ARTICULAR; INTRALESIONAL; INTRAMUSCULAR; SOFT TISSUE at 11:01

## 2025-01-09 NOTE — PROGRESS NOTES
2 patient identifiers, allergies reviewed, see MAR for dosage and injection site. No adverse reactions, left facility in stable condition.

## 2025-01-16 ENCOUNTER — OFFICE VISIT (OUTPATIENT)
Dept: RHEUMATOLOGY | Facility: CLINIC | Age: 75
End: 2025-01-16
Payer: COMMERCIAL

## 2025-01-16 DIAGNOSIS — L40.50 PSORIATIC ARTHRITIS: ICD-10-CM

## 2025-01-16 DIAGNOSIS — E53.8 B12 DEFICIENCY: Primary | ICD-10-CM

## 2025-01-16 DIAGNOSIS — D84.821 IMMUNOCOMPROMISED STATE DUE TO DRUG THERAPY: ICD-10-CM

## 2025-01-16 DIAGNOSIS — G89.4 CHRONIC PAIN SYNDROME: ICD-10-CM

## 2025-01-16 DIAGNOSIS — Z79.899 IMMUNOCOMPROMISED STATE DUE TO DRUG THERAPY: ICD-10-CM

## 2025-01-16 DIAGNOSIS — M05.711 RHEUMATOID ARTHRITIS INVOLVING RIGHT SHOULDER WITH POSITIVE RHEUMATOID FACTOR: ICD-10-CM

## 2025-01-16 DIAGNOSIS — L40.9 PSORIASIS: ICD-10-CM

## 2025-01-16 RX ORDER — CYANOCOBALAMIN 1000 UG/ML
1000 INJECTION, SOLUTION INTRAMUSCULAR; SUBCUTANEOUS
Qty: 10 ML | Refills: 3 | Status: SHIPPED | OUTPATIENT
Start: 2025-01-16

## 2025-01-16 RX ORDER — CLOBETASOL PROPIONATE 0.5 MG/G
OINTMENT TOPICAL 2 TIMES DAILY
Qty: 45 G | Refills: 2 | Status: SHIPPED | OUTPATIENT
Start: 2025-01-16

## 2025-01-17 NOTE — PROGRESS NOTES
Subjective:     Patient ID:  Rose Goff    Chief Complaint:  Disease Management     History of Present Illness:  Pt is a 74 y.o. female Mrs. Goff is a 74 year old female who presents to clinic for follow up on psoriatic arthritis.  She lost weight but is working on.  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 She was admitted to [facility] 7/22/24 for small bowel obstruction with ischemia requiring small bowel resection. She is slowly recovering and gaining weight. She is still having difficulty with eating and dehydration related to swallowing issues. She was seen in the ER recently for near syncope related to dehydration.  She plans to est with Palliative Care next week. She complains of persistent thrush due to dry mouth. Nystatin helps some but burns her mouth.      She complains of increased joint pain off biologics. She has pain in her hands and R hip. Psoriasis is stable with use of clobetasol at this time.      She has chronic low back pain secondary to OA and is taking norco TID PRN with good relief of pain. She is taking ambien for insomnia and this is working well.      We reviewed her recent labs consistent with hyponatremia and anemia.      Current tx:  1. Humira (off)  2. Mobic  3. Norco     Prior treatments:   Enbrel, Cimzia, Simponi, MTX, Cosentyx Rheumatologic History:   - Diagnosis/es:  - Positive serologies:  - Infectious screening labs:  - Previous Treatments:  - Current Treatments:     Interval History:   Hospitalization since last office visit: No    Patient Active Problem List    Diagnosis Date Noted    Otalgia of both ears 08/07/2024    Maxillary sinusitis 08/07/2024    Sore throat 08/07/2024    Hypokalemia 07/29/2024    Limitation of activity due to disability 07/24/2024    Small bowel obstruction 07/22/2024    Severe malnutrition 07/16/2024    Dysphagia 07/15/2024    Weight loss 07/15/2024    Oral thrush 07/15/2024     Odynophagia 05/21/2024    Moderate malnutrition 05/20/2024    Generalized weakness 05/20/2024    Syncope 05/19/2024    Orthostatic hypotension 05/19/2024    Dehydration 05/19/2024    Anemia 05/19/2024    ACP (advance care planning) 05/19/2024    Hyponatremia 05/19/2024    Cough 05/19/2024    Chronic diastolic CHF (congestive heart failure) 05/19/2024    Fall 05/19/2024    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    Immunocompromised state due to drug therapy 12/28/2022    Iron deficiency anemia 11/21/2022    Hypothyroidism 01/11/2021    Psoriatic arthritis 01/08/2019    Hyperlipidemia 04/14/2017    Essential hypertension 04/14/2017    Hypoglycemia 04/14/2017     Past Surgical History:   Procedure Laterality Date    ANKLE FRACTURE SURGERY      APPENDECTOMY      BREAST BIOPSY Left     Negative    ESOPHAGEAL DILATION N/A 7/18/2024    Procedure: DILATION, ESOPHAGUS;  Surgeon: Ivan Jaimes MD;  Location: [facility] ENDO;  Service: Endoscopy;  Laterality: N/A;    ESOPHAGOGASTRODUODENOSCOPY N/A 7/18/2024    Procedure: EGD (ESOPHAGOGASTRODUODENOSCOPY);  Surgeon: Ivan Jaimes MD;  Location: [facility] ENDO;  Service: Endoscopy;  Laterality: N/A;    EXCISION, SMALL INTESTINE  7/22/2024    Procedure: EXCISION, SMALL INTESTINE;  Surgeon: Lilian Leo MD;  Location: [facility] OR;  Service: General;;    HYSTERECTOMY      INSERTION OF TUNNELED CENTRAL VENOUS CATHETER (CVC) WITH SUBCUTANEOUS PORT Left 1/30/2024    Procedure: CWEWTNIUX-VZAU-X-CATH;  Surgeon: Vincenzo Fierro MD;  Location: [facility] OR;  Service: General;  Laterality: Left;    LAPAROTOMY, EXPLORATORY N/A 7/22/2024    Procedure: LAPAROTOMY, EXPLORATORY;  Surgeon: Lilian Leo MD;  Location: [facility] OR;  Service: General;  Laterality: N/A;    OOPHORECTOMY      RHIZOTOMY W/ RADIOFREQUENCY ABLATION Right     lumbar    thyroid tumor removed       Social History     Tobacco Use    Smoking status: Former     Current packs/day: 0.00     Types: Cigarettes     Quit date: 1/1/1978  "    Years since quittin.1    Smokeless tobacco: Never   Substance Use Topics    Alcohol use: Not Currently    Drug use: Never     Family History   Problem Relation Name Age of Onset    Diabetes Mother      Hyperlipidemia Mother      Stroke Mother      Heart attack Mother      Heart disease Mother      Heart disease Father      Heart disease Maternal Grandmother      Bipolar disorder Daughter       Review of patient's allergies indicates:   Allergen Reactions    Percocet [oxycodone-acetaminophen] Hallucinations    Penicillin     Penicillins Hives       Review of Systems   Review of Systems     Current Medications:  Current Outpatient Medications   Medication Instructions    clobetasol 0.05% (TEMOVATE) 0.05 % Oint Topical (Top), 2 times daily    cyanocobalamin 1,000 mcg, Subcutaneous, Every 7 days    diclofenac sodium (VOLTAREN) 2 g, Topical (Top), Daily PRN    estradioL (ESTRACE) 1 mg, Oral    fluconazole (DIFLUCAN) 100 MG tablet 2 tabs PO daily x 1 then 1 tab PO daily x 6 days    fluconazole (DIFLUCAN) 100 mg, Oral, Every other day    fluconazole (DIFLUCAN) 200 mg, Oral    fluticasone (FLONASE SENSIMIST) 27.5 mcg/actuation nasal spray 2 sprays, Nasal, Daily    [START ON 2/3/2025] HYDROcodone-acetaminophen (NORCO)  mg per tablet 1 tablet, Oral, Every 8 hours PRN    [START ON 3/3/2025] HYDROcodone-acetaminophen (NORCO)  mg per tablet 1 tablet, Oral, Every 8 hours PRN    [START ON 4/3/2025] HYDROcodone-acetaminophen (NORCO)  mg per tablet 1 tablet, Oral, Every 8 hours PRN    insulin syringes, disposable, 1 mL Syrg 1 Syringe, Misc.(Non-Drug; Combo Route), Every 7 days    levothyroxine (SYNTHROID) 112 MCG tablet TAKE 1 TABLET(112 MCG) BY MOUTH BEFORE BREAKFAST    LIDOcaine-diphenhyd-Al-mag-sim (FIRST-MOUTHWASH BLM) -664-40 mg/30mL mouthwash suspension 15 mLs, Swish & Spit, 4 times daily    needle, disp, 30 gauge 30 gauge x 1/2" Ndle 1 each, Misc.(Non-Drug; Combo Route), Weekly    omeprazole " (PRILOSEC) 40 mg, Oral, Daily    ondansetron (ZOFRAN-ODT) 4 mg, Oral, Every 8 hours PRN    polyethylene glycol (GLYCOLAX) 17 g, Oral, Twice weekly    zolpidem (AMBIEN) 10 mg, Oral, Nightly         Objective:     There were no vitals filed for this visit.   There is no height or weight on file to calculate BMI.     Physical Examinations:  Physical Exam   Constitutional: She is oriented to person, place, and time.   Neurological: She is alert and oriented to person, place, and time.   Psychiatric: Mood, affect and judgment normal.        Disease Assessment Scores:  Patient's Global Assessment of arthritis (0-10): 3  Physician's Global Assessment of arthritis (0-10): 3  Number of Tender Joints (0-28): 4  Number of Swollen Joints (0-28): 4         No data to display                Monitoring Lab Results:  Lab Results   Component Value Date    WBC 3.82 (L) 11/21/2024    WBC 3.88 (L) 11/21/2024    RBC 3.20 (L) 11/21/2024    RBC 3.22 (L) 11/21/2024    HGB 10.9 (L) 11/21/2024    HGB 11.1 (L) 11/21/2024    HCT 32.7 (L) 11/21/2024    HCT 32.7 (L) 11/21/2024     (H) 11/21/2024     (H) 11/21/2024    MCH 34.1 (H) 11/21/2024    MCH 34.5 (H) 11/21/2024    MCHC 33.3 11/21/2024    MCHC 33.9 11/21/2024    RDW 12.7 11/21/2024    RDW 12.6 11/21/2024     11/21/2024     11/21/2024        Lab Results   Component Value Date     (L) 11/21/2024     11/21/2024    K 4.2 11/21/2024    K 4.3 11/21/2024     11/21/2024     11/21/2024    CO2 24 11/21/2024    CO2 24 11/21/2024    GLU 94 11/21/2024    GLU 94 11/21/2024    BUN 16 11/21/2024    BUN 16 11/21/2024    CREATININE 0.89 11/21/2024    CREATININE 0.89 11/21/2024    CALCIUM 9.0 11/21/2024    CALCIUM 9.0 11/21/2024    PROT 6.9 11/21/2024    PROT 6.9 11/21/2024    ALBUMIN 4.3 11/21/2024    ALBUMIN 4.2 11/21/2024    BILITOT 0.5 11/21/2024    BILITOT 0.5 11/21/2024    ALKPHOS 56 11/21/2024    ALKPHOS 58 11/21/2024    AST 23 11/21/2024    AST 21  "11/21/2024    ALT 13 11/21/2024    ALT 12 11/21/2024    ANIONGAP 5 11/21/2024    ANIONGAP 7 11/21/2024    EGFRNORACEVR >60 11/21/2024    EGFRNORACEVR >60 11/21/2024       Lab Results   Component Value Date    SEDRATE 2 11/21/2024    CRP <0.50 11/21/2024        Lab Results   Component Value Date    ALCRHOOS30QY 30 06/13/2023    OICBJHHT19 >2000 (H) 03/10/2021        Lab Results   Component Value Date    CHOL 225 (H) 02/10/2023    HDL 84 (H) 02/10/2023    LDLCALC 122.6 02/10/2023    TRIG 92 02/10/2023       Lab Results   Component Value Date    RF <13.0 06/13/2023    CCPANTIBODIE 0.5 06/13/2023     No results found for: "ANASCREEN", "ANATITER", "ANAPATTE", "DSDNA", "SMRNPAB", "SSAANTIBODY", "SSBANTIBODY", "YKM12CV", "JO1AB"  No results found for: "HLABB27"    Infectious Disease Screening:  Lab Results   Component Value Date    HEPBSAG Non-reactive 10/16/2023    HEPBCAB Non-reactive 10/16/2023    HEPBSAB <3.00 11/08/2022    HEPBSAB Non-reactive 11/08/2022    HEPBSURFABQU Negative 10/16/2023    HEPBSURFABQU <3 10/16/2023    HEPBIGM Negative 05/20/2020     Lab Results   Component Value Date    HEPCAB Non-reactive 10/16/2023     Lab Results   Component Value Date    TBGOLDPLUS Negative 10/16/2023     No results found for: "QUANTIFERON", "SVCMT", "QUANTAGVALUE", "QUANTNILVALU", "QUANTMITOGEN", "QFTTBAG", "QINT"     Imaging:  DEXA, Xrays, MRIs, CTs, etc    Old & Outside Medical Records:  Reviewed old and all outside medical records available in Care Everywhere     Assessment:     Encounter Diagnoses   Name Primary?    B12 deficiency Yes    Psoriasis     Psoriatic arthritis     Rheumatoid arthritis involving right shoulder with positive rheumatoid factor     Immunocompromised state due to drug therapy     Chronic pain syndrome         Plan:      Encounter Diagnoses   Name Primary?    B12 deficiency Yes    Psoriasis     Psoriatic arthritis     Rheumatoid arthritis involving right shoulder with positive rheumatoid factor     " "Immunocompromised state due to drug therapy     Chronic pain syndrome      Diagnoses and all orders for this visit:    B12 deficiency  -     cyanocobalamin 1,000 mcg/mL injection; Inject 1 mL (1,000 mcg total) into the skin every 7 days.  -     insulin syringes, disposable, 1 mL Syrg; 1 Syringe by Misc.(Non-Drug; Combo Route) route every 7 days.  -     needle, disp, 30 gauge 30 gauge x 1/2" Ndle; 1 each by Misc.(Non-Drug; Combo Route) route once a week.  -     clobetasol 0.05% (TEMOVATE) 0.05 % Oint; Apply topically 2 (two) times daily.    Psoriasis  -     clobetasol 0.05% (TEMOVATE) 0.05 % Oint; Apply topically 2 (two) times daily.  -     Sedimentation rate; Future  -     C-Reactive Protein; Future  -     Comprehensive Metabolic Panel; Future  -     CBC Auto Differential; Future    Psoriatic arthritis  -     Ambulatory referral/consult to ENT; Future  -     Sedimentation rate; Future  -     C-Reactive Protein; Future  -     Comprehensive Metabolic Panel; Future  -     CBC Auto Differential; Future  -     HYDROcodone-acetaminophen (NORCO)  mg per tablet; Take 1 tablet by mouth every 8 (eight) hours as needed for Pain.  -     HYDROcodone-acetaminophen (NORCO)  mg per tablet; Take 1 tablet by mouth every 8 (eight) hours as needed for Pain.  -     HYDROcodone-acetaminophen (NORCO)  mg per tablet; Take 1 tablet by mouth every 8 (eight) hours as needed for Pain.    Rheumatoid arthritis involving right shoulder with positive rheumatoid factor  -     Ambulatory referral/consult to ENT; Future  -     Sedimentation rate; Future  -     C-Reactive Protein; Future  -     Comprehensive Metabolic Panel; Future  -     CBC Auto Differential; Future    Immunocompromised state due to drug therapy  -     Ambulatory referral/consult to ENT; Future  -     Sedimentation rate; Future  -     C-Reactive Protein; Future  -     Comprehensive Metabolic Panel; Future  -     CBC Auto Differential; Future  -     " HYDROcodone-acetaminophen (NORCO)  mg per tablet; Take 1 tablet by mouth every 8 (eight) hours as needed for Pain.  -     HYDROcodone-acetaminophen (NORCO)  mg per tablet; Take 1 tablet by mouth every 8 (eight) hours as needed for Pain.  -     HYDROcodone-acetaminophen (NORCO)  mg per tablet; Take 1 tablet by mouth every 8 (eight) hours as needed for Pain.    Chronic pain syndrome  -     Ambulatory referral/consult to ENT; Future  -     Sedimentation rate; Future  -     C-Reactive Protein; Future  -     Comprehensive Metabolic Panel; Future  -     CBC Auto Differential; Future  -     HYDROcodone-acetaminophen (NORCO)  mg per tablet; Take 1 tablet by mouth every 8 (eight) hours as needed for Pain.  -     HYDROcodone-acetaminophen (NORCO)  mg per tablet; Take 1 tablet by mouth every 8 (eight) hours as needed for Pain.  -     HYDROcodone-acetaminophen (NORCO)  mg per tablet; Take 1 tablet by mouth every 8 (eight) hours as needed for Pain.        1. Labs ordered  2. ent  3. Norco refill     Follow-up 4 months    The patient location is: home  The chief complaint leading to consultation is: ra    Visit type: audiovisual    Face to Face time with patient: 44   minutes of total time spent on the encounter, which includes face to face time and non-face to face time preparing to see the patient (eg, review of tests), Obtaining and/or reviewing separately obtained history, Documenting clinical information in the electronic or other health record, Independently interpreting results (not separately reported) and communicating results to the patient/family/caregiver, or Care coordination (not separately reported).         Each patient to whom he or she provides medical services by telemedicine is:  (1) informed of the relationship between the physician and patient and the respective role of any other health care provider with respect to management of the patient; and (2) notified that he or  she may decline to receive medical services by telemedicine and may withdraw from such care at any time.    Notes:

## 2025-01-27 RX ORDER — HYDROCODONE BITARTRATE AND ACETAMINOPHEN 10; 325 MG/1; MG/1
1 TABLET ORAL EVERY 8 HOURS PRN
Qty: 90 TABLET | Refills: 0 | Status: SHIPPED | OUTPATIENT
Start: 2025-04-03

## 2025-01-27 RX ORDER — HYDROCODONE BITARTRATE AND ACETAMINOPHEN 10; 325 MG/1; MG/1
1 TABLET ORAL EVERY 8 HOURS PRN
Qty: 90 TABLET | Refills: 0 | Status: SHIPPED | OUTPATIENT
Start: 2025-03-03

## 2025-01-27 RX ORDER — HYDROCODONE BITARTRATE AND ACETAMINOPHEN 10; 325 MG/1; MG/1
1 TABLET ORAL EVERY 8 HOURS PRN
Qty: 90 TABLET | Refills: 0 | Status: SHIPPED | OUTPATIENT
Start: 2025-02-03

## 2025-02-13 DIAGNOSIS — Z79.899 IMMUNOCOMPROMISED STATE DUE TO DRUG THERAPY: ICD-10-CM

## 2025-02-13 DIAGNOSIS — L40.50 PSORIATIC ARTHRITIS: ICD-10-CM

## 2025-02-13 DIAGNOSIS — D84.821 IMMUNOCOMPROMISED STATE DUE TO DRUG THERAPY: ICD-10-CM

## 2025-02-13 DIAGNOSIS — G89.4 CHRONIC PAIN SYNDROME: ICD-10-CM

## 2025-02-14 RX ORDER — PREDNISONE 2.5 MG/1
TABLET ORAL
Qty: 60 TABLET | Refills: 5 | Status: SHIPPED | OUTPATIENT
Start: 2025-02-14

## 2025-02-26 ENCOUNTER — TELEPHONE (OUTPATIENT)
Facility: CLINIC | Age: 75
End: 2025-02-26
Payer: COMMERCIAL

## 2025-02-26 NOTE — TELEPHONE ENCOUNTER
Advised patient that we currently do not have any new patient appointments available at the [facility]. Encouraged patient to reach out to appointment desk for scheduling at alternate campus.

## 2025-02-26 NOTE — TELEPHONE ENCOUNTER
----- Message from Rosmery sent at 2/26/2025  8:15 AM CST -----  Contact: Rose  Type:  Sooner Apoointment RequestCaller is requesting a sooner appointment.  Caller declined first available appointment listed below.  Caller will not accept being placed on the waitlist and is requesting a message be sent to doctor.Name of Caller:Mario Alberto is the first available appointment?NoneSymptoms:Skin checkWould the patient rather a call back or a response via [employer] [facility]? Call [facility] Call Back Number:  Additional Information: [facility]

## 2025-03-03 ENCOUNTER — TELEPHONE (OUTPATIENT)
Dept: RHEUMATOLOGY | Facility: CLINIC | Age: 75
End: 2025-03-03
Payer: COMMERCIAL

## 2025-03-03 NOTE — TELEPHONE ENCOUNTER
----- Message from Nurse Heather sent at 2/26/2025  4:02 PM CST -----  Regarding: Rash  Returned call to patient and LM on her voicemail.  Azalea FLORES  ----- Message -----  From: Solange Solano  Sent: 2/26/2025   1:33 PM CST  To: Sourav Kelly Staff    Type:  Needs Medical AdviceWho Called: ptWould the patient rather a call back or a response via [employer]? Call [facility] Call Back Number: [phone] Additional Information: pt st she been getting red spots/on lower back,leg,breast and arm. Pt st it itches.  please call to discuss

## 2025-03-03 NOTE — TELEPHONE ENCOUNTER
Called patient back on 2/26/25 and only able to leave message on patient voicemail.  Patient has itchy rash with she thinks has a pimple like bump with a seed in the middle of it.  No fever been taking antihistamine and rash still present.  She tried to get an appointment with Dermatology but couldn't.  I informed her to see PCP or urgent care.  Dr. Benjamin is off till Thursday and would be best if someone can assess her skin.  Patient going to see if she can go to PCP or urgent care.  Azalea FLORES

## 2025-03-07 DIAGNOSIS — G47.00 INSOMNIA, UNSPECIFIED TYPE: ICD-10-CM

## 2025-03-08 RX ORDER — ZOLPIDEM TARTRATE 10 MG/1
10 TABLET ORAL NIGHTLY
Qty: 30 TABLET | Refills: 4 | Status: SHIPPED | OUTPATIENT
Start: 2025-03-08

## 2025-03-10 ENCOUNTER — TELEPHONE (OUTPATIENT)
Dept: RHEUMATOLOGY | Facility: CLINIC | Age: 75
End: 2025-03-10
Payer: COMMERCIAL

## 2025-03-17 ENCOUNTER — PATIENT MESSAGE (OUTPATIENT)
Dept: RHEUMATOLOGY | Facility: CLINIC | Age: 75
End: 2025-03-17
Payer: COMMERCIAL

## 2025-03-18 NOTE — TELEPHONE ENCOUNTER
ELVIN TNF inhibitors can not be started until 7 years after treatment of cancer in organs I sent a message directly to the patient giving her other options she may need an appointment with Dr. Santillan

## 2025-03-31 DIAGNOSIS — K21.9 GASTROESOPHAGEAL REFLUX DISEASE, UNSPECIFIED WHETHER ESOPHAGITIS PRESENT: ICD-10-CM

## 2025-03-31 NOTE — TELEPHONE ENCOUNTER
Pharmacy requesting refill on Omeprazole 40mg  Pt's LOV 01/16/2025  Pt's NOV 10/13/2025  Medication pending

## 2025-04-01 RX ORDER — OMEPRAZOLE 40 MG/1
40 CAPSULE, DELAYED RELEASE ORAL DAILY
Qty: 90 CAPSULE | Refills: 3 | Status: SHIPPED | OUTPATIENT
Start: 2025-04-01

## 2025-04-23 NOTE — TELEPHONE ENCOUNTER
Pharmacy requesting refill on Clobetasol 0.05% solution  Pt's LOV 01/16/2025  Pt's NOV 10/13/2025  Medication pending

## 2025-04-24 RX ORDER — CLOBETASOL PROPIONATE 0.5 MG/ML
SOLUTION TOPICAL 2 TIMES DAILY
Qty: 150 ML | Refills: 3 | Status: SHIPPED | OUTPATIENT
Start: 2025-04-24

## 2025-05-01 DIAGNOSIS — D84.821 IMMUNOCOMPROMISED STATE DUE TO DRUG THERAPY: ICD-10-CM

## 2025-05-01 DIAGNOSIS — L40.50 PSORIATIC ARTHRITIS: ICD-10-CM

## 2025-05-01 DIAGNOSIS — G89.4 CHRONIC PAIN SYNDROME: ICD-10-CM

## 2025-05-01 DIAGNOSIS — Z79.899 IMMUNOCOMPROMISED STATE DUE TO DRUG THERAPY: ICD-10-CM

## 2025-05-05 ENCOUNTER — TELEPHONE (OUTPATIENT)
Dept: RHEUMATOLOGY | Facility: CLINIC | Age: 75
End: 2025-05-05
Payer: COMMERCIAL

## 2025-05-05 DIAGNOSIS — G89.4 CHRONIC PAIN SYNDROME: ICD-10-CM

## 2025-05-05 DIAGNOSIS — L40.50 PSORIATIC ARTHRITIS: ICD-10-CM

## 2025-05-05 DIAGNOSIS — Z79.899 IMMUNOCOMPROMISED STATE DUE TO DRUG THERAPY: ICD-10-CM

## 2025-05-05 DIAGNOSIS — D84.821 IMMUNOCOMPROMISED STATE DUE TO DRUG THERAPY: ICD-10-CM

## 2025-05-05 RX ORDER — HYDROCODONE BITARTRATE AND ACETAMINOPHEN 10; 325 MG/1; MG/1
1 TABLET ORAL EVERY 8 HOURS PRN
Qty: 90 TABLET | Refills: 0 | Status: SHIPPED | OUTPATIENT
Start: 2025-05-05

## 2025-05-05 RX ORDER — HYDROCODONE BITARTRATE AND ACETAMINOPHEN 10; 325 MG/1; MG/1
1 TABLET ORAL EVERY 8 HOURS PRN
Qty: 90 TABLET | Refills: 0 | Status: SHIPPED | OUTPATIENT
Start: 2025-07-04

## 2025-05-05 RX ORDER — HYDROCODONE BITARTRATE AND ACETAMINOPHEN 10; 325 MG/1; MG/1
1 TABLET ORAL EVERY 8 HOURS PRN
Qty: 90 TABLET | Refills: 0 | OUTPATIENT
Start: 2025-05-05

## 2025-05-05 RX ORDER — HYDROXYZINE HYDROCHLORIDE 25 MG/1
25 TABLET, FILM COATED ORAL 3 TIMES DAILY PRN
Qty: 90 TABLET | Refills: 5 | Status: SHIPPED | OUTPATIENT
Start: 2025-05-05

## 2025-05-05 RX ORDER — HYDROCODONE BITARTRATE AND ACETAMINOPHEN 10; 325 MG/1; MG/1
1 TABLET ORAL EVERY 8 HOURS PRN
Qty: 90 TABLET | Refills: 0 | Status: SHIPPED | OUTPATIENT
Start: 2025-06-04

## 2025-05-05 NOTE — TELEPHONE ENCOUNTER
----- Message from Coco sent at 5/2/2025  9:54 AM CDT -----  Contact: pt [phone]  Type:  Sooner Appointment RequestCaller is requesting a sooner appointment.  Caller declined first available appointment listed below.  Caller will not accept being placed on the waitlist and is requesting a message be sent to doctor.Name of Caller:  Pt When is the first available appointment?  NA Symptoms:  arthritis flare upWould the patient rather a call back or a response via [facility]? Call [facility] Call Back Number:  [phone] Information:  Pt stated she was taken off all her arthritis meds while in cancer remission. Pls call back

## 2025-05-06 ENCOUNTER — CLINICAL SUPPORT (OUTPATIENT)
Dept: RHEUMATOLOGY | Facility: CLINIC | Age: 75
End: 2025-05-06
Payer: COMMERCIAL

## 2025-05-06 VITALS — HEART RATE: 74 BPM | SYSTOLIC BLOOD PRESSURE: 117 MMHG | DIASTOLIC BLOOD PRESSURE: 73 MMHG

## 2025-05-06 DIAGNOSIS — D50.9 IRON DEFICIENCY ANEMIA, UNSPECIFIED IRON DEFICIENCY ANEMIA TYPE: ICD-10-CM

## 2025-05-06 DIAGNOSIS — M19.90 OSTEOARTHRITIS, UNSPECIFIED OSTEOARTHRITIS TYPE, UNSPECIFIED SITE: ICD-10-CM

## 2025-05-06 DIAGNOSIS — M05.711 RHEUMATOID ARTHRITIS INVOLVING RIGHT SHOULDER WITH POSITIVE RHEUMATOID FACTOR: ICD-10-CM

## 2025-05-06 DIAGNOSIS — L40.50 PSORIATIC ARTHRITIS: Primary | ICD-10-CM

## 2025-05-06 PROCEDURE — 99999 PR PBB SHADOW E&M-EST. PATIENT-LVL III: CPT | Mod: PBBFAC,,,

## 2025-05-06 RX ORDER — METHYLPREDNISOLONE ACETATE 80 MG/ML
160 INJECTION, SUSPENSION INTRA-ARTICULAR; INTRALESIONAL; INTRAMUSCULAR; SOFT TISSUE
Status: COMPLETED | OUTPATIENT
Start: 2025-05-06 | End: 2025-05-06

## 2025-05-06 RX ORDER — CYANOCOBALAMIN 1000 UG/ML
1000 INJECTION, SOLUTION INTRAMUSCULAR; SUBCUTANEOUS
Status: COMPLETED | OUTPATIENT
Start: 2025-05-06 | End: 2025-05-06

## 2025-05-06 RX ORDER — KETOROLAC TROMETHAMINE 30 MG/ML
60 INJECTION, SOLUTION INTRAMUSCULAR; INTRAVENOUS
Status: COMPLETED | OUTPATIENT
Start: 2025-05-06 | End: 2025-05-06

## 2025-05-06 RX ADMIN — KETOROLAC TROMETHAMINE 60 MG: 30 INJECTION, SOLUTION INTRAMUSCULAR; INTRAVENOUS at 11:05

## 2025-05-06 RX ADMIN — METHYLPREDNISOLONE ACETATE 160 MG: 80 INJECTION, SUSPENSION INTRA-ARTICULAR; INTRALESIONAL; INTRAMUSCULAR; SOFT TISSUE at 11:05

## 2025-05-06 RX ADMIN — CYANOCOBALAMIN 1000 MCG: 1000 INJECTION, SOLUTION INTRAMUSCULAR; SUBCUTANEOUS at 11:05

## 2025-07-30 ENCOUNTER — TELEPHONE (OUTPATIENT)
Dept: HEMATOLOGY/ONCOLOGY | Facility: CLINIC | Age: 75
End: 2025-07-30
Payer: COMMERCIAL

## 2025-07-30 DIAGNOSIS — Z79.899 IMMUNOCOMPROMISED STATE DUE TO DRUG THERAPY: ICD-10-CM

## 2025-07-30 DIAGNOSIS — G89.4 CHRONIC PAIN SYNDROME: ICD-10-CM

## 2025-07-30 DIAGNOSIS — D84.821 IMMUNOCOMPROMISED STATE DUE TO DRUG THERAPY: ICD-10-CM

## 2025-07-30 DIAGNOSIS — L40.50 PSORIATIC ARTHRITIS: ICD-10-CM

## 2025-07-30 NOTE — TELEPHONE ENCOUNTER
Copied from CRM #[id number]. Topic: Medications - Medication Refill  >> Jul 30, 2025  9:27 AM Mandy wrote:  Type:  RX Refill Request    Who Called: pt  Refill or New Rx:refill  RX Name and Strength:HYDROcodone-acetaminophen (NORCO)  mg per tablet  How is the patient currently taking it? (ex. 1XDay):as dir  Is this a 30 day or 90 day RX:as dir  Preferred Pharmacy with phone number:  [id number] - [facility] [id number] [place] [address] [facility] [address]  [address] [place] [id number]  Phone: [phone] Fax: [phone]  Local or Mail Order:local  Ordering Provider:belinda  Would the patient rather a call back or a response via [facility]? Pls call if unable to fill  [facility] Call Back Number:[phone]    Additional Information: pls refill

## 2025-07-30 NOTE — TELEPHONE ENCOUNTER
Called patient to notify her that Codi Courtney no longer works in Rheumatology and the appointment that was scheduled for her this morning was scheduled incorrectly. Informed her that I will cancel the appointment for today and that I have sent a message to the Rheumatology staff to contact her to get the appointment rescheduled appropriately. Patient verbalized understanding.

## 2025-07-31 RX ORDER — HYDROCODONE BITARTRATE AND ACETAMINOPHEN 10; 325 MG/1; MG/1
1 TABLET ORAL EVERY 8 HOURS PRN
Qty: 90 TABLET | Refills: 0 | Status: SHIPPED | OUTPATIENT
Start: 2025-07-31

## 2025-08-05 DIAGNOSIS — Z79.899 IMMUNOCOMPROMISED STATE DUE TO DRUG THERAPY: ICD-10-CM

## 2025-08-05 DIAGNOSIS — L40.50 PSORIATIC ARTHRITIS: ICD-10-CM

## 2025-08-05 DIAGNOSIS — G89.4 CHRONIC PAIN SYNDROME: ICD-10-CM

## 2025-08-05 DIAGNOSIS — D84.821 IMMUNOCOMPROMISED STATE DUE TO DRUG THERAPY: ICD-10-CM

## 2025-08-05 RX ORDER — HYDROCODONE BITARTRATE AND ACETAMINOPHEN 10; 325 MG/1; MG/1
1 TABLET ORAL EVERY 8 HOURS PRN
Qty: 90 TABLET | Refills: 0 | Status: CANCELLED | OUTPATIENT
Start: 2025-08-05

## 2025-08-05 NOTE — TELEPHONE ENCOUNTER
Attempted to return patient call regarding refill. Left a message that script was sent to the pharmacy dated 7-31-25. Advised to contact the pharmacy

## 2025-08-22 DIAGNOSIS — E53.8 B12 DEFICIENCY: ICD-10-CM

## 2025-08-25 RX ORDER — CYANOCOBALAMIN 1000 UG/ML
INJECTION, SOLUTION INTRAMUSCULAR; SUBCUTANEOUS
Qty: 10 ML | Refills: 3 | Status: SHIPPED | OUTPATIENT
Start: 2025-08-25

## 2025-09-04 DIAGNOSIS — L40.50 PSORIATIC ARTHRITIS: ICD-10-CM

## 2025-09-04 DIAGNOSIS — Z79.899 IMMUNOCOMPROMISED STATE DUE TO DRUG THERAPY: ICD-10-CM

## 2025-09-04 DIAGNOSIS — G89.4 CHRONIC PAIN SYNDROME: Primary | ICD-10-CM

## 2025-09-04 DIAGNOSIS — D84.821 IMMUNOCOMPROMISED STATE DUE TO DRUG THERAPY: ICD-10-CM

## 2025-09-04 RX ORDER — HYDROCODONE BITARTRATE AND ACETAMINOPHEN 10; 325 MG/1; MG/1
1 TABLET ORAL EVERY 8 HOURS PRN
Qty: 90 TABLET | Refills: 0 | Status: SHIPPED | OUTPATIENT
Start: 2025-09-04